# Patient Record
Sex: FEMALE | Race: BLACK OR AFRICAN AMERICAN | Employment: OTHER | URBAN - METROPOLITAN AREA
[De-identification: names, ages, dates, MRNs, and addresses within clinical notes are randomized per-mention and may not be internally consistent; named-entity substitution may affect disease eponyms.]

---

## 2017-10-17 ENCOUNTER — HOSPITAL ENCOUNTER (OUTPATIENT)
Dept: MAMMOGRAPHY | Age: 72
Discharge: HOME | End: 2017-10-17
Attending: NURSE PRACTITIONER
Payer: MEDICARE

## 2017-10-17 ENCOUNTER — HOSPITAL ENCOUNTER (OUTPATIENT)
Dept: BONE DENSITY | Age: 72
Discharge: HOME | End: 2017-10-17
Attending: NURSE PRACTITIONER
Payer: MEDICARE

## 2017-10-17 DIAGNOSIS — M81.0 OSTEOPOROSIS, SENILE: ICD-10-CM

## 2017-10-17 DIAGNOSIS — Z12.31 VISIT FOR SCREENING MAMMOGRAM: ICD-10-CM

## 2017-10-17 PROCEDURE — 77080 DXA BONE DENSITY AXIAL: CPT

## 2017-10-17 PROCEDURE — 77067 SCR MAMMO BI INCL CAD: CPT

## 2018-11-12 ENCOUNTER — HOSPITAL ENCOUNTER (OUTPATIENT)
Dept: MAMMOGRAPHY | Age: 73
Discharge: HOME | End: 2018-11-12
Attending: NURSE PRACTITIONER
Payer: MEDICARE

## 2018-11-12 DIAGNOSIS — Z12.39 SCREENING BREAST EXAMINATION: ICD-10-CM

## 2018-11-12 PROCEDURE — 77067 SCR MAMMO BI INCL CAD: CPT

## 2019-07-15 ENCOUNTER — HOSPITAL ENCOUNTER (INPATIENT)
Age: 74
LOS: 1 days | Discharge: HOME HEALTH | DRG: 065 | End: 2019-07-17
Attending: EMERGENCY MEDICINE | Admitting: EMERGENCY MEDICINE
Payer: MEDICARE

## 2019-07-15 ENCOUNTER — APPOINTMENT (OUTPATIENT)
Dept: CT IMAGING | Age: 74
DRG: 065 | End: 2019-07-15
Attending: EMERGENCY MEDICINE
Payer: MEDICARE

## 2019-07-15 ENCOUNTER — APPOINTMENT (OUTPATIENT)
Dept: MRI IMAGING | Age: 74
DRG: 065 | End: 2019-07-15
Attending: INTERNAL MEDICINE
Payer: MEDICARE

## 2019-07-15 ENCOUNTER — APPOINTMENT (OUTPATIENT)
Dept: ULTRASOUND IMAGING | Age: 74
DRG: 065 | End: 2019-07-15
Attending: INTERNAL MEDICINE
Payer: MEDICARE

## 2019-07-15 DIAGNOSIS — G45.9 TIA (TRANSIENT ISCHEMIC ATTACK): Primary | ICD-10-CM

## 2019-07-15 DIAGNOSIS — R47.81 SLURRED SPEECH: ICD-10-CM

## 2019-07-15 DIAGNOSIS — E78.2 MIXED HYPERLIPIDEMIA: ICD-10-CM

## 2019-07-15 DIAGNOSIS — I63.9 ACUTE CVA (CEREBROVASCULAR ACCIDENT) (HCC): ICD-10-CM

## 2019-07-15 LAB
ALBUMIN SERPL-MCNC: 3.6 G/DL (ref 3.5–5)
ALBUMIN/GLOB SERPL: 0.8 {RATIO} (ref 1.1–2.2)
ALP SERPL-CCNC: 104 U/L (ref 45–117)
ALT SERPL-CCNC: 14 U/L (ref 12–78)
ANION GAP SERPL CALC-SCNC: 3 MMOL/L (ref 5–15)
AST SERPL-CCNC: 14 U/L (ref 15–37)
ATRIAL RATE: 70 BPM
BASOPHILS # BLD: 0 K/UL (ref 0–0.1)
BASOPHILS NFR BLD: 1 % (ref 0–1)
BILIRUB DIRECT SERPL-MCNC: 0.1 MG/DL (ref 0–0.2)
BILIRUB SERPL-MCNC: 0.3 MG/DL (ref 0.2–1)
BUN SERPL-MCNC: 14 MG/DL (ref 6–20)
BUN/CREAT SERPL: 15 (ref 12–20)
CALCIUM SERPL-MCNC: 8.8 MG/DL (ref 8.5–10.1)
CALCULATED P AXIS, ECG09: 84 DEGREES
CALCULATED R AXIS, ECG10: 38 DEGREES
CALCULATED T AXIS, ECG11: 14 DEGREES
CHLORIDE SERPL-SCNC: 111 MMOL/L (ref 97–108)
CO2 SERPL-SCNC: 29 MMOL/L (ref 21–32)
CREAT SERPL-MCNC: 0.94 MG/DL (ref 0.55–1.02)
DIAGNOSIS, 93000: NORMAL
DIFFERENTIAL METHOD BLD: ABNORMAL
EOSINOPHIL # BLD: 0.2 K/UL (ref 0–0.4)
EOSINOPHIL NFR BLD: 3 % (ref 0–7)
ERYTHROCYTE [DISTWIDTH] IN BLOOD BY AUTOMATED COUNT: 15.3 % (ref 11.5–14.5)
GLOBULIN SER CALC-MCNC: 4.4 G/DL (ref 2–4)
GLUCOSE BLD STRIP.AUTO-MCNC: 79 MG/DL (ref 65–100)
GLUCOSE SERPL-MCNC: 82 MG/DL (ref 65–100)
HCT VFR BLD AUTO: 42.4 % (ref 35–47)
HGB BLD-MCNC: 13 G/DL (ref 11.5–16)
IMM GRANULOCYTES # BLD AUTO: 0 K/UL (ref 0–0.04)
IMM GRANULOCYTES NFR BLD AUTO: 0 % (ref 0–0.5)
INR PPP: 1 (ref 0.9–1.1)
LYMPHOCYTES # BLD: 2.5 K/UL (ref 0.8–3.5)
LYMPHOCYTES NFR BLD: 43 % (ref 12–49)
MCH RBC QN AUTO: 26.3 PG (ref 26–34)
MCHC RBC AUTO-ENTMCNC: 30.7 G/DL (ref 30–36.5)
MCV RBC AUTO: 85.8 FL (ref 80–99)
MONOCYTES # BLD: 0.5 K/UL (ref 0–1)
MONOCYTES NFR BLD: 8 % (ref 5–13)
NEUTS SEG # BLD: 2.6 K/UL (ref 1.8–8)
NEUTS SEG NFR BLD: 45 % (ref 32–75)
NRBC # BLD: 0 K/UL (ref 0–0.01)
NRBC BLD-RTO: 0 PER 100 WBC
P-R INTERVAL, ECG05: 142 MS
PLATELET # BLD AUTO: 337 K/UL (ref 150–400)
PMV BLD AUTO: 11.1 FL (ref 8.9–12.9)
POTASSIUM SERPL-SCNC: 3.9 MMOL/L (ref 3.5–5.1)
PROT SERPL-MCNC: 8 G/DL (ref 6.4–8.2)
PROTHROMBIN TIME: 10.4 SEC (ref 9–11.1)
Q-T INTERVAL, ECG07: 394 MS
QRS DURATION, ECG06: 86 MS
QTC CALCULATION (BEZET), ECG08: 425 MS
RBC # BLD AUTO: 4.94 M/UL (ref 3.8–5.2)
SERVICE CMNT-IMP: NORMAL
SODIUM SERPL-SCNC: 143 MMOL/L (ref 136–145)
VENTRICULAR RATE, ECG03: 70 BPM
WBC # BLD AUTO: 5.8 K/UL (ref 3.6–11)

## 2019-07-15 PROCEDURE — 74011000250 HC RX REV CODE- 250: Performed by: INTERNAL MEDICINE

## 2019-07-15 PROCEDURE — 74011250637 HC RX REV CODE- 250/637: Performed by: INTERNAL MEDICINE

## 2019-07-15 PROCEDURE — 0042T CT PERF W CBF: CPT

## 2019-07-15 PROCEDURE — 70496 CT ANGIOGRAPHY HEAD: CPT

## 2019-07-15 PROCEDURE — 99285 EMERGENCY DEPT VISIT HI MDM: CPT

## 2019-07-15 PROCEDURE — 85025 COMPLETE CBC W/AUTO DIFF WBC: CPT

## 2019-07-15 PROCEDURE — 70551 MRI BRAIN STEM W/O DYE: CPT

## 2019-07-15 PROCEDURE — 80048 BASIC METABOLIC PNL TOTAL CA: CPT

## 2019-07-15 PROCEDURE — 74011636320 HC RX REV CODE- 636/320: Performed by: EMERGENCY MEDICINE

## 2019-07-15 PROCEDURE — 85610 PROTHROMBIN TIME: CPT

## 2019-07-15 PROCEDURE — 70450 CT HEAD/BRAIN W/O DYE: CPT

## 2019-07-15 PROCEDURE — 36415 COLL VENOUS BLD VENIPUNCTURE: CPT

## 2019-07-15 PROCEDURE — 82962 GLUCOSE BLOOD TEST: CPT

## 2019-07-15 PROCEDURE — 99218 HC RM OBSERVATION: CPT

## 2019-07-15 PROCEDURE — 80076 HEPATIC FUNCTION PANEL: CPT

## 2019-07-15 PROCEDURE — 76536 US EXAM OF HEAD AND NECK: CPT

## 2019-07-15 PROCEDURE — 93005 ELECTROCARDIOGRAM TRACING: CPT

## 2019-07-15 PROCEDURE — 94762 N-INVAS EAR/PLS OXIMTRY CONT: CPT

## 2019-07-15 RX ORDER — LABETALOL HYDROCHLORIDE 5 MG/ML
5 INJECTION, SOLUTION INTRAVENOUS
Status: DISCONTINUED | OUTPATIENT
Start: 2019-07-15 | End: 2019-07-17 | Stop reason: HOSPADM

## 2019-07-15 RX ORDER — SODIUM CHLORIDE 0.9 % (FLUSH) 0.9 %
10 SYRINGE (ML) INJECTION
Status: COMPLETED | OUTPATIENT
Start: 2019-07-15 | End: 2019-07-15

## 2019-07-15 RX ORDER — ALBUTEROL SULFATE 90 UG/1
1 AEROSOL, METERED RESPIRATORY (INHALATION)
COMMUNITY
End: 2019-12-01 | Stop reason: CLARIF

## 2019-07-15 RX ORDER — CITALOPRAM 20 MG/1
20 TABLET, FILM COATED ORAL DAILY
Status: DISCONTINUED | OUTPATIENT
Start: 2019-07-16 | End: 2019-07-17 | Stop reason: HOSPADM

## 2019-07-15 RX ORDER — MIRTAZAPINE 15 MG/1
15 TABLET, FILM COATED ORAL
COMMUNITY
End: 2019-11-07 | Stop reason: SDUPTHER

## 2019-07-15 RX ORDER — SODIUM CHLORIDE 0.9 % (FLUSH) 0.9 %
5-40 SYRINGE (ML) INJECTION AS NEEDED
Status: DISCONTINUED | OUTPATIENT
Start: 2019-07-15 | End: 2019-07-17 | Stop reason: HOSPADM

## 2019-07-15 RX ORDER — GUAIFENESIN 100 MG/5ML
81 LIQUID (ML) ORAL DAILY
Status: DISCONTINUED | OUTPATIENT
Start: 2019-07-15 | End: 2019-07-17 | Stop reason: HOSPADM

## 2019-07-15 RX ORDER — SODIUM CHLORIDE 0.9 % (FLUSH) 0.9 %
5-40 SYRINGE (ML) INJECTION EVERY 8 HOURS
Status: DISCONTINUED | OUTPATIENT
Start: 2019-07-15 | End: 2019-07-17 | Stop reason: HOSPADM

## 2019-07-15 RX ORDER — LATANOPROST 50 UG/ML
1 SOLUTION/ DROPS OPHTHALMIC
COMMUNITY

## 2019-07-15 RX ORDER — LATANOPROST 50 UG/ML
1 SOLUTION/ DROPS OPHTHALMIC
Status: DISCONTINUED | OUTPATIENT
Start: 2019-07-15 | End: 2019-07-17 | Stop reason: HOSPADM

## 2019-07-15 RX ORDER — LISINOPRIL 20 MG/1
20 TABLET ORAL DAILY
COMMUNITY
End: 2019-11-07 | Stop reason: SDUPTHER

## 2019-07-15 RX ORDER — MIRTAZAPINE 15 MG/1
15 TABLET, FILM COATED ORAL
Status: DISCONTINUED | OUTPATIENT
Start: 2019-07-15 | End: 2019-07-17 | Stop reason: HOSPADM

## 2019-07-15 RX ORDER — ATORVASTATIN CALCIUM 40 MG/1
40 TABLET, FILM COATED ORAL
Status: DISCONTINUED | OUTPATIENT
Start: 2019-07-15 | End: 2019-07-17 | Stop reason: HOSPADM

## 2019-07-15 RX ORDER — CITALOPRAM 20 MG/1
20 TABLET, FILM COATED ORAL
COMMUNITY

## 2019-07-15 RX ORDER — ACETAMINOPHEN 325 MG/1
650 TABLET ORAL
Status: DISCONTINUED | OUTPATIENT
Start: 2019-07-15 | End: 2019-07-17 | Stop reason: HOSPADM

## 2019-07-15 RX ORDER — ACETAMINOPHEN 650 MG/1
650 SUPPOSITORY RECTAL
Status: DISCONTINUED | OUTPATIENT
Start: 2019-07-15 | End: 2019-07-17 | Stop reason: HOSPADM

## 2019-07-15 RX ORDER — ENOXAPARIN SODIUM 100 MG/ML
40 INJECTION SUBCUTANEOUS EVERY 24 HOURS
Status: DISCONTINUED | OUTPATIENT
Start: 2019-07-16 | End: 2019-07-17 | Stop reason: HOSPADM

## 2019-07-15 RX ADMIN — Medication 10 ML: at 13:17

## 2019-07-15 RX ADMIN — ASPIRIN 81 MG 81 MG: 81 TABLET ORAL at 15:48

## 2019-07-15 RX ADMIN — IOPAMIDOL 110 ML: 755 INJECTION, SOLUTION INTRAVENOUS at 13:17

## 2019-07-15 RX ADMIN — LATANOPROST 1 DROP: 50 SOLUTION OPHTHALMIC at 22:50

## 2019-07-15 RX ADMIN — Medication 10 ML: at 22:27

## 2019-07-15 RX ADMIN — ATORVASTATIN CALCIUM 40 MG: 40 TABLET, FILM COATED ORAL at 22:26

## 2019-07-15 NOTE — PROGRESS NOTES
Reason for Admission:   Headache, extremity weakness                   RRAT Score:          9 low risk           Plan for utilizing home health:   TBD                         Current Advanced Directive/Advance Care Plan: none                         Transition of Care Plan:             1.  Patient in ED bed waiting for inpatient admission  2. Patient will need 2nd IM letter at discharge  3. Patient prefers to discharge home with family assistance and follow up appointments. Patient is a 69year old female admitted 7/15 for headache and extremity weakness. Patient alert and oriented, resting in bed, no visitors present in room. Demographic information verified and correct. Insurance information verified and correct. Patient lives with her grandson, no home oxygen, no DME and has not used home health in the past.  Patient uses [facility] [employer] [facility]. Patient states she is independent with ADLs and can drive. Patient's daughter can transport at discharge. Care Management Interventions  PCP Verified by CM: Yes(Isabel King NP)  Mode of Transport at Discharge:  Other (see comment)(pt's daughter can transport at [facility])  Transition of Care Consult (CM Consult): Discharge Planning  Discharge Durable Medical Equipment: No  Physical Therapy Consult: No  Occupational Therapy Consult: No  Speech Therapy Consult: No  Current Support Network: Own Home(lives with grandson, 1 story house, 3 steps to enter)  Confirm Follow Up Transport: Family  Plan discussed with Pt/Family/Caregiver: Yes  Discharge Location  Discharge Placement: [address] Kiser, RN, [facility]  [phone]

## 2019-07-15 NOTE — H&P
Hospitalist Admission Note    NAME: Alan Gordon   :     MRN:  [id number]     Date/Time:  7/15/2019 3:22 PM    Patient PCP: Rossi Chilel NP  ______________________________________________________________________  Given the patient's current clinical presentation, I have a high level of concern for decompensation if discharged from the emergency department. Complex decision making was performed, which includes reviewing the patient's available past medical records, laboratory results, and x-ray films. My assessment of this patient's clinical condition and my plan of care is as follows. Assessment / Plan:  Possible TIA: Right sided weakness/numbness, dysarthria - resolved  -admit to Observation, telemetry  -continue on ASA  -Neurochecks  -MRI brain ordered, if positive then get TTE  -PT/OT/ST  -check A1c and FLP  -start statin    Right thyroid Nodule: CTA Head and Neck in ED showed: \". ..2. Nonspecific 1.8 cm centrally necrotic and peripherally enhancing right thyroid nodule. Follow-up thyroid ultrasound and aspiration is suggested. \"  -thyroid ultrasound ordered  -check TSH and Free T4    Hypertension:  -permissive hypertension at this time, hold home lisinopril  -prn labetalol    Anxiety/Depression:  -continue home celexa and remeron    Code Status: Full  Surrogate Decision Maker: Daughter    DVT Prophylaxis: sq Lovenox  GI Prophylaxis: not indicated    Baseline: Independent      Subjective:   CHIEF COMPLAINT: right hand weakness, trouble speaking    HISTORY OF PRESENT ILLNESS:     Alan Gordon is a 69 y.o.  female who presents with intermittent right had weakness and numbness. Patient reports that when she was driving yesterday her right hand was weak and she could not hold the steering wheel. This improved but she noted right hand clumsiness while in the store. Patient denies CP, SOB and Palpitations.  She reports right sided headache and states that she has been stressed recently. Today is her birthday. CTA Head and Neck showed: \"IMPRESSION:  1. No acute abnormality. Negative CTA head and neck  2. Nonspecific 1.8 cm centrally necrotic and peripherally enhancing right  thyroid nodule. Follow-up thyroid ultrasound and aspiration is suggested. \"    We were asked to admit for work up and evaluation of the above problems. Past Medical History:   Diagnosis Date    Fall at home 2/10/2014   [facility] or floaters of right eye 8/21/2014    Hypertension     Stroke [facility])     2010        Past Surgical History:   Procedure Laterality Date    HX TUBAL LIGATION  1981       Social History     Tobacco Use    Smoking status: Current Every Day Smoker     Packs/day: 0.25     Years: 20.00     Pack years: 5.00    Smokeless tobacco: Never Used   Substance Use Topics    Alcohol use: No        Family History   Problem Relation Age of Onset    Hypertension Mother     Hypertension Brother      No Known Allergies     Prior to Admission medications    Medication Sig Start Date End Date Taking? Authorizing Provider   lisinopril (PRINIVIL, ZESTRIL) 20 mg tablet Take 20 mg by mouth daily. Yes Provider, Historical   albuterol (VENTOLIN HFA) 90 mcg/actuation inhaler Take 1 Puff by inhalation every four (4) hours as needed for Wheezing. Yes Provider, Historical   citalopram (CELEXA) 20 mg tablet Take 20 mg by mouth daily as needed. Yes Provider, Historical   mirtazapine (REMERON) 15 mg tablet Take 15 mg by mouth daily as needed. Yes Provider, Historical   latanoprost (XALATAN) 0.005 % ophthalmic solution Administer 1 Drop to both eyes nightly. Yes Provider, Historical   multivitamin (ONE A DAY) tablet Take 1 Tab by mouth daily.    Yes Provider, Historical       REVIEW OF SYSTEMS:     Total of 12 systems reviewed as follows:       POSITIVE= underlined text  Negative = text not underlined  General:  fever, chills, sweats, generalized weakness, weight loss/gain,      loss of appetite   Eyes:    blurred vision, eye pain, loss of vision, double vision  ENT:    rhinorrhea, pharyngitis   Respiratory:   cough, sputum production, SOB, JHA, wheezing, pleuritic pain   Cardiology:   chest pain, palpitations, orthopnea, PND, edema, syncope   Gastrointestinal:  abdominal pain , N/V, diarrhea, dysphagia, constipation, bleeding   Genitourinary:  frequency, urgency, dysuria, hematuria, incontinence   Muskuloskeletal :  arthralgia, myalgia, back pain  Hematology:  easy bruising, nose or gum bleeding, lymphadenopathy   Dermatological: rash, ulceration, pruritis, color change / jaundice  Endocrine:   hot flashes or polydipsia   Neurological:  headache, dizziness, confusion, focal weakness, paresthesia,     Speech difficulties, memory loss, gait difficulty  Psychological: Feelings of anxiety, depression, agitation    Objective:   VITALS:    Visit Vitals  /86   Pulse 78   Temp 97.8 °F (36.6 °C)   Resp 17   Ht 5' 5\" (1.651 m)   Wt 71.8 kg (158 lb 4.6 oz)   SpO2 100%   BMI 26.34 kg/m²       PHYSICAL EXAM:    General:    Alert, cooperative, no distress, appears stated age. HEENT: Atraumatic, anicteric sclerae, pink conjunctivae     No oral ulcers, mucosa moist, o/p clear, dentition fair  Neck:  Supple, symmetrical,  thyroid: non tender  Lungs:   Clear to auscultation bilaterally. No Wheezing or Rhonchi. No rales. Chest wall:  No tenderness  No Accessory muscle use. Heart:   Regular  rhythm,  No  murmur   No edema  Abdomen:   Soft, non-tender. Not distended. Bowel sounds normal  Extremities: No cyanosis. No clubbing,      Skin turgor normal, Capillary refill normal, Radial dial pulse 2+  Skin:     Not pale. Not Jaundiced  No rashes   Psych:  Fair insight. Not depressed. Not anxious or agitated. Neurologic: EOMs intact. No facial asymmetry. No aphasia or slurred speech. Symmetrical strength. Alert and oriented X 4. _______________________________________________________________________  Care Plan discussed with:    Comments   Patient x    Family      RN     Care Manager                    Consultant:      _______________________________________________________________________  Expected  Disposition:   Home with Family x   HH/PT/OT/RN ? HH   SNF/LTC    FERNANDO    ________________________________________________________________________  TOTAL TIME: 50  Minutes    Critical Care Provided     Minutes non procedure based      Comments    x Reviewed previous records   >50% of visit spent in counseling and coordination of care x Discussion with patient and/or family and questions answered       ________________________________________________________________________  Signed: Venancio Art MD    Procedures: see electronic medical records for all procedures/Xrays and details which were not copied into this note but were reviewed prior to creation of Plan. LAB DATA REVIEWED:    Recent Results (from the past 24 hour(s))   CBC WITH AUTOMATED DIFF    Collection Time: 07/15/19  1:02 PM   Result Value Ref Range    WBC 5.8 3.6 - 11.0 K/uL    RBC 4.94 3.80 - 5.20 M/uL    HGB 13.0 11.5 - 16.0 g/dL    HCT 42.4 35.0 - 47.0 %    MCV 85.8 80.0 - 99.0 FL    MCH 26.3 26.0 - 34.0 PG    MCHC 30.7 30.0 - 36.5 g/dL    RDW 15.3 (H) 11.5 - 14.5 %    PLATELET 603 331 - 773 K/uL    MPV 11.1 8.9 - 12.9 FL    NRBC 0.0 0  WBC    ABSOLUTE NRBC 0.00 0.00 - 0.01 K/uL    NEUTROPHILS 45 32 - 75 %    LYMPHOCYTES 43 12 - 49 %    MONOCYTES 8 5 - 13 %    EOSINOPHILS 3 0 - 7 %    BASOPHILS 1 0 - 1 %    IMMATURE GRANULOCYTES 0 0.0 - 0.5 %    ABS. NEUTROPHILS 2.6 1.8 - 8.0 K/UL    ABS. LYMPHOCYTES 2.5 0.8 - 3.5 K/UL    ABS. MONOCYTES 0.5 0.0 - 1.0 K/UL    ABS. EOSINOPHILS 0.2 0.0 - 0.4 K/UL    ABS. BASOPHILS 0.0 0.0 - 0.1 K/UL    ABS. IMM.  GRANS. 0.0 0.00 - 0.04 K/UL    DF AUTOMATED     METABOLIC PANEL, BASIC    Collection Time: 07/15/19  1:02 PM   Result Value Ref Range    Sodium 143 136 - 145 mmol/L    Potassium 3.9 3.5 - 5.1 mmol/L    Chloride 111 (H) 97 - 108 mmol/L    CO2 29 21 - 32 mmol/L    Anion gap 3 (L) 5 - 15 mmol/L    Glucose 82 65 - 100 mg/dL    BUN 14 6 - 20 MG/DL    Creatinine 0.94 0.55 - 1.02 MG/DL    BUN/Creatinine ratio 15 12 - 20      GFR est AA >60 >60 ml/min/1.73m2    GFR est non-AA 59 (L) >60 ml/min/1.73m2    Calcium 8.8 8.5 - 10.1 MG/DL   PROTHROMBIN TIME + INR    Collection Time: 07/15/19  1:02 PM   Result Value Ref Range    INR 1.0 0.9 - 1.1      Prothrombin time 10.4 9.0 - 11.1 sec   HEPATIC FUNCTION PANEL    Collection Time: 07/15/19  1:02 PM   Result Value Ref Range    Protein, total 8.0 6.4 - 8.2 g/dL    Albumin 3.6 3.5 - 5.0 g/dL    Globulin 4.4 (H) 2.0 - 4.0 g/dL    A-G Ratio 0.8 (L) 1.1 - 2.2      Bilirubin, total 0.3 0.2 - 1.0 MG/DL    Bilirubin, direct 0.1 0.0 - 0.2 MG/DL    Alk.  phosphatase 104 45 - 117 U/L    AST (SGOT) 14 (L) 15 - 37 U/L    ALT (SGPT) 14 12 - 78 U/L   EKG, 12 LEAD, INITIAL    Collection Time: 07/15/19  1:48 PM   Result Value Ref Range    Ventricular Rate 70 BPM    Atrial Rate 70 BPM    P-R Interval 142 ms    QRS Duration 86 ms    Q-T Interval 394 ms    QTC Calculation (Bezet) 425 ms    Calculated P Axis 84 degrees    Calculated R Axis 38 degrees    Calculated T Axis 14 degrees    Diagnosis       Normal sinus rhythm  Nonspecific T wave abnormality  No previous ECGs available  Confirmed by Wayne Galarza MD, [facility] ([id number]) on 7/15/2019 2:58:34 PM     GLUCOSE, POC    Collection Time: 07/15/19  1:55 PM   Result Value Ref Range    Glucose (POC) 79 65 - 100 mg/dL    Performed by Nicola Reid

## 2019-07-15 NOTE — ED PROVIDER NOTES
EMERGENCY DEPARTMENT HISTORY AND PHYSICAL EXAM      Date: 7/15/2019  Patient Name: Bigg Salazar  Patient Age and Sex: 69 y.o. female     History of Presenting Illness     Chief Complaint   Patient presents with    Headache     PT reports a frontal headache since this morning    Extremity Weakness     Intermittent right arm weakness/heaviness since 1100 yesterday    Dysarthria     Slurred speech that started this morning. History Provided By: Patient    HPI: Bigg Salazar is a 73-year-old female with a history of hypertension presenting for dysarthria and externally weakness. Patient states that since 11 AM yesterday has been having intermittent right arm weakness and heaviness. States that it also feels numb at some point in time. It is associated with a frontal headache but no dizziness. Her knees reports that she went over her house this morning and noted that her speech was very slurred. Also sometimes having a difficult time getting words out. Nurse reported glucose was normal here. Denies any chest pain, shortness of breath, nausea, vomiting or fevers. There are no other complaints, changes, or physical findings at this time. PCP: Trevor Garvey NP    No current facility-administered medications on file prior to encounter. Current Outpatient Medications on File Prior to Encounter   Medication Sig Dispense Refill    lisinopril (PRINIVIL, ZESTRIL) 20 mg tablet Take 20 mg by mouth daily.  albuterol (VENTOLIN HFA) 90 mcg/actuation inhaler Take 1 Puff by inhalation every four (4) hours as needed for Wheezing.  citalopram (CELEXA) 20 mg tablet Take 20 mg by mouth daily as needed.  mirtazapine (REMERON) 15 mg tablet Take 15 mg by mouth daily as needed.  latanoprost (XALATAN) 0.005 % ophthalmic solution Administer 1 Drop to both eyes nightly.  multivitamin (ONE A DAY) tablet Take 1 Tab by mouth daily.          Past History     Past Medical History:  Past Medical History:   Diagnosis Date    Fall at home 2/10/2014   [facility] or floaters of right eye 8/21/2014    Hypertension     Stroke [facility])     2010       Past Surgical History:  Past Surgical History:   Procedure Laterality Date    HX TUBAL LIGATION  1981       Family History:  Family History   Problem Relation Age of Onset    Hypertension Mother     Hypertension Brother        Social History:  Social History     Tobacco Use    Smoking status: Current Every Day Smoker     Packs/day: 0.25     Years: 20.00     Pack years: 5.00    Smokeless tobacco: Never Used   Substance Use Topics    Alcohol use: No    Drug use: No       Allergies:  No Known Allergies      Review of Systems   Review of Systems   Constitutional: Negative for chills and fever. Respiratory: Negative for cough and shortness of breath. Cardiovascular: Negative for chest pain and leg swelling. Gastrointestinal: Negative for constipation, diarrhea, nausea and vomiting. Neurological: Positive for speech difficulty, weakness, numbness and headaches. Negative for light-headedness. All other systems reviewed and are negative. Physical Exam   Physical Exam   Constitutional: She is oriented to person, place, and time. She appears well-developed and well-nourished. HENT:   Head: Normocephalic and atraumatic. Eyes: Conjunctivae and EOM are normal.   Neck: Normal range of motion. Neck supple. Cardiovascular: Normal rate and regular rhythm. Pulmonary/Chest: Effort normal and breath sounds normal. No respiratory distress. Abdominal: Soft. She exhibits no distension. There is no tenderness. Musculoskeletal: Normal range of motion. Neurological: She is alert and oriented to person, place, and time. Patient has slurred speech, able to answer questions appropriately, 4 out of 5 strength with right  and elbow extension flexion. Skin: Skin is warm and dry. Psychiatric: She has a normal mood and affect.    Nursing note and vitals reviewed. Diagnostic Study Results     Labs -     Recent Results (from the past 12 hour(s))   CBC WITH AUTOMATED DIFF    Collection Time: 07/15/19  1:02 PM   Result Value Ref Range    WBC 5.8 3.6 - 11.0 K/uL    RBC 4.94 3.80 - 5.20 M/uL    HGB 13.0 11.5 - 16.0 g/dL    HCT 42.4 35.0 - 47.0 %    MCV 85.8 80.0 - 99.0 FL    MCH 26.3 26.0 - 34.0 PG    MCHC 30.7 30.0 - 36.5 g/dL    RDW 15.3 (H) 11.5 - 14.5 %    PLATELET 330 542 - 787 K/uL    MPV 11.1 8.9 - 12.9 FL    NRBC 0.0 0  WBC    ABSOLUTE NRBC 0.00 0.00 - 0.01 K/uL    NEUTROPHILS 45 32 - 75 %    LYMPHOCYTES 43 12 - 49 %    MONOCYTES 8 5 - 13 %    EOSINOPHILS 3 0 - 7 %    BASOPHILS 1 0 - 1 %    IMMATURE GRANULOCYTES 0 0.0 - 0.5 %    ABS. NEUTROPHILS 2.6 1.8 - 8.0 K/UL    ABS. LYMPHOCYTES 2.5 0.8 - 3.5 K/UL    ABS. MONOCYTES 0.5 0.0 - 1.0 K/UL    ABS. EOSINOPHILS 0.2 0.0 - 0.4 K/UL    ABS. BASOPHILS 0.0 0.0 - 0.1 K/UL    ABS. IMM. GRANS. 0.0 0.00 - 0.04 K/UL    DF AUTOMATED     METABOLIC PANEL, BASIC    Collection Time: 07/15/19  1:02 PM   Result Value Ref Range    Sodium 143 136 - 145 mmol/L    Potassium 3.9 3.5 - 5.1 mmol/L    Chloride 111 (H) 97 - 108 mmol/L    CO2 29 21 - 32 mmol/L    Anion gap 3 (L) 5 - 15 mmol/L    Glucose 82 65 - 100 mg/dL    BUN 14 6 - 20 MG/DL    Creatinine 0.94 0.55 - 1.02 MG/DL    BUN/Creatinine ratio 15 12 - 20      GFR est AA >60 >60 ml/min/1.73m2    GFR est non-AA 59 (L) >60 ml/min/1.73m2    Calcium 8.8 8.5 - 10.1 MG/DL   PROTHROMBIN TIME + INR    Collection Time: 07/15/19  1:02 PM   Result Value Ref Range    INR 1.0 0.9 - 1.1      Prothrombin time 10.4 9.0 - 11.1 sec   HEPATIC FUNCTION PANEL    Collection Time: 07/15/19  1:02 PM   Result Value Ref Range    Protein, total 8.0 6.4 - 8.2 g/dL    Albumin 3.6 3.5 - 5.0 g/dL    Globulin 4.4 (H) 2.0 - 4.0 g/dL    A-G Ratio 0.8 (L) 1.1 - 2.2      Bilirubin, total 0.3 0.2 - 1.0 MG/DL    Bilirubin, direct 0.1 0.0 - 0.2 MG/DL    Alk.  phosphatase 104 45 - 117 U/L    AST (SGOT) 14 (L) 15 - 37 U/L    ALT (SGPT) 14 12 - 78 U/L   EKG, 12 LEAD, INITIAL    Collection Time: 07/15/19  1:48 PM   Result Value Ref Range    Ventricular Rate 70 BPM    Atrial Rate 70 BPM    P-R Interval 142 ms    QRS Duration 86 ms    Q-T Interval 394 ms    QTC Calculation (Bezet) 425 ms    Calculated P Axis 84 degrees    Calculated R Axis 38 degrees    Calculated T Axis 14 degrees    Diagnosis       Normal sinus rhythm  Nonspecific T wave abnormality  No previous ECGs available  Confirmed by Curlee Galeazzi MD, Noel La ([id number]) on 7/15/2019 2:58:34 PM     GLUCOSE, POC    Collection Time: 07/15/19  1:55 PM   Result Value Ref Range    Glucose (POC) 79 65 - 100 mg/dL    Performed by Elijah Hutchinson        Radiologic Studies -   CTA CODE NEURO HEAD AND NECK W CONT   Final Result   IMPRESSION:   1. No acute abnormality. Negative CTA head and neck   2. Nonspecific 1.8 cm centrally necrotic and peripherally enhancing right   thyroid nodule. Follow-up thyroid ultrasound and aspiration is suggested. CT PERF W CBF   Final Result   IMPRESSION:   1. No acute abnormality. Negative CTA head and neck   2. Nonspecific 1.8 cm centrally necrotic and peripherally enhancing right   thyroid nodule. Follow-up thyroid ultrasound and aspiration is suggested. CT CODE NEURO HEAD WO CONTRAST   Final Result   Impression:    1. No evidence of acute infarct or intracranial hemorrhage. 2. Moderate to severe white matter disease is likely secondary to chronic small   vessel ischemic changes. MRI BRAIN WO CONT    (Results Pending)   US THYROID/PARATHYROID/SOFT TISS    (Results Pending)     CT Results  (Last 48 hours)               07/15/19 1316  CTA CODE NEURO HEAD AND NECK W CONT Final result    Impression:  IMPRESSION:   1. No acute abnormality. Negative CTA head and neck   2. Nonspecific 1.8 cm centrally necrotic and peripherally enhancing right   thyroid nodule. Follow-up thyroid ultrasound and aspiration is suggested.        Narrative: INDICATION: Right arm weakness. Contrast-enhanced CT angiogram head and neck performed using 100 cc Isovue-370. Three-dimensional postprocessing was performed. CT perfusion analysis using computed tomography with contrast administration   including postprocessing of parametric maps with determination of cerebral blood   flow, cerebral blood volume, and mean transit time was also performed. CT dose reduction was achieved through use of a standardized protocol tailored   for this examination and are automatic exposure control for dose modulation dose   reduction       NECK:       The origins of the great vessels are patent. Both vertebral arteries are patent. Both carotid arteries are patent. There is no significant stenosis using NASCET   criteria. There are nonspecific bilateral thyroid nodules the largest is on the right   measuring 1.8 x 1.4 cm, centrally necrotic and demonstrates contrast   enhancement. Head:       Major intracranial vessels are patent. No large vessel occlusion or intracranial   aneurysm. No evidence for intracranial hemorrhage or acute stroke. The   underlying brain demonstrates chronic ischemic change in the white matter. [facility] Perfusion imaging demonstrates symmetric intracranial perfusion. [facility] 07/15/19 1316  CT PERF W CBF Final result    Impression:  IMPRESSION:   1. No acute abnormality. Negative CTA head and neck   2. Nonspecific 1.8 cm centrally necrotic and peripherally enhancing right   thyroid nodule. Follow-up thyroid ultrasound and aspiration is suggested. Narrative:      INDICATION: Right arm weakness. Contrast-enhanced CT angiogram head and neck performed using 100 cc Isovue-370. Three-dimensional postprocessing was performed.        CT perfusion analysis using computed tomography with contrast administration   including postprocessing of parametric maps with determination of cerebral blood   flow, cerebral blood volume, and mean transit time was also performed. CT dose reduction was achieved through use of a standardized protocol tailored   for this examination and are automatic exposure control for dose modulation dose   reduction       NECK:       The origins of the great vessels are patent. Both vertebral arteries are patent. Both carotid arteries are patent. There is no significant stenosis using NASCET   criteria. There are nonspecific bilateral thyroid nodules the largest is on the right   measuring 1.8 x 1.4 cm, centrally necrotic and demonstrates contrast   enhancement. Head:       Major intracranial vessels are patent. No large vessel occlusion or intracranial   aneurysm. No evidence for intracranial hemorrhage or acute stroke. The   underlying brain demonstrates chronic ischemic change in the white matter. [facility] Perfusion imaging demonstrates symmetric intracranial perfusion. [facility] 07/15/19 1236  CT CODE NEURO HEAD WO CONTRAST Final result    Impression:  Impression:    1. No evidence of acute infarct or intracranial hemorrhage. 2. Moderate to severe white matter disease is likely secondary to chronic small   vessel ischemic changes. Narrative: Indication:  Stroke aphasia        Comparison: None       Findings: 5 mm axial images were obtained from the skull base through the   vertex. CT dose reduction was achieved through the use of a standardized protocol   tailored for this examination and automatic exposure control for dose   modulation. The ventricles and cortical sulci are prominent, compatible with age related   volume loss. There is no evidence of intracranial hemorrhage, mass, mass effect,   or acute infarct. There is periventricular white matter disease. No extra-axial   fluid collections are seen. The visualized paranasal sinuses and mastoid air   cells are clear. The orbital structures are unremarkable. No osseous   abnormalities are seen.                 CXR Results  (Last 48 hours)    None            Medical Decision Making   I am the first provider for this patient. I reviewed the vital signs, available nursing notes, past medical history, past surgical history, family history and social history. Vital Signs-Reviewed the patient's vital signs. Patient Vitals for the past 12 hrs:   Temp Pulse Resp BP SpO2   07/15/19 1430  78 17 162/86 100 %   07/15/19 1400  80 21 (!) 180/101 99 %   07/15/19 1340  81 21 168/78 100 %   07/15/19 1224 97.8 °F (36.6 °C) 79 16 160/75 100 %       Records Reviewed: Nursing Notes and Old Medical Records    Provider Notes (Medical Decision Making):   Patient presents with stroke like symptoms and seen immediately by me on arrival. Spoke with pt regarding symptoms, Time of onset, vitals. DDx: ischemic vs. Hemorrhagic stroke, complex migraine, sunny's paralysis, hypoglycemia. Given the history and physical, will get a CT head and Neurology consult . no tpa since out of window. ED Course:   Initial assessment performed. The patients presenting problems have been discussed, and they are in agreement with the care plan formulated and outlined with them. I have encouraged them to ask questions as they arise throughout their visit. ED Course as of Jul 15 1611   Mon Jul 15, 2019   1243 Spoke with Bryce Flores neuro who will see her and agrees with CTA. [JS]   0166 CTs done, Bryce Flores at bedside    [JS]   [facility]. states likely TIA. Needs mri, mra and neurology to see. Permissive htn, asa.    [JS]      ED Course User Index  [JS] Arthur Ch MD         Disposition:    Admission Note:  Patient is being admitted to the hospital by Dr. Agustín Ye, Service: Hospitalist.  The results of their tests and reasons for their admission have been discussed with them and available family. They convey agreement and understanding for the need to be admitted and for their admission diagnosis. Diagnosis     Clinical Impression:   1.  TIA (transient ischemic attack)    2. Slurred speech        Attestations:  Isaac Rodarte M.D. Please note that this dictation was completed with Marco Vasco, the computer voice recognition software. Quite often unanticipated grammatical, syntax, homophones, and other interpretive errors are inadvertently transcribed by the computer software. Please disregard these errors. Please excuse any errors that have escaped final proofreading. Thank you.

## 2019-07-15 NOTE — PROGRESS NOTES
Pharmacy Medication Reconciliation     The patient was interviewed regarding current PTA medication list, use and drug allergies. The patient was questioned regarding use of any other inhalers, topical products, over the counter medications, herbal medications, vitamin products or ophthalmic/nasal/otic medication use. Allergy Update: Patient has no known allergies. Sources: patients and rx query     Recommendations/Findings: The following amendments were made to the patient's active medication list on file at [facility]:   1) Additions:   Ventolin 90 mcg/actuation 1 puff every 4hrs prn    Citalopram 20 mg tab 1 tab daily prn    Latanoprost 0.005% 1 drop in both eyes at night    Lisinopril 20 mg 1 tab po every day   Mirtazapine 15 mg 1 tablet every day prn     2) Deletions:   Aspirin 81 mg once a day    B-12 1000 mcg once a day    Ergocalciferol 81769 take one cap every seven days   Ibuprofen 800 mg tab take one tablet every 8hrs prn    Lisinopril/HCTZ 10-12.5 take (1/2) tab by mouth every day    3) Changes: none    4) Pertinent Pharmacy Findings:  · Patient has not taken mirtazapine or citalopram in at least 2 weeks because she takes them as needed      -Clarified PTA med list with patient. PTA medication list was corrected to the following:     Prior to Admission Medications   Prescriptions Last Dose Informant Patient Reported? Taking? albuterol (VENTOLIN HFA) 90 mcg/actuation inhaler  Self Yes Yes   Sig: Take 1 Puff by inhalation every four (4) hours as needed for Wheezing. citalopram (CELEXA) 20 mg tablet 6/15/2019 at Unknown time Self Yes Yes   Sig: Take 20 mg by mouth daily as needed. latanoprost (XALATAN) 0.005 % ophthalmic solution 7/14/2019 at Unknown time Self Yes Yes   Sig: Administer 1 Drop to both eyes nightly. lisinopril (PRINIVIL, ZESTRIL) 20 mg tablet 7/15/2019 at Unknown time Self Yes Yes   Sig: Take 20 mg by mouth daily.    mirtazapine (REMERON) 15 mg tablet 6/15/2019 at Unknown time Self Yes Yes   Sig: Take 15 mg by mouth daily as needed. multivitamin (ONE A DAY) tablet 7/15/2019 at Unknown time Self Yes Yes   Sig: Take 1 Tab by mouth daily.       Facility-Administered Medications: None       Thank you,  [place]

## 2019-07-15 NOTE — PROGRESS NOTES

## 2019-07-15 NOTE — ED NOTES
TRANSFER - OUT REPORT:    Verbal report given to Vee Rojas on Deana Lezama  being transferred to [facility] for urgent transfer       Report consisted of patients Situation, Background, Assessment and   Recommendations(SBAR). Information from the following report(s) SBAR, ED Summary, [facility] and Recent Results was reviewed with the receiving nurse. Opportunity for questions and clarification was provided.       Patient transported with:   Return Path

## 2019-07-15 NOTE — PROGRESS NOTES
Spiritual Care Assessment/Progress Note  [facility]      NAME: Abdirahman Stern      MRN: [id number]  AGE: 69 y.o. SEX: female  Latter day Affiliation: Nondenominational   Language: English     7/15/2019     Total Time (in minutes): 8     Spiritual Assessment begun in [facility] EMERGENCY DEPT through conversation with:         [x]Patient        [] Family    [] Friend(s)        Reason for Consult: Other (comment)(Code Stroke)     Spiritual beliefs: (Please include comment if needed)     [] Identifies with a selwyn tradition:         [] Supported by a selwyn community:            [] Claims no spiritual orientation:           [] Seeking spiritual identity:                [] Adheres to an individual form of spirituality:           [x] Not able to assess:                           Identified resources for coping:      [] Prayer                               [] Music                  [] Guided Imagery     [] Family/friends                 [] Pet visits     [] Devotional reading                         [x] Unknown     [] Other:                                              Interventions offered during this visit: (See comments for more details)    Patient Interventions: Crisis, Other (comment)(Code Stroke)           Plan of Care:     [x] Support spiritual and/or cultural needs    [] Support AMD and/or advance care planning process      [] Support grieving process   [] Coordinate Rites and/or Rituals    [] Coordination with community clergy   [] No spiritual needs identified at this time   [] Detailed Plan of Care below (See Comments)  [] Make referral to Music Therapy  [] Make referral to Pet Therapy     [] Make referral to Addiction services  [] Make referral to [facility]  [] Make referral to Spiritual Care Partner  [] No future visits requested        [x] Follow up visits as needed     Comments:  made visit to ED for code stroke page to patients room. [address]was in [address] when  arrived.  Spiritual Care will follow up as needed.       Becka Leo MDiv  Pager: [phone]

## 2019-07-16 ENCOUNTER — APPOINTMENT (OUTPATIENT)
Dept: NON INVASIVE DIAGNOSTICS | Age: 74
DRG: 065 | End: 2019-07-16
Attending: EMERGENCY MEDICINE
Payer: MEDICARE

## 2019-07-16 PROBLEM — I63.9 STROKE (HCC): Status: ACTIVE | Noted: 2019-07-16

## 2019-07-16 LAB
ALBUMIN SERPL-MCNC: 3.2 G/DL (ref 3.5–5)
ALBUMIN/GLOB SERPL: 0.8 {RATIO} (ref 1.1–2.2)
ALP SERPL-CCNC: 98 U/L (ref 45–117)
ALT SERPL-CCNC: 12 U/L (ref 12–78)
ANION GAP SERPL CALC-SCNC: 5 MMOL/L (ref 5–15)
AST SERPL-CCNC: 13 U/L (ref 15–37)
BILIRUB SERPL-MCNC: 0.3 MG/DL (ref 0.2–1)
BUN SERPL-MCNC: 18 MG/DL (ref 6–20)
BUN/CREAT SERPL: 18 (ref 12–20)
CALCIUM SERPL-MCNC: 8.7 MG/DL (ref 8.5–10.1)
CHLORIDE SERPL-SCNC: 110 MMOL/L (ref 97–108)
CHOLEST SERPL-MCNC: 172 MG/DL
CO2 SERPL-SCNC: 25 MMOL/L (ref 21–32)
CREAT SERPL-MCNC: 0.99 MG/DL (ref 0.55–1.02)
ECHO AV AREA PEAK VELOCITY: 1.5 CM2
ECHO AV AREA/BSA PEAK VELOCITY: 0.8 CM2/M2
ECHO AV PEAK GRADIENT: 4.9 MMHG
ECHO AV PEAK VELOCITY: 110.31 CM/S
ECHO AV REGURGITANT PHT: 1546.6 CM
ECHO EST RA PRESSURE: 10 MMHG
ECHO LA AREA 4C: 18.1 CM2
ECHO LA MAJOR AXIS: 2.87 CM
ECHO LA VOL 4C: 47.37 ML (ref 22–52)
ECHO LA VOLUME INDEX A4C: 26.47 ML/M2 (ref 16–28)
ECHO LV E' LATERAL VELOCITY: 7.43 CM/S
ECHO LV E' SEPTAL VELOCITY: 6.15 CM/S
ECHO LV INTERNAL DIMENSION DIASTOLIC: 4.09 CM (ref 3.9–5.3)
ECHO LV INTERNAL DIMENSION SYSTOLIC: 2.15 CM
ECHO LV IVSD: 1.23 CM (ref 0.6–0.9)
ECHO LV MASS 2D: 200.4 G (ref 67–162)
ECHO LV MASS INDEX 2D: 112 G/M2 (ref 43–95)
ECHO LV POSTERIOR WALL DIASTOLIC: 1.18 CM (ref 0.6–0.9)
ECHO LVOT DIAM: 1.89 CM
ECHO LVOT PEAK GRADIENT: 1.4 MMHG
ECHO LVOT PEAK VELOCITY: 59.32 CM/S
ECHO MV A VELOCITY: 84.05 CM/S
ECHO MV E DECELERATION TIME (DT): 263.6 MS
ECHO MV E VELOCITY: 72.99 CM/S
ECHO MV E/A RATIO: 0.87
ECHO MV E/E' LATERAL: 9.82
ECHO MV E/E' RATIO (AVERAGED): 10.85
ECHO MV E/E' SEPTAL: 11.87
ECHO MV REGURGITANT PEAK GRADIENT: 9.2 MMHG
ECHO MV REGURGITANT PEAK VELOCITY: 151.5 CM/S
ECHO PULMONARY ARTERY SYSTOLIC PRESSURE (PASP): 40 MMHG
ECHO PV MAX VELOCITY: 73.33 CM/S
ECHO PV PEAK GRADIENT: 2.2 MMHG
ECHO RA AREA 4C: 8.54 CM2
ECHO RIGHT VENTRICULAR SYSTOLIC PRESSURE (RVSP): 31.1 MMHG
ECHO TV REGURGITANT MAX VELOCITY: 229.51 CM/S
ECHO TV REGURGITANT PEAK GRADIENT: 21.1 MMHG
ERYTHROCYTE [DISTWIDTH] IN BLOOD BY AUTOMATED COUNT: 15.3 % (ref 11.5–14.5)
EST. AVERAGE GLUCOSE BLD GHB EST-MCNC: 123 MG/DL
GLOBULIN SER CALC-MCNC: 3.9 G/DL (ref 2–4)
GLUCOSE SERPL-MCNC: 96 MG/DL (ref 65–100)
HBA1C MFR BLD: 5.9 % (ref 4.2–6.3)
HCT VFR BLD AUTO: 40.8 % (ref 35–47)
HDLC SERPL-MCNC: 52 MG/DL
HDLC SERPL: 3.3 {RATIO} (ref 0–5)
HGB BLD-MCNC: 12.5 G/DL (ref 11.5–16)
LDLC SERPL CALC-MCNC: 97.2 MG/DL (ref 0–100)
LIPID PROFILE,FLP: NORMAL
MCH RBC QN AUTO: 26.4 PG (ref 26–34)
MCHC RBC AUTO-ENTMCNC: 30.6 G/DL (ref 30–36.5)
MCV RBC AUTO: 86.3 FL (ref 80–99)
NRBC # BLD: 0 K/UL (ref 0–0.01)
NRBC BLD-RTO: 0 PER 100 WBC
PISA AR MAX VEL: 280.26 CM/S
PLATELET # BLD AUTO: 340 K/UL (ref 150–400)
PMV BLD AUTO: 11.2 FL (ref 8.9–12.9)
POTASSIUM SERPL-SCNC: 4 MMOL/L (ref 3.5–5.1)
PROT SERPL-MCNC: 7.1 G/DL (ref 6.4–8.2)
RBC # BLD AUTO: 4.73 M/UL (ref 3.8–5.2)
SODIUM SERPL-SCNC: 140 MMOL/L (ref 136–145)
T4 FREE SERPL-MCNC: 1.1 NG/DL (ref 0.8–1.5)
TRIGL SERPL-MCNC: 114 MG/DL (ref ?–150)
TSH SERPL DL<=0.05 MIU/L-ACNC: 0.73 UIU/ML (ref 0.36–3.74)
VLDLC SERPL CALC-MCNC: 22.8 MG/DL
WBC # BLD AUTO: 6.5 K/UL (ref 3.6–11)

## 2019-07-16 PROCEDURE — 74011250637 HC RX REV CODE- 250/637: Performed by: INTERNAL MEDICINE

## 2019-07-16 PROCEDURE — 97116 GAIT TRAINING THERAPY: CPT

## 2019-07-16 PROCEDURE — 97162 PT EVAL MOD COMPLEX 30 MIN: CPT

## 2019-07-16 PROCEDURE — 65660000000 HC RM CCU STEPDOWN

## 2019-07-16 PROCEDURE — 80053 COMPREHEN METABOLIC PANEL: CPT

## 2019-07-16 PROCEDURE — 84443 ASSAY THYROID STIM HORMONE: CPT

## 2019-07-16 PROCEDURE — 83036 HEMOGLOBIN GLYCOSYLATED A1C: CPT

## 2019-07-16 PROCEDURE — 99218 HC RM OBSERVATION: CPT

## 2019-07-16 PROCEDURE — 85027 COMPLETE CBC AUTOMATED: CPT

## 2019-07-16 PROCEDURE — 97530 THERAPEUTIC ACTIVITIES: CPT | Performed by: OCCUPATIONAL THERAPIST

## 2019-07-16 PROCEDURE — 74011250636 HC RX REV CODE- 250/636: Performed by: INTERNAL MEDICINE

## 2019-07-16 PROCEDURE — 36415 COLL VENOUS BLD VENIPUNCTURE: CPT

## 2019-07-16 PROCEDURE — 93306 TTE W/DOPPLER COMPLETE: CPT

## 2019-07-16 PROCEDURE — 92522 EVALUATE SPEECH PRODUCTION: CPT | Performed by: SPEECH-LANGUAGE PATHOLOGIST

## 2019-07-16 PROCEDURE — 80061 LIPID PANEL: CPT

## 2019-07-16 PROCEDURE — 97165 OT EVAL LOW COMPLEX 30 MIN: CPT | Performed by: OCCUPATIONAL THERAPIST

## 2019-07-16 PROCEDURE — 84439 ASSAY OF FREE THYROXINE: CPT

## 2019-07-16 RX ADMIN — ATORVASTATIN CALCIUM 40 MG: 40 TABLET, FILM COATED ORAL at 22:55

## 2019-07-16 RX ADMIN — LATANOPROST 1 DROP: 50 SOLUTION OPHTHALMIC at 22:55

## 2019-07-16 RX ADMIN — Medication 10 ML: at 22:00

## 2019-07-16 RX ADMIN — ASPIRIN 81 MG 81 MG: 81 TABLET ORAL at 09:38

## 2019-07-16 RX ADMIN — ENOXAPARIN SODIUM 40 MG: 40 INJECTION SUBCUTANEOUS at 09:38

## 2019-07-16 RX ADMIN — Medication 10 ML: at 14:00

## 2019-07-16 RX ADMIN — CITALOPRAM HYDROBROMIDE 20 MG: 20 TABLET ORAL at 09:38

## 2019-07-16 RX ADMIN — Medication 10 ML: at 22:55

## 2019-07-16 RX ADMIN — Medication 10 ML: at 03:34

## 2019-07-16 NOTE — PHYSICIAN ADVISORY
Letter of Status Determination:  
Recommend hospitalization status upgraded from OBSERVATION  to INPATIENT  Status Pt Name:  Deana Craven MR#  
CHANDANA # [id number] / 
[id number] Payor: Maria Del Carmen Murguia / Plan: [address] HMO / Product Type: Managed Care Medicare /   
MICHAEL#  [id number] Room and [address] [place] [address]  @ [facility] Hospitalization date  7/15/2019 12:26 PM  
Current Attending Physician  Ermias Sood MD  
Principal diagnosis  TIA (transient ischemic attack) [G45.9] Clinicals  69 y.o. y.o  female hospitalized with above diagnosis This pt is now diagnosed with Acute CVA. She suffers from multiple chronic illnesses. MRI \"2 small areas of acute infarction\" [place] (MCG) criteria Does  apply Acute CVA STATUS DETERMINATION  Based on documented presenting clinical data, comorbid conditions, high risk of adverse events and deterioration, it is our recommendation that the patient's status should be upgraded from OBSERVATION to INPATIENT status. The final decision of the patient's hospitalization status depends on the attending physician's judgment. Additional comments Payor: Maria Del Carmen Murguia / Plan: [address] [place], [place] [address]O / Product Type: Managed Care Medicare /   
  
 
Kevin Schumacher MD MPH FACP Cell: [phone] Physician Advisor [address]  
   
Cell  [phone]   
 
 
[phone] iGno May

## 2019-07-16 NOTE — PROGRESS NOTES
Occupational Therapy neurological EVALUATION with discharge  Patient: Deana Craven (80 y.o. female)  Date: 7/16/2019  Primary Diagnosis: TIA (transient ischemic attack) [G45.9]       Precautions: none       ASSESSMENT:  Based on the objective data described below, the patient presents with slight right hand grasp deficits that does not impede function 4+/5. Pt has good balance, BUE coordination is intact and good safety awareness. Pt is overall independent with ADLS and does not need further skilled acute occupational therapy is not indicated at this time. Pt is in agreement   Discharge Recommendations: home without therapy services  Further Equipment Recommendations for Discharge: none needed     SUBJECTIVE:   Patient stated I feel fine.     OBJECTIVE DATA SUMMARY:   HISTORY:   Past Medical History:   Diagnosis Date    Fall at home 2/10/2014   [facility] or floaters of right eye 8/21/2014    Hypertension     Stroke [facility])     2010     Past Surgical History:   Procedure Laterality Date    HX TUBAL LIGATION  1981       Prior Level of Function/Environment/Context: driving, performing all ADLS and IADLs without assist, 15year old grandson lives with pt    Expanded or extensive additional review of patient history:     Home Situation  Home Environment: Private residence  # Steps to Enter: 3  Rails to Enter: Yes  Hand Rails : Bilateral  One/Two Story Residence: One story  Living Alone: No  Support Systems: Child(terrance), Family member(s)  Patient Expects to be Discharged to[de-identified] Unknown  Current DME Used/Available at Home: Walker, rolling  Tub or Shower Type: Tub/Shower combination    Hand dominance: right    EXAMINATION OF PERFORMANCE DEFICITS:  Cognitive/Behavioral Status:  Neurologic State: Alert  Orientation Level: Oriented X4  Cognition: Appropriate safety awareness; Appropriate for age attention/concentration; Appropriate decision making  Perception: Appears intact  Perseveration: No perseveration noted  Safety/Judgement: Awareness of environment; Fall prevention;Home safety; Insight into deficits        Hearing: Auditory  Auditory Impairment: Hard of hearing, left side    Vision/Perceptual:    Tracking: Able to track stimulus in all quadrants w/o difficulty                      Acuity: Within Defined Limits(with glasses)    Corrective Lenses: Glasses    Range of Motion:    AROM: Within functional limits  PROM: Within functional limits                      Strength:    Strength: (R UE slightly weaker)                Coordination:  Coordination: Within functional limits  Fine Motor Skills-Upper: Left Intact; Right Intact    Gross Motor Skills-Upper: Left Intact; Right Intact    Tone & Sensation:    Tone: Normal                         Balance:  Sitting: Intact; Without support  Standing: Intact; Without support    Functional Mobility and Transfers for ADLs:  Bed Mobility:  Rolling: Independent  Supine to Sit: Independent  Scooting: Independent    Transfers:  Sit to Stand: Supervision  Functional Transfers  Bathroom Mobility: Independent  Toilet Transfer : Independent   Bed to Chair: Independent    ADL Assessment:  Feeding: Independent    Oral Facial Hygiene/Grooming: Independent    Bathing: Independent    Upper Body Dressing: Independent    Lower Body Dressing: Independent    Toileting: Independent                ADL Intervention and task modifications:  Donned and doffed socks with independence. Obtained objects from floor with independece  Cognitive Retraining  Safety/Judgement: Awareness of environment; Fall prevention;Home safety; Insight into deficits    Therapeutic Exercise:  Issued blue heavy weight resistive sponge and educated on grasp and release exercises    Functional Measure:   Fugl-Rashid Assessment of Motor Recovery after Stroke:     Reflex Activity  Flexors/Biceps/Fingers: Can be elicited  Extensors/Triceps: Can be elicited  Reflex Subtotal: 4    Volitional Movement Within Synergies  Shoulder Retraction: Full  Shoulder Elevation: Full  Shoulder Abduction (90 degrees): Full  Shoulder External Rotation: Full  Elbow Flexion: Full  Forearm Supination: Full  Shoulder Adduction/Internal Rotation: Full  Elbow Extension: Full  Forearm Pronation: Full  Subtotal: 18    Volitional Movement Mixing Synergies  Hand to Lumbar Spine: Full  Shoulder Flexion (0-90 degrees): Full  Pronation-Supination: Full  Subtotal: 6    Volitional Movement With Little or No Synergy  Shoulder Abduction (0-90 degrees): Full  Shoulder Flexion ( degrees): Full  Pronation/Supination: Full  Subtotal : 6    Normal Reflex Activity  Biceps, Triceps, Finger Flexors: Full  Subtotal : 2    Upper Extremity Total   Upper Extremity Total: 36    Wrist  Stability at 15 Degree Dorsiflexion: Full  Repeated Dorsiflexion/ Volar Flexion: Full  Stability at 15 Degree Dorsiflexion: Full  Repeated Dorsiflexion/ Volar Flexion: Full  Circumduction: Full  Wrist Total: 10    Hand  Mass Flexion: Full  Mass Extension: Full  Grasp A: Full  Grasp B: Full  Grasp C: Full  Grasp D: Full  Grasp E: Full  Hand Total: 14    Coordination/Speed  Tremor: None  Dysmetria: None  Time: <1s  Coordination/Speed Total : 6    Total A-D  Total A-D (Motor Function): 66/66       This is a reliable/valid measure of arm function after a neurological event. It has established value to characterize functional status and for measuring spontaneous and therapy-induced recovery; tests proximal and distal motor functions. Fugl-Rashid Assessment  UE scores recorded between five and 30 days post neurologic event can be used to predict UE recovery at six months post neurologic event. Severe = 0-21 points   Moderately Severe = 22-33 points   Moderate = 34-47 points   Mild = 48-66 points  MORGAN Pastor, RADHA Ortega, & TUCKER Garvey (1992). Measurement of motor recovery after stroke: Outcome assessment and sample size requirements. Stroke, 23, pp. 7003-7136. ------------------------------------------------------------------------------------------------------------------------------------------------------------------  MCID:  Stroke:   Adams Acuna et al, 2001; n = 171; mean age 79 (6) years; assessed within 16 (12) days of stroke, Acute Stroke)  FMA Motor Scores from Admission to Discharge   10 point increase in FMA Upper Extremity = 1.5 change in discharge FIM   10 point increase in FMA Lower Extremity = 1.9 change in discharge FIM  MDC:   Stroke:   Jessi Hodges et al, 2008, n = 14, mean age = 59.9 (14.6) years, assessed on average 14 (6.5) months post stroke, Chronic Stroke)   FMA = 5.2 points for the Upper Extremity portion of the assessment       Occupational Therapy Evaluation Charge Determination   History Examination Decision-Making   LOW Complexity : Brief history review  LOW Complexity : 1-3 performance deficits relating to physical, cognitive , or psychosocial skils that result in activity limitations and / or participation restrictions  LOW Complexity : No comorbidities that affect functional and no verbal or physical assistance needed to complete eval tasks       Based on the above components, the patient evaluation is determined to be of the following complexity level: LOW     Pain:      0/10              Activity Tolerance:     Please refer to the flowsheet for vital signs taken during this treatment. After treatment:   [x]  Patient left in no apparent distress sitting up in chair  []  Patient left in no apparent distress in bed  [x]  Call bell left within reach  [x]  Nursing notified  []  Caregiver present  []  Bed alarm activated    COMMUNICATION/EDUCATION:   Findings and recommendations were discussed with: Physical Therapist, Registered Nurse and patient    Patient was educated regarding her deficit(s) of right hand decreased  as this relates to her diagnosis of CVA. She demonstrated Excellent understanding as evidenced by by verbalization.     Patient and/or family was verbally educated on the BE FAST acronym for signs/symptoms of CVA and TIA. BE FAST was written on patient's communication board  for visual education and reinforcement. All questions answered with patient indicating good understanding. [x]      Home safety education was provided and the patient/caregiver indicated understanding. [x]      Patient have participated as able and agree with findings and recommendations. []      Patient is unable to participate in plan of care at this time.     Thank you for this referral.  Lois Carvalho, OTR/L  Time Calculation: 22 mins

## 2019-07-16 NOTE — PROGRESS NOTES
Orders received, chart reviewed and patient evaluated by physical therapy. Recommend patient to discharge to home with OP neuro if desired. Encouraged patient to be up in the chair for all meals. Patient is up ad phuong in the room currently. Full evaluation to follow.      Ashley Bourne, PT, DPT

## 2019-07-16 NOTE — PROGRESS NOTES
Problem: TIA/CVA Stroke: 0-24 hours  Goal: Off Pathway (Use only if patient is Off Pathway)  Outcome: Progressing Towards Goal  Goal: Activity/Safety  Outcome: Progressing Towards Goal  Goal: Consults, if ordered  Outcome: Progressing Towards Goal  Goal: Diagnostic Test/Procedures  Outcome: Progressing Towards Goal  Goal: Discharge Planning  Outcome: Progressing Towards Goal  Goal: Medications  Outcome: Progressing Towards Goal  Goal: Respiratory  Outcome: Progressing Towards Goal  Goal: Treatments/Interventions/Procedures  Outcome: Progressing Towards Goal  Goal: Psychosocial  Outcome: Progressing Towards Goal  Goal: *Hemodynamically stable  Outcome: Progressing Towards Goal  Goal: *Neurologically stable  Description  Absence of additional neurological deficits    Outcome: Progressing Towards Goal  Goal: *Verbalizes anxiety and depression are reduced or absent  Outcome: Progressing Towards Goal  Goal: *Absence of Signs of Aspiration on Current Diet  Outcome: Progressing Towards Goal  Goal: *Absence of deep venous thrombosis signs and symptoms(Stroke Metric)  Outcome: Progressing Towards Goal  Goal: *Ability to perform ADLs and demonstrates progressive mobility and function  Outcome: Progressing Towards Goal  Goal: *Stroke education started(Stroke Metric)  Outcome: Progressing Towards Goal  Goal: *Dysphagia screen performed(Stroke Metric)  Outcome: Progressing Towards Goal  Goal: *Rehab consulted(Stroke Metric)  Outcome: Progressing Towards Goal     Problem: Patient Education: Go to Patient Education Activity  Goal: Patient/Family Education  Outcome: Progressing Towards Goal     Problem: TIA/CVA Stroke: 0-24 hours  Goal: Off Pathway (Use only if patient is Off Pathway)  Outcome: Progressing Towards Goal  Goal: Activity/Safety  Outcome: Progressing Towards Goal  Goal: Consults, if ordered  Outcome: Progressing Towards Goal  Goal: Diagnostic Test/Procedures  Outcome: Progressing Towards Goal  Goal: Nutrition/Diet  Outcome: Progressing Towards Goal  Goal: Discharge Planning  Outcome: Progressing Towards Goal  Goal: Medications  Outcome: Progressing Towards Goal  Goal: Respiratory  Outcome: Progressing Towards Goal  Goal: Treatments/Interventions/Procedures  Outcome: Progressing Towards Goal  Goal: Minimize risk of bleeding post-thrombolytic infusion  Outcome: Progressing Towards Goal  Goal: Monitor for complications post-thrombolytic infusion  Outcome: Progressing Towards Goal  Goal: Psychosocial  Outcome: Progressing Towards Goal  Goal: *Hemodynamically stable  Outcome: Progressing Towards Goal  Goal: *Neurologically stable  Description  Absence of additional neurological deficits    Outcome: Progressing Towards Goal  Goal: *Verbalizes anxiety and depression are reduced or absent  Outcome: Progressing Towards Goal  Goal: *Absence of Signs of Aspiration on Current Diet  Outcome: Progressing Towards Goal  Goal: *Absence of deep venous thrombosis signs and symptoms(Stroke Metric)  Outcome: Progressing Towards Goal  Goal: *Ability to perform ADLs and demonstrates progressive mobility and function  Outcome: Progressing Towards Goal  Goal: *Stroke education started(Stroke Metric)  Outcome: Progressing Towards Goal  Goal: *Dysphagia screen performed(Stroke Metric)  Outcome: Progressing Towards Goal  Goal: *Rehab consulted(Stroke Metric)  Outcome: Progressing Towards Goal     Problem: TIA/CVA Stroke: Day 2 Until Discharge  Goal: Off Pathway (Use only if patient is Off Pathway)  Outcome: Progressing Towards Goal  Goal: Activity/Safety  Outcome: Progressing Towards Goal  Goal: Diagnostic Test/Procedures  Outcome: Progressing Towards Goal  Goal: Nutrition/Diet  Outcome: Progressing Towards Goal  Goal: Discharge Planning  Outcome: Progressing Towards Goal  Goal: Medications  Outcome: Progressing Towards Goal  Goal: Respiratory  Outcome: Progressing Towards Goal  Goal: Treatments/Interventions/Procedures  Outcome: Progressing Towards Goal  Goal: Psychosocial  Outcome: Progressing Towards Goal  Goal: *Verbalizes anxiety and depression are reduced or absent  Outcome: Progressing Towards Goal  Goal: *Absence of aspiration  Outcome: Progressing Towards Goal  Goal: *Absence of deep venous thrombosis signs and symptoms(Stroke Metric)  Outcome: Progressing Towards Goal  Goal: *Optimal pain control at patient's stated goal  Outcome: Progressing Towards Goal  Goal: *Tolerating diet  Outcome: Progressing Towards Goal  Goal: *Ability to perform ADLs and demonstrates progressive mobility and function  Outcome: Progressing Towards Goal  Goal: *Stroke education continued(Stroke Metric)  Outcome: Progressing Towards Goal     Problem: Ischemic Stroke: Discharge Outcomes  Goal: *Verbalizes anxiety and depression are reduced or absent  Outcome: Progressing Towards Goal  Goal: *Verbalize understanding of risk factor modification(Stroke Metric)  Outcome: Progressing Towards Goal  Goal: *Hemodynamically stable  Outcome: Progressing Towards Goal  Goal: *Absence of aspiration pneumonia  Outcome: Progressing Towards Goal  Goal: *Aware of needed dietary changes  Outcome: Progressing Towards Goal  Goal: *Verbalize understanding of prescribed medications including anti-coagulants, anti-lipid, and/or anti-platelets(Stroke Metric)  Outcome: Progressing Towards Goal  Goal: *Tolerating diet  Outcome: Progressing Towards Goal  Goal: *Aware of follow-up diagnostics related to anticoagulants  Outcome: Progressing Towards Goal  Goal: *Ability to perform ADLs and demonstrates progressive mobility and function  Outcome: Progressing Towards Goal  Goal: *Absence of DVT(Stroke Metric)  Outcome: Progressing Towards Goal  Goal: *Absence of aspiration  Outcome: Progressing Towards Goal  Goal: *Optimal pain control at patient's stated goal  Outcome: Progressing Towards Goal  Goal: *Home safety concerns addressed  Outcome: Progressing Towards Goal  Goal: *Describes available resources and support systems  Outcome: Progressing Towards Goal  Goal: *Verbalizes understanding of activation of EMS(911) for stroke symptoms(Stroke Metric)  Outcome: Progressing Towards Goal  Goal: *Understands and describes signs and symptoms to report to providers(Stroke Metric)  Outcome: Progressing Towards Goal  Goal: *Neurolgocially stable (absence of additional neurological deficits)  Outcome: Progressing Towards Goal  Goal: *Verbalizes importance of follow-up with primary care physician(Stroke Metric)  Outcome: Progressing Towards Goal  Goal: *Smoking cessation discussed,if applicable(Stroke Metric)  Outcome: Progressing Towards Goal  Goal: *Depression screening completed(Stroke Metric)  Outcome: Progressing Towards Goal

## 2019-07-16 NOTE — CONSULTS
STROKE/TRANSIENT ISCHEMIC ATTACK CONSULT NOTE    Patient ID:  Abdirahman Stern  [id number]  53 y.o.  1945    Date of Consultation:  July 16, 2019    Referring Physician: Dr. German Keen    Reason for Consultation:  Right arm weakness and slurred speech    History of Present Illness:     Patient Active Problem List    Diagnosis Date Noted    TIA (transient ischemic attack) 07/15/2019    Flashers or floaters of right eye 08/21/2014    Fall at home 02/10/2014    Knee pain, left 02/10/2014    Prediabetes 02/10/2014    HTN, goal below 130/80 01/06/2014    Vitamin D deficiency 01/06/2014    Hypercholesterolemia 06/27/2013    Tiredness 06/06/2013    Depression, acute 06/06/2013     Past Medical History:   Diagnosis Date    Fall at home 2/10/2014   [facility] or floaters of right eye 8/21/2014    Hypertension     Stroke [facility])     2010      Past Surgical History:   Procedure Laterality Date    HX TUBAL LIGATION  1981      Prior to Admission medications    Medication Sig Start Date End Date Taking? Authorizing Provider   lisinopril (PRINIVIL, ZESTRIL) 20 mg tablet Take 20 mg by mouth daily. Yes Provider, Historical   albuterol (VENTOLIN HFA) 90 mcg/actuation inhaler Take 1 Puff by inhalation every four (4) hours as needed for Wheezing. Yes Provider, Historical   citalopram (CELEXA) 20 mg tablet Take 20 mg by mouth daily as needed. Yes Provider, Historical   mirtazapine (REMERON) 15 mg tablet Take 15 mg by mouth daily as needed. Yes Provider, Historical   latanoprost (XALATAN) 0.005 % ophthalmic solution Administer 1 Drop to both eyes nightly. Yes Provider, Historical   multivitamin (ONE A DAY) tablet Take 1 Tab by mouth daily. Yes Provider, Historical     No Known Allergies   Social History     Tobacco Use    Smoking status: Current Every Day Smoker     Packs/day: 0.25     Years: 20.00     Pack years: 5.00    Smokeless tobacco: Never Used   Substance Use Topics    Alcohol use:  No Family History   Problem Relation Age of Onset    Hypertension Mother     Hypertension Brother         Subjective:      Jayson Peoples is a 69 y.o. RHAAF with history of depression, hypertension, vitamin D deficiency and hypercholesterolemia who was admitted for a stroke. Patient yesterday while she was driving she noticed numbness of the right arm. Felt heavy. Denies any overt weakness. She tried to rub it to bring back the sensation. Then she went into the grocery store and was dropping things with her right hand. She is also dropping groceries that she was loading into her truck. She then drove home to see if symptoms would resolve. At home she also noted that she was starting to have slurring of her speech. Also developed moderate intensity headaches. Talk to her granddaughter on the phone and eventually her daughter and was brought to the emergency room. In the ER blood pressure was 160/75. Laboratory work-up was unremarkable except for LDL of 97.2. Head CT without contrast did not reveal any acute process. Moderate to severe white matter disease. CT perfusion did not reveal any abnormality. Head and neck CTA did not reveal any flow-limiting stenosis or aneurysm. Noted necrotic 1.8 cm right thyroid nodule. Brain MRI revealed small left parietal lobe and right centrum semiovale infarct. When seen, patient reports resolution of her symptoms. Outside reports reviewed: ER records, radiology reports, lab reports. Review of Systems:    Pertinent items are noted in HPI. Objective:     Patient Vitals for the past 8 hrs:   BP Temp Pulse Resp SpO2   07/16/19 0803 125/75 98.6 °F (37 °C) 70 16 96 %   07/16/19 0332 130/59 98.3 °F (36.8 °C) 74 18 97 %           NEUROLOGICAL EXAM:    Appearance: The patient is well developed, well nourished, provides a coherent history and is in no acute distress. Mental Status: Oriented to time, place and person.   Fluent, no evidence of dysarthria or aphasia. Mood and affect appropriate. Cranial Nerves:   Intact visual fields. LOYD, EOM's full, no nystagmus, no ptosis. Facial sensation is normal. Corneal reflexes are intact. Facial movement is symmetric. Hearing is normal bilaterally. Palate is midline with normal sternocleidomastoid and trapezius muscles are normal. Tongue is midline. Motor:  5/5 strength in upper and lower proximal and distal muscles. Normal bulk and tone. No fasciculations. No pronator drift. Reflexes:   Deep tendon reflexes 1+/4 and symmetrical.  Downgoing toes. Sensory:   Normal to cold, pinprick and vibration. Gait:  Normal gait. no Romberg. Tremor:   No tremor noted. Cerebellar:  No cerebellar signs present. Neurovascular:  Normal heart sounds and regular rhythm, peripheral pulses intact, and no carotid bruits. NIHSS 0    Imaging  CT Head, head/neck CTA, brain MRI: reviewed    Lab Review    Recent Results (from the past 24 hour(s))   CBC WITH AUTOMATED DIFF    Collection Time: 07/15/19  1:02 PM   Result Value Ref Range    WBC 5.8 3.6 - 11.0 K/uL    RBC 4.94 3.80 - 5.20 M/uL    HGB 13.0 11.5 - 16.0 g/dL    HCT 42.4 35.0 - 47.0 %    MCV 85.8 80.0 - 99.0 FL    MCH 26.3 26.0 - 34.0 PG    MCHC 30.7 30.0 - 36.5 g/dL    RDW 15.3 (H) 11.5 - 14.5 %    PLATELET 489 929 - 063 K/uL    MPV 11.1 8.9 - 12.9 FL    NRBC 0.0 0  WBC    ABSOLUTE NRBC 0.00 0.00 - 0.01 K/uL    NEUTROPHILS 45 32 - 75 %    LYMPHOCYTES 43 12 - 49 %    MONOCYTES 8 5 - 13 %    EOSINOPHILS 3 0 - 7 %    BASOPHILS 1 0 - 1 %    IMMATURE GRANULOCYTES 0 0.0 - 0.5 %    ABS. NEUTROPHILS 2.6 1.8 - 8.0 K/UL    ABS. LYMPHOCYTES 2.5 0.8 - 3.5 K/UL    ABS. MONOCYTES 0.5 0.0 - 1.0 K/UL    ABS. EOSINOPHILS 0.2 0.0 - 0.4 K/UL    ABS. BASOPHILS 0.0 0.0 - 0.1 K/UL    ABS. IMM.  GRANS. 0.0 0.00 - 0.04 K/UL    DF AUTOMATED     METABOLIC PANEL, BASIC    Collection Time: 07/15/19  1:02 PM   Result Value Ref Range    Sodium 143 136 - 145 mmol/L    Potassium 3.9 3.5 - 5.1 mmol/L    Chloride 111 (H) 97 - 108 mmol/L    CO2 29 21 - 32 mmol/L    Anion gap 3 (L) 5 - 15 mmol/L    Glucose 82 65 - 100 mg/dL    BUN 14 6 - 20 MG/DL    Creatinine 0.94 0.55 - 1.02 MG/DL    BUN/Creatinine ratio 15 12 - 20      GFR est AA >60 >60 ml/min/1.73m2    GFR est non-AA 59 (L) >60 ml/min/1.73m2    Calcium 8.8 8.5 - 10.1 MG/DL   PROTHROMBIN TIME + INR    Collection Time: 07/15/19  1:02 PM   Result Value Ref Range    INR 1.0 0.9 - 1.1      Prothrombin time 10.4 9.0 - 11.1 sec   HEPATIC FUNCTION PANEL    Collection Time: 07/15/19  1:02 PM   Result Value Ref Range    Protein, total 8.0 6.4 - 8.2 g/dL    Albumin 3.6 3.5 - 5.0 g/dL    Globulin 4.4 (H) 2.0 - 4.0 g/dL    A-G Ratio 0.8 (L) 1.1 - 2.2      Bilirubin, total 0.3 0.2 - 1.0 MG/DL    Bilirubin, direct 0.1 0.0 - 0.2 MG/DL    Alk.  phosphatase 104 45 - 117 U/L    AST (SGOT) 14 (L) 15 - 37 U/L    ALT (SGPT) 14 12 - 78 U/L   EKG, 12 LEAD, INITIAL    Collection Time: 07/15/19  1:48 PM   Result Value Ref Range    Ventricular Rate 70 BPM    Atrial Rate 70 BPM    P-R Interval 142 ms    QRS Duration 86 ms    Q-T Interval 394 ms    QTC Calculation (Bezet) 425 ms    Calculated P Axis 84 degrees    Calculated R Axis 38 degrees    Calculated T Axis 14 degrees    Diagnosis       Normal sinus rhythm  Nonspecific T wave abnormality  No previous ECGs available  Confirmed by Corby Arango MD, [facility] ([id number]) on 7/15/2019 2:58:34 PM     GLUCOSE, POC    Collection Time: 07/15/19  1:55 PM   Result Value Ref Range    Glucose (POC) 79 65 - 100 mg/dL    Performed by Monna Lanes    LIPID PANEL    Collection Time: 07/16/19  3:21 AM   Result Value Ref Range    LIPID PROFILE          Cholesterol, total 172 <200 MG/DL    Triglyceride 114 <150 MG/DL    HDL Cholesterol 52 MG/DL    LDL, calculated 97.2 0 - 100 MG/DL    VLDL, calculated 22.8 MG/DL    CHOL/HDL Ratio 3.3 0.0 - 5.0     HEMOGLOBIN A1C WITH EAG    Collection Time: 07/16/19  3:21 AM   Result Value Ref Range    Hemoglobin A1c 5.9 4.2 - 6.3 %    Est. average glucose 123 mg/dL   CBC W/O DIFF    Collection Time: 07/16/19  3:21 AM   Result Value Ref Range    WBC 6.5 3.6 - 11.0 K/uL    RBC 4.73 3.80 - 5.20 M/uL    HGB 12.5 11.5 - 16.0 g/dL    HCT 40.8 35.0 - 47.0 %    MCV 86.3 80.0 - 99.0 FL    MCH 26.4 26.0 - 34.0 PG    MCHC 30.6 30.0 - 36.5 g/dL    RDW 15.3 (H) 11.5 - 14.5 %    PLATELET 378 647 - 460 K/uL    MPV 11.2 8.9 - 12.9 FL    NRBC 0.0 0  WBC    ABSOLUTE NRBC 0.00 0.00 - 0.71 K/uL   METABOLIC PANEL, COMPREHENSIVE    Collection Time: 07/16/19  3:21 AM   Result Value Ref Range    Sodium 140 136 - 145 mmol/L    Potassium 4.0 3.5 - 5.1 mmol/L    Chloride 110 (H) 97 - 108 mmol/L    CO2 25 21 - 32 mmol/L    Anion gap 5 5 - 15 mmol/L    Glucose 96 65 - 100 mg/dL    BUN 18 6 - 20 MG/DL    Creatinine 0.99 0.55 - 1.02 MG/DL    BUN/Creatinine ratio 18 12 - 20      GFR est AA >60 >60 ml/min/1.73m2    GFR est non-AA 55 (L) >60 ml/min/1.73m2    Calcium 8.7 8.5 - 10.1 MG/DL    Bilirubin, total 0.3 0.2 - 1.0 MG/DL    ALT (SGPT) 12 12 - 78 U/L    AST (SGOT) 13 (L) 15 - 37 U/L    Alk. phosphatase 98 45 - 117 U/L    Protein, total 7.1 6.4 - 8.2 g/dL    Albumin 3.2 (L) 3.5 - 5.0 g/dL    Globulin 3.9 2.0 - 4.0 g/dL    A-G Ratio 0.8 (L) 1.1 - 2.2     T4, FREE    Collection Time: 07/16/19  3:21 AM   Result Value Ref Range    T4, Free 1.1 0.8 - 1.5 NG/DL   TSH 3RD GENERATION    Collection Time: 07/16/19  3:21 AM   Result Value Ref Range    TSH 0.73 0.36 - 3.74 uIU/mL         Assessment:   CVA  Hyperlipidemia    Plan:   Neurological examination was nonfocal.  No residual deficits. Status post left parietal lobe and right centrum semiovale small infarcts. Head CT without contrast did not reveal any acute abnormality. Moderate to severe white matter disease. Brain MRI revealed small acute left parietal lobe and right centrum semiovale stroke. Head and neck CTA did not reveal any flow-limiting stenosis or aneurysm. LDL was elevated and 97.2.   It needs to be less than 70. Recommend starting statin therapy. Echocardiogram is pending. Continue aspirin for stroke prophylaxis. Baseline evaluation by therapy. Patient was informed that per [facility] regulation she cannot drive at least for 30 days and pending reevaluation by neurology. She understood. If echo does not show any significant abnormality, patient is okay to be discharged from a neurological standpoint. Follow-up with outpatient neurology in 2 weeks. Thank you for the consult.

## 2019-07-16 NOTE — PROGRESS NOTES
Speech LAnguage Pathology evaluation/discharge  Patient: Moshe Holter (89 y.o. female)  Date: 7/16/2019  Primary Diagnosis: TIA (transient ischemic attack) [G45.9]       Precautions:        ASSESSMENT :  Based on the objective data described below, the patient presents with intact motor speech. Patient with intact oral motor function. Patient intelligible in conversation. Patient reports speech is back to baseline. STAND completed with no difficulty noted and diet initiated. Patient reports no difficulty swallow just limited appetite. Skilled acute therapy provided by a speech-language pathologist is not indicated at this time. PLAN :  Recommendations:  No further SLP treatment warranted  Discharge Recommendations: None     SUBJECTIVE:   Patient stated I knew something wasn't right of speech changes yesterday.     OBJECTIVE:     Past Medical History:   Diagnosis Date    Fall at home 2/10/2014   [facility] or floaters of right eye 8/21/2014    Hypertension     Stroke [facility])     2010     Past Surgical History:   Procedure Laterality Date    HX TUBAL LIGATION  1981     Prior Level of Function/Home Situation:   Home Situation  Home Environment: Private residence  # Steps to Enter: 3  Rails to Enter: Yes  Hand Rails : Bilateral  One/Two Story Residence: One story  Living Alone: No  Support Systems: Child(terrance), Family member(s)  Patient Expects to be Discharged to[de-identified] Unknown  Current DME Used/Available at Home: Walker, rolling  Tub or Shower Type: Tub/Shower combination  Mental Status:  Neurologic State: Alert  Orientation Level: Oriented X4  Cognition: Appropriate decision making, Appropriate for age attention/concentration, Appropriate safety awareness, Follows commands  Perception: Appears intact  Perseveration: No perseveration noted  Safety/Judgement: Awareness of environment, Fall prevention, Home safety, Insight into deficits  Motor Speech:  Oral-Motor Structure/Motor Speech  Face: No impairment  Labial: No impairment  Dentition: Natural;Intact  Oral Hygiene: Moist oral mucosa  Lingual: No impairment  Velum: Unable to visualize  Mandible: No impairment  Apraxic Characteristics: None  Dysarthric Characteristics: None  Intelligibility: No impairment  Overall Impairment Severity: None    NOMS: The NOMS functional outcome measure was used to quantify this patient's level of motor speech impairment. Based on the NOMS, the patient was determined to be at level 7 for motor speech function. NOMS Motor Speech:  Level 1 (CN):  100% unintelligible  Level 2 (CM):  Communication partner responsible for message; can do CV or automatic words w/ max cues but rarely intelligible in context  Level 3 (CL): communication partner primarily responsible for message but says CV/automatic words intelligibly; mod cues to say simple words/phrases  Level 4 (CK): In structured conversation with familiar listener can say simple words and phrases. Mod cues for simple sentences  Level 5 (CJ):  Uses simple sentences for ADLs with familiar and unfamiliar listener; min cues for complex sentences  Level 6 (CI):  Intelligible in ADLs; difficulty in voc/social activites; rare cues for complex message; uses comp strategies  Level 7 ([address] [place] [address]):  Intelligible in all activities. May occasionally use compensatory strategies. DOMINGO. (2003). National Outcomes Measurement System (NOMS): Adult Speech-Language Pathology User's Guide. After treatment:   []   Patient left in no apparent distress sitting up in chair  [x]   Patient left in no apparent distress in bed  [x]   Call bell left within reach  [x]   Nursing notified  []   Caregiver present  []   Bed alarm activated    COMMUNICATION/EDUCATION:   The patients plan of care including findings and recommendations was discussed with: Registered Nurse. Patient was educated regarding role of SLP.   [x]   Patient/family have participated as able and agree with findings and recommendations. []   Patient is unable to participate in plan of care at this time.     Thank you for this referral.  Lin Quinones SLP  Time Calculation: 15 mins

## 2019-07-16 NOTE — PROGRESS NOTES
Initial Nutrition Assessment:    INTERVENTIONS/RECOMMENDATIONS:   · Meals/Snacks: General/healthful diet: Continue Cardiac diet   · Supplements: Commercial supplement: Ensure enlive daily    ASSESSMENT:   Patient medically noted for possible TIA and right thyroid nodule. PMH for depression and HTN. Patient hadn't eaten much of her breakfast tray. She reports being hungry this morning but didn't like the food. Normally isn't very hungry; often skips breakfast and doesn't eat until later in the day. States she weighed 165# earlier this year. Was drinking boost/ensure at one point but MD told her to stop. Agreeable to ensure enlive daily for now. Encouraged intake of 3 meals/day. Diet Order: Cardiac  % Eaten:  No data found. Pertinent Medications: [x]Reviewed []Other: Atorvastatin, Celexa   Pertinent Labs: [x]Reviewed []Other:   Food Allergies: [x]None []Yes:    Last BM:     []Active     []Hyperactive  []Hypoactive       [] Absent BS  Skin:    [x] Intact   [] Incision  [] Breakdown: [] Edema []Other:    Anthropometrics:   Height: 5' 5\" (165.1 cm) Weight: 71.8 kg (158 lb 4.6 oz)   IBW (%IBW):   ( ) UBW (%UBW):   (  %)   Last Weight Metrics:  Weight Loss Metrics 7/15/2019 8/21/2014 2/10/2014 1/6/2014 6/27/2013 6/6/2013   Today's Wt 158 lb 4.6 oz 160 lb 175 lb 12.8 oz 176 lb 174 lb 173 lb   BMI 26.34 kg/m2 25.05 kg/m2 27.53 kg/m2 27.56 kg/m2 27.25 kg/m2 27.09 kg/m2       BMI: Body mass index is 26.34 kg/m². This BMI is indicative of:   []Underweight    []Normal    [x]Overweight    [] Obesity   [] Extreme Obesity (BMI>40)     Estimated Nutrition Needs (Based on):   1600 Kcals/day(BMR (1231) x 1. 3AF) , 58 g(-72g (0.8-1.0 g/kg bw)) Protein  Carbohydrate:  At Least 130 g/day  Fluids: 1600 mL/day (1ml/kcal)    Pt expected to meet estimated nutrient needs: [x]Yes []No    NUTRITION DIAGNOSES:   Problem:  Inadequate oral intake      Etiology: related to decreased appetite     Signs/Symptoms: as evidenced by reported weight loss, skipping meals      NUTRITION INTERVENTIONS:  Meals/Snacks: General/healthful diet   Supplements: Commercial supplement              GOAL:   PO intake >50% of meals and 75% of ONS next 3-5 days    LEARNING NEEDS (Diet, Food/Nutrient-Drug Interaction):    [x] None Identified   [] Identified and Education Provided/Documented   [] Identified and Pt declined/was not appropriate     Cultural, Baptist, OR Ethnic Dietary Needs:    [x] None Identified   [] Identified and Addressed     [x] Interdisciplinary Care Plan Reviewed/Documented    [x] Discharge Planning:   Heart healthy diet + ONS daily    MONITORING /EVALUATION:   Food/Nutrient Intake Outcomes:  Total energy intake  Physical Signs/Symptoms Outcomes: Weight/weight change    NUTRITION RISK:    [x] Patient At Nutritional Risk              [] Patient Not at Nutritional Risk    PT SEEN FOR:    []  MD Consult: []Calorie Count      []Diabetic Diet Education        []Diet Education     []Electrolyte Management     []General Nutrition Management and Supplements     []Management of Tube Feeding     []TPN Recommendations    [x]  RN Referral:  [x]MST score >=2     []Enteral/Parenteral Nutrition PTA     []Pregnant: Gestational DM or Multigestation     []Pressure Ulcer/Wound Care needs        []  Low BMI  []  DIOMEDES Aguilar [phone]  Pager [phone]    Weekend Pager [phone]

## 2019-07-16 NOTE — PROGRESS NOTES
physical Therapy neuro EVALUATION/discharge     Patient: Stephanie Vegas (62 y.o. female)  Date: 7/16/2019  Primary Diagnosis: TIA (transient ischemic attack) [G45.9]       Precautions:        ASSESSMENT :  Based on the objective data described below, the patient presents with good strength, good functional mobility, steady gait, and mildly impaired higher level balance following admission for TIA. PTA patient lives with her 15 yo grandson. She is independent with all aspects of functional mobility and ADLS. Patient denies any falls. She reports her family is very supportive. Currently, patient received resting in bed, agreeable and cleared for therapy. Patient is independent with all functional mobility. Patient mildly impaired with higher level balance as described below. Patient negotiated 3 stairs with mod I and R HR. Patient left sitting in the chair at the conclusion of PT evaluation. Recommending OP neuro PT at discharge. Further skilled acute physical therapy is not indicated at this time. PLAN :  Discharge Recommendations: Outpatient neuro PT  Further Equipment Recommendations for Discharge: none       SUBJECTIVE:   Patient stated I feel really good.     OBJECTIVE DATA SUMMARY:   HISTORY:    Past Medical History:   Diagnosis Date    Fall at home 2/10/2014   [facility] or floaters of right eye 8/21/2014    Hypertension     [facility]roke [facility])     2010     Past Surgical History:   Procedure Laterality Date    HX TUBAL LIGATION  1981     Prior Level of Function/Home Situation:  patient lives with her 15 yo grandson. She is independent with all aspects of functional mobility and ADLS. Patient denies any falls. She reports her family is very supportive.    Personal factors and/or comorbidities impacting plan of care: CVA    Home Situation  Home Environment: Private residence  # Steps to Enter: 3  Rails to Enter: Yes  Hand Rails : Bilateral  One/Two Story Residence: One story  Living Alone: No  Support Systems: Child(terrance), Family member(s)  Patient Expects to be Discharged to[de-identified] Unknown  Current DME Used/Available at Home: Walker, rolling  Tub or Shower Type: Tub/Shower combination    EXAMINATION/PRESENTATION/DECISION MAKING:   Critical Behavior:  Neurologic State: Alert  Orientation Level: Oriented X4  Cognition: Appropriate decision making, Appropriate for age attention/concentration, Appropriate safety awareness, Follows commands  Safety/Judgement: Awareness of environment, Fall prevention, Home safety, Insight into deficits  Hearing: Auditory  Auditory Impairment: Hard of hearing, left side  Skin:    Edema:   Range Of Motion:  AROM: Within functional limits           PROM: Within functional limits           Strength:    Strength: (R UE slightly weaker)                    Tone & Sensation:   Tone: Normal                              Coordination:  Coordination: Within functional limits  Vision:   Tracking: Able to track stimulus in all quadrants w/o difficulty  Acuity: Within Defined Limits(with glasses)  Corrective Lenses: Glasses  Functional Mobility:  Bed Mobility:  Rolling: Independent  Supine to Sit: Independent     Scooting: Independent  Transfers:  Sit to Stand: Supervision  Stand to Sit: Supervision        Bed to Chair: Independent              Balance:   Sitting: Intact; Without support  Standing: Intact; Without support  Ambulation/Gait Training:  Distance (ft): 200 Feet (ft)  Assistive Device: Gait belt  Ambulation - Level of Assistance: Supervision; Independent     Gait Description (WDL): Exceptions to [facility]           Base of Support: Narrowed     Speed/Gregoria: Pace decreased (<100 feet/min)                        Stair Training:  Number of Stairs Trained: 3  Stairs - Level of Assistance: Modified independent  Rail Use: Right        Therapeutic Exercises:       Functional Measure:  [place] Balance Test:    Sitting to Standin  Standing Unsupported: 4  Sitting with Back Unsupported: 4  Standing to Sittin  Transfers: 4  Standing Unsupported with Eyes Closed: 4  Standing Unsupported with Feet Together: 4  Reach Forward with Outstretched Arm: 4   Object: 4  Turn to Look Over Shoulders: 4  Turn 360 Degrees: 4  Alternate Foot on Step/Stool: 4  Standing Unsupported One Foot in Front: 1  Stand on One Le  Total: 50         56=Maximum possible score;   0-20=High fall risk  21-40=Moderate fall risk   41-56=Low fall risk       Physical Therapy Evaluation Charge Determination   History Examination Presentation Decision-Making   MEDIUM  Complexity : 1-2 comorbidities / personal factors will impact the outcome/ POC  MEDIUM Complexity : 3 Standardized tests and measures addressing body structure, function, activity limitation and / or participation in recreation  MEDIUM Complexity : Evolving with changing characteristics  Other outcome measures [place]  MEDIUM      Based on the above components, the patient evaluation is determined to be of the following complexity level: MEDIUM    Pain:  Pain Scale 1: Numeric (0 - 10)  Pain Intensity 1: 0              Activity Tolerance:   Good close to baseline. Please refer to the flowsheet for vital signs taken during this treatment. After treatment:   [x]         Patient left in no apparent distress sitting up in chair  []         Patient left in no apparent distress in bed  [x]         Call bell left within reach  [x]         Nursing notified  []         Caregiver present  []         Bed alarm activated    COMMUNICATION/EDUCATION:   Patient was educated regarding Her deficit(s) of slurred speech as this relates to Her diagnosis of CVA. She demonstrated Good understanding as evidenced by verbal feedback. Patient and/or family was verbally educated on the BE FAST acronym for signs/symptoms of CVA and TIA. BE FAST was written on patient's communication board  for visual education and reinforcement. All questions answered with patient indicating good understanding. [x]   Fall prevention education was provided and the patient/caregiver indicated understanding. [x]   Patient/family have participated as able and agree with findings and recommendations. []   Patient is unable to participate in plan of care at this time.     Findings and recommendations were discussed with: Occupational Therapist, Registered Nurse and     Thank you for this referral.  Marleny Don, PT, DPT   Time Calculation: 23 mins

## 2019-07-16 NOTE — ROUTINE PROCESS
* No surgery found * 
* No surgery found * Bedside and Verbal shift change report given to RN (oncoming nurse) by Drew Langley RN (offgoing nurse). Report included the following information SBAR, Kardex, ED Summary, [facility] and Recent Results. Zone Phone:   [phone] Significant changes during shift:  Admit to NSTU Patient Information Stephanie Vegas 
69 y.o. 
7/15/2019 12:26 PM by Juan M Dumont MD. Stephanie Vegas was admitted from Home 
 
Problem List 
 
Patient Active Problem List  
 Diagnosis Date Noted  TIA (transient ischemic attack) 07/15/2019  Flashers or floaters of right eye 08/21/2014  Fall at home 02/10/2014  Knee pain, left 02/10/2014  Prediabetes 02/10/2014  
 HTN, goal below 130/80 01/06/2014  Vitamin D deficiency 01/06/2014  Hypercholesterolemia 06/27/2013  Tiredness 06/06/2013  Depression, acute 06/06/2013 Past Medical History:  
Diagnosis Date  Fall at home 2/10/2014  Flashers or floaters of right eye 8/21/2014  Hypertension  Stroke ([facility] [address] 2010 Core Measures: CVA: Yes Yes CHF:No No 
PNA:No No 
 
Activity Status: OOB to Chair Yes Ambulated this shift Yes Bed Rest Yes Supplemental O2: (If Applicable) NC No 
NRB No 
Venti-mask No 
On 0 Liters/min DVT prophylaxis: DVT prophylaxis Med- No 
DVT prophylaxis SCD or COMFORT- No  
 
Wounds: (If Applicable) Wounds- No 
 
Location 0 Patient Safety: 
 
Falls Score Total Score: 0 Safety Level_______ Bed Alarm On? No 
Sitter? No 
 
Plan for upcoming shift: Consults Discharge Plan: Yes TBD Active Consults: 
IP CONSULT TO NEUROLOGY

## 2019-07-16 NOTE — PROGRESS NOTES
Bedside and Verbal shift change report given to fiorella RN (oncoming nurse) by jluis RN (offgoing nurse). Report included the following information SBAR, Kardex, ED Summary, MAR and Recent Results.     Zone Phone:   [phone]        Significant changes during shift:  Admit to NSTU           Patient Information     Abdirahman Stern  69 y.o.  7/15/2019 12:26 PM by German Keen MD. Abdirahman Stern was admitted from Home     Problem List          Patient Active Problem List     Diagnosis Date Noted    TIA (transient ischemic attack) 07/15/2019    Flashers or floaters of right eye 08/21/2014    Fall at home 02/10/2014    Knee pain, left 02/10/2014    Prediabetes 02/10/2014    HTN, goal below 130/80 01/06/2014    Vitamin D deficiency 01/06/2014    Hypercholesterolemia 06/27/2013    Tiredness 06/06/2013    Depression, acute 06/06/2013           Past Medical History:   Diagnosis Date    Fall at home 2/10/2014   [facility] or floaters of right eye 8/21/2014    Hypertension      Stroke [facility])       2010            Core Measures:     CVA: Yes Yes  CHF:No No  PNA:No No     Activity Status:     OOB to Chair Yes  Ambulated this shift Yes   Bed Rest Yes     Supplemental O2: (If Applicable)     NC No  NRB No  Venti-mask No  On 0 Liters/min     DVT prophylaxis:     DVT prophylaxis Med- No  DVT prophylaxis SCD or COMFORT- No      Wounds: (If Applicable)     Wounds- No     Location 0     Patient Safety:     Falls Score Total Score: 0  Safety Level_______  Bed Alarm On? No  Sitter?  No     Plan for upcoming shift: Consults           Discharge Plan: Yes TBD     Active Consults:  IP CONSULT TO NEUROLOGY

## 2019-07-16 NOTE — ROUTINE PROCESS
TRANSFER - IN REPORT: 
 
Verbal report received from [facility]) on Ruiz Sarmiento  being received from ED(unit) for routine progression of care Report consisted of patients Situation, Background, Assessment and  
Recommendations(SBAR). Information from the following report(s) SBAR, Kardex, ED Summary, [facility] and Recent Results was reviewed with the receiving nurse. Opportunity for questions and clarification was provided. Assessment completed upon patients arrival to unit and care assumed.

## 2019-07-17 ENCOUNTER — TELEPHONE (OUTPATIENT)
Dept: NEUROLOGY | Age: 74
End: 2019-07-17

## 2019-07-17 ENCOUNTER — APPOINTMENT (OUTPATIENT)
Dept: NON INVASIVE DIAGNOSTICS | Age: 74
DRG: 065 | End: 2019-07-17
Attending: EMERGENCY MEDICINE
Payer: MEDICARE

## 2019-07-17 VITALS
OXYGEN SATURATION: 98 % | HEART RATE: 66 BPM | DIASTOLIC BLOOD PRESSURE: 46 MMHG | TEMPERATURE: 97.8 F | WEIGHT: 158 LBS | RESPIRATION RATE: 16 BRPM | BODY MASS INDEX: 26.33 KG/M2 | HEIGHT: 65 IN | SYSTOLIC BLOOD PRESSURE: 148 MMHG

## 2019-07-17 PROCEDURE — 74011250636 HC RX REV CODE- 250/636: Performed by: INTERNAL MEDICINE

## 2019-07-17 PROCEDURE — 93308 TTE F-UP OR LMTD: CPT

## 2019-07-17 PROCEDURE — 74011250637 HC RX REV CODE- 250/637: Performed by: INTERNAL MEDICINE

## 2019-07-17 RX ORDER — ATORVASTATIN CALCIUM 40 MG/1
40 TABLET, FILM COATED ORAL
Qty: 30 TAB | Refills: 0 | Status: SHIPPED | OUTPATIENT
Start: 2019-07-17 | End: 2019-08-16

## 2019-07-17 RX ORDER — GUAIFENESIN 100 MG/5ML
81 LIQUID (ML) ORAL DAILY
Qty: 30 TAB | Refills: 0 | Status: SHIPPED | OUTPATIENT
Start: 2019-07-18 | End: 2019-08-17

## 2019-07-17 RX ADMIN — ASPIRIN 81 MG 81 MG: 81 TABLET ORAL at 08:38

## 2019-07-17 RX ADMIN — CITALOPRAM HYDROBROMIDE 20 MG: 20 TABLET ORAL at 08:38

## 2019-07-17 RX ADMIN — Medication 10 ML: at 04:21

## 2019-07-17 RX ADMIN — ENOXAPARIN SODIUM 40 MG: 40 INJECTION SUBCUTANEOUS at 08:38

## 2019-07-17 NOTE — PROGRESS NOTES
This nurse went over discharge packet with pt and pt's family. Medications and follow up appointments were reviewed. I asked if the pt or family had any questions or concerns and I received verbal confirmation that they understood and had no questions for me.  Rusty BANUELOS

## 2019-07-17 NOTE — ROUTINE PROCESS
Bedside and Verbal shift change report given to Emleia RN (oncoming nurse) by [employer] nurse).  Report included the following information SBAR, Kardex, ED Summary, MAR and Recent Results.     Zone Phone:   [phone]        Significant changes during shift:  None           Patient Information     Miriam Orf  69 y.o.  7/15/2019 12:26 PM by Fabian Oshea MD. Genoveva Jimenes was admitted from Home     Problem List             Patient Active Problem List     Diagnosis Date Noted    TIA (transient ischemic attack) 07/15/2019    Flashers or floaters of right eye 08/21/2014    Fall at home 02/10/2014    Knee pain, left 02/10/2014    Prediabetes 02/10/2014    HTN, goal below 130/80 01/06/2014    Vitamin D deficiency 01/06/2014    Hypercholesterolemia 06/27/2013    Tiredness 06/06/2013    Depression, acute 06/06/2013              Past Medical History:   Diagnosis Date    Fall at home 2/10/2014   [facility] or floaters of right eye 8/21/2014    Hypertension      Stroke [facility])       2010            Core Measures:     CVA: Yes Yes  CHF:No No  PNA:No No     Activity Status:     OOB to Chair Yes  Ambulated this shift Yes   Bed Rest Yes     Supplemental P9: (NX Applicable)     NC No  NRB No  Venti-mask No  On 0 Liters/min     DVT prophylaxis:     DVT prophylaxis Med- No  DVT prophylaxis SCD or COMFORT- No      Wounds: (If Applicable)     Wounds- No     Location 0     Patient Safety:     Falls Score Total Score: 0  Safety Level_______  Bed Alarm On? No  Sitter? No     Plan for upcoming shift: Consults           Discharge Plan: Yes TBD     Active Consults:  IP CONSULT TO NEUROLOGY

## 2019-07-17 NOTE — DISCHARGE SUMMARY
Hospitalist Discharge Summary     Patient ID:  Jayson Peoples  [id number]  44 y.o.  1945  7/15/2019    PCP on record: Kendra Whyte NP    Admit date: 7/15/2019  Discharge date and time: 7/17/2019    DISCHARGE DIAGNOSIS:      Right-sided weakness and numbness with slurred speech, resolved secondary to acute CVA  Right thyroid nodule, needs follow-up with endocrinology, will be arranged by PCP  Hypertension  Hyperlipidemia  Anxiety and depression  Overweight status BMI 26    CONSULTATIONS:  IP CONSULT TO NEUROLOGY    Excerpted HPI from H&P of Dr. Sarah Gamez: right hand weakness, trouble speaking     HISTORY OF PRESENT ILLNESS:     Jayson Peoples is a 69 y.o.  female who presents with intermittent right had weakness and numbness. Patient reports that when she was driving yesterday her right hand was weak and she could not hold the steering wheel. This improved but she noted right hand clumsiness while in the store. Patient denies CP, SOB and Palpitations. She reports right sided headache and states that she has been stressed recently. Today is her birthday.  Tri Ajert and Neck showed: \"IMPRESSION:  1. No acute abnormality. Negative CTA head and neck  2. Nonspecific 1.8 cm centrally necrotic and peripherally enhancing right  thyroid nodule. Follow-up thyroid ultrasound and aspiration is suggested. \"     We were asked to admit for work up and evaluation of the above problems.        ______________________________________________________________________  DISCHARGE SUMMARY/HOSPITAL COURSE:  for full details see H&P, daily progress notes, labs, consult notes.        Right sided weakness/numbness, dysarthria, resolved  2nd to acute CVA  R/o embolic source  -cont telemetry  -continue on ASA  -Neurochecks  -Patient's MRI reveals an acute infarct in the left frontal parietal lobe and additional focus in the right frontal lobe suspicious for acute infarct in background of severe periventricular and subcortical white matter disease  -Echocardiogram with mild LVH EF normal, no shunt, neg bubble  -We will follow on telemetry, so far no signs of arrhythmia  -A1c 5.9, TSH normal  -LDL 97, started atorvastatin high intensity dose  -Patient was seen by PT OT and speech and cleared for outpatient physical therapy, can be arranged by pcp  -Case management working on home health nursing for nursing and safety eval/PT     Right thyroid Nodule: CTA Head and Neck in ED showed: \". ..2. Nonspecific 1.8 cm centrally necrotic and peripherally enhancing right thyroid nodule. Follow-up thyroid ultrasound and aspiration is suggested. \"  -thyroid ultrasound: There is a dominant solid right thyroid nodule measuring 2.9 by 1.2  x 2.2 cm and fine-needle aspiration is recommended for further assessment. Other  nodules and cysts noted bilaterally measure 1 cm or less in size and can be  followed on future ultrasound. .     -normal TSH and Free T4  -will need f/u with endo as outpt to consider aspiration     Hypertension:  -permissive hypertension at this time, resume home lisinopril at dc       Anxiety/Depression:  -continue home celexa and remeron    Recommended Disposition:     Patient will be discharged home today with home health eval and safety PT eval.  She may be able to be transition to outpatient therapy as an outpatient. She will need to follow-up with her primary care physician tomorrow. I spoke to [place] care who will arrange her follow-up tomorrow. She will need to follow-up with neurology in the next 1 to 2 weeks. Her daughter has agreed to stay with her for a few days after dc. Patient presented with right-sided weakness and numbness and slurred speech which all resolved however she was found to have an acute CVA.   There was evidence of infarct in the left frontal parietal lobe as well as in the right frontal lobe with severe background periventricular and subcortical white matter disease. She was in sinus rhythm no evidence of A. fib. Echocardiogram showed normal EF no thrombi and the bubble study showed no evidence of shunt. Her LDL was 97 she was started on atorvastatin high intensity therapy. She was seen by neurology who reviewed the MRI and did not feel that this was likely embolic but may be related to amyloid angiopathy. She is to continue on aspirin on a statin. She is not allowed to drive for at least 30 days possibly 6 months or until cleared by neurology due to her acute stroke. And she will need to follow-up with neurology in the next 2 weeks. _______________________________________________________________________  Patient seen and examined by me on discharge day. Pertinent Findings:  Patient without complaints. No further neurologic symptoms. She is awake and alert oriented x3 no distress. Cardiovascular regular rate lungs are clear abdomen benign extremities no clubbing cyanosis or edema. Neuro exam is nonfocal.  _______________________________________________________________________  DISCHARGE MEDICATIONS:   Current Discharge Medication List      START taking these medications    Details   aspirin 81 mg chewable tablet Take 1 Tab by mouth daily for 30 days. Qty: 30 Tab, Refills: 0      atorvastatin (LIPITOR) 40 mg tablet Take 1 Tab by mouth nightly for 30 days. Qty: 30 Tab, Refills: 0         CONTINUE these medications which have NOT CHANGED    Details   lisinopril (PRINIVIL, ZESTRIL) 20 mg tablet Take 20 mg by mouth daily. albuterol (VENTOLIN HFA) 90 mcg/actuation inhaler Take 1 Puff by inhalation every four (4) hours as needed for Wheezing. citalopram (CELEXA) 20 mg tablet Take 20 mg by mouth daily as needed. mirtazapine (REMERON) 15 mg tablet Take 15 mg by mouth daily as needed. latanoprost (XALATAN) 0.005 % ophthalmic solution Administer 1 Drop to both eyes nightly.       multivitamin (ONE A DAY) tablet Take 1 Tab by mouth daily.     Associated Diagnoses: Depression, acute; Tiredness               Patient Follow Up Instructions:     F/u with pcp tomorrow, their office will contact you with time  F/u with neuro in 1-2 weeks  PCP will arrange f/u with endocrine for thyroid nodule evaluation  Return to the ER with recurrent neurologic symtomtoms      Follow-up Information     Follow up With Specialties Details Why Contact Info    Jose Roberto NP Nurse Practitioner In 3 days  [address] [place] [address] Annie Isabel MD Neurology In 2 weeks  [facility]  [address] [place] [address] Juan Carlos Whitehead MD Endocrinology In 2 weeks  [address] [facility] 2 [address] [facility]  [address] [address]  [phone]      At [facility]   This is your home health agency and they will see you when discharged for nursing and physical therapy safety eval.  [phone]    Jose Roberto NP Nurse Practitioner In 1 day  [address] [place] [address] Annie Isabel MD Neurology In 2 weeks  [address]  [phone]          ________________________________________________________________    Risk of deterioration: Moderate    Condition at Discharge:  Stable  __________________________________________________________________    Disposition  Home with family and home health services    ____________________________________________________________________    Code Status: Full Code  ___________________________________________________________________      Total time in minutes spent coordinating this discharge (includes going over instructions, follow-up, prescriptions, and preparing report for sign off to her PCP) : 40  minutes    Signed:  Catarino Ormond, MD

## 2019-07-17 NOTE — PROGRESS NOTES
Problem: TIA/CVA Stroke: 0-24 hours  Goal: Off Pathway (Use only if patient is Off Pathway)  Outcome: Progressing Towards Goal     Problem: TIA/CVA Stroke: Day 2 Until Discharge  Goal: *Optimal pain control at patient's stated goal  Outcome: Progressing Towards Goal  Goal: *Tolerating diet  Outcome: Progressing Towards Goal     Problem: Ischemic Stroke: Discharge Outcomes  Goal: *Ability to perform ADLs and demonstrates progressive mobility and function  Outcome: Progressing Towards Goal  Goal: *Absence of DVT(Stroke Metric)  Outcome: Progressing Towards Goal  Goal: *Absence of aspiration  Outcome: Progressing Towards Goal

## 2019-07-17 NOTE — TELEPHONE ENCOUNTER
Received call from [facility], who asked that a message be sent back to the nurse for a hospital follow up appt to be scheduled for the patient being discharged. The patient needs to be seen within 6 weeks for a TIA.       [phone]

## 2019-07-17 NOTE — DISCHARGE INSTRUCTIONS
HOSPITALIST DISCHARGE INSTRUCTIONS    NAME: Wilfrido Huber   :     MRN:  [id number]     Date/Time:  2019 3:36 PM    ADMIT DATE: 7/15/2019   DISCHARGE DATE: 2019         · It is important that you take the medication exactly as they are prescribed. · Keep your medication in the bottles provided by the pharmacist and keep a list of the medication names, dosages, and times to be taken in your wallet. · Do not take other medications without consulting your doctor. What to do at Home    Recommended diet:  Cardiac Diet    Recommended activity: Activity as tolerated and PT/OT per Home Health,  no driving for 6 months unless cleared by neurology      If you have questions regarding the hospital related prescriptions or hospital related issues please call [facility] at [phone]. You can always direct your questions to your primary care doctor if you are unable to reach your hospital physician; your PCP works as an extension of your hospital doctor just like your hospital doctor is an extension of your PCP for your time at the hospital [facility], [facility])    If you experience any of the following symptoms then please call your primary care physician or return to the emergency room if you cannot get hold of your doctor:    Fever, chills, nausea, vomiting, or persistent diarrhea  Worsening weakness or new problems with your speech or balance  Dark stools or visible blood in your stools  New Leg swelling or shortness of breath as these could be signs of a clot    Additional Instructions:      Bring these papers with you to your follow up appointments.  The papers will help your doctors be sure to continue the care plan from the hospital.    F/u with pcp tomorrow, their office will contact you with time  F/u with neuro in 1-2 weeks  PCP will arrange f/u with endocrine for thyroid nodule evaluation  Return to the ER with recurrent neurologic symtomtoms          Information obtained by :  I understand that if any problems occur once I am at home I am to contact my physician. I understand and acknowledge receipt of the instructions indicated above.                                                                                                                                            Physician's or R.N.'s Signature                                                                  Date/Time                                                                                                                                              Patient or Representative Signature

## 2019-07-17 NOTE — PROGRESS NOTES
SADA Plan--Home with home health services. [facility] Home accepted patient for home health services. Patient and family aware and in agreement. FOC signed and placed on chart. Orders faxed to [facility] with confirmation. Daughter states she will transport patient when she is discharged.  Daughter is in the room at this time and has spoken to MD.

## 2019-07-17 NOTE — PROGRESS NOTES
SADA Plan--Home with home health services. Spoke with patient and daughter re dcp. Patient and daughter agreeable with home health services. Choice given and [facility] chosen. Referral sent to At [address] [place] [address] for pt with safety eval and nursing for med compliance, assessment and vitals. Will await their response.

## 2019-07-30 ENCOUNTER — TELEPHONE (OUTPATIENT)
Dept: NEUROLOGY | Age: 74
End: 2019-07-30

## 2019-07-30 NOTE — TELEPHONE ENCOUNTER
----- Message from [employer] sent at 7/30/2019  2:54 PM EDT -----  Regarding: Dr. Rody Augustine Message/Vendor Calls    Caller's first and last name: Adam([employer])      Reason for call:sooner appt      Callback required yes/no and why:yes sooner appt      Best contact number(s): [phone]      Details to clarify the request:Gissel Hylton([employer]) requested a sooner appt for the pt.       [employer]

## 2019-07-31 NOTE — TELEPHONE ENCOUNTER
Patient saw Dr. Jie Horta on 07/16/19 in the hospital; the appointment patient has is for a hospital follow up.

## 2019-08-28 ENCOUNTER — HOSPITAL ENCOUNTER (OUTPATIENT)
Dept: ULTRASOUND IMAGING | Age: 74
Discharge: HOME | End: 2019-08-28
Attending: NURSE PRACTITIONER
Payer: MEDICARE

## 2019-08-28 DIAGNOSIS — E04.1 THYROID NODULE: ICD-10-CM

## 2019-08-28 PROCEDURE — 76536 US EXAM OF HEAD AND NECK: CPT

## 2019-08-29 ENCOUNTER — OFFICE VISIT (OUTPATIENT)
Dept: NEUROLOGY | Age: 74
End: 2019-08-29

## 2019-08-29 VITALS
SYSTOLIC BLOOD PRESSURE: 152 MMHG | BODY MASS INDEX: 27.49 KG/M2 | HEIGHT: 65 IN | HEART RATE: 80 BPM | OXYGEN SATURATION: 99 % | WEIGHT: 165 LBS | DIASTOLIC BLOOD PRESSURE: 66 MMHG

## 2019-08-29 DIAGNOSIS — I63.30 CEREBROVASCULAR ACCIDENT (CVA) DUE TO THROMBOSIS OF CEREBRAL ARTERY (HCC): Primary | ICD-10-CM

## 2019-08-29 DIAGNOSIS — E78.2 MIXED HYPERLIPIDEMIA: ICD-10-CM

## 2019-08-29 RX ORDER — ATORVASTATIN CALCIUM 40 MG/1
40 TABLET, FILM COATED ORAL DAILY
Qty: 30 TAB | Refills: 3 | Status: SHIPPED | OUTPATIENT
Start: 2019-08-29

## 2019-08-29 RX ORDER — ASPIRIN 81 MG/1
TABLET ORAL DAILY
COMMUNITY
End: 2019-11-07 | Stop reason: DRUGHIGH

## 2019-08-29 NOTE — PATIENT INSTRUCTIONS
High Cholesterol: Care Instructions  Your Care Instructions    Cholesterol is a type of fat in your blood. It is needed for many body functions, such as making new cells. Cholesterol is made by your body. It also comes from food you eat. High cholesterol means that you have too much of the fat in your blood. This raises your risk of a heart attack and stroke. LDL and HDL are part of your total cholesterol. LDL is the \"bad\" cholesterol. High LDL can raise your risk for heart disease, heart attack, and stroke. HDL is the \"good\" cholesterol. It helps clear bad cholesterol from the body. High HDL is linked with a lower risk of heart disease, heart attack, and stroke. Your cholesterol levels help your doctor find out your risk for having a heart attack or stroke. You and your doctor can talk about whether you need to lower your risk and what treatment is best for you. A heart-healthy lifestyle along with medicines can help lower your cholesterol and your risk. The way you choose to lower your risk will depend on how high your risk is for heart attack and stroke. It will also depend on how you feel about taking medicines. Follow-up care is a key part of your treatment and safety. Be sure to make and go to all appointments, and call your doctor if you are having problems. It's also a good idea to know your test results and keep a list of the medicines you take. How can you care for yourself at home? · Eat a variety of foods every day. Good choices include fruits, vegetables, whole grains (like oatmeal), dried beans and peas, nuts and seeds, soy products (like tofu), and fat-free or low-fat dairy products. · Replace butter, margarine, and hydrogenated or partially hydrogenated oils with olive and canola oils. (Canola oil margarine without trans fat is fine.)  · Replace red meat with fish, poultry, and soy protein (like tofu). · Limit processed and packaged foods like chips, crackers, and cookies.   · Bake, broil, or steam foods. Don't mccoy them. · Be physically active. Get at least 30 minutes of exercise on most days of the week. Walking is a good choice. You also may want to do other activities, such as running, swimming, cycling, or playing tennis or team sports. · Stay at a healthy weight or lose weight by making the changes in eating and physical activity listed above. Losing just a small amount of weight, even 5 to 10 pounds, can reduce your risk for having a heart attack or stroke. · Do not smoke. When should you call for help? Watch closely for changes in your health, and be sure to contact your doctor if:    · You need help making lifestyle changes.     · You have questions about your medicine. Where can you learn more? Go to http://jose c-lencho.info/. Enter B358 in the search box to learn more about \"High Cholesterol: Care Instructions. \"  Current as of: July 22, 2018  Content Version: 12.1  © 0626-7033 [employer]. Care instructions adapted under license by [employer] (which disclaims liability or warranty for this information). If you have questions about a medical condition or this instruction, always ask your healthcare professional. [place] [address] any warranty or liability for your use of this information. Learning About How to Prevent a Stroke  What is a stroke? A stroke occurs when a blood vessel in the brain bursts or is blocked by a blood clot. Without blood and the oxygen it carries, part of the brain starts to die. The part of the body controlled by the damaged area of the brain can't work properly. But there are many things you can do to help lower your stroke risk. What increases your risk for stroke? A risk factor is anything that makes you more likely to have a particular health problem.   Risk factors for stroke that you can treat or change include:  · Health problems like high blood pressure, atrial fibrillation, diabetes, and high cholesterol. · Smoking. · Heavy use of alcohol. · Being overweight. · Not getting enough physical activity. Risk factors you can't change include:  · Age. The risk of stroke goes up as you get older. · Race.  Americans, Native Americans, and [place] Natives have a higher risk than those of other races. · Being female. Women have a higher risk of stroke than men. · Family history of stroke. Your doctor can help you know your risk. Then you and your can doctor talk about whether you need to lower it. What can you do to prevent a stroke? · Treat any health problems you have that raise your risk. · Adopt a heart-healthy lifestyle:  ? Don't smoke. If you need help quitting, talk to your doctor about stop-smoking programs and medicines. These can increase your chances of quitting for good. ? Limit alcohol to 2 drinks a day for men and 1 drink a day for women. ? Stay at a healthy weight. Lose weight if you need to.  ? If your doctor recommends it, get more exercise. Walking is a good choice. Bit by bit, increase the amount you walk every day. Try for at least 30 minutes on most days of the week. ? Eat heart-healthy foods. These include fruits, vegetables, high-fiber foods, and fish. Choose foods that are low in sodium, saturated fat, and trans fat. · Decide with your doctor whether you will also take medicines to help lower your risk. For example, you and your doctor may decide you will take a medicine that prevents blood clots. What are the symptoms of a stroke? The brain damage from a stroke starts within minutes. That's why it's so important to know the symptoms of stroke and to act fast. Quick treatment can help limit damage to the brain so that you have fewer problems after the stroke. FAST is a simple way to remember the main symptoms of stroke. Recognizing these symptoms helps you know when to call for medical help. FAST stands for:  · Face drooping.   · Arm weakness. · Speech difficulty. · Time to call 911. Follow-up care is a key part of your treatment and safety. Be sure to make and go to all appointments, and call your doctor if you are having problems. It's also a good idea to know your test results and keep a list of the medicines you take. Where can you learn more? Go to http://jose c-lencho.info/. Enter G757 in the search box to learn more about \"Learning About How to Prevent a Stroke. \"  Current as of: September 26, 2018  Content Version: 12.1  © 9678-5109 [employer]. Care instructions adapted under license by [employer] (which disclaims liability or warranty for this information). If you have questions about a medical condition or this instruction, always ask your healthcare professional. [address] any warranty or liability for your use of this information. Learning About How to Prevent Another Stroke  What can you do to prevent another stroke? After a stroke, people feel lots of different emotions. Some people are worried that they could have another stroke. Or they may feel overwhelmed by how much there is to learn and do. Some people feel sad or depressed. No matter what emotions you are feeling, you can give yourself some control and peace of mind by making a plan to lower your risk of having another stroke. Take your medicines  You'll need to take medicines to help prevent another stroke. Be sure to take your medicines exactly as prescribed. And don't stop taking them unless your doctor tells you to. If you stop taking your medicines, you can increase your risk of having another stroke. Some of the medicines your doctor may prescribe include:  · Aspirin or some other blood thinner to prevent blood clots. · Statins and other medicines to lower cholesterol. · Blood pressure medicines to lower blood pressure.   Manage other health problems  You can help lower your chance of having another stroke by managing certain other health problems. Problems that increase your risk of having another stroke include:  · High blood pressure. · High cholesterol. · Atrial fibrillation. · Diabetes. If you have any of these health problems, you can manage them with healthy lifestyle changes along with medicine. Adopt a healthy lifestyle  · Do not smoke or allow others to smoke around you. If you need help quitting, talk to your doctor about stop-smoking programs and medicines. These can increase your chances of quitting for good. Smoking makes a stroke more likely. · Limit alcohol to 2 drinks a day for men and 1 drink a day for women. · Lose weight if you need to. Controlling your weight will help you keep your heart and body healthy. · Be active. Ask your doctor what type and level of activity is safe for you. · Eat heart-healthy foods, like fruits, vegetables, and high-fiber foods. It's also important to:  · Get a flu shot every year. · Ask for help if you think you are depressed. Do stroke rehab  Taking part in a stroke rehabilitation (rehab) program will help you to regain skills you lost or make the most of your abilities after a stroke. It also helps you take steps to prevent another stroke. Your rehab team will give you education and support to help you build new, healthy habits. You'll learn how to manage any other health problems that you might have. Chaplin Artist also learn how to exercise safely, eat a healthy diet, and quit smoking if you smoke. Chaplin Artist work with your team to decide what lifestyle choices are best for you. If your doctor hasn't already suggested it, ask him or her if stroke rehab is right for you. Know stroke symptoms  Make sure you know the symptoms of stroke. FAST is a simple way to remember. Recognizing these symptoms helps you know when to call for medical help. FAST stands for:  · Face drooping. · Arm weakness. · Speech difficulty.   · Time to call 911.  Follow-up care is a key part of your treatment and safety. Be sure to make and go to all appointments, and call your doctor if you are having problems. It's also a good idea to know your test results and keep a list of the medicines you take. Where can you learn more? Go to http://jose c-lencho.info/. Enter S479 in the search box to learn more about \"Learning About How to Prevent Another Stroke. \"  Current as of: September 26, 2018  Content Version: 12.1  © 5051-9130 [employer]. Care instructions adapted under license by [employer] (which disclaims liability or warranty for this information). If you have questions about a medical condition or this instruction, always ask your healthcare professional. [address] any warranty or liability for your use of this information.

## 2019-08-29 NOTE — LETTER
8/29/19 Patient: Sam Camejo YOB: 1945 Date of Visit: 8/29/2019 Larry Conner NP 
[address] [address] VIA Facsimile: [phone] Dear Larry Conner NP, Thank you for referring Ms. Sam Camejo to [address] [place] [address] for evaluation. My notes for this consultation are attached. If you have questions, please do not hesitate to call me. I look forward to following your patient along with you. Sincerely, Nico Rodriguez MD

## 2019-08-29 NOTE — PROGRESS NOTES
NEUROLOGY CLINIC NOTE    Patient ID:  Lexi Castro  [id number]  9 y.o.  1945    Date of Consultation:  August 29, 2019    Reason for Consultation:  Follow up for Stroke    Chief Complaint   Patient presents with   [facility] Follow Up       History of Present Illness:     Patient Active Problem List    Diagnosis Date Noted    Stroke [facility]) 07/16/2019    TIA (transient ischemic attack) 07/15/2019    Flashers or floaters of right eye 08/21/2014    Fall at home 02/10/2014    Knee pain, left 02/10/2014    Prediabetes 02/10/2014    HTN, goal below 130/80 01/06/2014    Vitamin D deficiency 01/06/2014    Hypercholesterolemia 06/27/2013    Tiredness 06/06/2013    Depression, acute 06/06/2013     Past Medical History:   Diagnosis Date    Fall at home 2/10/2014   [facility] or floaters of right eye 8/21/2014    Hypertension     Stroke [facility])     2010      Past Surgical History:   Procedure Laterality Date    HX TUBAL LIGATION  1981      Prior to Admission medications    Medication Sig Start Date End Date Taking? Authorizing Provider   aspirin delayed-release 81 mg tablet Take  by mouth daily. Yes Provider, Historical   lisinopril (PRINIVIL, ZESTRIL) 20 mg tablet Take 20 mg by mouth daily. Yes Provider, Historical   albuterol (VENTOLIN HFA) 90 mcg/actuation inhaler Take 1 Puff by inhalation every four (4) hours as needed for Wheezing. Yes Provider, Historical   citalopram (CELEXA) 20 mg tablet Take 20 mg by mouth daily as needed. Yes Provider, Historical   mirtazapine (REMERON) 15 mg tablet Take 15 mg by mouth daily as needed. Yes Provider, Historical   latanoprost (XALATAN) 0.005 % ophthalmic solution Administer 1 Drop to both eyes nightly. Yes Provider, Historical   multivitamin (ONE A DAY) tablet Take 1 Tab by mouth daily.    Yes Provider, Historical     No Known Allergies   Social History     Tobacco Use    Smoking status: Current Every Day Smoker     Packs/day: 0.25     Years: 20.00 Pack years: 5.00    Smokeless tobacco: Never Used   Substance Use Topics    Alcohol use: No      Family History   Problem Relation Age of Onset    Hypertension Mother     Hypertension Brother         Subjective:      Lexi Castro is a 69 y.o. RHAAF with history of depression, hypertension, vitamin D deficiency and hypercholesterolemia who is here for follow up after a recent stroke. Patient was admitted to [facility] 7/15/2019 for right arm weakness and slurred speech. Day of admission, while she was driving she noticed numbness of the right arm. Felt heavy. Denies any overt weakness. She tried to rub it to bring back the sensation. Then she went into the grocery store and was dropping things with her right hand. She is also dropping groceries that she was loading into her truck. She then drove home to see if symptoms would resolve. At home she also noted that she was starting to have slurring of her speech. Also developed moderate intensity headaches. Talk to her granddaughter on the phone and eventually her daughter and was brought to the emergency room. In the ER blood pressure was 160/75. Laboratory work-up was unremarkable except for LDL of 97.2. Head CT without contrast did not reveal any acute process. Moderate to severe white matter disease. CT perfusion did not reveal any abnormality. Head and neck CTA did not reveal any flow-limiting stenosis or aneurysm. Noted necrotic 1.8 cm right thyroid nodule. Brain MRI revealed small left parietal lobe and right centrum semiovale infarct. Severe periventricular and subcortical white matter disease. When seen, patient reports resolution of her symptoms. Echocardiogram done was unremarkable. Patient was discharged 7/17/2019 on atorvastatin 40 mg daily and aspirin 81 mg daily. Since then, patient reports no recurrence of the right arm and hand weakness as well as slurred speech. She continues to do well.   She is compliant with aspirin 81 mg daily as well as atorvastatin 40 mg daily. She is doing the dietary modifications. No new complaints. Outside reports reviewed: ER records, radiology reports, lab reports, historical medical records. Review of Systems:    A comprehensive review of systems was negative except for:   Depression, fatigue, hearing loss, muscle pain, poor appetite, snoring    Objective:     Visit Vitals  /66   Pulse 80   Ht 5' 5\" (1.651 m)   Wt 165 lb (74.8 kg)   SpO2 99%   BMI 27.46 kg/m²       PHYSICAL EXAM:    NEUROLOGICAL EXAM:     Appearance: The patient is well developed, well nourished, provides a coherent history and is in no acute distress. Mental Status: Oriented to time, place and person. Fluent, no evidence of dysarthria or aphasia. Mood and affect appropriate. Cranial Nerves:   Intact visual fields. LOYD, EOM's full, no nystagmus, no ptosis. Facial sensation is normal. Corneal reflexes are intact. Facial movement is symmetric. Hearing is normal bilaterally. Palate is midline with normal sternocleidomastoid and trapezius muscles are normal. Tongue is midline. Motor:  5/5 strength in upper and lower proximal and distal muscles. Normal bulk and tone. No fasciculations. No pronator drift. Reflexes:   Deep tendon reflexes 1+/4 and symmetrical.  Downgoing toes. Sensory:   Normal to cold, pinprick and vibration. Gait:  Normal gait. no Romberg. Tremor:   No tremor noted. Cerebellar:  Intact FTN/SUSAN/HTS     Imaging  CT Head, head/neck CTA, brain MRI: reviewed    Lab Review      Assessment:   CVA  Hyperlipidemia    Plan:   Neurological examination was nonfocal.  No residual deficits. Status post left parietal lobe and right centrum semiovale small infarcts. Head CT without contrast did not reveal any acute abnormality. Moderate to severe white matter disease. Brain MRI revealed small acute left parietal lobe and right centrum semiovale stroke.  Severe periventricular and deep white matter disease. Patient will be prone to memory lapses as well. Head and neck CTA did not reveal any flow-limiting stenosis or aneurysm. Echocardiogram was unremarkable. Continue aspirin 81 mg daily for stroke prophylaxis. Continue routine structured physical exercises. Call 911 if new deficits occur. Patient is released for driving from a neurological standpoint. Start off with daytime driving and then proceed to nighttime driving if no issues. LDL was elevated and 97.2. It needs to be less than 70. Continue atorvastatin 40 mg daily. Prescriptions provided. Future prescriptions through her PCP. Recommend PCP checking lipid profile in the future. Advised compliance with dietary modifications and medications for hyperlipidemia. All questions and concerns were answered.     Visit lasted 40 minutes. Greater than 50% was spent reviewing her medical records as summarized above, reviewing actual MRI films with patient, discussion about her condition, etiology, prognosis, stroke prevention and prophylaxis, compliance with dietary modifications and medications for hyperlipidemia, release for driving, treatment options, medication    This note was created using voice recognition software. Despite editing, there may be syntax errors.

## 2019-11-05 ENCOUNTER — HOSPITAL ENCOUNTER (OUTPATIENT)
Dept: MRI IMAGING | Age: 74
Discharge: HOME | End: 2019-11-05
Payer: MEDICARE

## 2019-11-05 DIAGNOSIS — Z86.73 HISTORY OF STROKE: ICD-10-CM

## 2019-11-05 PROCEDURE — 70551 MRI BRAIN STEM W/O DYE: CPT

## 2019-11-07 ENCOUNTER — OFFICE VISIT (OUTPATIENT)
Dept: NEUROLOGY | Age: 74
End: 2019-11-07

## 2019-11-07 VITALS — HEIGHT: 65 IN | OXYGEN SATURATION: 93 % | WEIGHT: 160 LBS | BODY MASS INDEX: 26.66 KG/M2 | HEART RATE: 92 BPM

## 2019-11-07 DIAGNOSIS — E78.2 MIXED HYPERLIPIDEMIA: ICD-10-CM

## 2019-11-07 DIAGNOSIS — I65.02 VERTEBRAL ARTERY OCCLUSION, LEFT: ICD-10-CM

## 2019-11-07 DIAGNOSIS — I63.9 CEREBELLAR INFARCT (HCC): Primary | ICD-10-CM

## 2019-11-07 DIAGNOSIS — Z86.73 HISTORY OF CVA (CEREBROVASCULAR ACCIDENT): ICD-10-CM

## 2019-11-07 DIAGNOSIS — I10 ESSENTIAL HYPERTENSION: ICD-10-CM

## 2019-11-07 RX ORDER — LISINOPRIL 10 MG/1
TABLET ORAL
COMMUNITY
Start: 2019-11-04

## 2019-11-07 RX ORDER — ASPIRIN 325 MG
325 TABLET ORAL DAILY
Qty: 30 TAB | Refills: 11
Start: 2019-11-07 | End: 2019-12-06

## 2019-11-07 RX ORDER — MIRTAZAPINE 30 MG/1
30 TABLET, FILM COATED ORAL
COMMUNITY
Start: 2019-10-04

## 2019-11-07 NOTE — PATIENT INSTRUCTIONS
High Cholesterol: Care Instructions Your Care Instructions Cholesterol is a type of fat in your blood. It is needed for many body functions, such as making new cells. Cholesterol is made by your body. It also comes from food you eat. High cholesterol means that you have too much of the fat in your blood. This raises your risk of a heart attack and stroke. LDL and HDL are part of your total cholesterol. LDL is the \"bad\" cholesterol. High LDL can raise your risk for heart disease, heart attack, and stroke. HDL is the \"good\" cholesterol. It helps clear bad cholesterol from the body. High HDL is linked with a lower risk of heart disease, heart attack, and stroke. Your cholesterol levels help your doctor find out your risk for having a heart attack or stroke. You and your doctor can talk about whether you need to lower your risk and what treatment is best for you. A heart-healthy lifestyle along with medicines can help lower your cholesterol and your risk. The way you choose to lower your risk will depend on how high your risk is for heart attack and stroke. It will also depend on how you feel about taking medicines. Follow-up care is a key part of your treatment and safety. Be sure to make and go to all appointments, and call your doctor if you are having problems. It's also a good idea to know your test results and keep a list of the medicines you take. How can you care for yourself at home? · Eat a variety of foods every day. Good choices include fruits, vegetables, whole grains (like oatmeal), dried beans and peas, nuts and seeds, soy products (like tofu), and fat-free or low-fat dairy products. · Replace butter, margarine, and hydrogenated or partially hydrogenated oils with olive and canola oils. (Canola oil margarine without trans fat is fine.) · Replace red meat with fish, poultry, and soy protein (like tofu). · Limit processed and packaged foods like chips, crackers, and cookies. · Bake, broil, or steam foods. Don't mccoy them. · Be physically active. Get at least 30 minutes of exercise on most days of the week. Walking is a good choice. You also may want to do other activities, such as running, swimming, cycling, or playing tennis or team sports. · Stay at a healthy weight or lose weight by making the changes in eating and physical activity listed above. Losing just a small amount of weight, even 5 to 10 pounds, can reduce your risk for having a heart attack or stroke. · Do not smoke. When should you call for help? Watch closely for changes in your health, and be sure to contact your doctor if: 
  · You need help making lifestyle changes.  
  · You have questions about your medicine. Where can you learn more? Go to http://jose cRue La Lalencho.info/. Enter H249 in the search box to learn more about \"High Cholesterol: Care Instructions. \" Current as of: April 9, 2019 Content Version: 12.2 © 3716-8534 [employer]. Care instructions adapted under license by [employer] (which disclaims liability or warranty for this information). If you have questions about a medical condition or this instruction, always ask your healthcare professional. [address] any warranty or liability for your use of this information. High Blood Pressure: Care Instructions Overview It's normal for blood pressure to go up and down throughout the day. But if it stays up, you have high blood pressure. Another name for high blood pressure is hypertension. Despite what a lot of people think, high blood pressure usually doesn't cause headaches or make you feel dizzy or lightheaded. It usually has no symptoms. But it does increase your risk of stroke, heart attack, and other problems. You and your doctor will talk about your risks of these problems based on your blood pressure. Your doctor will give you a goal for your blood pressure. Your goal will be based on your health and your age. Lifestyle changes, such as eating healthy and being active, are always important to help lower blood pressure. You might also take medicine to reach your blood pressure goal. 
Follow-up care is a key part of your treatment and safety. Be sure to make and go to all appointments, and call your doctor if you are having problems. It's also a good idea to know your test results and keep a list of the medicines you take. How can you care for yourself at home? Medical treatment · If you stop taking your medicine, your blood pressure will go back up. You may take one or more types of medicine to lower your blood pressure. Be safe with medicines. Take your medicine exactly as prescribed. Call your doctor if you think you are having a problem with your medicine. · Talk to your doctor before you start taking aspirin every day. Aspirin can help certain people lower their risk of a heart attack or stroke. But taking aspirin isn't right for everyone, because it can cause serious bleeding. · See your doctor regularly. You may need to see the doctor more often at first or until your blood pressure comes down. · If you are taking blood pressure medicine, talk to your doctor before you take decongestants or anti-inflammatory medicine, such as ibuprofen. Some of these medicines can raise blood pressure. · Learn how to check your blood pressure at home. Lifestyle changes · Stay at a healthy weight. This is especially important if you put on weight around the waist. Losing even 10 pounds can help you lower your blood pressure. · If your doctor recommends it, get more exercise. Walking is a good choice. Bit by bit, increase the amount you walk every day. Try for at least 30 minutes on most days of the week. You also may want to swim, bike, or do other activities. · Avoid or limit alcohol. Talk to your doctor about whether you can drink any alcohol. · Try to limit how much sodium you eat to less than 2,300 milligrams (mg) a day. Your doctor may ask you to try to eat less than 1,500 mg a day. · Eat plenty of fruits (such as bananas and oranges), vegetables, legumes, whole grains, and low-fat dairy products. · Lower the amount of saturated fat in your diet. Saturated fat is found in animal products such as milk, cheese, and meat. Limiting these foods may help you lose weight and also lower your risk for heart disease. · Do not smoke. Smoking increases your risk for heart attack and stroke. If you need help quitting, talk to your doctor about stop-smoking programs and medicines. These can increase your chances of quitting for good. When should you call for help? Call  911 anytime you think you may need emergency care. This may mean having symptoms that suggest that your blood pressure is causing a serious heart or blood vessel problem. Your blood pressure may be over 180/120. 
 For example, call  911 if: 
  · You have symptoms of a heart attack. These may include: 
? Chest pain or pressure, or a strange feeling in the chest. 
? Sweating. ? Shortness of breath. ? Nausea or vomiting. ? Pain, pressure, or a strange feeling in the back, neck, jaw, or upper belly or in one or both shoulders or arms. ? Lightheadedness or sudden weakness. ? A fast or irregular heartbeat.  
  · You have symptoms of a stroke. These may include: 
? Sudden numbness, tingling, weakness, or loss of movement in your face, arm, or leg, especially on only one side of your body. ? Sudden vision changes. ? Sudden trouble speaking. ? Sudden confusion or trouble understanding simple statements. ? Sudden problems with walking or balance. ? A sudden, severe headache that is different from past headaches.  
  · You have severe back or belly pain.  Do not wait until your blood pressure comes down on its own. Get help right away. 
 Call your doctor now or seek immediate care if: 
  · Your blood pressure is much higher than normal (such as 180/120 or higher), but you don't have symptoms.  
  · You think high blood pressure is causing symptoms, such as: 
? Severe headache. 
? Blurry vision.  
 Watch closely for changes in your health, and be sure to contact your doctor if: 
  · Your blood pressure measures higher than your doctor recommends at least 2 times. That means the top number is higher or the bottom number is higher, or both.  
  · You think you may be having side effects from your blood pressure medicine. Where can you learn more? Go to http://jose c-lencho.info/. Enter O297 in the search box to learn more about \"High Blood Pressure: Care Instructions. \" Current as of: April 9, 2019 Content Version: 12.2 © 0099-7031 [employer]. Care instructions adapted under license by [employer] (which disclaims liability or warranty for this information). If you have questions about a medical condition or this instruction, always ask your healthcare professional. [place] [address] any warranty or liability for your use of this information. Learning About How to Prevent a Stroke What is a stroke? A stroke occurs when a blood vessel in the brain bursts or is blocked by a blood clot. Without blood and the oxygen it carries, part of the brain starts to die. The part of the body controlled by the damaged area of the brain can't work properly. But there are many things you can do to help lower your stroke risk. What increases your risk for stroke? A risk factor is anything that makes you more likely to have a particular health problem. Risk factors for stroke that you can treat or change include: 
· Health problems like high blood pressure, atrial fibrillation, diabetes, and high cholesterol. · Smoking. · Heavy use of alcohol. · Being overweight. · Not getting enough physical activity. Risk factors you can't change include: · Age. The risk of stroke goes up as you get older. · Race.  Americans, Native Americans, and [place] Natives have a higher risk than those of other races. · Being female. Women have a higher risk of stroke than men. · Family history of stroke. Your doctor can help you know your risk. Then you and your can doctor talk about whether you need to lower it. What can you do to prevent a stroke? · Treat any health problems you have that raise your risk. · Adopt a heart-healthy lifestyle: ? Don't smoke. If you need help quitting, talk to your doctor about stop-smoking programs and medicines. These can increase your chances of quitting for good. ? Limit alcohol to 2 drinks a day for men and 1 drink a day for women. ? Stay at a healthy weight. Lose weight if you need to. 
? If your doctor recommends it, get more exercise. Walking is a good choice. Bit by bit, increase the amount you walk every day. Try for at least 30 minutes on most days of the week. ? Eat heart-healthy foods. These include fruits, vegetables, high-fiber foods, and fish. Choose foods that are low in sodium, saturated fat, and trans fat. · Decide with your doctor whether you will also take medicines to help lower your risk. For example, you and your doctor may decide you will take a medicine that prevents blood clots. What are the symptoms of a stroke? The brain damage from a stroke starts within minutes. That's why it's so important to know the symptoms of stroke and to act fast. Quick treatment can help limit damage to the brain so that you have fewer problems after the stroke. FAST is a simple way to remember the main symptoms of stroke. Recognizing these symptoms helps you know when to call for medical help. FAST stands for: 
· Face drooping. · Arm weakness. · Speech difficulty. · Time to call 911. Follow-up care is a key part of your treatment and safety. Be sure to make and go to all appointments, and call your doctor if you are having problems. It's also a good idea to know your test results and keep a list of the medicines you take. Where can you learn more? Go to http://jose c-lencho.info/. Enter G757 in the search box to learn more about \"Learning About How to Prevent a Stroke. \" Current as of: September 26, 2018 Content Version: 12.2 © 3922-2701 [employer]. Care instructions adapted under license by [employer] (which disclaims liability or warranty for this information). If you have questions about a medical condition or this instruction, always ask your healthcare professional. [place] [address] any warranty or liability for your use of this information. Learning About How to Prevent Another Stroke What can you do to prevent another stroke? After a stroke, people feel lots of different emotions. Some people are worried that they could have another stroke. Or they may feel overwhelmed by how much there is to learn and do. Some people feel sad or depressed. No matter what emotions you are feeling, you can give yourself some control and peace of mind by making a plan to lower your risk of having another stroke. Take your medicines You'll need to take medicines to help prevent another stroke. Be sure to take your medicines exactly as prescribed. And don't stop taking them unless your doctor tells you to. If you stop taking your medicines, you can increase your risk of having another stroke. Some of the medicines your doctor may prescribe include: · Aspirin or some other blood thinner to prevent blood clots. · Statins and other medicines to lower cholesterol. · Blood pressure medicines to lower blood pressure. Manage other health problems You can help lower your chance of having another stroke by managing certain other health problems. Problems that increase your risk of having another stroke include: · High blood pressure. · High cholesterol. · Atrial fibrillation. · Diabetes. If you have any of these health problems, you can manage them with healthy lifestyle changes along with medicine. Adopt a healthy lifestyle · Do not smoke or allow others to smoke around you. If you need help quitting, talk to your doctor about stop-smoking programs and medicines. These can increase your chances of quitting for good. Smoking makes a stroke more likely. · Limit alcohol to 2 drinks a day for men and 1 drink a day for women. · Lose weight if you need to. Controlling your weight will help you keep your heart and body healthy. · Be active. Ask your doctor what type and level of activity is safe for you. · Eat heart-healthy foods, like fruits, vegetables, and high-fiber foods. It's also important to: · Get a flu shot every year. · Ask for help if you think you are depressed. Do stroke rehab Taking part in a stroke rehabilitation (rehab) program will help you to regain skills you lost or make the most of your abilities after a stroke. It also helps you take steps to prevent another stroke. Your rehab team will give you education and support to help you build new, healthy habits. You'll learn how to manage any other health problems that you might have. Ashley Fernandez also learn how to exercise safely, eat a healthy diet, and quit smoking if you smoke. Ashley Fernandez work with your team to decide what lifestyle choices are best for you. If your doctor hasn't already suggested it, ask him or her if stroke rehab is right for you. Know stroke symptoms Make sure you know the symptoms of stroke. FAST is a simple way to remember. Recognizing these symptoms helps you know when to call for medical help. FAST stands for: 
· Face drooping. · Arm weakness. · Speech difficulty. · Time to call 911. Follow-up care is a key part of your treatment and safety. Be sure to make and go to all appointments, and call your doctor if you are having problems. It's also a good idea to know your test results and keep a list of the medicines you take. Where can you learn more? Go to http://jose c-lencho.info/. Enter F456 in the search box to learn more about \"Learning About How to Prevent Another Stroke. \" Current as of: September 26, 2018 Content Version: 12.2 © 5159-4406 [employer]. Care instructions adapted under license by [employer] (which disclaims liability or warranty for this information). If you have questions about a medical condition or this instruction, always ask your healthcare professional. [address] any warranty or liability for your use of this information.

## 2019-11-07 NOTE — PROGRESS NOTES
NEUROLOGY CLINIC NOTE    Patient ID:  Elayne Rangel  [id number]  59 y.o.  1945    Date of Consultation:  November 7, 2019    Reason for Consultation:  Stroke    Chief Complaint   Patient presents with    Follow-up       History of Present Illness:     Patient Active Problem List    Diagnosis Date Noted    Stroke [facility]) 07/16/2019    TIA (transient ischemic attack) 07/15/2019    Flashers or floaters of right eye 08/21/2014    Fall at home 02/10/2014    Knee pain, left 02/10/2014    Prediabetes 02/10/2014    HTN, goal below 130/80 01/06/2014    Vitamin D deficiency 01/06/2014    Hypercholesterolemia 06/27/2013    Tiredness 06/06/2013    Depression, acute 06/06/2013     Past Medical History:   Diagnosis Date    Fall at home 2/10/2014   [facility] or floaters of right eye 8/21/2014    Hypertension     Stroke [facility])     2010      Past Surgical History:   Procedure Laterality Date    HX TUBAL LIGATION  1981      Prior to Admission medications    Medication Sig Start Date End Date Taking? Authorizing Provider   aspirin delayed-release 81 mg tablet Take  by mouth daily. Yes Provider, Historical   atorvastatin (LIPITOR) 40 mg tablet Take 1 Tab by mouth daily. 8/29/19  Yes Santiago Palencia MD   lisinopril (PRINIVIL, ZESTRIL) 20 mg tablet Take 20 mg by mouth daily. Yes Provider, Historical   albuterol (VENTOLIN HFA) 90 mcg/actuation inhaler Take 1 Puff by inhalation every four (4) hours as needed for Wheezing. Yes Provider, Historical   citalopram (CELEXA) 20 mg tablet Take 20 mg by mouth daily as needed. Yes Provider, Historical   mirtazapine (REMERON) 15 mg tablet Take 15 mg by mouth daily as needed. Yes Provider, Historical   latanoprost (XALATAN) 0.005 % ophthalmic solution Administer 1 Drop to both eyes nightly. Yes Provider, Historical   multivitamin (ONE A DAY) tablet Take 1 Tab by mouth daily.    Yes Provider, Historical     No Known Allergies   Social History     Tobacco Use    Smoking status: Current Every Day Smoker     Packs/day: 0.25     Years: 20.00     Pack years: 5.00    Smokeless tobacco: Never Used   Substance Use Topics    Alcohol use: No      Family History   Problem Relation Age of Onset    Hypertension Mother     Hypertension Brother         Subjective:      Maddi Choudhury is a 69 y.o. RHAAF with history of depression, hypertension, vitamin D deficiency and hypercholesterolemia who is here for follow up after a recent stroke. Patient was admitted to [address] [place] [address] 7/15/2019 for right arm weakness and slurred speech. Day of admission, while she was driving she noticed numbness of the right arm. Felt heavy. Denies any overt weakness. She tried to rub it to bring back the sensation. Then she went into the grocery store and was dropping things with her right hand. She is also dropping groceries that she was loading into her truck. She then drove home to see if symptoms would resolve. At home she also noted that she was starting to have slurring of her speech. Also developed moderate intensity headaches. Talk to her granddaughter on the phone and eventually her daughter and was brought to the emergency room. In the ER blood pressure was 160/75. Laboratory work-up was unremarkable except for LDL of 97.2. Head CT without contrast did not reveal any acute process. Moderate to severe white matter disease. CT perfusion did not reveal any abnormality. Head and neck CTA did not reveal any flow-limiting stenosis or aneurysm. Noted necrotic 1.8 cm right thyroid nodule. Brain MRI revealed small left parietal lobe and right centrum semiovale infarct. Severe periventricular and subcortical white matter disease. When seen, patient reports resolution of her symptoms. Echocardiogram done was unremarkable. Patient was discharged 7/17/2019 on atorvastatin 40 mg daily and aspirin 81 mg daily.     Since then, patient reports no recurrence of the right arm and hand weakness as well as slurred speech. She continues to do well. She is compliant with aspirin 81 mg daily as well as atorvastatin 40 mg daily. She is doing the dietary modifications. No new complaints. Since the last visit on 8/29/2019, patient reportedly was doing fine until 11/1/2019 when she developed sudden onset of dizziness and when she was walking she was leaning to the left. Also noted slurring of her speech and blurred vision. Denies any weakness or sensory loss. Patient saw her PCP last 11/4/2019 for evaluation. Labs done 11/4/2019 revealed unremarkable CBC, INR, PT, PTT, B12, folate, homocysteine and LDL of 68. Brain MRI without contrast 11/5/2019 revealed new small infarct left middle cerebellar peduncle. Abnormal T2 hypointense signal seen throughout the left internal auditory canal with an apparent small soft tissue lesion in the left cerebellopontine angle cistern measuring 7 x 7 mm. 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 Abnormal flow void signal within the left vertebral artery V4 segment. Unchanged severe chronic microvascular ischemic disease. Unchanged numerous small chronic infarcts including bilateral thalami, autumn and middle cerebellar peduncles. Unchanged few scattered chronic microhemorrhages (left superior parietal lobe, right lateral frontal lobe, right parietal lobe, bilateral temporal and occipital lobes). A left suboccipital cranioplasty is again seen. Patient reports she is improving. Less issue with imbalance. Still having some slurred speech. Blurring vision is also improved. She apparently has not been taking aspirin 81 mg daily for several months now.     Outside reports reviewed: radiology reports, lab reports    Review of Systems:    A comprehensive review of systems was negative except for:   Weight loss, fatigue, chills, night sweats, blurry vision, dry eyes, constipation, increased urinary frequency, hair loss, weakness, depression, dizziness, balance issues    Objective: Visit Vitals  Pulse 92   Ht 5' 5\" (1.651 m)   Wt 160 lb (72.6 kg)   SpO2 93%   BMI 26.63 kg/m²       PHYSICAL EXAM:    NEUROLOGICAL EXAM:     Appearance: The patient is well developed, well nourished, provides a coherent history and is in no acute distress. Mental Status: Oriented to time, place and person. Fluent, no aphasia but with ataxic dysarthria. Mood and affect appropriate. Cranial Nerves:   Intact visual fields. LOYD, EOM's full, no nystagmus, no ptosis. Facial sensation is normal. Corneal reflexes are intact. Facial movement is symmetric. Hearing is normal bilaterally. Palate is midline with normal sternocleidomastoid and trapezius muscles are normal. Tongue is midline. Motor:  5/5 strength. Normal bulk and tone. No fasciculations. No pronator drift. Reflexes:   Deep tendon reflexes 1+/4 and symmetrical.  Downgoing toes. Sensory:   Normal to cold, pinprick and vibration. Gait:  Slightly leans to the left. No Romberg but with truncal instability. Unable to do tandem walking. Tremor:   No tremor noted. Cerebellar:  Dysmetria and ataxia left FTN/SUSAN/HTS     Imaging  Brain MRI: reviewed    Labs Reviewed    Assessment:   Left cerebellar infarct - acute  Left vertebral artery occlusion  History of CVA  Hyperlipidemia  Essential hypertension    Plan:   Neurological examination reveals ataxic dysarthria, left-sided ataxia and clumsiness and gait ataxia. Status post acute left middle cerebellar peduncle infarct. Brain MRI confirms this acute finding. No other new strokes are found. Patient apparently has been noncompliant with her aspirin. Start aspirin 325 mg daily for stroke prophylaxis. Plan is to do aspirin testing in 1 month to see if patient is a responder or not. Patient referred for home health speech, PT and OT. Patient recently just had extensive stroke risk stratification work-up done. No need to repeat. Patient was advised to call 911 if new deficits occur.   Daughter was on the phone during the visit. All questions and concerns were answered. Patient is not to drive until cleared by neurology. Brain MRI without contrast done July 2019 and the one done recently revealed abnormal flow void signal within the left vertebral artery V4 segment. However head and neck CTA did not reveal any flow-limiting stenosis or aneurysm. Treatment for this would be antiplatelet therapy. There is a question of whether the patient has a left schwannoma. Imaging also shows old left cranioplasty. I would suspect this was related to this issue. Status post left parietal lobe and right centrum semiovale small infarcts. Previous Head CT without contrast did not reveal any acute abnormality. Moderate to severe white matter disease. Brain MRI revealed small acute left parietal lobe and right centrum semiovale stroke. Severe periventricular and deep white matter disease. Patient will be prone to memory lapses as well. Head and neck CTA did not reveal any flow-limiting stenosis or aneurysm. Echocardiogram was unremarkable. LDL now at goal of <70. Continue atorvastatin 40 mg daily. Prescriptions provided. Advised compliance with dietary modifications and medications for hyperlipidemia. Patient is advised with daughter listening to check her blood pressures twice a day (upon waking up and before bed) to see if it is constantly less than 130/80. If higher than she needs adjustment of her blood pressure medications. Advised strict compliance with dietary modifications and medications for hypertension. All questions and concerns were answered.     This note was created using voice recognition software. Despite editing, there may be syntax errors.

## 2019-11-08 ENCOUNTER — DOCUMENTATION ONLY (OUTPATIENT)
Dept: NEUROLOGY | Age: 74
End: 2019-11-08

## 2019-11-08 ENCOUNTER — HOME HEALTH ADMISSION (OUTPATIENT)
Dept: HOME HEALTH SERVICES | Facility: HOME HEALTH | Age: 74
End: 2019-11-08

## 2019-11-08 ENCOUNTER — TELEPHONE (OUTPATIENT)
Dept: NEUROLOGY | Age: 74
End: 2019-11-08

## 2019-11-08 NOTE — TELEPHONE ENCOUNTER
----- Message from Jada Mcdermott sent at 11/8/2019  3:46 PM EST -----  Regarding: Dr. Glynn Crocker first and last name: [address]      Reason for call: Advising that occupation therapy will not be able to visit pt until the week of 11/17/19.       Callback required yes/no and why: yes      Best contact number(s): [phone]      Details to clarify the request:      Jada Mcdermott

## 2019-11-09 ENCOUNTER — APPOINTMENT (OUTPATIENT)
Dept: CT IMAGING | Age: 74
End: 2019-11-09
Attending: EMERGENCY MEDICINE
Payer: MEDICARE

## 2019-11-09 ENCOUNTER — HOSPITAL ENCOUNTER (EMERGENCY)
Age: 74
Discharge: HOME | End: 2019-11-09
Attending: EMERGENCY MEDICINE
Payer: MEDICARE

## 2019-11-09 VITALS
RESPIRATION RATE: 18 BRPM | SYSTOLIC BLOOD PRESSURE: 177 MMHG | DIASTOLIC BLOOD PRESSURE: 96 MMHG | OXYGEN SATURATION: 96 % | HEART RATE: 86 BPM | TEMPERATURE: 97.9 F | WEIGHT: 156.75 LBS | HEIGHT: 66 IN | BODY MASS INDEX: 25.19 KG/M2

## 2019-11-09 DIAGNOSIS — R53.1 WEAKNESS: Primary | ICD-10-CM

## 2019-11-09 LAB
ALBUMIN SERPL-MCNC: 3.9 G/DL (ref 3.5–5)
ALBUMIN/GLOB SERPL: 0.8 {RATIO} (ref 1.1–2.2)
ALP SERPL-CCNC: 158 U/L (ref 45–117)
ALT SERPL-CCNC: 16 U/L (ref 12–78)
ANION GAP SERPL CALC-SCNC: 4 MMOL/L (ref 5–15)
APPEARANCE UR: CLEAR
AST SERPL-CCNC: 20 U/L (ref 15–37)
BASOPHILS # BLD: 0 K/UL (ref 0–0.1)
BASOPHILS NFR BLD: 1 % (ref 0–1)
BILIRUB SERPL-MCNC: 0.6 MG/DL (ref 0.2–1)
BILIRUB UR QL: NEGATIVE
BUN SERPL-MCNC: 13 MG/DL (ref 6–20)
BUN/CREAT SERPL: 14 (ref 12–20)
CALCIUM SERPL-MCNC: 9.5 MG/DL (ref 8.5–10.1)
CHLORIDE SERPL-SCNC: 108 MMOL/L (ref 97–108)
CO2 SERPL-SCNC: 28 MMOL/L (ref 21–32)
COLOR UR: ABNORMAL
CREAT SERPL-MCNC: 0.96 MG/DL (ref 0.55–1.02)
DIFFERENTIAL METHOD BLD: ABNORMAL
EOSINOPHIL # BLD: 0.1 K/UL (ref 0–0.4)
EOSINOPHIL NFR BLD: 2 % (ref 0–7)
ERYTHROCYTE [DISTWIDTH] IN BLOOD BY AUTOMATED COUNT: 14.8 % (ref 11.5–14.5)
GLOBULIN SER CALC-MCNC: 5 G/DL (ref 2–4)
GLUCOSE SERPL-MCNC: 74 MG/DL (ref 65–100)
GLUCOSE UR STRIP.AUTO-MCNC: NEGATIVE MG/DL
HCT VFR BLD AUTO: 45.6 % (ref 35–47)
HGB BLD-MCNC: 13.8 G/DL (ref 11.5–16)
HGB UR QL STRIP: NEGATIVE
IMM GRANULOCYTES # BLD AUTO: 0 K/UL (ref 0–0.04)
IMM GRANULOCYTES NFR BLD AUTO: 0 % (ref 0–0.5)
KETONES UR QL STRIP.AUTO: 15 MG/DL
LEUKOCYTE ESTERASE UR QL STRIP.AUTO: NEGATIVE
LYMPHOCYTES # BLD: 2.3 K/UL (ref 0.8–3.5)
LYMPHOCYTES NFR BLD: 40 % (ref 12–49)
MCH RBC QN AUTO: 26.7 PG (ref 26–34)
MCHC RBC AUTO-ENTMCNC: 30.3 G/DL (ref 30–36.5)
MCV RBC AUTO: 88.4 FL (ref 80–99)
MONOCYTES # BLD: 0.5 K/UL (ref 0–1)
MONOCYTES NFR BLD: 9 % (ref 5–13)
NEUTS SEG # BLD: 2.7 K/UL (ref 1.8–8)
NEUTS SEG NFR BLD: 48 % (ref 32–75)
NITRITE UR QL STRIP.AUTO: NEGATIVE
NRBC # BLD: 0 K/UL (ref 0–0.01)
NRBC BLD-RTO: 0 PER 100 WBC
PH UR STRIP: 6 [PH] (ref 5–8)
PLATELET # BLD AUTO: 343 K/UL (ref 150–400)
PMV BLD AUTO: 10.7 FL (ref 8.9–12.9)
POTASSIUM SERPL-SCNC: 4.4 MMOL/L (ref 3.5–5.1)
PROT SERPL-MCNC: 8.9 G/DL (ref 6.4–8.2)
PROT UR STRIP-MCNC: NEGATIVE MG/DL
RBC # BLD AUTO: 5.16 M/UL (ref 3.8–5.2)
SODIUM SERPL-SCNC: 140 MMOL/L (ref 136–145)
SP GR UR REFRACTOMETRY: 1.01 (ref 1–1.03)
UROBILINOGEN UR QL STRIP.AUTO: 1 EU/DL (ref 0.2–1)
WBC # BLD AUTO: 5.7 K/UL (ref 3.6–11)

## 2019-11-09 PROCEDURE — 81003 URINALYSIS AUTO W/O SCOPE: CPT

## 2019-11-09 PROCEDURE — 85025 COMPLETE CBC W/AUTO DIFF WBC: CPT

## 2019-11-09 PROCEDURE — 36415 COLL VENOUS BLD VENIPUNCTURE: CPT

## 2019-11-09 PROCEDURE — 80053 COMPREHEN METABOLIC PANEL: CPT

## 2019-11-09 PROCEDURE — 70450 CT HEAD/BRAIN W/O DYE: CPT

## 2019-11-09 PROCEDURE — 99284 EMERGENCY DEPT VISIT MOD MDM: CPT

## 2019-11-09 NOTE — ED PROVIDER NOTES
EMERGENCY DEPARTMENT HISTORY AND PHYSICAL EXAM          Date: 11/9/2019  Patient Name: Alvina Montero    History of Presenting Illness     Chief Complaint   Patient presents with    Fatigue     pt was evaluated for stroke-like sx last friday. today she presents feeling \"off balance\" and nauseous as well as extreme fatigue. She woke up feeling that way this morning    Dizziness    Nausea       History Provided By: Patient    HPI: Alvina Montero is a 69 y.o. female, pmhx HTN,stroke, who presents ambulatory to the ED c/o  I dont feel right    Pt was treated here last week for a stroke. She notes today when she woke up she didn't feel right. She states she feels nauseated and weak. She denies any fevers, vomiting, chest pain, AP, BP, dysuria but notes she does feel cold and has been urinating frequently. PCP: Pablo Hood NP    Allergies: NKDA  Social Hx: +tobacco, -EtOH, -Illicit Drugs    There are no other complaints, changes, or physical findings at this time. Current Outpatient Medications   Medication Sig Dispense Refill    mirtazapine (REMERON) 30 mg tablet       lisinopril (PRINIVIL, ZESTRIL) 10 mg tablet       aspirin (ASPIRIN) 325 mg tablet Take 1 Tab by mouth daily. 30 Tab 11    atorvastatin (LIPITOR) 40 mg tablet Take 1 Tab by mouth daily. 30 Tab 3    albuterol (VENTOLIN HFA) 90 mcg/actuation inhaler Take 1 Puff by inhalation every four (4) hours as needed for Wheezing.  citalopram (CELEXA) 20 mg tablet Take 20 mg by mouth daily as needed.  latanoprost (XALATAN) 0.005 % ophthalmic solution Administer 1 Drop to both eyes nightly.  multivitamin (ONE A DAY) tablet Take 1 Tab by mouth daily.          Past History     Past Medical History:  Past Medical History:   Diagnosis Date    Fall at home 2/10/2014   [facility] or floaters of right eye 8/21/2014    Hypertension     Stroke [facility])     2010       Past Surgical History:  Past Surgical History:   Procedure Laterality Date    HX TUBAL LIGATION         Family History:  Family History   Problem Relation Age of Onset    Hypertension Mother     Hypertension Brother        Social History:  Social History     Tobacco Use    Smoking status: Former Smoker     Packs/day: 0.25     Years: 20.00     Pack years: 5.00     Last attempt to quit: 10/26/2019     Years since quittin.0    Smokeless tobacco: Never Used   Substance Use Topics    Alcohol use: No    Drug use: No       Allergies:  No Known Allergies      Review of Systems   Review of Systems   Constitutional: Positive for chills. Negative for activity change, appetite change, fever and unexpected weight change. HENT: Negative for congestion. Eyes: Negative for pain and visual disturbance. Respiratory: Negative for cough and shortness of breath. Cardiovascular: Negative for chest pain. Gastrointestinal: Negative for abdominal pain, diarrhea, nausea and vomiting. Genitourinary: Positive for frequency. Negative for difficulty urinating, dysuria, hematuria and urgency. Musculoskeletal: Negative for back pain. Skin: Negative for rash. Neurological: Positive for weakness (left leg weakness she noticed this morning when walking down the hallway). Negative for headaches. Physical Exam   Physical Exam   Constitutional: She is oriented to person, place, and time. She appears well-developed and well-nourished. Thin elderly female currently in minimal distress   HENT:   Head: Normocephalic and atraumatic. Mouth/Throat: Oropharynx is clear and moist.   Eyes: Pupils are equal, round, and reactive to light. Conjunctivae and EOM are normal. Right eye exhibits no discharge. Left eye exhibits no discharge. Neck: Normal range of motion. Neck supple. Cardiovascular: Normal rate, regular rhythm and normal heart sounds. No murmur heard. Pulmonary/Chest: Effort normal and breath sounds normal. No respiratory distress. She has no wheezes. She has no rales.    Abdominal: Soft. Bowel sounds are normal. She exhibits no distension and no mass. There is no tenderness. There is no rebound and no guarding. Musculoskeletal: Normal range of motion. She exhibits no edema, tenderness or deformity. Neurological: She is alert and oriented to person, place, and time. She has normal strength. She displays no tremor. A sensory deficit is present. No cranial nerve deficit. She exhibits normal muscle tone. Coordination normal.   Skin: Skin is warm and dry. No rash noted. She is not diaphoretic. Nursing note and vitals reviewed. Diagnostic Study Results     Labs -     Recent Results (from the past 12 hour(s))   CBC WITH AUTOMATED DIFF    Collection Time: 11/09/19  4:21 PM   Result Value Ref Range    WBC 5.7 3.6 - 11.0 K/uL    RBC 5.16 3.80 - 5.20 M/uL    HGB 13.8 11.5 - 16.0 g/dL    HCT 45.6 35.0 - 47.0 %    MCV 88.4 80.0 - 99.0 FL    MCH 26.7 26.0 - 34.0 PG    MCHC 30.3 30.0 - 36.5 g/dL    RDW 14.8 (H) 11.5 - 14.5 %    PLATELET 906 515 - 177 K/uL    MPV 10.7 8.9 - 12.9 FL    NRBC 0.0 0  WBC    ABSOLUTE NRBC 0.00 0.00 - 0.01 K/uL    NEUTROPHILS 48 32 - 75 %    LYMPHOCYTES 40 12 - 49 %    MONOCYTES 9 5 - 13 %    EOSINOPHILS 2 0 - 7 %    BASOPHILS 1 0 - 1 %    IMMATURE GRANULOCYTES 0 0.0 - 0.5 %    ABS. NEUTROPHILS 2.7 1.8 - 8.0 K/UL    ABS. LYMPHOCYTES 2.3 0.8 - 3.5 K/UL    ABS. MONOCYTES 0.5 0.0 - 1.0 K/UL    ABS. EOSINOPHILS 0.1 0.0 - 0.4 K/UL    ABS. BASOPHILS 0.0 0.0 - 0.1 K/UL    ABS. IMM.  GRANS. 0.0 0.00 - 0.04 K/UL    DF AUTOMATED     METABOLIC PANEL, COMPREHENSIVE    Collection Time: 11/09/19  4:21 PM   Result Value Ref Range    Sodium 140 136 - 145 mmol/L    Potassium 4.4 3.5 - 5.1 mmol/L    Chloride 108 97 - 108 mmol/L    CO2 28 21 - 32 mmol/L    Anion gap 4 (L) 5 - 15 mmol/L    Glucose 74 65 - 100 mg/dL    BUN 13 6 - 20 MG/DL    Creatinine 0.96 0.55 - 1.02 MG/DL    BUN/Creatinine ratio 14 12 - 20      GFR est AA >60 >60 ml/min/1.73m2    GFR est non-AA 57 (L) >60 ml/min/1.73m2    Calcium 9.5 8.5 - 10.1 MG/DL    Bilirubin, total 0.6 0.2 - 1.0 MG/DL    ALT (SGPT) 16 12 - 78 U/L    AST (SGOT) 20 15 - 37 U/L    Alk. phosphatase 158 (H) 45 - 117 U/L    Protein, total 8.9 (H) 6.4 - 8.2 g/dL    Albumin 3.9 3.5 - 5.0 g/dL    Globulin 5.0 (H) 2.0 - 4.0 g/dL    A-G Ratio 0.8 (L) 1.1 - 2.2     URINALYSIS W/ RFLX MICROSCOPIC    Collection Time: 11/09/19  7:05 PM   Result Value Ref Range    Color YELLOW/STRAW      Appearance CLEAR CLEAR      Specific gravity 1.015 1.003 - 1.030      pH (UA) 6.0 5.0 - 8.0      Protein NEGATIVE  NEG mg/dL    Glucose NEGATIVE  NEG mg/dL    Ketone 15 (A) NEG mg/dL    Bilirubin NEGATIVE  NEG      Blood NEGATIVE  NEG      Urobilinogen 1.0 0.2 - 1.0 EU/dL    Nitrites NEGATIVE  NEG      Leukocyte Esterase NEGATIVE  NEG         Radiologic Studies -   CT HEAD WO CONT   Final Result   IMPRESSION: No acute intracranial hemorrhage, mass or infarct. CT Results  (Last 48 hours)               11/09/19 1705  CT HEAD WO CONT Final result    Impression:  IMPRESSION: No acute intracranial hemorrhage, mass or infarct. Narrative:  INDICATION: stroke last week (on MRI) and now with LLE weakness        Exam: Noncontrast CT of the brain is performed with 5 mm collimation. CT dose reduction was achieved with the use of the standardized protocol   tailored for this examination and automatic exposure control for dose   modulation. Direct comparison is made to prior MR dated November 2019. FINDINGS: There is no acute intracranial hemorrhage, mass, mass effect or   herniation. There are confluent white matter hypodensities consistent with   chronic microvascular ischemic changes. There is no evidence of acute   territorial infarct. Ventricular system is normal. The mastoid air cells are   well pneumatized.  The visualized paranasal sinuses are normal.               CXR Results  (Last 48 hours)    None            Medical Decision Making   I am the first provider for this patient. I reviewed the vital signs, available nursing notes, past medical history, past surgical history, family history and social history. Vital Signs-Reviewed the patient's vital signs. Patient Vitals for the past 12 hrs:   Temp Pulse Resp BP SpO2   11/09/19 1930 97.9 °F (36.6 °C) 86 18 (!) 177/96 96 %   11/09/19 1714  73 17 140/88 99 %   11/09/19 1632     98 %   11/09/19 1539 97.3 °F (36.3 °C) 79 16 154/69 100 %     Pulse Oximetry Analysis - 100% on RA    Cardiac Monitor:   Rate: 85bpm  Rhythm: Normal Sinus Rhythm      Records Reviewed: Nursing Notes, Old Medical Records, Previous Radiology Studies and Previous Laboratory Studies    Provider Notes (Medical Decision Making):   MDM: Elderly female presenting with some ataxia this morning which seems to have since resolved. Family is concerned that she is not able to care for herself at home and she has not eaten since yesterday as there is no one at home to go get supplies for the patient. Patient states since she left the hospital she has been ambulating on her own and caring for herself without complications. Currently her neurologic exam is at baseline without any evidence of focality. ED Course:   Initial assessment performed. The patients presenting problems have been discussed, and they are in agreement with the care plan formulated and outlined with them. I have encouraged them to ask questions as they arise throughout their visit. PROGRESS NOTE:  7 PM  Pt remains unchanged. Her symptoms have not seemed to develop and she is sitting comfortably in bed. We have ambulated around the emergency room and she is able to get up on her own, put her shoes on and ambulate around the emergency room without any ataxia or evidence of weakness. Discussed with family CT findings without any evidence of bleeding from her recent stroke, appropriate care at home, neurologic follow-up.   All questions were answered and they are comfortable taking her back home with them. Discussed strict return precautions and with any concerns to bring her back to the emergency room. Discharge note:  Pt re-evaluated and noted to be feeling better, ready for discharge. Updated pt and family on all final lab and CT findings. Will follow up as instructed. All questions have been answered, pt voiced understanding and agreement with plan. Specific return precautions provided as well as instructions to return to the ED should sx worsen at any time. Vital signs stable for discharge. Critical Care Time:   0      Diagnosis     Clinical Impression:   1. Weakness        PLAN:  1. Current Discharge Medication List        2. Follow-up Information     Follow up With Specialties Details Why Contact Info    [facility] EMERGENCY DEPT Emergency Medicine  If symptoms worsen [address]  [address] [facility]  [phone]    Charissa Simmonds, NP Nurse Practitioner Schedule an appointment as soon as possible for a visit  [address] [id number] [phone]          Return to ED if worse     Disposition:  home      Please note, this dictation was completed with i[employer], the computer voice recognition software. Quite often unanticipated grammatical, syntax, homophones, and other interpretive errors are inadvertently transcribed by the computer software. Please disregard these errors. Please excuse any errors that have escaped final proof reading.

## 2019-11-09 NOTE — ED NOTES
Pt presents from triage with cc of dizziness (pass out) with nausea since yesterday at 1130am. States recent MRI showed stroke. She denies extremity weakness, tingling, sensory changes, shortness of breath, facial droop, slurred speech, or other complaints. Pt placed on cardiac monitor x3. VSS. Family at bedside.

## 2019-11-10 NOTE — DISCHARGE INSTRUCTIONS
Patient Education        Fatigue: Care Instructions  Your Care Instructions  Fatigue is a feeling of tiredness, exhaustion, or lack of energy. You may feel fatigue because of too much or not enough activity. It can also come from stress, lack of sleep, boredom, and poor diet. Many medical problems, such as viral infections, can cause fatigue. Emotional problems, especially depression, are often the cause of fatigue. Fatigue is most often a symptom of another problem. Treatment for fatigue depends on the cause. For example, if you have fatigue because you have a certain health problem, treating this problem also treats your fatigue. If depression or anxiety is the cause, treatment may help. Follow-up care is a key part of your treatment and safety. Be sure to make and go to all appointments, and call your doctor if you are having problems. It's also a good idea to know your test results and keep a list of the medicines you take. How can you care for yourself at home? · Get regular exercise. But don't overdo it. Go back and forth between rest and exercise. · Get plenty of rest.  · Eat a healthy diet. Do not skip meals, especially breakfast.  · Reduce your use of caffeine, tobacco, and alcohol. Caffeine is most often found in coffee, tea, cola drinks, and chocolate. · Limit medicines that can cause fatigue. This includes tranquilizers and cold and allergy medicines. When should you call for help? Watch closely for changes in your health, and be sure to contact your doctor if:    · You have new symptoms such as fever or a rash.     · Your fatigue gets worse.     · You have been feeling down, depressed, or hopeless. Or you may have lost interest in things that you usually enjoy.     · You are not getting better as expected. Where can you learn more? Go to http://jose c-lencho.info/. Enter A677 in the search box to learn more about \"Fatigue: Care Instructions. \"  Current as of: June 26, 2019  Content Version: 12.2  © 5734-3511 [employer]. Care instructions adapted under license by [employer] (which disclaims liability or warranty for this information). If you have questions about a medical condition or this instruction, always ask your healthcare professional. [address] any warranty or liability for your use of this information. Patient Education        Muscle Conditioning: Exercises  Introduction  Here are some examples of exercises for muscle conditioning. Start each exercise slowly. Ease off the exercise if you start to have pain. Your doctor or physical therapist will tell you when you can start these exercises and which ones will work best for you. How to do the exercises  Wall push-ups    1. Stand facing a wall, about 12 to 18 inches away. 2. Place your hands on the wall at shoulder height. 3. Slowly bend your elbows and bring your face toward the wall, moving your hips and shoulders forward together. 4. Push slowly back to the starting position. 5. Start with 5 repetitions and work up to 8 to 12. 6. Rest for a minute, and repeat the exercise. Knee extension    1. While sitting in a chair, straighten one leg and hold while you slowly count to 5. Be sure you do not lock your knee. 2. Repeat 8 to 12 times. 3. Rest for a minute, and repeat the exercise. 4. Do the same exercise with the other leg. Side-lying leg lift    1. Lie on your side, with your legs extended. Keep your hips straight up and down during this exercise. Do not let your top hip rock toward the back. Support your head with your hand, and place the other hand on the floor near your waist.  2. Slowly raise your upper leg until it is about in line with your shoulder. Keep your toes pointed forward. 3. Slowly lower your leg to the starting position. 4. Repeat 8 to 12 times. 5. Rest for a minute, and repeat the exercise.   6. Turn to your other side and do the same exercise with your other leg. Shallow standing knee bends    1. Stand with your hands lightly resting on a counter or chair in front of you with your feet shoulder-width apart. 2. Slowly bend your knees so that you squat down just like you were going to sit in a chair. Make sure your knees do not go in front of your toes. 3. Lower yourself about 6 inches. Your heels should remain on the floor at all times. 4. Rise slowly to a standing position. 5. Repeat 8 to 12 times. 6. Rest for a minute, and repeat the exercise. Follow-up care is a key part of your treatment and safety. Be sure to make and go to all appointments, and call your doctor if you are having problems. It's also a good idea to know your test results and keep a list of the medicines you take. Where can you learn more? Go to http://jose c-lencho.info/. Enter Y811 in the search box to learn more about \"Muscle Conditioning: Exercises. \"  Current as of: May 5, 2019  Content Version: 12.2  © 6705-1668 [employer]. Care instructions adapted under license by [employer] (which disclaims liability or warranty for this information). If you have questions about a medical condition or this instruction, always ask your healthcare professional. [address] any warranty or liability for your use of this information.

## 2019-11-10 NOTE — ED NOTES
Bedside and Verbal shift change report given to Ling Tubbs RN (oncoming nurse) by Keshav Barry RN (offgoing nurse). Report included the following information SBAR, ED Summary, MAR and Recent Results.

## 2019-11-10 NOTE — ED NOTES
MD Chapa reviewed discharge instructions with the patient and caregiver. The patient and caregiver verbalized understanding. Patient discharged home with stable vitals. Patient out of ED with discharge instructions in hand. Opportunity for questions and clarification provided. No further complaints noted at this time.

## 2019-11-19 ENCOUNTER — HOSPITAL ENCOUNTER (OUTPATIENT)
Dept: BONE DENSITY | Age: 74
Discharge: HOME | End: 2019-11-19
Attending: NURSE PRACTITIONER
Payer: MEDICARE

## 2019-11-19 ENCOUNTER — HOSPITAL ENCOUNTER (OUTPATIENT)
Dept: MAMMOGRAPHY | Age: 74
Discharge: HOME | End: 2019-11-19
Attending: NURSE PRACTITIONER
Payer: MEDICARE

## 2019-11-19 DIAGNOSIS — Z12.39 SCREENING BREAST EXAMINATION: ICD-10-CM

## 2019-11-19 DIAGNOSIS — M81.0 OSTEOPOROSIS: ICD-10-CM

## 2019-11-19 PROCEDURE — 77067 SCR MAMMO BI INCL CAD: CPT

## 2019-11-25 ENCOUNTER — OFFICE VISIT (OUTPATIENT)
Dept: ENDOCRINOLOGY | Age: 74
End: 2019-11-25

## 2019-11-25 VITALS
HEART RATE: 73 BPM | BODY MASS INDEX: 27.29 KG/M2 | WEIGHT: 169.8 LBS | DIASTOLIC BLOOD PRESSURE: 68 MMHG | SYSTOLIC BLOOD PRESSURE: 171 MMHG | HEIGHT: 66 IN

## 2019-11-25 DIAGNOSIS — E04.1 THYROID NODULE: Primary | ICD-10-CM

## 2019-11-25 RX ORDER — CALCIUM CARBONATE 600 MG
600 TABLET ORAL DAILY
COMMUNITY

## 2019-11-25 NOTE — PATIENT INSTRUCTIONS
1. Plan for a biopsy of your thyroid gland. I will call you with results. 2. Plan to return in 1 year. 3. Plan to repeat your ultrasound 1 week before the next visit (which is 1 year). We can adjust this plan if we need to based on the results. Abhilash Perez. [id number] [facility] 0 [facility]

## 2019-11-25 NOTE — PROGRESS NOTES
CONSULTATION REQUESTED BY: Charissa Simmonds, NP     REASON FOR CONSULT: Evaluation of thyroid nodule    CHIEF COMPLAINT: Thyroid nodule    HISTORY OF PRESENT ILLNESS:   Nikki Boyle is a 69 y.o. female with a PMHx as noted below who was referred to our endocrinology clinic for evaluation of a thyroid nodule. Patient describes that she had a stroke in July 2019,   CT imaging revealed a thyroid nodule for which thyroid Ultrasound was obtained, repeated. Family history is not significant for thyroid nodules or thyroid cancers. Patient denies any history of radiation exposure. They deny any dysphagia or dyspnea. Patient denies symptoms of hyper or hypothyroidism. 7/15/19: Thyroid Ultrasound:    Right dominant 2.9 x 1.2 x 2.2 cm solid nodule   Left dominant 1 x 0.6 x 1 cm nodule    8/28/19: Thyroid Ultrasound:    Right dominant 2.7 x 1.8 x 1.3 cm solid nodule, minimal change     Review of most recent thyroid function:  Lab Results   Component Value Date    TSH 0.73 07/16/2019    TSH 0.944 08/21/2014    TSH 0.652 01/06/2014    FT4 1.1 07/16/2019      TSILT = Thyroid stimulating antibodies  TMCLT = TPO antibodies  T3LT = Total T3 levels    At this time they would like to further investigate the nature of the thyroid nodule. PAST MEDICAL/SURGICAL HISTORY:   Past Medical History:   Diagnosis Date    Depression     Fall at [facility]/10/2014   [facility] or floaters of right eye 8/21/2014    High cholesterol     Hypertension     Stroke [facility])     2010 and 2019     Past Surgical History:   Procedure Laterality Date    HX TUBAL LIGATION  1981    HX TUMOR REMOVAL  2008    brain tumor       ALLERGIES:   No Known Allergies    MEDICATIONS ON ADMISSION:     Current Outpatient Medications:     calcium carbonate (CALTREX) 600 mg calcium (1,500 mg) tablet, Take 600 mg by mouth daily. , Disp: , Rfl:     mirtazapine (REMERON) 30 mg tablet, 30 mg nightly., Disp: , Rfl:     lisinopril (PRINIVIL, ZESTRIL) 10 mg tablet, , Disp: , Rfl:     aspirin (ASPIRIN) 325 mg tablet, Take 1 Tab by mouth daily. , Disp: 30 Tab, Rfl: 11    atorvastatin (LIPITOR) 40 mg tablet, Take 1 Tab by mouth daily. , Disp: 30 Tab, Rfl: 3    albuterol (VENTOLIN HFA) 90 mcg/actuation inhaler, Take 1 Puff by inhalation every four (4) hours as needed for Wheezing., Disp: , Rfl:     citalopram (CELEXA) 20 mg tablet, Take 20 mg by mouth daily as needed. , Disp: , Rfl:     latanoprost (XALATAN) 0.005 % ophthalmic solution, Administer 1 Drop to both eyes nightly., Disp: , Rfl:     multivitamin (ONE A DAY) tablet, Take 1 Tab by mouth daily. , Disp: , Rfl:     SOCIAL HISTORY:   Social History     Socioeconomic History    Marital status:      Spouse name: Not on file    Number of children: Not on file    Years of education: Not on file    Highest education level: Not on file   Occupational History    Not on file   Social Needs    Financial resource strain: Not on file    Food insecurity:     Worry: Not on file     Inability: Not on file    Transportation needs:     Medical: Not on file     Non-medical: Not on file   Tobacco Use    Smoking status: Former Smoker     Packs/day: 0.25     Years: 20.00     Pack years: 5.00     Last attempt to quit: 10/26/2019     Years since quittin.0    Smokeless tobacco: Never Used   Substance and Sexual Activity    Alcohol use: No    Drug use: No    Sexual activity: Never   Lifestyle    Physical activity:     Days per week: Not on file     Minutes per session: Not on file    Stress: Not on file   Relationships    Social connections:     Talks on phone: Not on file     Gets together: Not on file     Attends Restoration service: Not on file     Active member of club or organization: Not on file     Attends meetings of clubs or organizations: Not on file     Relationship status: Not on file    Intimate partner violence:     Fear of current or ex partner: Not on file     Emotionally abused: Not on file Physically abused: Not on file     Forced sexual activity: Not on file   Other Topics Concern    Not on file   Social History Narrative    Not on file       FAMILY HISTORY:  Family History   Problem Relation Age of Onset    Hypertension Mother     Dementia Brother     Alcohol abuse Sister     Alcohol abuse Sister     No Known Problems Sister     No Known Problems Sister     No Known Problems Sister     Dementia Brother     No Known Problems Brother        REVIEW OF SYSTEMS: Complete ROS assessed and noted for that which is described above, all else are negative. Eyes: normal  ENT: normal  CVS: normal  Resp: normal  GI: normal  : normal  GYN: normal  Endocrine: normal  Integument: normal  Musculoskeletal: normal  Neuro: normal  Psych: normal    PHYSICAL EXAMINATION:    VITAL SIGNS:  Visit Vitals  /68 (BP 1 Location: Right arm, BP Patient Position: Sitting)   Pulse 73   Ht 5' 6\" (1.676 m)   Wt 169 lb 12.8 oz (77 kg)   BMI 27.41 kg/m²       GENERAL: NCAT, Sitting comfortably, NAD  EYES: EOMI, non-icteric, no proptosis  Ear/Nose/Throat: NCAT, no inflammation, palpable Rt thyroid nodule (bulky)  LYMPH NODES: No LAD  CARDIOVASCULAR: S1 S2, RRR, No murmur, 2+ radial pulses  RESPIRATORY: CTA b/l, no wheeze/rales  GASTROINTESTINAL: NT, ND  MUSCULOSKELETAL: Normal ROM, no atrophy  SKIN: warm, no edema/rash/ or other skin changes  NEUROLOGIC: 5/5 power all extremities, AAOx3, no tremors  PSYCHIATRIC: Normal affect, Normal insight and judgement      REVIEW OF LABORATORY AND RADIOLOGY DATA:   Labs and documentation have been reviewed as described above. ASSESSMENT AND PLAN:   Bhavin Hussein is a 69 y.o. female with a PMHx as noted above who was referred to our endocrinology clinic for evaluation of a thyroid nodule. Thyroid Nodule    Today we discussed the incidence of thyroid nodules in the community and the natural history of most thyroid nodules.  We also discussed the proper workup and evaluation for thyroid nodules and management based upon the clinical features and sonographic patterns which may suggest benign vs more concerning nodules. We discussed the role of evaluating thyroid function to assess the impact of thyroid hormone levels on nodular size and to evaluate for autonomous toxic nodules, as this would also affect management decisions. Ultimately we also discussed the role of thyroid biopsies should that become indicated. Of note, patient seems to be communicating well following her stroke. I encouraged her to participate as actively as she can with any physical therapy . I have reviewed the images of the patients thyroid ultrasound and included select images above. Patient has a dominant solid right lobe nodule and on the left is a cyst. The solid nodule should be biopsied for reassurance. We discussed this today. FNA biopsy of right dominant 2.7 x 1.8 x 1.3 cm solid nodule  Will discuss results by phone when it becomes available,  Plan to RTC in 1 year with f/u thyroid US to be completed 1 week prior,   Plan to be adjusted based on findings,     Felix ROCA.  [id number] [facility] [place] [facility]

## 2019-12-01 ENCOUNTER — APPOINTMENT (OUTPATIENT)
Dept: CT IMAGING | Age: 74
DRG: 065 | End: 2019-12-01
Attending: EMERGENCY MEDICINE
Payer: MEDICARE

## 2019-12-01 ENCOUNTER — APPOINTMENT (OUTPATIENT)
Dept: GENERAL RADIOLOGY | Age: 74
DRG: 065 | End: 2019-12-01
Attending: EMERGENCY MEDICINE
Payer: MEDICARE

## 2019-12-01 ENCOUNTER — HOSPITAL ENCOUNTER (INPATIENT)
Age: 74
LOS: 5 days | Discharge: NURSING, REHAB OR LONG-TERM CARE | DRG: 065 | End: 2019-12-06
Attending: EMERGENCY MEDICINE | Admitting: INTERNAL MEDICINE
Payer: MEDICARE

## 2019-12-01 DIAGNOSIS — I63.9 CEREBELLAR INFARCT (HCC): ICD-10-CM

## 2019-12-01 DIAGNOSIS — I63.9 CEREBROVASCULAR ACCIDENT (CVA), UNSPECIFIED MECHANISM (HCC): ICD-10-CM

## 2019-12-01 DIAGNOSIS — I63.9 ACUTE CVA (CEREBROVASCULAR ACCIDENT) (HCC): ICD-10-CM

## 2019-12-01 DIAGNOSIS — I63.50 RIGHT PONTINE STROKE (HCC): ICD-10-CM

## 2019-12-01 DIAGNOSIS — Z86.73 HISTORY OF STROKE: ICD-10-CM

## 2019-12-01 DIAGNOSIS — I65.23 BILATERAL CAROTID ARTERY STENOSIS: ICD-10-CM

## 2019-12-01 DIAGNOSIS — H53.2 DIPLOPIA: Primary | ICD-10-CM

## 2019-12-01 DIAGNOSIS — D33.3 LEFT ACOUSTIC NEUROMA (HCC): ICD-10-CM

## 2019-12-01 LAB
ALBUMIN SERPL-MCNC: 3.3 G/DL (ref 3.5–5)
ALBUMIN/GLOB SERPL: 0.8 {RATIO} (ref 1.1–2.2)
ALP SERPL-CCNC: 156 U/L (ref 45–117)
ALT SERPL-CCNC: 37 U/L (ref 12–78)
ANION GAP SERPL CALC-SCNC: 6 MMOL/L (ref 5–15)
APPEARANCE UR: CLEAR
AST SERPL-CCNC: 34 U/L (ref 15–37)
BACTERIA URNS QL MICRO: NEGATIVE /HPF
BASOPHILS # BLD: 0 K/UL (ref 0–0.1)
BASOPHILS NFR BLD: 1 % (ref 0–1)
BILIRUB SERPL-MCNC: 0.5 MG/DL (ref 0.2–1)
BILIRUB UR QL: NEGATIVE
BUN SERPL-MCNC: 16 MG/DL (ref 6–20)
BUN/CREAT SERPL: 17 (ref 12–20)
CALCIUM SERPL-MCNC: 8.9 MG/DL (ref 8.5–10.1)
CHLORIDE SERPL-SCNC: 109 MMOL/L (ref 97–108)
CO2 SERPL-SCNC: 25 MMOL/L (ref 21–32)
COLOR UR: NORMAL
CREAT SERPL-MCNC: 0.95 MG/DL (ref 0.55–1.02)
DIFFERENTIAL METHOD BLD: ABNORMAL
EOSINOPHIL # BLD: 0.2 K/UL (ref 0–0.4)
EOSINOPHIL NFR BLD: 4 % (ref 0–7)
EPITH CASTS URNS QL MICRO: NORMAL /LPF
ERYTHROCYTE [DISTWIDTH] IN BLOOD BY AUTOMATED COUNT: 16.2 % (ref 11.5–14.5)
GLOBULIN SER CALC-MCNC: 4.4 G/DL (ref 2–4)
GLUCOSE BLD STRIP.AUTO-MCNC: 96 MG/DL (ref 65–100)
GLUCOSE SERPL-MCNC: 85 MG/DL (ref 65–100)
GLUCOSE UR STRIP.AUTO-MCNC: NEGATIVE MG/DL
HCT VFR BLD AUTO: 40.2 % (ref 35–47)
HGB BLD-MCNC: 12.2 G/DL (ref 11.5–16)
HGB UR QL STRIP: NEGATIVE
HYALINE CASTS URNS QL MICRO: NORMAL /LPF (ref 0–5)
IMM GRANULOCYTES # BLD AUTO: 0 K/UL (ref 0–0.04)
IMM GRANULOCYTES NFR BLD AUTO: 0 % (ref 0–0.5)
KETONES UR QL STRIP.AUTO: NEGATIVE MG/DL
LEUKOCYTE ESTERASE UR QL STRIP.AUTO: NEGATIVE
LYMPHOCYTES # BLD: 2.5 K/UL (ref 0.8–3.5)
LYMPHOCYTES NFR BLD: 39 % (ref 12–49)
MCH RBC QN AUTO: 26.7 PG (ref 26–34)
MCHC RBC AUTO-ENTMCNC: 30.3 G/DL (ref 30–36.5)
MCV RBC AUTO: 88 FL (ref 80–99)
MONOCYTES # BLD: 0.7 K/UL (ref 0–1)
MONOCYTES NFR BLD: 11 % (ref 5–13)
NEUTS SEG # BLD: 2.9 K/UL (ref 1.8–8)
NEUTS SEG NFR BLD: 45 % (ref 32–75)
NITRITE UR QL STRIP.AUTO: NEGATIVE
NRBC # BLD: 0 K/UL (ref 0–0.01)
NRBC BLD-RTO: 0 PER 100 WBC
PH UR STRIP: 8 [PH] (ref 5–8)
PLATELET # BLD AUTO: 335 K/UL (ref 150–400)
PMV BLD AUTO: 11.3 FL (ref 8.9–12.9)
POTASSIUM SERPL-SCNC: 4 MMOL/L (ref 3.5–5.1)
PROT SERPL-MCNC: 7.7 G/DL (ref 6.4–8.2)
PROT UR STRIP-MCNC: NEGATIVE MG/DL
RBC # BLD AUTO: 4.57 M/UL (ref 3.8–5.2)
RBC #/AREA URNS HPF: NORMAL /HPF (ref 0–5)
SERVICE CMNT-IMP: NORMAL
SODIUM SERPL-SCNC: 140 MMOL/L (ref 136–145)
SP GR UR REFRACTOMETRY: 1.01 (ref 1–1.03)
TROPONIN I SERPL-MCNC: <0.05 NG/ML
UA: UC IF INDICATED,UAUC: NORMAL
UROBILINOGEN UR QL STRIP.AUTO: 0.2 EU/DL (ref 0.2–1)
WBC # BLD AUTO: 6.4 K/UL (ref 3.6–11)
WBC URNS QL MICRO: NORMAL /HPF (ref 0–4)

## 2019-12-01 PROCEDURE — 81001 URINALYSIS AUTO W/SCOPE: CPT

## 2019-12-01 PROCEDURE — 80053 COMPREHEN METABOLIC PANEL: CPT

## 2019-12-01 PROCEDURE — 65660000000 HC RM CCU STEPDOWN

## 2019-12-01 PROCEDURE — 74011636320 HC RX REV CODE- 636/320: Performed by: EMERGENCY MEDICINE

## 2019-12-01 PROCEDURE — 36415 COLL VENOUS BLD VENIPUNCTURE: CPT

## 2019-12-01 PROCEDURE — 70450 CT HEAD/BRAIN W/O DYE: CPT

## 2019-12-01 PROCEDURE — 85025 COMPLETE CBC W/AUTO DIFF WBC: CPT

## 2019-12-01 PROCEDURE — 93005 ELECTROCARDIOGRAM TRACING: CPT

## 2019-12-01 PROCEDURE — 99285 EMERGENCY DEPT VISIT HI MDM: CPT

## 2019-12-01 PROCEDURE — 84484 ASSAY OF TROPONIN QUANT: CPT

## 2019-12-01 PROCEDURE — 70496 CT ANGIOGRAPHY HEAD: CPT

## 2019-12-01 PROCEDURE — 82962 GLUCOSE BLOOD TEST: CPT

## 2019-12-01 PROCEDURE — 71045 X-RAY EXAM CHEST 1 VIEW: CPT

## 2019-12-01 RX ORDER — ACETAMINOPHEN 325 MG/1
650 TABLET ORAL
Status: DISCONTINUED | OUTPATIENT
Start: 2019-12-01 | End: 2019-12-06 | Stop reason: HOSPADM

## 2019-12-01 RX ORDER — HEPARIN SODIUM 5000 [USP'U]/ML
5000 INJECTION, SOLUTION INTRAVENOUS; SUBCUTANEOUS EVERY 8 HOURS
Status: DISCONTINUED | OUTPATIENT
Start: 2019-12-01 | End: 2019-12-06 | Stop reason: HOSPADM

## 2019-12-01 RX ORDER — ATORVASTATIN CALCIUM 40 MG/1
40 TABLET, FILM COATED ORAL DAILY
Status: DISCONTINUED | OUTPATIENT
Start: 2019-12-02 | End: 2019-12-06 | Stop reason: HOSPADM

## 2019-12-01 RX ORDER — LABETALOL HYDROCHLORIDE 5 MG/ML
5 INJECTION, SOLUTION INTRAVENOUS
Status: DISCONTINUED | OUTPATIENT
Start: 2019-12-01 | End: 2019-12-06 | Stop reason: HOSPADM

## 2019-12-01 RX ORDER — CALCIUM CARBONATE 500(1250)
500 TABLET ORAL DAILY
Status: DISCONTINUED | OUTPATIENT
Start: 2019-12-02 | End: 2019-12-06 | Stop reason: HOSPADM

## 2019-12-01 RX ORDER — GUAIFENESIN 100 MG/5ML
81 LIQUID (ML) ORAL DAILY
Status: DISCONTINUED | OUTPATIENT
Start: 2019-12-02 | End: 2019-12-06 | Stop reason: HOSPADM

## 2019-12-01 RX ORDER — SODIUM CHLORIDE 0.9 % (FLUSH) 0.9 %
10 SYRINGE (ML) INJECTION
Status: COMPLETED | OUTPATIENT
Start: 2019-12-01 | End: 2019-12-01

## 2019-12-01 RX ORDER — CLOPIDOGREL BISULFATE 75 MG/1
75 TABLET ORAL DAILY
Status: DISCONTINUED | OUTPATIENT
Start: 2019-12-02 | End: 2019-12-06 | Stop reason: HOSPADM

## 2019-12-01 RX ORDER — MIRTAZAPINE 15 MG/1
30 TABLET, FILM COATED ORAL
Status: DISCONTINUED | OUTPATIENT
Start: 2019-12-01 | End: 2019-12-06 | Stop reason: HOSPADM

## 2019-12-01 RX ORDER — ACETAMINOPHEN 650 MG/1
650 SUPPOSITORY RECTAL
Status: DISCONTINUED | OUTPATIENT
Start: 2019-12-01 | End: 2019-12-06 | Stop reason: HOSPADM

## 2019-12-01 RX ORDER — LATANOPROST 50 UG/ML
1 SOLUTION/ DROPS OPHTHALMIC
Status: DISCONTINUED | OUTPATIENT
Start: 2019-12-01 | End: 2019-12-06 | Stop reason: HOSPADM

## 2019-12-01 RX ADMIN — Medication 10 ML: at 14:43

## 2019-12-01 RX ADMIN — IOPAMIDOL 100 ML: 755 INJECTION, SOLUTION INTRAVENOUS at 14:43

## 2019-12-01 NOTE — H&P
Hospitalist Admission Note    NAME: Sena Campbell   :     MRN:  [id number]     Date/Time:  2019 6:42 PM    Patient PCP: Camilo Bedolla NP  ________________________________________________________________________    My assessment of this patient's clinical condition and my plan of care is as follows. Assessment / Plan:  Diplopia concern for acute cerebrovascular accident  Recurrent CVA  Cerebral amyloid angiopathy on recent MRI of brain  Occlusion of left V4 segment chronic  -Patient had a left cerebellar infarct with left vertebral artery occlusion on MRI of  and was recommended to continue aspirin and atorvastatin by neurology and to have aspirin testing in 1 month  -Patient had another episode of CVA in 2019  -She will need further testing for recurrent CVA. EKG shows normal sinus rhythm. It is possible that cerebral  amyloid angiopathy could be contributing to her recurrent strokes  -Initiate complete work-up for CVA. Check MRI of the brain. Check 2D echocardiogram.  -Check hemoglobin A1c, TSH and fasting lipid profile  -Due to recurrent CVA, I will decrease the dose of aspirin to 81 mg from 325 mg and add Plavix 75 mg.   Please check with neurology if they are okay with this in a.m.  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   female who presents with past medical history of CVA, hypertension, dyslipidemia is coming to the hospital with chief complaints of blurred vision. Patient reports being in her usual state of health until about this morning when she woke up with blurred vision. Her last well-known time was about 11 PM last night before going to bed. She does not report any associated focal weakness, tingling, numbness, facial deviation or slurred speech. She does not report any headache. She reports this is her third episode of stroke and she recently followed up with the neurology on outpatient basis where she had an MRI and was told she had a new stroke. She does not report any history of chest pain, shortness of breath or palpitations. On arrival to the hospital, she was noted to have a blood pressure of 185 x 68. On lab work she was noted to have normal CBC. CMP was within normal limits. First troponin was normal at 0.05. CT head showed no acute process. 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 We were asked to admit for work up and evaluation of the above problems.      Past Medical History:   Diagnosis Date    Depression     Fall at home 2/10/2014   [facility] or floaters of right eye 2014    High cholesterol     Hypertension     Stroke [facility])      and         Past Surgical History:   Procedure Laterality Date    HX TUBAL LIGATION      HX TUMOR REMOVAL      brain tumor       Social History     Tobacco Use    Smoking status: Former Smoker     Packs/day: 0.25     Years: 20.00     Pack years: 5.00     Last attempt to quit: 10/26/2019     Years since quittin.0    Smokeless tobacco: Never Used   Substance Use Topics    Alcohol use: No        Family History   Problem Relation Age of Onset    Hypertension Mother     Dementia Brother     Alcohol abuse Sister     Alcohol abuse Sister     No Known Problems Sister     No Known Problems Sister     No Known Problems Sister     Dementia Brother     No Known Problems Brother      No Known Allergies     Prior to Admission medications    Medication Sig Start Date End Date Taking? Authorizing Provider   calcium carbonate (CALTREX) 600 mg calcium (1,500 mg) tablet Take 600 mg by mouth daily. Yes Provider, Historical   lisinopril (PRINIVIL, ZESTRIL) 10 mg tablet  11/4/19  Yes Provider, Historical   aspirin (ASPIRIN) 325 mg tablet Take 1 Tab by mouth daily. 11/7/19  Yes Sancho Rodriguez MD   atorvastatin (LIPITOR) 40 mg tablet Take 1 Tab by mouth daily. 8/29/19  Yes Sancho Rodriguez MD   citalopram (CELEXA) 20 mg tablet Take 20 mg by mouth daily as needed. Yes Provider, Historical   latanoprost (XALATAN) 0.005 % ophthalmic solution Administer 1 Drop to both eyes nightly. Yes Provider, Historical   mirtazapine (REMERON) 30 mg tablet 30 mg nightly. 10/4/19   Provider, Historical   multivitamin (ONE A DAY) tablet Take 1 Tab by mouth daily. Provider, Historical       REVIEW OF SYSTEMS:     I am not able to complete the review of systems because:    The patient is intubated and sedated    The patient has altered mental status due to his acute medical problems    The patient has baseline aphasia from prior stroke(s)    The patient has baseline dementia and is not reliable historian    The patient is in acute medical distress and unable to provide information           Total of 12 systems reviewed as follows:       POSITIVE= underlined text  Negative = text not underlined  General:  fever, chills, sweats, generalized weakness, weight loss/gain,      loss of appetite   Eyes:    blurred vision, eye pain, loss of vision, double vision  ENT:    rhinorrhea, pharyngitis   Respiratory:   cough, sputum production, SOB, JHA, wheezing, pleuritic pain   Cardiology:   chest pain, palpitations, orthopnea, PND, edema, syncope   Gastrointestinal:  abdominal pain , N/V, diarrhea, dysphagia, constipation, bleeding   Genitourinary:  frequency, urgency, dysuria, hematuria, incontinence   Muskuloskeletal :  arthralgia, myalgia, back pain  Hematology:  easy bruising, nose or gum bleeding, lymphadenopathy   Dermatological: rash, ulceration, pruritis, color change / jaundice  Endocrine:   hot flashes or polydipsia   Neurological:  headache, dizziness, confusion, focal weakness, paresthesia,     Speech difficulties, memory loss, gait difficulty  Psychological: Feelings of anxiety, depression, agitation    Objective:   VITALS:    Visit Vitals  /66   Pulse 68   Temp 98.1 °F (36.7 °C)   Resp 11   Ht 5' 6\" (1.676 m)   Wt 71.7 kg (158 lb 1.1 oz)   SpO2 97%   BMI 25.51 kg/m²       PHYSICAL EXAM:    General:    Alert, cooperative, no distress, appears stated age. HEENT: Atraumatic, anicteric sclerae, pink conjunctivae     No oral ulcers, mucosa moist  Neck:  Supple, symmetrical,  thyroid: non tender  Lungs:   Clear to auscultation bilaterally. No Wheezing or Rhonchi. No rales. Chest wall:  No tenderness  No Accessory muscle use. Heart:   Regular  rhythm,  No  murmur   No edema  Abdomen:   Soft, non-tender. Not distended. Bowel sounds normal  Extremities: No cyanosis. No clubbing,      Skin turgor normal, Capillary refill normal, Radial dial pulse 2+  Skin:     Not pale. Not Jaundiced  No rashes   Psych:  Good insight. Not depressed. Not anxious or agitated. Neurologic: EOMs intact. No facial asymmetry. No aphasia or slurred speech. Symmetrical strength, Sensation grossly intact. Alert and oriented X 4.   Double vision +     _______________________________________________________________________  Care Plan discussed with:    Comments   Patient y    Family      RN y    Care Manager                    Consultant:      _______________________________________________________________________  Expected  Disposition:   Home with Family y   HH/PT/OT/RN    SNF/LTC    FERNANDO ________________________________________________________________________  TOTAL TIME:  60  Minutes    Critical Care Provided     Minutes non procedure based      Comments    y Reviewed previous records   >50% of visit spent in counseling and coordination of care y Discussion with patient and/or family and questions answered       ________________________________________________________________________  Signed: Ariela Suarez MD    Procedures: see electronic medical records for all procedures/Xrays and details which were not copied into this note but were reviewed prior to creation of Plan. LAB DATA REVIEWED:    Recent Results (from the past 24 hour(s))   GLUCOSE, POC    Collection Time: 12/01/19 11:51 AM   Result Value Ref Range    Glucose (POC) 96 65 - 100 mg/dL    Performed by Jennifer Fitzpatrick    CBC WITH AUTOMATED DIFF    Collection Time: 12/01/19 12:05 PM   Result Value Ref Range    WBC 6.4 3.6 - 11.0 K/uL    RBC 4.57 3.80 - 5.20 M/uL    HGB 12.2 11.5 - 16.0 g/dL    HCT 40.2 35.0 - 47.0 %    MCV 88.0 80.0 - 99.0 FL    MCH 26.7 26.0 - 34.0 PG    MCHC 30.3 30.0 - 36.5 g/dL    RDW 16.2 (H) 11.5 - 14.5 %    PLATELET 086 858 - 339 K/uL    MPV 11.3 8.9 - 12.9 FL    NRBC 0.0 0  WBC    ABSOLUTE NRBC 0.00 0.00 - 0.01 K/uL    NEUTROPHILS 45 32 - 75 %    LYMPHOCYTES 39 12 - 49 %    MONOCYTES 11 5 - 13 %    EOSINOPHILS 4 0 - 7 %    BASOPHILS 1 0 - 1 %    IMMATURE GRANULOCYTES 0 0.0 - 0.5 %    ABS. NEUTROPHILS 2.9 1.8 - 8.0 K/UL    ABS. LYMPHOCYTES 2.5 0.8 - 3.5 K/UL    ABS. MONOCYTES 0.7 0.0 - 1.0 K/UL    ABS. EOSINOPHILS 0.2 0.0 - 0.4 K/UL    ABS. BASOPHILS 0.0 0.0 - 0.1 K/UL    ABS. IMM.  GRANS. 0.0 0.00 - 0.04 K/UL    DF AUTOMATED     METABOLIC PANEL, COMPREHENSIVE    Collection Time: 12/01/19 12:05 PM   Result Value Ref Range    Sodium 140 136 - 145 mmol/L    Potassium 4.0 3.5 - 5.1 mmol/L    Chloride 109 (H) 97 - 108 mmol/L    CO2 25 21 - 32 mmol/L    Anion gap 6 5 - 15 mmol/L    Glucose 85 65 - 100 mg/dL BUN 16 6 - 20 MG/DL    Creatinine 0.95 0.55 - 1.02 MG/DL    BUN/Creatinine ratio 17 12 - 20      GFR est AA >60 >60 ml/min/1.73m2    GFR est non-AA 58 (L) >60 ml/min/1.73m2    Calcium 8.9 8.5 - 10.1 MG/DL    Bilirubin, total 0.5 0.2 - 1.0 MG/DL    ALT (SGPT) 37 12 - 78 U/L    AST (SGOT) 34 15 - 37 U/L    Alk.  phosphatase 156 (H) 45 - 117 U/L    Protein, total 7.7 6.4 - 8.2 g/dL    Albumin 3.3 (L) 3.5 - 5.0 g/dL    Globulin 4.4 (H) 2.0 - 4.0 g/dL    A-G Ratio 0.8 (L) 1.1 - 2.2     URINALYSIS W/ REFLEX CULTURE    Collection Time: 12/01/19 12:05 PM   Result Value Ref Range    Color YELLOW/STRAW      Appearance CLEAR CLEAR      Specific gravity 1.008 1.003 - 1.030      pH (UA) 8.0 5.0 - 8.0      Protein NEGATIVE  NEG mg/dL    Glucose NEGATIVE  NEG mg/dL    Ketone NEGATIVE  NEG mg/dL    Bilirubin NEGATIVE  NEG      Blood NEGATIVE  NEG      Urobilinogen 0.2 0.2 - 1.0 EU/dL    Nitrites NEGATIVE  NEG      Leukocyte Esterase NEGATIVE  NEG      WBC 0-4 0 - 4 /hpf    RBC 0-5 0 - 5 /hpf    Epithelial cells FEW FEW /lpf    Bacteria NEGATIVE  NEG /hpf    UA:UC IF INDICATED CULTURE NOT INDICATED BY UA RESULT CNI      Hyaline cast 0-2 0 - 5 /lpf   TROPONIN I    Collection Time: 12/01/19 12:05 PM   Result Value Ref Range    Troponin-I, Qt. <0.05 <0.05 ng/mL   EKG, 12 LEAD, INITIAL    Collection Time: 12/01/19 12:41 PM   Result Value Ref Range    Ventricular Rate 68 BPM    Atrial Rate 68 BPM    P-R Interval 138 ms    QRS Duration 82 ms    Q-T Interval 396 ms    QTC Calculation (Bezet) 421 ms    Calculated P Axis 71 degrees    Calculated R Axis 25 degrees    Calculated T Axis 13 degrees    Diagnosis       Normal sinus rhythm  Nonspecific T wave abnormality  When compared with ECG of 15-JUL-2019 13:48,  No significant change was found

## 2019-12-01 NOTE — ED NOTES
Eye patch applied to right eye per Dr. Hansa Vu (neurology); patch to be switched to other eye every several hours.

## 2019-12-01 NOTE — ED PROVIDER NOTES
EMERGENCY DEPARTMENT HISTORY AND PHYSICAL EXAM      Date: 12/1/2019  Patient Name: Mónica Vincent    History of Presenting Illness     Chief Complaint   Patient presents with    Double Vision     Patient started with double vision at 0930; denies pain. Hx of CVA involving cerebellar function       History Provided By: Patient and Patient's Daughter    HPI: Mónica Vincent, 69 y.o. female with history of hypertension and prior CVA in 2010 as well as July 2019 and November 2019 presents to the ED with cc of double vision. Patient woke up with symptoms this morning. Last known normal 10:30 PM yesterday evening when patient went to sleep. Patient went back to sleep and then called her daughter when she woke up with symptoms again later this morning. Patient complains of double vision no matter where she looks but denies any eye pain. Denies any recent illness. There is no new numbness weakness or paresthesias of the extremities. She has been unsteady on her feet today. Daughter states this is an acute change for her. On chart review patient had MRI with admission 7/15 which demonstrated acute stroke. Patient is followed by Dr. Samantha Mendoza, neurology, who ordered outpatient MRI 11/5/2019 which demonstrated new acute right cerebellar peduncle infarction. Patient has been on aspirin alone. There are no other complaints, changes, or physical findings at this time. PCP: Lynne Burch NP    No current facility-administered medications on file prior to encounter. Current Outpatient Medications on File Prior to Encounter   Medication Sig Dispense Refill    calcium carbonate (CALTREX) 600 mg calcium (1,500 mg) tablet Take 600 mg by mouth daily.  lisinopril (PRINIVIL, ZESTRIL) 10 mg tablet       aspirin (ASPIRIN) 325 mg tablet Take 1 Tab by mouth daily. 30 Tab 11    atorvastatin (LIPITOR) 40 mg tablet Take 1 Tab by mouth daily.  30 Tab 3    citalopram (CELEXA) 20 mg tablet Take 20 mg by mouth daily as needed.  latanoprost (XALATAN) 0.005 % ophthalmic solution Administer 1 Drop to both eyes nightly.  mirtazapine (REMERON) 30 mg tablet 30 mg nightly.  multivitamin (ONE A DAY) tablet Take 1 Tab by mouth daily. Past History     Past Medical History:  Past Medical History:   Diagnosis Date    Depression     Fall at home 2/10/2014   [facility] or floaters of right eye 2014    High cholesterol     Hypertension     Stroke [facility])      and        Past Surgical History:  Past Surgical History:   Procedure Laterality Date    HX TUBAL LIGATION      HX TUMOR REMOVAL  2008    brain tumor       Family History:  Family History   Problem Relation Age of Onset    Hypertension Mother     Dementia Brother     Alcohol abuse Sister     Alcohol abuse Sister     No Known Problems Sister     No Known Problems Sister     No Known Problems Sister     Dementia Brother     No Known Problems Brother        Social History:  Social History     Tobacco Use    Smoking status: Former Smoker     Packs/day: 0.25     Years: 20.00     Pack years: 5.00     Last attempt to quit: 10/26/2019     Years since quittin.0    Smokeless tobacco: Never Used   Substance Use Topics    Alcohol use: No    Drug use: No       Allergies:  No Known Allergies      Review of Systems   Review of Systems   Constitutional: Negative for chills and fever. HENT: Negative. Eyes: Positive for visual disturbance. Respiratory: Negative for cough and shortness of breath. Cardiovascular: Negative for chest pain and leg swelling. Gastrointestinal: Negative for abdominal pain, nausea and vomiting. Genitourinary: Positive for frequency. Musculoskeletal: Negative for back pain and gait problem. Skin: Negative for color change and rash. Neurological: Negative for dizziness, speech difficulty, weakness, light-headedness, numbness and headaches. Hematological: Does not bruise/bleed easily. Physical Exam   Physical Exam  Constitutional:       General: She is not in acute distress. Appearance: Normal appearance. She is not ill-appearing or toxic-appearing. HENT:      Head: Normocephalic and atraumatic. Nose: Nose normal.      Mouth/Throat:      Mouth: Mucous membranes are moist.   Eyes:      Pupils: Pupils are equal, round, and reactive to light. Comments: Left eye deviated down now however when tested with the right eye covered there is full extraocular movement of the left eye but with positive nystagmus noted. Right eye is noted to have inability with adduction. Visual fields intact when both eyes tested individually. Patient is unable to focus both eyes on one single object. Neck:      Musculoskeletal: Normal range of motion and neck supple. Cardiovascular:      Rate and Rhythm: Normal rate and regular rhythm. Heart sounds: No murmur. Pulmonary:      Effort: Pulmonary effort is normal. No respiratory distress. Breath sounds: Normal breath sounds. No wheezing. Abdominal:      General: There is no distension. Palpations: Abdomen is soft. Tenderness: There is no tenderness. There is no guarding or rebound. Musculoskeletal: Normal range of motion. General: No swelling or tenderness. Right lower leg: No edema. Left lower leg: No edema. Skin:     General: Skin is warm and dry. Coloration: Skin is not pale. Findings: No erythema. Neurological:      Mental Status: She is alert and oriented to person, place, and time. Comments: Motor and sensory intact at her baseline. No other cranial nerve deficit aside from extraocular movement movements noted above.          Diagnostic Study Results     Labs -     Recent Results (from the past 12 hour(s))   GLUCOSE, POC    Collection Time: 12/01/19 11:51 AM   Result Value Ref Range    Glucose (POC) 96 65 - 100 mg/dL    Performed by Jasmine Hanna    CBC WITH AUTOMATED DIFF Collection Time: 12/01/19 12:05 PM   Result Value Ref Range    WBC 6.4 3.6 - 11.0 K/uL    RBC 4.57 3.80 - 5.20 M/uL    HGB 12.2 11.5 - 16.0 g/dL    HCT 40.2 35.0 - 47.0 %    MCV 88.0 80.0 - 99.0 FL    MCH 26.7 26.0 - 34.0 PG    MCHC 30.3 30.0 - 36.5 g/dL    RDW 16.2 (H) 11.5 - 14.5 %    PLATELET 526 498 - 648 K/uL    MPV 11.3 8.9 - 12.9 FL    NRBC 0.0 0  WBC    ABSOLUTE NRBC 0.00 0.00 - 0.01 K/uL    NEUTROPHILS 45 32 - 75 %    LYMPHOCYTES 39 12 - 49 %    MONOCYTES 11 5 - 13 %    EOSINOPHILS 4 0 - 7 %    BASOPHILS 1 0 - 1 %    IMMATURE GRANULOCYTES 0 0.0 - 0.5 %    ABS. NEUTROPHILS 2.9 1.8 - 8.0 K/UL    ABS. LYMPHOCYTES 2.5 0.8 - 3.5 K/UL    ABS. MONOCYTES 0.7 0.0 - 1.0 K/UL    ABS. EOSINOPHILS 0.2 0.0 - 0.4 K/UL    ABS. BASOPHILS 0.0 0.0 - 0.1 K/UL    ABS. IMM. GRANS. 0.0 0.00 - 0.04 K/UL    DF AUTOMATED     METABOLIC PANEL, COMPREHENSIVE    Collection Time: 12/01/19 12:05 PM   Result Value Ref Range    Sodium 140 136 - 145 mmol/L    Potassium 4.0 3.5 - 5.1 mmol/L    Chloride 109 (H) 97 - 108 mmol/L    CO2 25 21 - 32 mmol/L    Anion gap 6 5 - 15 mmol/L    Glucose 85 65 - 100 mg/dL    BUN 16 6 - 20 MG/DL    Creatinine 0.95 0.55 - 1.02 MG/DL    BUN/Creatinine ratio 17 12 - 20      GFR est AA >60 >60 ml/min/1.73m2    GFR est non-AA 58 (L) >60 ml/min/1.73m2    Calcium 8.9 8.5 - 10.1 MG/DL    Bilirubin, total 0.5 0.2 - 1.0 MG/DL    ALT (SGPT) 37 12 - 78 U/L    AST (SGOT) 34 15 - 37 U/L    Alk.  phosphatase 156 (H) 45 - 117 U/L    Protein, total 7.7 6.4 - 8.2 g/dL    Albumin 3.3 (L) 3.5 - 5.0 g/dL    Globulin 4.4 (H) 2.0 - 4.0 g/dL    A-G Ratio 0.8 (L) 1.1 - 2.2     URINALYSIS W/ REFLEX CULTURE    Collection Time: 12/01/19 12:05 PM   Result Value Ref Range    Color YELLOW/STRAW      Appearance CLEAR CLEAR      Specific gravity 1.008 1.003 - 1.030      pH (UA) 8.0 5.0 - 8.0      Protein NEGATIVE  NEG mg/dL    Glucose NEGATIVE  NEG mg/dL    Ketone NEGATIVE  NEG mg/dL    Bilirubin NEGATIVE  NEG      Blood NEGATIVE  NEG Urobilinogen 0.2 0.2 - 1.0 EU/dL    Nitrites NEGATIVE  NEG      Leukocyte Esterase NEGATIVE  NEG      WBC 0-4 0 - 4 /hpf    RBC 0-5 0 - 5 /hpf    Epithelial cells FEW FEW /lpf    Bacteria NEGATIVE  NEG /hpf    UA:UC IF INDICATED CULTURE NOT INDICATED BY UA RESULT CNI      Hyaline cast 0-2 0 - 5 /lpf   TROPONIN I    Collection Time: 12/01/19 12:05 PM   Result Value Ref Range    Troponin-I, Qt. <0.05 <0.05 ng/mL   EKG, 12 LEAD, INITIAL    Collection Time: 12/01/19 12:41 PM   Result Value Ref Range    Ventricular Rate 68 BPM    Atrial Rate 68 BPM    P-R Interval 138 ms    QRS Duration 82 ms    Q-T Interval 396 ms    QTC Calculation (Bezet) 421 ms    Calculated P Axis 71 degrees    Calculated R Axis 25 degrees    Calculated T Axis 13 degrees    Diagnosis       Normal sinus rhythm  Nonspecific T wave abnormality  When compared with ECG of 15-JUL-2019 13:48,  No significant change was found         Radiologic Studies -   CT HEAD WO CONT   Final Result   IMPRESSION:    1. No significant change in the extensive low-density in the periventricular   white matter, low density in the thalami bilaterally and mid autumn consistent   with small vessel ischemic changes. No acute intracranial process identified. CTA HEAD NECK W CONT         XR CHEST PORT   Final Result   IMPRESSION: No acute abnormality. . Increased density right lung base medially   is most likely a prominent pericardial fat pad. CT Results  (Last 48 hours)               12/01/19 1443  CT HEAD WO CONT Final result    Impression:  IMPRESSION:    1. No significant change in the extensive low-density in the periventricular   white matter, low density in the thalami bilaterally and mid autumn consistent   with small vessel ischemic changes. No acute intracranial process identified. Narrative:  EXAM: CT HEAD WO CONT       INDICATION: diplopia, recent CVA       COMPARISON: 2019. CONTRAST: None.        TECHNIQUE: Unenhanced CT of the head was performed using 5 mm images. Brain and   bone windows were generated. CT dose reduction was achieved through use of a   standardized protocol tailored for this examination and automatic exposure   control for dose modulation. FINDINGS:   There is diffuse prominence of ventricles and sulci. There are moderately severe   changes small vessel disease in the periventricular white matter and there is   low-density in the thalami bilaterally and mid autumn consistent small vessels   demonstrate changes similar to the prior study. Bony structures are unchanged. 12/01/19 1443  CTA HEAD NECK W CONT Preliminary result    Narrative:  *PRELIMINARY REPORT*       CTA was performed of the head and neck. There are changes of emphysema. There is   a 1.7 cm right thyroid nodule. There is a dominant right vertebral artery. 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 There are   bilateral posterior communicating arteries. There is question of increased soft tissue density in the left internal auditory   canal and MR of the IACs is recommended for further assessment. Preliminary report was provided by Dr. Malka Nicole, the on-call radiologist, at 21 [phone]       Final report to follow. *END PRELIMINARY REPORT*                                   CXR Results  (Last 48 hours)               12/01/19 1317  XR CHEST PORT Final result    Impression:  IMPRESSION: No acute abnormality. . Increased density right lung base medially   is most likely a prominent pericardial fat pad. Narrative:  EXAM:  XR CHEST PORT       INDICATION:  CVA       COMPARISON:  None. FINDINGS: A portable AP radiograph of the chest was obtained at 1256 hours. The   patient is on a cardiac monitor. Lungs are clear of an acute process. Increased   density right cardiophrenic angle is most likely prominent fat pad. Cindy Frizzle   Heart size   is normal.  The bones and soft tissues are grossly within normal limits. Medical Decision Making   I am the first provider for this patient. I reviewed the vital signs, available nursing notes, past medical history, past surgical history, family history and social history. Vital Signs-Reviewed the patient's vital signs. Patient Vitals for the past 12 hrs:   Temp Pulse Resp BP SpO2   12/01/19 1245  68 11 176/66 97 %   12/01/19 1141 98.1 °F (36.7 °C) 67 18 185/68 99 %       Records Reviewed: Nursing Notes, Old Medical Records, Previous Radiology Studies and Previous Laboratory Studies    Provider Notes (Medical Decision Making): This is a 70-year-old female here with diplopia and noted to have disconjugate gaze with exam as above. No other focal neurologic deficits noted on my examination. She is afebrile vital signs stable. Her last known normal was sometime yesterday evening and she is not a TPA candidate given her recurrent strokes recently. CT head and CTA obtained which were negative, CTA does show occlusion of left V4 which is not a candidate for intervention. Discussed with telemetry neurology, Dr. Regina Potts, who has evaluated patient and recommends admission with aspirin, Plavix. Concern at this time is for new stroke in posterior circulation versus brainstem. Discussed with Dr. Nicky Fabian, hospitalist, who will accept for admission. ED Course:   Initial assessment performed. The patients presenting problems have been discussed, and they are in agreement with the care plan formulated and outlined with them. I have encouraged them to ask questions as they arise throughout their visit. ED Course as of Dec 01 1716   Sun Dec 01, 2019   1705 EKG per my interpretation normal sinus rhythm, rate 60 bpm, normal axis, no acute ischemic changes.     [AK]      ED Course User Index  [AK] Velva Lundborg, MD       Admission Note:  Patient is being admitted to the hospital by Dr. Nicky Fabian, Service: Hospitalist.  The results of their tests and reasons for their admission have been discussed with them and available family. They convey agreement and understanding for the need to be admitted and for their admission diagnosis. Critical Care Time:   0    Disposition:  Admit      Diagnosis     Clinical Impression:   1. Diplopia        Attestations:    Josette Gutierrez MD    Please note that this dictation was completed with [employer], the computer voice recognition software. Quite often unanticipated grammatical, syntax, homophones, and other interpretive errors are inadvertently transcribed by the computer software. Please disregard these errors. Please excuse any errors that have escaped final proofreading. Thank you.

## 2019-12-01 NOTE — ED NOTES
[id number]- Patient arrives via EMS from home with c/o double vision today after waking; last known well time last night. She denies pain. Monitor x 3 applied. Strength and sensation intact. Nystagmus on lateral and superior gazes; amblyopia of left eye. 1240- Patient states she voided on herself. Ultrasorb changed. Patient placed on pure wick. Patient and family educated on device.

## 2019-12-02 ENCOUNTER — APPOINTMENT (OUTPATIENT)
Dept: MRI IMAGING | Age: 74
DRG: 065 | End: 2019-12-02
Attending: INTERNAL MEDICINE
Payer: MEDICARE

## 2019-12-02 ENCOUNTER — APPOINTMENT (OUTPATIENT)
Dept: NON INVASIVE DIAGNOSTICS | Age: 74
DRG: 065 | End: 2019-12-02
Attending: INTERNAL MEDICINE
Payer: MEDICARE

## 2019-12-02 PROBLEM — I63.50 RIGHT PONTINE STROKE (HCC): Status: ACTIVE | Noted: 2019-12-02

## 2019-12-02 PROBLEM — D33.3 LEFT ACOUSTIC NEUROMA (HCC): Status: ACTIVE | Noted: 2019-12-02

## 2019-12-02 PROBLEM — I65.23 BILATERAL CAROTID ARTERY STENOSIS: Status: ACTIVE | Noted: 2019-12-02

## 2019-12-02 PROBLEM — H53.2 DIPLOPIA: Status: ACTIVE | Noted: 2019-12-02

## 2019-12-02 LAB
ANION GAP SERPL CALC-SCNC: 5 MMOL/L (ref 5–15)
ATRIAL RATE: 68 BPM
AV PEAK GRADIENT: 79.06 MMHG
AV VELOCITY RATIO: 0.73
BUN SERPL-MCNC: 21 MG/DL (ref 6–20)
BUN/CREAT SERPL: 23 (ref 12–20)
CALCIUM SERPL-MCNC: 8.9 MG/DL (ref 8.5–10.1)
CALCULATED P AXIS, ECG09: 71 DEGREES
CALCULATED R AXIS, ECG10: 25 DEGREES
CALCULATED T AXIS, ECG11: 13 DEGREES
CHLORIDE SERPL-SCNC: 109 MMOL/L (ref 97–108)
CHOLEST SERPL-MCNC: 142 MG/DL
CO2 SERPL-SCNC: 26 MMOL/L (ref 21–32)
CREAT SERPL-MCNC: 0.9 MG/DL (ref 0.55–1.02)
DIAGNOSIS, 93000: NORMAL
ECHO AV AREA PEAK VELOCITY: 2.1 CM2
ECHO AV PEAK GRADIENT: 5.3 MMHG
ECHO AV PEAK VELOCITY: 115.49 CM/S
ECHO AV REGURGITANT PHT: 486.2 CM
ECHO EST RA PRESSURE: 10 MMHG
ECHO LA AREA 4C: 16.1 CM2
ECHO LA MAJOR AXIS: 2.92 CM
ECHO LA VOL 2C: 47.92 ML (ref 22–52)
ECHO LA VOL 4C: 38.61 ML (ref 22–52)
ECHO LA VOL BP: 52.32 ML (ref 22–52)
ECHO LA VOL/BSA BIPLANE: 28.91 ML/M2 (ref 16–28)
ECHO LA VOLUME INDEX A2C: 26.48 ML/M2 (ref 16–28)
ECHO LA VOLUME INDEX A4C: 21.34 ML/M2 (ref 16–28)
ECHO LV E' LATERAL VELOCITY: 7.09 CM/S
ECHO LV E' SEPTAL VELOCITY: 5.48 CM/S
ECHO LV EDV TEICHHOLZ: 0.41 ML
ECHO LV ESV TEICHHOLZ: 0.13 ML
ECHO LV INTERNAL DIMENSION DIASTOLIC: 3.98 CM (ref 3.9–5.3)
ECHO LV INTERNAL DIMENSION SYSTOLIC: 2.47 CM
ECHO LV IVSD: 1.16 CM (ref 0.6–0.9)
ECHO LV MASS 2D: 179.5 G (ref 67–162)
ECHO LV MASS INDEX 2D: 99.2 G/M2 (ref 43–95)
ECHO LV POSTERIOR WALL DIASTOLIC: 1.15 CM (ref 0.6–0.9)
ECHO LVOT DIAM: 1.91 CM
ECHO LVOT PEAK GRADIENT: 2.8 MMHG
ECHO LVOT PEAK VELOCITY: 84.09 CM/S
ECHO MV A VELOCITY: 100.75 CM/S
ECHO MV E DECELERATION TIME (DT): 204 MS
ECHO MV E VELOCITY: 58.63 CM/S
ECHO MV E/A RATIO: 0.58
ECHO MV E/E' LATERAL: 8.27
ECHO MV E/E' RATIO (AVERAGED): 9.48
ECHO MV E/E' SEPTAL: 10.7
ECHO MV REGURGITANT PEAK GRADIENT: 9.4 MMHG
ECHO MV REGURGITANT PEAK VELOCITY: 153.2 CM/S
ECHO PULMONARY ARTERY SYSTOLIC PRESSURE (PASP): 16.1 MMHG
ECHO RA AREA 4C: 8.94 CM2
ECHO RIGHT VENTRICULAR SYSTOLIC PRESSURE (RVSP): 16.1 MMHG
ECHO TV REGURGITANT MAX VELOCITY: 123.52 CM/S
ECHO TV REGURGITANT PEAK GRADIENT: 6.1 MMHG
ERYTHROCYTE [DISTWIDTH] IN BLOOD BY AUTOMATED COUNT: 16.2 % (ref 11.5–14.5)
EST. AVERAGE GLUCOSE BLD GHB EST-MCNC: 120 MG/DL
GLUCOSE SERPL-MCNC: 81 MG/DL (ref 65–100)
HBA1C MFR BLD: 5.8 % (ref 4–5.6)
HCT VFR BLD AUTO: 37.6 % (ref 35–47)
HDLC SERPL-MCNC: 69 MG/DL
HDLC SERPL: 2.1 {RATIO} (ref 0–5)
HGB BLD-MCNC: 11.6 G/DL (ref 11.5–16)
LDLC SERPL CALC-MCNC: 59.8 MG/DL (ref 0–100)
LIPID PROFILE,FLP: NORMAL
LVFS 2D: 37.99 %
MCH RBC QN AUTO: 27 PG (ref 26–34)
MCHC RBC AUTO-ENTMCNC: 30.9 G/DL (ref 30–36.5)
MCV RBC AUTO: 87.6 FL (ref 80–99)
MV DEC SLOPE: 2.87
NRBC # BLD: 0 K/UL (ref 0–0.01)
NRBC BLD-RTO: 0 PER 100 WBC
P-R INTERVAL, ECG05: 138 MS
PISA AR MAX VEL: 444.59 CM/S
PLATELET # BLD AUTO: 326 K/UL (ref 150–400)
PMV BLD AUTO: 11.3 FL (ref 8.9–12.9)
POTASSIUM SERPL-SCNC: 3.9 MMOL/L (ref 3.5–5.1)
Q-T INTERVAL, ECG07: 396 MS
QRS DURATION, ECG06: 82 MS
QTC CALCULATION (BEZET), ECG08: 421 MS
RBC # BLD AUTO: 4.29 M/UL (ref 3.8–5.2)
SODIUM SERPL-SCNC: 140 MMOL/L (ref 136–145)
TRIGL SERPL-MCNC: 66 MG/DL (ref ?–150)
TROPONIN I SERPL-MCNC: <0.05 NG/ML
TSH SERPL DL<=0.05 MIU/L-ACNC: 1.05 UIU/ML (ref 0.36–3.74)
VENTRICULAR RATE, ECG03: 68 BPM
VLDLC SERPL CALC-MCNC: 13.2 MG/DL
WBC # BLD AUTO: 5.7 K/UL (ref 3.6–11)

## 2019-12-02 PROCEDURE — 36415 COLL VENOUS BLD VENIPUNCTURE: CPT

## 2019-12-02 PROCEDURE — 80048 BASIC METABOLIC PNL TOTAL CA: CPT

## 2019-12-02 PROCEDURE — 97161 PT EVAL LOW COMPLEX 20 MIN: CPT

## 2019-12-02 PROCEDURE — 65660000000 HC RM CCU STEPDOWN

## 2019-12-02 PROCEDURE — 84484 ASSAY OF TROPONIN QUANT: CPT

## 2019-12-02 PROCEDURE — 92522 EVALUATE SPEECH PRODUCTION: CPT | Performed by: SPEECH-LANGUAGE PATHOLOGIST

## 2019-12-02 PROCEDURE — 83036 HEMOGLOBIN GLYCOSYLATED A1C: CPT

## 2019-12-02 PROCEDURE — 97116 GAIT TRAINING THERAPY: CPT

## 2019-12-02 PROCEDURE — 74011000250 HC RX REV CODE- 250: Performed by: INTERNAL MEDICINE

## 2019-12-02 PROCEDURE — 74011250636 HC RX REV CODE- 250/636: Performed by: INTERNAL MEDICINE

## 2019-12-02 PROCEDURE — 70551 MRI BRAIN STEM W/O DYE: CPT

## 2019-12-02 PROCEDURE — 93306 TTE W/DOPPLER COMPLETE: CPT

## 2019-12-02 PROCEDURE — 80061 LIPID PANEL: CPT

## 2019-12-02 PROCEDURE — 84443 ASSAY THYROID STIM HORMONE: CPT

## 2019-12-02 PROCEDURE — 85027 COMPLETE CBC AUTOMATED: CPT

## 2019-12-02 PROCEDURE — 74011250637 HC RX REV CODE- 250/637: Performed by: INTERNAL MEDICINE

## 2019-12-02 RX ORDER — GLUCOSAM/CHONDRO/HERB 149/HYAL 750-100 MG
1 TABLET ORAL DAILY
Status: DISCONTINUED | OUTPATIENT
Start: 2019-12-03 | End: 2019-12-06 | Stop reason: HOSPADM

## 2019-12-02 RX ORDER — LISINOPRIL 5 MG/1
10 TABLET ORAL
Status: DISCONTINUED | OUTPATIENT
Start: 2019-12-02 | End: 2019-12-06 | Stop reason: HOSPADM

## 2019-12-02 RX ADMIN — LATANOPROST 1 DROP: 50 SOLUTION OPHTHALMIC at 22:43

## 2019-12-02 RX ADMIN — ATORVASTATIN CALCIUM 40 MG: 40 TABLET, FILM COATED ORAL at 08:45

## 2019-12-02 RX ADMIN — MIRTAZAPINE 30 MG: 15 TABLET, FILM COATED ORAL at 22:02

## 2019-12-02 RX ADMIN — HEPARIN SODIUM 5000 UNITS: 5000 INJECTION INTRAVENOUS; SUBCUTANEOUS at 14:42

## 2019-12-02 RX ADMIN — ASPIRIN 81 MG 81 MG: 81 TABLET ORAL at 08:45

## 2019-12-02 RX ADMIN — HEPARIN SODIUM 5000 UNITS: 5000 INJECTION INTRAVENOUS; SUBCUTANEOUS at 06:01

## 2019-12-02 RX ADMIN — LABETALOL HYDROCHLORIDE 5 MG: 5 INJECTION INTRAVENOUS at 06:01

## 2019-12-02 RX ADMIN — CALCIUM 500 MG: 500 TABLET ORAL at 08:46

## 2019-12-02 RX ADMIN — HEPARIN SODIUM 5000 UNITS: 5000 INJECTION INTRAVENOUS; SUBCUTANEOUS at 22:02

## 2019-12-02 RX ADMIN — CLOPIDOGREL BISULFATE 75 MG: 75 TABLET ORAL at 08:45

## 2019-12-02 RX ADMIN — LISINOPRIL 10 MG: 5 TABLET ORAL at 22:02

## 2019-12-02 NOTE — PROGRESS NOTES
Problem: Mobility Impaired (Adult and Pediatric)  Goal: *Acute Goals and Plan of Care (Insert Text)  Description  FUNCTIONAL STATUS PRIOR TO ADMISSION: Pt has had 2 previous strokes, most recent in Nov. She lives with her dtr and family whom she states are with her most of time. She had progressed to mod I amb on level with SPC but needed assist on stairs to second floor. Family states L sided weakness from old stroke. Pt was able to dress self and take shower using shower chair. HOME SUPPORT PRIOR TO ADMISSION: The patient lived with family. Physical Therapy Goals  Initiated 12/2/2019  1. Patient will move from supine to sit and sit to supine , scoot up and down and roll side to side in bed with independence within 7 day(s). 2.  Patient will transfer from bed to chair and chair to bed with modified independence using the least restrictive device within 7 day(s). 3.  Patient will perform sit to stand with modified independence within 7 day(s). 4.  Patient will ambulate with supervision/set-up for 150 feet with the least restrictive device within 7 day(s). 5.  Patient will ascend/descend 12 stairs with left handrail(s) with minimal assistance/contact guard assist within 7 day(s). Outcome: Progressing Towards Goal   PHYSICAL THERAPY EVALUATION- NEURO POPULATION  Patient: Viv Phillips (38 y.o. female)  Date: 12/2/2019  Primary Diagnosis: Acute CVA (cerebrovascular accident) [facility]) [I63.9]        Precautions: fall        ASSESSMENT  Based on the objective data described below, the patient presents with ataxia, decreased balance, decreased gait and functional mobility with high risk of fall - all below her baseline noted above. Current Level of Function Impacting Discharge (mobility/balance): Pt is S to CGA for bed mobility. She is CGA to min A for transfers. She amb with no device with significant ataxia, path deviation and moderate trunk sway.  Pt is CGA when using RW for amb but with poor technique and decreased safety with its use. Pt is showing increased difficulty with control and strength vs function post previous CVAs. Functional Outcome Measure: The patient scored  13/56 on the [facility] Assessment which is indicative of high fall risk. Other factors to consider for discharge: 2 story home, was independent with amb with cane and basic ADLs     Patient will benefit from skilled therapy intervention to address the above noted impairments. PLAN :  Recommendations and Planned Interventions: bed mobility training, transfer training, gait training, therapeutic exercises, neuromuscular re-education, patient and family training/education, and therapeutic activities      Frequency/Duration: Patient will be followed by physical therapy:  5 times a week to address goals. Recommendation for discharge: (in order for the patient to meet his/her long term goals)  Given pt's previous CVAs and new deficits with significant decline from baseline, would recommend therapy 3 hours per day 5-7 days per week; pt would be good candidate and shows ability to tolerate more intense rehab    This discharge recommendation:  Has not yet been discussed the attending provider and/or case management    IF patient discharges home will need the following DME: to be determined (TBD)         SUBJECTIVE:   Patient stated I knew something was wrong.     OBJECTIVE DATA SUMMARY:   HISTORY:    Past Medical History:   Diagnosis Date    Depression     Fall at home 2/10/2014   [facility] or floaters of right eye 8/21/2014    High cholesterol     Hypertension     Stroke [facility])     2010 and 2019     Past Surgical History:   Procedure Laterality Date    HX TUBAL LIGATION  1981    HX TUMOR REMOVAL  2008    brain tumor       Personal factors and/or comorbidities impacting plan of care: 2 previous CVAs    Home Situation  Home Environment: Private residence  # Steps to Enter: 0  One/Two Story Residence: Two story  # of Interior Steps: 12  Interior Rails: Left  Living Alone: No  Support Systems: Family member(s), Child(terrance)  Current DME Used/Available at Home: Cane, straight, Shower chair  Tub or Shower Type: Tub/Shower combination    EXAMINATION/PRESENTATION/DECISION MAKING:   Critical Behavior:  Neurologic State: Alert  Orientation Level: Oriented X4  Cognition: Follows commands       Range Of Motion:  AROM: Within functional limits           PROM: Within functional limits           Strength:    Strength: Generally decreased, functional(slightly weaker L proximal LE)                    Tone & Sensation:   Tone: Normal              Sensation: Intact               Coordination:  Coordination: (ataxic in gait, decr finger>nose on L, decr LLE control)  Vision:   Tracking: (patched on R currently; see H&P notes for vision testing)  Functional Mobility:  Bed Mobility:  Rolling: Supervision  Supine to Sit: Contact guard assistance  Sit to Supine: Contact guard assistance  Scooting: Supervision  Transfers:  Sit to Stand: Contact guard assistance(to RW; Min A w/o device to come to support)  Stand to Sit: Contact guard assistance;Minimum assistance                       Balance:   Sitting: Impaired  Sitting - Static: Good (unsupported)  Sitting - Dynamic: Fair (occasional)  Standing: Impaired  Standing - Static: Fair  Standing - Dynamic : Fair(with RW; needs support w/o device)  Ambulation/Gait Training:  Distance (ft): 50 Feet (ft)  Assistive Device: Gait belt;Walker, rolling  Ambulation - Level of Assistance: Minimal assistance;Contact guard assistance(min A w/o device; CGA wtih RW)        Gait Abnormalities: Ataxic;Decreased step clearance;Trunk sway increased        Base of Support: Narrowed     Speed/Gregoria: Slow;Pace decreased (<100 feet/min)  Step Length: Right shortened;Left shortened  Swing Pattern: Left asymmetrical            Functional Measure  [place] Balance Test:  Not completed as neurologist arrived for exam just prior to testing; able to complete tinetti  Tinetti test:    Sitting Balance: 1  Arises: 1  Attempts to Rise: 2  Immediate Standing Balance: 1  Standing Balance: 0  Nudged: 0  Eyes Closed: 0  Turn 360 Degrees - Continuous/Discontinuous: 0  Turn 360 Degrees - Steady/Unsteady: 0  Sitting Down: 1  Balance Score: 6 Balance total score  Indication of Gait: 1  R Step Length/Height: 1  L Step Length/Height: 1  R Foot Clearance: 1  L Foot Clearance: 1  Step Symmetry: 0  Step Continuity: 0  Path: 1  Trunk: 0  Walking Time: 1  Gait Score: 7 Gait total score  Total Score: 13/28 Overall total score         Tinetti Tool Score Risk of Falls  <19 = High Fall Risk  19-24 = Moderate Fall Risk  25-28 = Low Fall Risk  Tinetti ME. Performance-Oriented Assessment of Mobility Problems in Elderly Patients. [facility]; [id number].  (Scoring Description: PT Bulletin Feb. 10, 1993)    Older adults: Melissa Chapman et al, 2009; n = 1000 [facility] elderly evaluated with ABC, MEL, ADL, and IADL)  · Mean MEL score for males aged 69-68 years = 26.21(3.40)  · Mean MEL score for females age 69-68 years = 25.16(4.30)  · Mean MEL score for males over 80 years = 23.29(6.02)  · Mean MEL score for females over 80 years = 17.20(8.32)            56=Maximum possible score;   0-20=High fall risk  21-40=Moderate fall risk   41-56=Low fall risk        Physical Therapy Evaluation Charge Determination   History Examination Presentation Decision-Making   HIGH Complexity :3+ comorbidities / personal factors will impact the outcome/ POC  HIGH Complexity : 4+ Standardized tests and measures addressing body structure, function, activity limitation and / or participation in recreation  MEDIUM Complexity : Evolving with changing characteristics  LOW Complexity : FOTO score of       Based on the above components, the patient evaluation is determined to be of the following complexity level: LOW         Activity Tolerance:   Good  Please refer to the flowsheet for vital signs taken during this treatment. After treatment patient left in no apparent distress:   Sitting at [facility] with neurologist for exam, dtr present     COMMUNICATION/EDUCATION:   The patients plan of care was discussed with: Registered Nurse. Fall prevention education was provided and the patient/caregiver indicated understanding., Patient/family have participated as able in goal setting and plan of care. , and Patient/family agree to work toward stated goals and plan of care.     Thank you for this referral.  Luann Fulton, PT   Time Calculation: 23 mins

## 2019-12-02 NOTE — ACP (ADVANCE CARE PLANNING)
Advance Care Planning Note      NAME: Elayne Rangel   :     MRN:  [id number]     Date/Time:  2019 7:01 PM    Active Diagnoses:  Hospital Problems  Date Reviewed: 2019          Codes Class Noted POA    Acute CVA (cerebrovascular accident) [facility]) ICD-10-CM: I63.9  ICD-9-CM: 434.91  2019 Unknown          Recurrent CVA  Cerebral amyloid angiopathy  HTN  Dyslipidemia  ? Schwannoma  Left V4 occlusion    These active diagnoses are of sufficient risk that focused discussion on advance care planning is indicated in order to allow the patient to thoughtfully consider personal goals of care, and if situations arise that prevent the ability to personally give input, to ensure appropriate representation of their personal desires for different levels and aggressiveness of care. Discussion:   Code status addressed and wants to be a Full Code. Patient wants central line and vasopressors if needed. Patient  would like to assign Kai Land   as the surrogate decision maker. Persons present and participating in discussion: Dannielle Enriquez MD, Kai Land. Time Spent:   Total time spent face-to-face in education and discussion:  16   minutes.          Stephani Cardenas MD   Hospitalist

## 2019-12-02 NOTE — CONSULTS
Dictated: Patient has a tiny punctate right pontine tegmentum infarct on diffusion imaging missed per the radiologist, but clearly accounts for her right medial longitudinal fasciculus syndrome and double vision. Plavix has been added to her aspirin therapy which we should continue for 3 to 6 weeks and then switch to Plavix alone. 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 Consult  REFERRED BY:  Fabiola Gonzales NP    CHIEF COMPLAINT: Sudden double vision that started on Saturday and has persisted with some weakness in the left leg that is worse than before      Subjective:     Katharine Sotelo is a 69 y.o. right-handed -American female seen as a new patient to me, at the request of Dr. Hector Mcintosh of new problem of sudden double vision that occurred Saturday morning when she awoke, and has persisted, with the vision being more [id number], associated with some more difficulty with walking and balance problems and seems to have some more weakness with her left knee giving way she says in addition. She did not appear to be diabetic but does have a history of high blood pressure hyperlipidemia recurring strokes in the past and just had a stroke in November 3 to 4 weeks ago involving the left cerebellar peduncle causing ataxia and loss of balance. She also has on MRI scan done today multiple areas of ischemia on her scans in different distributions with atrophy, and a fair number of micro infarcts and numerous microhemorrhages on the basis of her hypertensive disease raising the possibility of some possible amyloid angiopathy also. She also has a left vestibular schwannoma and an MRI of the internal auditory canals has been ordered.   Not mentioned in the MRI report there is a very small area of punctate infarct in the right pontine tegmentum that would fit with her findings of a right medial longitudinal fasciculus syndrome causing her double vision, and coming from small vessel hypertensive type disease. This may account some for her left leg feeling a little weaker also. She also has occluded left vertebral artery at the V4 segment previously seen on CTA last month. She denies any headache, denies any palpitations or chest pain, denies any other cardiac symptoms, and was admitted with accelerated hypertension from the emergency room. High-dose statin and 325 mg aspirin therapy prior to this admission, and her aspirin dose has been reduced to 81 mg now and she is also on Plavix therapy now in addition plus to high-dose statin. She denies any chest pain or palpitations or any other causes of this new lesion. She denies any headache, fever, meningismus, or other cranial nerve problem. She does have reduced hearing in the left ear. She does not think she seen a neurosurgeon.     Past Medical History:   Diagnosis Date    Depression     Fall at home 2/10/2014   [facility] or floaters of right eye 2014    High cholesterol     History of stroke     Hypertension     [facility] [address] [address]     Stroke ([facility] [address]      and       Past Surgical History:   Procedure Laterality Date    HX TUBAL LIGATION      HX TUMOR REMOVAL      brain tumor     Family History   Problem Relation Age of Onset   [facility] Hypertension Mother     Other Father         Unknown    Dementia Brother     Alcohol abuse Sister     Alcohol abuse Sister     No Known Problems Sister     No Known Problems Sister     No Known Problems Sister     Dementia Brother     No Known Problems Brother     No Known Problems Child       Social History     Tobacco Use    Smoking status: Former Smoker     Packs/day: 0.25     Years: 20.00     Pack years: 5.00     Last attempt to quit: 10/26/2019     Years since quittin.1    Smokeless tobacco: Never Used   Substance Use Topics    Alcohol use: No         Current Facility-Administered Medications:     [START ON 12/3/2019] omega 3-DHA-EPA-fish oil 1,000 mg (120 mg-180 mg) capsule 1 Cap, 1 Cap, Oral, DAILY, Diana Martinez MD    lisinopril (PRINIVIL, ZESTRIL) tablet 10 mg, 10 mg, Oral, QHS, Diana Martinez MD    atorvastatin (LIPITOR) tablet 40 mg, 40 mg, Oral, DAILY, Bard Tavares Lowe MD, 40 mg at 12/02/19 0845    calcium carbonate (OS-ABEL) tablet 500 mg [elemental], 500 mg, Oral, DAILY, Miguel RIVERA MD, 500 mg at 12/02/19 0846    latanoprost (XALATAN) 0.005 % ophthalmic solution 1 Drop, 1 Drop, Both Eyes, QHS, Timoteo Garrido MD    mirtazapine (REMERON) tablet 30 mg, 30 mg, Oral, QHS, Bard Tavares Garrido MD    acetaminophen (TYLENOL) tablet 650 mg, 650 mg, Oral, Q4H PRN **OR** acetaminophen (TYLENOL) solution 650 mg, 650 mg, Per NG tube, Q4H PRN **OR** acetaminophen (TYLENOL) suppository 650 mg, 650 mg, Rectal, Q4H PRN, Bard Tavares Lowe MD    aspirin chewable tablet 81 mg, 81 mg, Oral, DAILY, Timoteo Garrido MD, 81 mg at 12/02/19 0845    clopidogrel (PLAVIX) tablet 75 mg, 75 mg, Oral, DAILY, Timoteo Garrido MD, 75 mg at 12/02/19 0845    labetalol (NORMODYNE;TRANDATE) injection 5 mg, 5 mg, IntraVENous, Q10MIN PRN, Alyssa Mcdermott MD, 5 mg at 12/02/19 0601    heparin (porcine) injection 5,000 Units, 5,000 Units, SubCUTAneous, Q8H, Timoteo Garrido MD, 5,000 Units at 12/02/19 1442        No Known Allergies   MRI Results (most recent):  Results from Hospital Encounter encounter on 12/01/19   MRI BRAIN WO CONT    Narrative EXAM: MRI BRAIN WO CONT    INDICATION: Diplopia. COMPARISON: CTA head and neck 12/1/2019, MRI brain 11/5/2019. CONTRAST: None. TECHNIQUE:    Multiplanar multisequence acquisition without contrast of the brain and orbits. FINDINGS:  The orbital contents are within normal limits.  The optic nerves are normal in  size and signal. The extraocular muscles are normal.    There is unchanged abnormal T2 hypointense signal seen throughout the left  internal auditory canal (series 9 image 18 and 19), with an apparent small soft  tissue lesion in the left cerebellopontine angle cistern measuring 9 x 7 mm. There is unchanged abnormal flow void signal within the left vertebral artery V4  segment, consistent with occlusion as seen on prior CTA. Unchanged generalized parenchymal volume loss with commensurate dilation of the  sulci and ventricular system. Unchanged confluent periventricular deep white  matter T2/FLAIR hyperintensity, consistent with severe chronic microvascular  ischemic disease. Unchanged numerous small chronic infarcts, including in the  bilateral thalami, autumn and middle cerebellar peduncles. Redemonstrated numerous  peripheral chronic microhemorrhages scattered throughout the cerebral  hemispheres, as well as a few small chronic microhemorrhages noted in the deep  gray nuclei, for example in the left thalamus (series 6 image 31 and 29), and a  small chronic microhemorrhage in the left medulla (series 6 image 13). No  evidence of acute infarct. There is no cerebellar tonsillar herniation. The paranasal sinuses, mastoid air cells and middle ears are clear. A left  suboccipital cranioplasty is again noted. Impression IMPRESSION:       1. No acute intracranial abnormality. No evidence of intraorbital abnormality. 2. Unchanged generalized parenchymal volume loss and severe chronic  microvascular ischemic disease with numerous small chronic supratentorial and  infratentorial infarcts. 3. Unchanged scattered supratentorial and infratentorial chronic  microhemorrhages, which may be on the basis of chronic microvascular ischemic  disease. However, the peripheral location of nearly all of these  microhemorrhages raises suspicion for cerebral amyloid angiopathy, and clinical  correlation is recommended.    4. Redemonstrated left vestibular schwannoma within the internal auditory canal  and cerebellopontine angle cistern. Consider MRI IAC protocol for full  characterization. 5. Redemonstrated abnormal left vertebral artery V4 segment flow void,  consistent with occlusion as seen on prior CTA. Results from [facility] encounter on 12/01/19   MRI BRAIN WO CONT    Narrative EXAM: MRI BRAIN WO CONT    INDICATION: Diplopia. COMPARISON: CTA head and neck 12/1/2019, MRI brain 11/5/2019. CONTRAST: None. TECHNIQUE:    Multiplanar multisequence acquisition without contrast of the brain and orbits. FINDINGS:  The orbital contents are within normal limits. The optic nerves are normal in  size and signal. The extraocular muscles are normal.    There is unchanged abnormal T2 hypointense signal seen throughout the left  internal auditory canal (series 9 image 18 and 19), with an apparent small soft  tissue lesion in the left cerebellopontine angle cistern measuring 9 x 7 mm. There is unchanged abnormal flow void signal within the left vertebral artery V4  segment, consistent with occlusion as seen on prior CTA. Unchanged generalized parenchymal volume loss with commensurate dilation of the  sulci and ventricular system. Unchanged confluent periventricular deep white  matter T2/FLAIR hyperintensity, consistent with severe chronic microvascular  ischemic disease. Unchanged numerous small chronic infarcts, including in the  bilateral thalami, autumn and middle cerebellar peduncles. Redemonstrated numerous  peripheral chronic microhemorrhages scattered throughout the cerebral  hemispheres, as well as a few small chronic microhemorrhages noted in the deep  gray nuclei, for example in the left thalamus (series 6 image 31 and 29), and a  small chronic microhemorrhage in the left medulla (series 6 image 13). No  evidence of acute infarct. There is no cerebellar tonsillar herniation.        The paranasal sinuses, mastoid air cells and middle ears are clear. A left  suboccipital cranioplasty is again noted. Impression IMPRESSION:       1. No acute intracranial abnormality. No evidence of intraorbital abnormality. 2. Unchanged generalized parenchymal volume loss and severe chronic  microvascular ischemic disease with numerous small chronic supratentorial and  infratentorial infarcts. 3. Unchanged scattered supratentorial and infratentorial chronic  microhemorrhages, which may be on the basis of chronic microvascular ischemic  disease. However, the peripheral location of nearly all of these  microhemorrhages raises suspicion for cerebral amyloid angiopathy, and clinical  correlation is recommended. 4. Redemonstrated left vestibular schwannoma within the internal auditory canal  and cerebellopontine angle cistern. Consider MRI IAC protocol for full  characterization. 5. Redemonstrated abnormal left vertebral artery V4 segment flow void,  consistent with occlusion as seen on prior CTA. Review of Systems:  A comprehensive review of systems was negative except for: Constitutional: positive for fatigue and malaise  Eyes: positive for visual disturbance and Diplopia  Musculoskeletal: positive for myalgias, arthralgias, stiff joints and muscle weakness  Neurological: positive for coordination problems, gait problems, weakness and Diplopia  Behvioral/Psych: positive for anxiety and depression   Vitals:    12/02/19 1225 12/02/19 1326 12/02/19 1509 12/02/19 1929   BP: 154/71 145/71 164/74 151/64   Pulse: 70  82 84   Resp: 16  16 16   Temp: 98.2 °F (36.8 °C)  97.9 °F (36.6 °C) 98 °F (36.7 °C)   SpO2: 96%  97% 90%   Weight:  158 lb (71.7 kg)     Height:  5' 6\" (1.676 m)       Objective:     I      NEUROLOGICAL EXAM:    Appearance: The is well developed, well nourished, provides a fair history and is in no acute distress.    Mental Status: Oriented to time, place and person, and the president, cognitive function is slow but probably normal and speech is fluent and no aphasia or dysarthria. Mood and affect appropriate but anxious. Cranial Nerves:   Intact visual fields. Fundi are benign, disc are flat, no lesions seen on funduscopy. LOYD, EOM's showed decreased medial rectus function with nystagmus in the left eye on left gaze indicating a right medial longitudinal fasciculus syndrome or right pontine segmental infarct. Mild left eye nystagmus on gaze to the left, no ptosis. Facial sensation is normal. Corneal reflexes are not tested. Facial movement is symmetric. Hearing is abnormal bilaterally. Palate is midline with normal sternocleidomastoid and trapezius muscles are normal. Tongue is midline. Neck without meningismus or bruits  Temporal arteries are not tender or enlarged  TMJ areas are not tender on palpation   Motor:  4/5 strength in upper and lower proximal and distal muscles, with perhaps slight weakness of her left lower extremity. Normal bulk and tone. No fasciculations. Rapid alternating movement is symmetric and slow bilaterally   Reflexes:   Deep tendon reflexes 1+/4 and symmetrical.  No babinski or clonus present   Sensory:   Normal to touch, pinprick and vibration and temperature. DSS is intact   Gait:  Abnormal gait with the patient having mild cerebellar ataxia and mild weakness in the left lower extremity. Tremor:   No tremor noted. Cerebellar: Moderately abnormal cerebellar signs present on Romberg and tandem testing due to left cerebellar ataxia and finger-nose-finger exam is symmetric. Neurovascular:  Normal heart sounds and regular rhythm, peripheral pulses decreased, and no carotid bruits. Assessment:       ICD-10-CM ICD-9-CM    1. Diplopia H53.2 368. 2      Active Problems:    TIA (transient ischemic attack) (7/15/2019)      Stroke ([facility] [address] (7/16/2019)      Acute CVA (cerebrovascular accident) ([facility] [address] (12/1/2019)      Right pontine stroke ([facility] [address] (12/2/2019)      Bilateral carotid artery stenosis (12/2/2019)      Diplopia (12/2/2019)      Left acoustic neuroma ([address] (12/2/2019)      History of stroke ()        Plan:     Patient with new onset of double vision, and appears to have a right pontine segmental infarct on MRI, associated with a right medial longitudinal fasciculus syndrome causing her double vision. 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 She may be a candidate for gamma knife therapy if she is interested. Patient with multiple areas of previous stroke, will be treated with aspirin and Plavix for 3 to 6 weeks, and a high-dose statin and complete neurovascular work-up has been ordered. Continue cardiac monitoring and telemetry to rule out any cardioembolic phenomena. Have to watch her carefully because she has marked evidence of microhemorrhages from her hypertensive disease most likely may be a candidate for easy intracranial bleeding. Very difficult case, PT OT and speech therapy all has been consulted and will follow the patient and continue the excellent care of her blood pressure cholesterol and diet to reduce her risk factors of stroke which are mainly high blood pressure, cholesterol, smoking which she does not do, diabetes and poor diet. She is also advised of the risk factors for stroke including be fast which includes balance, eyes, face, arms, sensory and time. We will follow carefully with you.       Signed By: Ella Cordoba MD     December 2, 2019       CC: Faye Litten, NP  FAX: [phone]

## 2019-12-02 NOTE — PROGRESS NOTES
Speech LAnguage Pathology evaluation/discharge  Patient: Gavin Villa (80 y.o. female)  Date: 12/2/2019  Primary Diagnosis: Acute CVA (cerebrovascular accident) [facility]) [I63.9]       Precautions:        ASSESSMENT :  Based on the objective data described below, the patient presents with functional motor speech. Patient intelligible in conversation. Patient and daughter report baseline level of speech/language. STAND completed with no difficulty noted. Diet intiated. Patient and daughter report no difficulty swallowing. Formal swallow evaluation not warranted. Patient continues to report impaired vision however eye patch helps. Patient may follow up with outpatient SLP for reading comprehension treatment if reading comprehension is impaired. Patient reading words without difficulty this date. Skilled acute therapy provided by a speech-language pathologist is not indicated at this time. PLAN :  Recommendations:  No further SLP treatment warranted  Discharge Recommendations: None     SUBJECTIVE:   Patient stated [facility] speech is good. . Daughter at bedside. STAND completed.     OBJECTIVE:     Past Medical History:   Diagnosis Date    Depression     Fall at home 2/10/2014   [facility] or floaters of right eye 8/21/2014    High cholesterol     Hypertension     Stroke [facility])     2010 and 2019     Past Surgical History:   Procedure Laterality Date    HX TUBAL LIGATION  1981    HX TUMOR REMOVAL  2008    brain tumor     Prior Level of Function/Home Situation:   Home Situation  Home Environment: Private residence  # Steps to Enter: 0  One/Two Story Residence: Two story  # of Interior Steps: 12  Interior Rails: Left  Living Alone: No  Support Systems: Family member(s), Child(terrance)  Current DME Used/Available at Home: Cane, straight, Shower chair  Tub or Shower Type: Tub/Shower combination  Mental Status:  Neurologic State: Alert  Orientation Level: Oriented X4  Cognition: Follows commands Perseveration: No perseveration noted     Motor Speech:  Oral-Motor Structure/Motor Speech  Labial: Left droop(Mild)  Dentition: Natural;Intact  Oral Hygiene: Moist oral mucosa  Lingual: No impairment  Velum: Unable to visualize  Mandible: No impairment  Apraxic Characteristics: None  Dysarthric Characteristics: None  Intelligibility: No impairment    NOMS: The NOMS functional outcome measure was used to quantify this patient's level of motor speech impairment. Based on the NOMS, the patient was determined to be at level 7 for motor speech function. NOMS Motor Speech:  Level 1 (CN):  100% unintelligible  Level 2 (CM):  Communication partner responsible for message; can do CV or automatic words w/ max cues but rarely intelligible in context  Level 3 (CL): communication partner primarily responsible for message but says CV/automatic words intelligibly; mod cues to say simple words/phrases  Level 4 (CK): In structured conversation with familiar listener can say simple words and phrases. Mod cues for simple sentences  Level 5 (CJ):  Uses simple sentences for ADLs with familiar and unfamiliar listener; min cues for complex sentences  Level 6 (CI):  Intelligible in ADLs; difficulty in voc/social activites; rare cues for complex message; uses comp strategies  Level 7 ([address] [place] [address]):  Intelligible in all activities. May occasionally use compensatory strategies. DOMINGO. (2003). National Outcomes Measurement System (NOMS): Adult Speech-Language Pathology User's Guide. After treatment:   []   Patient left in no apparent distress sitting up in chair  [x]   Patient left in no apparent distress in bed  [x]   Call bell left within reach  [x]   Nursing notified  [x]   Caregiver present  []   Bed alarm activated    COMMUNICATION/EDUCATION:   The patients plan of care including findings and recommendations was discussed with:    Patient was educated regarding role of SLP and POC.   [x]   Patient/family have participated as able and agree with findings and recommendations. []   Patient is unable to participate in plan of care at this time.     Thank you for this referral.  Claudia Rizo, SLP  Time Calculation: 15 mins

## 2019-12-02 NOTE — PROGRESS NOTES
Attempted to see pt for OT services. Pt is in the process of transferring up from ED. Will defer and continue to follow.

## 2019-12-02 NOTE — PROGRESS NOTES
Hospitalist Progress Note    NAME: Alok Rangel   :  1945   MRN:  [id number]       Assessment / Plan:  Diplopia   Recurrent Stroke: discussed with Dr. 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 :  -MRI of auditory canals ordered     Hypertensive urgency  -restart home lisinopril (will ordered qhs)  -prn labetalol      Hyperlipidemia: controlled  -Continue Lipitor  Depression  -continue Remeron     Code Status: full   Surrogate Decision Maker: Kai Land     DVT Prophylaxis: sq heparin  GI Prophylaxis: not indicated     Baseline: From home independent       Subjective:     Chief Complaint / Reason for Physician Visit: follow-up stroke  Patient seen for follow-up  Still with double vision  Quit smoking 1 month ago      Review of Systems:  Symptom Y/N Comments  Symptom Y/N Comments   Fever/Chills    Chest Pain n    Poor Appetite    Edema     Cough    Abdominal Pain n    Sputum    Joint Pain     SOB/JHA n   Pruritis/Rash     Nausea/vomit    Tolerating PT/OT     Diarrhea    Tolerating Diet     Constipation    Other       Could NOT obtain due to:      Objective:     VITALS:   Last 24hrs VS reviewed since prior progress note.  Most recent are:  Patient Vitals for the past 24 hrs:   Temp Pulse Resp BP SpO2   19 1225 98.2 °F (36.8 °C) 70 16 154/71 96 %   19 1000  84 16 145/71 95 %   19 0900  (!) 110 18 195/89 98 %   19 0825  80 18 153/64 95 %   19 0700  73 10 152/70 95 %   19 0630  80 11 178/67 96 %   19 0130  88 16 176/70 96 %   19 0100  74 11 159/65 95 %   12/02/19 0030  80 12 153/68 96 %   12/01/19 2330  79 11 151/68 96 %   12/01/19 2300  79 11 146/58 96 %   12/01/19 2230  75 15 124/72 97 %   12/01/19 2200  80 13 144/65 97 %   12/01/19 2130  79 10 120/58 96 %   12/01/19 2100  83 16 148/59 96 %   12/01/19 2030  80 11 153/64 96 %   12/01/19 2000  87 15 166/71 96 %   12/01/19 1900  87 16 (!) 157/102 98 %   12/01/19 1830  79 13 174/69 99 %   12/01/19 1245  68 11 176/66 97 %     No intake or output data in the 24 hours ending 12/02/19 1236     PHYSICAL EXAM:  General: WD, WN. Alert, cooperative, no acute distress    EENT:  Disconjugate gaze. Anicteric sclerae. MMM  Resp:  CTA bilaterally, no wheezing or rales. No accessory muscle use  CV:  Regular  rhythm,  No edema  GI:  Soft, Non distended, Non tender.  +Bowel sounds  Neurologic:  Alert and oriented X 3, normal speech,   Psych:   Good insight. Not anxious nor agitated  Skin:  No rashes. No jaundice    Reviewed most current lab test results and cultures  YES  Reviewed most current radiology test results   YES  Review and summation of old records today    NO  Reviewed patient's current orders and MAR    YES  PMH/SH reviewed - no change compared to H&P  ________________________________________________________________________  Care Plan discussed with:    Comments   Patient x    Family      RN x    Care Manager     Consultant  x Dr. Melvin Whitmore team rounds were held today with , nursing, pharmacist and clinical coordinator. Patient's plan of care was discussed; medications were reviewed and discharge planning was addressed.      ________________________________________________________________________  Total NON critical care TIME:  35   Minutes    Total CRITICAL CARE TIME Spent:   Minutes non procedure based      Comments   >50% of visit spent in counseling and coordination of care x ________________________________________________________________________  Elisabeth Gonzales MD     Procedures: see electronic medical records for all procedures/Xrays and details which were not copied into this note but were reviewed prior to creation of Plan. LABS:  I reviewed today's most current labs and imaging studies.   Pertinent labs include:  Recent Labs     12/02/19  0345 12/01/19  1205   WBC 5.7 6.4   HGB 11.6 12.2   HCT 37.6 40.2    335     Recent Labs     12/02/19  0345 12/01/19  1205    140   K 3.9 4.0   * 109*   CO2 26 25   GLU 81 85   BUN 21* 16   CREA 0.90 0.95   CA 8.9 8.9   ALB  --  3.3*   TBILI  --  0.5   SGOT  --  34   ALT  --  37       Signed: Elisabeth Gonzales MD

## 2019-12-02 NOTE — PROGRESS NOTES
Reason for Admission:   CVA                  RRAT Score:     16 moderate risk             Do you (patient/family) have any concerns for transition/discharge? No concerns at this time              Plan for utilizing home health:   Currently using [facility]    Current Advanced Directive/Advance Care Plan:  none            Transition of Care Plan:          1. Patient in ED bed waiting for inpatient admission  2. Patient will need 2nd IM letter at discharge  3. Patient states she will discharge to her daughter's house with follow up appointments. Patient states she is currently using [facility]    Patient is a 69year old female admitted 12/1 for CVA. Patient alert and oriented, resting in bed, no visitors present in room. Demographic information verified and correct. Insurance information verified and correct. Patient lives with her daughter, no home oxygen, no DME, is currently using HH. Patient uses [address] on [address]. Patient states she is independent with ADL, her daughter manages her medications and provides transportation. Patient's daughter can transport at discharge. Care Management Interventions  PCP Verified by CM: Yes(Isabel Vega NP)  Mode of Transport at Discharge: Other (see comment)(pt's daughter can transport at [place])  Transition of Care Consult (CM Consult): Discharge Planning  Discharge Durable Medical Equipment: No  Physical Therapy Consult: Yes  Occupational Therapy Consult: Yes  Speech Therapy Consult: Yes  Current Support Network:  Other(will dc to daughters' house, 2 story, 0 steps to enter)  Confirm Follow Up Transport: Family  Plan discussed with Pt/Family/Caregiver: Yes  Discharge Location  Discharge Placement: [address] RN, [address] [place] [address]  [phone]

## 2019-12-03 ENCOUNTER — APPOINTMENT (OUTPATIENT)
Dept: MRI IMAGING | Age: 74
DRG: 065 | End: 2019-12-03
Attending: PSYCHIATRY & NEUROLOGY
Payer: MEDICARE

## 2019-12-03 PROCEDURE — 74011250636 HC RX REV CODE- 250/636: Performed by: INTERNAL MEDICINE

## 2019-12-03 PROCEDURE — 97116 GAIT TRAINING THERAPY: CPT

## 2019-12-03 PROCEDURE — A9575 INJ GADOTERATE MEGLUMI 0.1ML: HCPCS | Performed by: INTERNAL MEDICINE

## 2019-12-03 PROCEDURE — 70553 MRI BRAIN STEM W/O & W/DYE: CPT

## 2019-12-03 PROCEDURE — 74011000250 HC RX REV CODE- 250: Performed by: INTERNAL MEDICINE

## 2019-12-03 PROCEDURE — 97165 OT EVAL LOW COMPLEX 30 MIN: CPT

## 2019-12-03 PROCEDURE — 74011250637 HC RX REV CODE- 250/637: Performed by: INTERNAL MEDICINE

## 2019-12-03 PROCEDURE — 65660000000 HC RM CCU STEPDOWN

## 2019-12-03 PROCEDURE — 97535 SELF CARE MNGMENT TRAINING: CPT

## 2019-12-03 RX ORDER — GADOTERATE MEGLUMINE 376.9 MG/ML
15 INJECTION INTRAVENOUS
Status: COMPLETED | OUTPATIENT
Start: 2019-12-03 | End: 2019-12-03

## 2019-12-03 RX ORDER — POLYETHYLENE GLYCOL 3350 17 G/17G
17 POWDER, FOR SOLUTION ORAL DAILY
Status: DISCONTINUED | OUTPATIENT
Start: 2019-12-04 | End: 2019-12-06 | Stop reason: HOSPADM

## 2019-12-03 RX ADMIN — CALCIUM 500 MG: 500 TABLET ORAL at 08:58

## 2019-12-03 RX ADMIN — LISINOPRIL 10 MG: 5 TABLET ORAL at 20:25

## 2019-12-03 RX ADMIN — HEPARIN SODIUM 5000 UNITS: 5000 INJECTION INTRAVENOUS; SUBCUTANEOUS at 22:16

## 2019-12-03 RX ADMIN — CLOPIDOGREL BISULFATE 75 MG: 75 TABLET ORAL at 08:57

## 2019-12-03 RX ADMIN — ASPIRIN 81 MG 81 MG: 81 TABLET ORAL at 08:58

## 2019-12-03 RX ADMIN — OMEGA-3 FATTY ACIDS CAP 1000 MG 1 CAPSULE: 1000 CAP at 08:57

## 2019-12-03 RX ADMIN — HEPARIN SODIUM 5000 UNITS: 5000 INJECTION INTRAVENOUS; SUBCUTANEOUS at 05:06

## 2019-12-03 RX ADMIN — ATORVASTATIN CALCIUM 40 MG: 40 TABLET, FILM COATED ORAL at 08:57

## 2019-12-03 RX ADMIN — HEPARIN SODIUM 5000 UNITS: 5000 INJECTION INTRAVENOUS; SUBCUTANEOUS at 15:56

## 2019-12-03 RX ADMIN — MIRTAZAPINE 30 MG: 15 TABLET, FILM COATED ORAL at 22:18

## 2019-12-03 RX ADMIN — LATANOPROST 1 DROP: 50 SOLUTION OPHTHALMIC at 22:17

## 2019-12-03 RX ADMIN — GADOTERATE MEGLUMINE 15 ML: 376.9 INJECTION INTRAVENOUS at 10:20

## 2019-12-03 NOTE — PROGRESS NOTES
Initial Nutrition Assessment:    INTERVENTIONS/RECOMMENDATIONS:   · Meals/Snacks: General/healthful diet: Continue cardiac diet     ASSESSMENT:   Patient medically noted for diplopia and recurrent stroke. PMH stroke, depression, HTN, and acoustic schwannoma. Patient had finished breakfast at time of visit; wasn't a fan of her meal. She reports a fluctuating appetite at home. Not usually a breakfast eater but will eat lunch and dinner. Doesn't eat if she doesn't feel hungry. She used to drink boost/ensure supplements but her physician told her to stop; family reports this is because she was only consuming supplements and not her meals. Encouraged meals first and to supplement with ensure if eating <50%. She declined need for supplement at this time but aware of availability. She reports no recent weight loss. Family states she weighed 168# at an appointment last week. Would obtain standing scale weight for accuracy. Weight appears to have fluctuated between 158-168# over the last few months per chart review. Menu at bedside. Encouraged intake of meals and use of room service. Diet Order: Cardiac  % Eaten:  No data found. Pertinent Medications: [x]Reviewed []Other: Atorvastatin, Os-jacky, Plavix, Lisinopril, Remeron, Fish oil  Pertinent Labs: [x]Reviewed []Other: BG 81-85-96, A1c 5.8  Food Allergies: [x]None []Yes:    Last BM: 12/2  []Active     []Hyperactive  []Hypoactive       [] Absent BS  Skin:    [x] Intact   [] Incision  [] Breakdown: [] Edema []Other:    Anthropometrics:   Height: 5' 6\" (167.6 cm) Weight: 71.7 kg (158 lb)   IBW (%IBW):   ( ) UBW (%UBW):   (  %)   Last Weight Metrics:  Weight Loss Metrics 12/2/2019 11/25/2019 11/9/2019 11/7/2019 8/29/2019 7/17/2019 8/21/2014   Today's Wt 158 lb 169 lb 12.8 oz 156 lb 12 oz 160 lb 165 lb 158 lb 160 lb   BMI 25.5 kg/m2 27.41 kg/m2 25.3 kg/m2 26.63 kg/m2 27.46 kg/m2 26.29 kg/m2 25.05 kg/m2       BMI: Body mass index is 25.5 kg/m².     This BMI is indicative of:   []Underweight    []Normal    [x]Overweight    [] Obesity   [] Extreme Obesity (BMI>40)     Estimated Nutrition Needs (Based on):   1621 Kcals/day(BMR (1247) x 1. 3AF) , 72 g(-86g (1.0-1.2 g/kg bw)) Protein  Carbohydrate: At Least 130 g/day  Fluids: 1600 mL/day (1ml/kcal)    Pt expected to meet estimated nutrient needs: [x]Yes []No    NUTRITION DIAGNOSES:   Problem:  No nutritional diagnosis at this time      Etiology: related to       Signs/Symptoms: as evidenced by        NUTRITION INTERVENTIONS:  Meals/Snacks: General/healthful diet                  GOAL:   PO intake >50% of meals next 3-5 days    LEARNING NEEDS (Diet, Food/Nutrient-Drug Interaction):    [x] None Identified   [] Identified and Education Provided/Documented   [] Identified and Pt declined/was not appropriate     Cultural, Hinduism, OR Ethnic Dietary Needs:    [x] None Identified   [] Identified and Addressed     [x] Interdisciplinary Care Plan Reviewed/Documented    [x] Discharge Planning:  Heart healthy diet     MONITORING /EVALUATION:   Food/Nutrient Intake Outcomes:  Total energy intake  Physical Signs/Symptoms Outcomes: Weight/weight change, Glucose profile, Electrolyte and renal profile    NUTRITION RISK:    [x] Patient At Nutritional Risk              [] Patient Not at Nutritional Risk    PT SEEN FOR:    []  MD Consult: []Calorie Count      []Diabetic Diet Education        []Diet Education     []Electrolyte Management     []General Nutrition Management and Supplements     []Management of Tube Feeding     []TPN Recommendations    [x]  RN Referral:  [x]MST score >=2     []Enteral/Parenteral Nutrition PTA     []Pregnant: Gestational DM or Multigestation     []Pressure Ulcer/Wound Care needs        []  Low BMI  []  DIOMEDES Perez [address]  Pager [phone]    Weekend Pager [phone]

## 2019-12-03 NOTE — PROGRESS NOTES
Hospitalist Progress Note    NAME: Alok Rangel   :  1945   MRN:  [id number]       Assessment / Plan:  Diplopia POA  Acute right [place] stroke POA  Recurrent Stroke: discussed with Dr. 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  -Neurology will discuss with pt regarding Gamma knife radiation     Hypertensive urgency  -restart home lisinopril   -prn labetalol      Hyperlipidemia: controlled  -Continue Lipitor  Depression  -continue Remeron     Code Status: full   Surrogate Decision Maker: Kai Land     DVT Prophylaxis: sq heparin  GI Prophylaxis: not indicated     Baseline: From home independent    Hopefully tomorrow, If cleared by Neuro. Will benefit from inpatient rehab     Subjective:     Chief Complaint / Reason for Physician Visit: follow-up stroke  Patient seen for follow-up  Still with double vision  No events overnight      Review of Systems:  Symptom Y/N Comments  Symptom Y/N Comments   Fever/Chills    Chest Pain n    Poor Appetite    Edema     Cough    Abdominal Pain n    Sputum    Joint Pain     SOB/JHA n   Pruritis/Rash     Nausea/vomit    Tolerating PT/OT     Diarrhea    Tolerating Diet     Constipation    Other       Could NOT obtain due to:      Objective:     VITALS:   Last 24hrs VS reviewed since prior progress note.  Most recent are:  Patient Vitals for the past 24 hrs:   Temp Pulse Resp BP SpO2   19 1148 97.6 °F (36.4 °C) 75 16 151/71 95 %   19 0758 97.8 °F (36.6 °C) 77 16 148/69 97 %   12/03/19 0506 97.7 °F (36.5 °C) 82 16 (!) 126/91 99 %   12/03/19 0001 98.7 °F (37.1 °C) 86 16 152/76 95 %   12/02/19 2202  88  150/60    12/02/19 1929 98 °F (36.7 °C) 84 16 151/64 90 %   12/02/19 1509 97.9 °F (36.6 °C) 82 16 164/74 97 %     No intake or output data in the 24 hours ending 12/03/19 1435     PHYSICAL EXAM:  General: WD, WN. Alert, cooperative, no acute distress    EENT:  Disconjugate gaze. Anicteric sclerae. MMM  Resp:  CTA bilaterally, no wheezing or rales. No accessory muscle use  CV:  Regular  rhythm,  No edema  GI:  Soft, Non distended, Non tender.  +Bowel sounds  Neurologic:  Alert and oriented X 3, normal speech,   Psych:   Good insight. Not anxious nor agitated  Skin:  No rashes. No jaundice    Reviewed most current lab test results and cultures  YES  Reviewed most current radiology test results   YES  Review and summation of old records today    NO  Reviewed patient's current orders and MAR    YES  PMH/SH reviewed - no change compared to H&P  ________________________________________________________________________  Care Plan discussed with:    Comments   Patient x    Family      RN x    Care Manager     Consultant  x Dr. Mirta Hall team rounds were held today with , nursing, pharmacist and clinical coordinator. Patient's plan of care was discussed; medications were reviewed and discharge planning was addressed.      ________________________________________________________________________  Total NON critical care TIME:  35   Minutes    Total CRITICAL CARE TIME Spent:   Minutes non procedure based      Comments   >50% of visit spent in counseling and coordination of care x    ________________________________________________________________________  May MD Wilver     Procedures: see electronic medical records for all procedures/Xrays and details which were not copied into this note but were reviewed prior to creation of Plan. LABS:  I reviewed today's most current labs and imaging studies.   Pertinent labs include:  Recent Labs     12/02/19  0345 12/01/19  1205   WBC 5.7 6.4   HGB 11.6 12.2   HCT 37.6 40.2    335     Recent Labs     12/02/19  0345 12/01/19  1205    140   K 3.9 4.0   * 109*   CO2 26 25   GLU 81 85   BUN 21* 16   CREA 0.90 0.95   CA 8.9 8.9   ALB  --  3.3*   TBILI  --  0.5   SGOT  --  34   ALT  --  37       Signed: June Medellin MD

## 2019-12-03 NOTE — PROGRESS NOTES
[facility]:    Possibly Inpatient Rehab  2nd  Medicare Letter    CM: Jackie Sow is currently working with the pt in the Neuro Unit. CM staffed case with therapist and it was reported that pt could benefit from inpatient rehab. CM completed room visit with pt and daughter, by bedside. CM informed both of recommendations: inpatient rehab. Both agreeable to services. CM informed both that if pt is not approved for inpatient rehab then additional options can be arranged: snf placement. CM informed both of [address] document (signed) placed in chart. CM informed that pt is an current [employer] (aHydee Figueroa) pt. Arielle Vance will be approved through [employer] if clinicals are accepted by [facility].   CM will inform CM from [facility] about the following referral.    Jackie Sow, MSW, [address] [place] [address]

## 2019-12-03 NOTE — PROGRESS NOTES
Dictated: Patient has a tiny punctate right pontine tegmentum infarct on diffusion imaging missed per the radiologist, but clearly accounts for her right medial longitudinal fasciculus syndrome and double vision. Plavix has been added to her aspirin therapy which we should continue for 3 to 6 weeks and then switch to Plavix alone. 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 Consult  REFERRED BY:  Cheryl Martinez NP    CHIEF COMPLAINT: Sudden double vision that started on Saturday and has persisted with some weakness in the left leg that is worse than before      Subjective:     Alok Rangel is a 69 y.o. right-handed -American female seen for evaluation of new problem of sudden double vision that occurred Saturday morning when she awoke, and has persisted, with the vision being more [id number], associated with some more difficulty with walking and balance problems and seems to have some more weakness with her left knee giving way she says in addition. Her initial and repeat MRI scans show a right pontine tegmentum infarct typical of a lesion of the medial longitudinal fasciculus which accounts for her double vision. She also was found to have an acoustic neuroma, and has had surgery at [facility] [place] in 2002 for that, and is deaf in her left ear but has no other symptoms and does not want to do anything else with her acoustic neuroma. Patient just had a stroke in November 3 to 4 weeks ago involving the left cerebellar peduncle causing ataxia and loss of balance. 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   She denies any headache, denies any palpitations or chest pain, denies any other cardiac symptoms, and was admitted with accelerated hypertension from the emergency room. High-dose statin and 325 mg aspirin therapy prior to this admission, and her aspirin dose has been reduced to 81 mg now and she is also on Plavix therapy now in addition plus to high-dose statin. She denies any chest pain or palpitations or any other causes of this new lesion. She denies any headache, fever, meningismus, or other cranial nerve problem. She does have reduced hearing in the left ear.     Past Medical History:   Diagnosis Date    Depression     Fall at home 2/10/2014   [facility] or floaters of right eye 2014    High cholesterol     History of stroke     Hypertension     [facility] [address]     Stroke ([facility] [address]      and       Past Surgical History:   Procedure Laterality Date    HX TUBAL LIGATION      HX TUMOR REMOVAL      brain tumor     Family History   Problem Relation Age of Onset   Roberta Ewing Hypertension Mother     Other Father         Unknown    Dementia Brother     Alcohol abuse Sister     Alcohol abuse Sister     No Known Problems Sister     No Known Problems Sister     No Known Problems Sister     Dementia Brother     No Known Problems Brother     No Known Problems Child       Social History     Tobacco Use    Smoking status: Former Smoker     Packs/day: 0.25     Years: 20.00     Pack years: 5.00     Last attempt to quit: 10/26/2019     Years since quittin.1    Smokeless tobacco: Never Used   Substance Use Topics    Alcohol use: No         Current Facility-Administered Medications:     omega 3-DHA-EPA-fish oil 1,000 mg (120 mg-180 mg) capsule 1 Cap, 1 Cap, Oral, DAILY, Diana Martinez MD, 1 Cap at 19 0857    lisinopril (PRINIVIL, ZESTRIL) tablet 10 mg, 10 mg, Oral, QHS, Diana Martinez MD, 10 mg at 19 220    atorvastatin (LIPITOR) tablet 40 mg, 40 mg, Oral, DAILY, Timoteo Garrido MD, 40 mg at 19 0857    calcium carbonate (OS-ABEL) tablet 500 mg [elemental], 500 mg, Oral, DAILY, Du Rivers MD, 500 mg at 12/03/19 0858    latanoprost (XALATAN) 0.005 % ophthalmic solution 1 Drop, 1 Drop, Both Eyes, QHS, Erica Garrido MD, 1 Drop at 12/02/19 2243    mirtazapine (REMERON) tablet 30 mg, 30 mg, Oral, QHS, Timoteo Garrido MD, 30 mg at 12/02/19 2202    acetaminophen (TYLENOL) tablet 650 mg, 650 mg, Oral, Q4H PRN **OR** acetaminophen (TYLENOL) solution 650 mg, 650 mg, Per NG tube, Q4H PRN **OR** acetaminophen (TYLENOL) suppository 650 mg, 650 mg, Rectal, Q4H PRN, Erica Martinez MD    aspirin chewable tablet 81 mg, 81 mg, Oral, DAILY, Timoteo Garrido MD, 81 mg at 12/03/19 0858    clopidogrel (PLAVIX) tablet 75 mg, 75 mg, Oral, DAILY, Timoteo Garrido MD, 75 mg at 12/03/19 0857    labetalol (NORMODYNE;TRANDATE) injection 5 mg, 5 mg, IntraVENous, Q10MIN PRN, Mare Mckeon MD, 5 mg at 12/02/19 0601    heparin (porcine) injection 5,000 Units, 5,000 Units, SubCUTAneous, Q8H, Timoteo Garrido MD, 5,000 Units at 12/03/19 1556        No Known Allergies   MRI Results (most recent):  Results from Hospital Encounter encounter on 12/01/19   MRI IAC W WO CONT    Narrative INDICATION: acoustic neuroma left    EXAMINATION:  MR BRAIN WITH/WITHOUT CONTRAST, IAC PROTOCOL    COMPARISON: MRI noncontrast brain December 2, 2019    TECHNIQUE:  Multiplanar multisequence acquisition without and with contrast of  the brain/internal auditory canals. FINDINGS:    Ventricles are midline without hydrocephalus. Punctate focus of diffusion  restriction in the dorsal autumn just right of midline as well as a small linear  area of diffusion restriction in the left brachium pontis. Enhancing extra-axial  mass left cerebellopontine angle extending to the fundus of the left internal  auditory canal, filling the porous acoustic us measures 17 x 8 x 9 mm. This is  not cause mass effect upon the adjacent left brachium pontis. Areas of chronic  infarction in the autumn bilateral thalami and supratentorial brain better seen on  prior MRI. Postsurgical changes left occipital bone. Small round focus of  extra-axial enhancement planum sphenoid alley measures 7 x 7 mm. Impression IMPRESSION:  1. Suspect 2 separate small foci of acute to subacute infarction right dorsal  autumn and left brachium pontis. 2. Left cerebellopontine angle/IAC mass most consistent with adenoma, 17 x 8 x 9  mm. 3. Small, 7 x 7 mm meningioma planum sphenoidale. Results from [place] encounter on 12/01/19   MRI IAC W WO CONT    Narrative INDICATION: acoustic neuroma left    EXAMINATION:  MR BRAIN WITH/WITHOUT CONTRAST, IAC PROTOCOL    COMPARISON: MRI noncontrast brain December 2, 2019    TECHNIQUE:  Multiplanar multisequence acquisition without and with contrast of  the brain/internal auditory canals. FINDINGS:    Ventricles are midline without hydrocephalus. Punctate focus of diffusion  restriction in the dorsal autumn just right of midline as well as a small linear  area of diffusion restriction in the left brachium pontis. Enhancing extra-axial  mass left cerebellopontine angle extending to the fundus of the left internal  auditory canal, filling the porous acoustic us measures 17 x 8 x 9 mm. This is  not cause mass effect upon the adjacent left brachium pontis. Areas of chronic  infarction in the autumn bilateral thalami and supratentorial brain better seen on  prior MRI. Postsurgical changes left occipital bone. Small round focus of  extra-axial enhancement planum sphenoid alley measures 7 x 7 mm. Impression IMPRESSION:  1. Suspect 2 separate small foci of acute to subacute infarction right dorsal  autumn and left brachium pontis. 2. Left cerebellopontine angle/IAC mass most consistent with adenoma, 17 x 8 x 9  mm. 3. Small, 7 x 7 mm meningioma planum sphenoidale.      Review of Systems:  A comprehensive review of systems was negative except for: Constitutional: positive for fatigue and malaise  Eyes: positive for visual disturbance and Diplopia  Musculoskeletal: positive for myalgias, arthralgias, stiff joints and muscle weakness  Neurological: positive for coordination problems, gait problems, weakness and Diplopia  Behvioral/Psych: positive for anxiety and depression   Vitals:    12/03/19 0506 12/03/19 0758 12/03/19 1148 12/03/19 1530   BP: (!) 126/91 148/69 151/71 148/64   Pulse: 82 77 75 94   Resp: 16 16 16 16   Temp: 97.7 °F (36.5 °C) 97.8 °F (36.6 °C) 97.6 °F (36.4 °C) 97.9 °F (36.6 °C)   SpO2: 99% 97% 95% 95%   Weight:       Height:         Objective:     I      NEUROLOGICAL EXAM:    Appearance: The is well developed, well nourished, provides a fair history and is in no acute distress. Mental Status: Oriented to time, place and person, and the president, cognitive function is slow but probably normal and speech is fluent and no aphasia or dysarthria. Mood and affect appropriate but anxious. Cranial Nerves:   Intact visual fields. Fundi are benign, disc are flat, no lesions seen on funduscopy. LOYD, EOM's showed decreased medial rectus function with nystagmus in the left eye on left gaze indicating a right medial longitudinal fasciculus syndrome or right pontine segmental infarct. Mild left eye nystagmus on gaze to the left, no ptosis. Facial sensation is normal. Corneal reflexes are not tested. Facial movement is symmetric. Hearing is abnormal bilaterally. Palate is midline with normal sternocleidomastoid and trapezius muscles are normal. Tongue is midline. Neck without meningismus or bruits  Temporal arteries are not tender or enlarged  TMJ areas are not tender on palpation   Motor:  4/5 strength in upper and lower proximal and distal muscles, with perhaps slight weakness of her left lower extremity. Normal bulk and tone. No fasciculations.   Rapid alternating movement is symmetric and slow bilaterally   Reflexes:   Deep tendon reflexes 1+/4 and symmetrical.  No babinski or clonus present   Sensory:   Normal to touch, pinprick and vibration and temperature. DSS is intact   Gait:  Abnormal gait with the patient having mild cerebellar ataxia and mild weakness in the left lower extremity. Tremor:   No tremor noted. Cerebellar: Moderately abnormal cerebellar signs present on Romberg and tandem testing due to left cerebellar ataxia and finger-nose-finger exam is symmetric. Neurovascular:  Normal heart sounds and regular rhythm, peripheral pulses decreased, and no carotid bruits. Assessment:       ICD-10-CM ICD-9-CM    1. Diplopia H53.2 368.2    2. Bilateral carotid artery stenosis I65.23 433.10      433.30    3. History of stroke Z86.73 V12.54    4. Left acoustic neuroma (HCC) D33.3 225.1    5. Right pontine stroke ([facility]) I63.50 434.91    6. Cerebrovascular accident (CVA), unspecified mechanism ([facility] [address] I63.9 434.91    7. Acute CVA (cerebrovascular accident) [facility]) I63.9 434.91      Active Problems:    TIA (transient ischemic attack) (7/15/2019)      Stroke ([facility] [address] (7/16/2019)      Acute CVA (cerebrovascular accident) ([facility] [address] (12/1/2019)      Right pontine stroke ([facility] [address] (12/2/2019)      Bilateral carotid artery stenosis (12/2/2019)      Diplopia (12/2/2019)      Left acoustic neuroma ([facility] [address] (12/2/2019)      History of stroke ()        Plan:     Alok Rangel is a 69 y.o. right-handed -American female seen for evaluation of new problem of sudden double vision that occurred Saturday morning when she awoke, and has persisted, with the vision being more [id number]-[id number], associated with some more difficulty with walking and balance problems and seems to have some more weakness with her left knee giving way she says in addition. Her initial and repeat MRI scans show a right pontine tegmentum infarct typical of a lesion of the medial longitudinal fasciculus which accounts for her double vision.   She also was found to have an acoustic neuroma, and has had surgery at [facility] [place] in 2002 for that, and is deaf in her left ear but has no other symptoms and does not want to do anything else with her acoustic neuroma. Patient just had a stroke in November 3 to 4 weeks ago involving the left cerebellar peduncle causing ataxia and loss of balance. 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 She denies any headache, denies any palpitations or chest pain, denies any other cardiac symptoms, and was admitted with accelerated hypertension from the emergency room. High-dose statin and 325 mg aspirin therapy prior to this admission, and her aspirin dose has been reduced to 81 mg now and she is also on Plavix therapy now in addition plus to high-dose statin. She denies any chest pain or palpitations or any other causes of this new lesion. She denies any headache, fever, meningismus, or other cranial nerve problem. She does have reduced hearing in the left ear.         Signed By: Kofi Rinaldi MD     December 3, 2019       CC: Florentin Weller NP  FAX: [phone]

## 2019-12-03 NOTE — ROUTINE PROCESS
Bedside and Verbal shift change report given to [facility] (oncoming nurse) by Matty Becker (offgoing nurse). Report included the following information SBAR, Kardex, ED Summary, Intake/Output, MAR and Cardiac Rhythm NSR. Zone Phone:   [phone] Significant changes during shift: none Patient Information Donel Rake 
69 y.o. 
12/1/2019 11:30 AM by Myron Somers MD. Donel Rake was admitted from Home 
 
Problem List 
 
Patient Active Problem List  
 Diagnosis Date Noted  Right pontine stroke ([address] 12/02/2019  Bilateral carotid artery stenosis 12/02/2019  Diplopia 12/02/2019  Left acoustic neuroma ([address] 12/02/2019  
 History of stroke  Acute CVA (cerebrovascular accident) ([address] 12/01/2019  Stroke ([address] 07/16/2019  TIA (transient ischemic attack) 07/15/2019  Flashers or floaters of right eye 08/21/2014  Fall at home 02/10/2014  Knee pain, left 02/10/2014  Prediabetes 02/10/2014  
 HTN, goal below 130/80 01/06/2014  Vitamin D deficiency 01/06/2014  Hypercholesterolemia 06/27/2013  Tiredness 06/06/2013  Depression, acute 06/06/2013 Past Medical History:  
Diagnosis Date  Depression  Fall at home 2/10/2014  Flashers or floaters of right eye 8/21/2014  High cholesterol  History of stroke  Hypertension  Left acoustic neuroma ([address]  Stroke ([address] 2010 and 2019 Core Measures: CVA: Yes Yes CHF:No No 
PNA:No No 
 
Activity Status: OOB to Chair No 
Ambulated this shift Yes Bed Rest No 
 
 
LINES AND DRAINS: 
 
PIV 
 
DVT prophylaxis: DVT prophylaxis Med- Yes DVT prophylaxis SCD or COMFORT- No  
 
 
Patient Safety: 
 
Falls Score Total Score: 3 Safety Level_______ Bed Alarm On? Yes Sitter? No 
 
Plan for upcoming shift: MRI today Discharge Plan: Yes Active Consults: 
IP CONSULT TO NEUROLOGY 
IP CONSULT TO NEUROLOGY

## 2019-12-03 NOTE — ROUTINE PROCESS
Bedside and Verbal shift change report given to Ottoniel Molina (oncoming nurse) by [facility] (offgoing nurse). Report included the following information SBAR, Kardex, ED Summary, Intake/Output, MAR and Cardiac Rhythm NSR. Zone Phone:   [phone] Significant changes during shift:  Admit to floor; seen by OT, CM, neurology, speech, PT Patient Information Yoli Morales 
69 y.o. 
12/1/2019 11:30 AM by Marlen Lennon MD. Yoli Morales was admitted from Home 
 
Problem List 
 
Patient Active Problem List  
 Diagnosis Date Noted  Acute CVA (cerebrovascular accident) ([facility] [address] 12/01/2019  Stroke ([facility] [address] 07/16/2019  TIA (transient ischemic attack) 07/15/2019  Flashers or floaters of right eye 08/21/2014  Fall at home 02/10/2014  Knee pain, left 02/10/2014  Prediabetes 02/10/2014  
 HTN, goal below 130/80 01/06/2014  Vitamin D deficiency 01/06/2014  Hypercholesterolemia 06/27/2013  Tiredness 06/06/2013  Depression, acute 06/06/2013 Past Medical History:  
Diagnosis Date  Depression  Fall at home 2/10/2014  Flashers or floaters of right eye 8/21/2014  High cholesterol  Hypertension  Stroke ([facility] [address] 2010 and 2019 Core Measures: CVA: Yes Yes CHF:No No 
PNA:No No 
 
Activity Status: OOB to Chair No 
Ambulated this shift Yes Bed Rest No 
 
 
LINES AND DRAINS: 
 
PIV 
 
DVT prophylaxis: DVT prophylaxis Med- Yes DVT prophylaxis SCD or COMFORT- No  
 
 
Patient Safety: 
 
Falls Score Total Score: 3 Safety Level_______ Bed Alarm On? Yes Sitter? No 
 
Plan for upcoming shift: MRI tomorrow Discharge Plan: Yes Active Consults: 
IP CONSULT TO NEUROLOGY 
IP CONSULT TO NEUROLOGY

## 2019-12-03 NOTE — ROUTINE PROCESS
Bedside and Verbal shift change report given to Chaya Osorio (oncoming nurse) by Vitaliy Alas (offgoing nurse). Report included the following information SBAR, Kardex, ED Summary, Intake/Output, MAR and Cardiac Rhythm NSR. 
  
Zone Phone:   [phone] 
  
  
Significant changes during shift:  seen by neuro, OT, CM, PT, RD. Patient has left acoustic neuroma. Doesn't want to do anything with it.  
  
  
  
Patient Information 
  
Shoshana Peacock 
69 y.o. 
12/1/2019 11:30 AM by Anurag Mcguire MD. Shoshana Peacock was admitted from Home 
  
Problem List 
  
    
Patient Active Problem List  
  Diagnosis Date Noted  Acute CVA (cerebrovascular accident) ([facility] [address] 12/01/2019  Stroke ([facility] [address] 07/16/2019  TIA (transient ischemic attack) 07/15/2019  Flashers or floaters of right eye 08/21/2014  Fall at home 02/10/2014  Knee pain, left 02/10/2014  Prediabetes 02/10/2014  
 HTN, goal below 130/80 01/06/2014  Vitamin D deficiency 01/06/2014  Hypercholesterolemia 06/27/2013  Tiredness 06/06/2013  Depression, acute 06/06/2013  
  
    
Past Medical History:  
Diagnosis Date  Depression    
 Fall at home 2/10/2014  Flashers or floaters of right eye 8/21/2014  High cholesterol    
 Hypertension    
 Stroke [facility])    
  2010 and 2019  
  
  
  
Core Measures: 
  
CVA: Yes Yes CHF:No No 
PNA:No No 
  
Activity Status: 
  
OOB to Chair No 
Ambulated this shift Yes Bed Rest No 
  
  
LINES AND DRAINS: 
  
PIV 
  
DVT prophylaxis: 
  
DVT prophylaxis Med- Yes DVT prophylaxis SCD or COMFORT- No  
  
  
Patient Safety: 
  
Falls Score Total Score: 3 Safety Level_______ Bed Alarm On? Yes Sitter? No 
  
Plan for upcoming shift: safety 
  
  
  
Discharge Plan:  Yes  
  
Active Consults: 
IP CONSULT TO NEUROLOGY 
IP CONSULT TO NEUROLOGY

## 2019-12-03 NOTE — PROGRESS NOTES
Problem: Mobility Impaired (Adult and Pediatric)  Goal: *Acute Goals and Plan of Care (Insert Text)  Description  FUNCTIONAL STATUS PRIOR TO ADMISSION: Pt has had 2 previous strokes, most recent in Nov. She lives with her dtr and family whom she states are with her most of time. She had progressed to mod I amb on level with SPC but needed assist on stairs to second floor. Family states L sided weakness from old stroke. Pt was able to dress self and take shower using shower chair. HOME SUPPORT PRIOR TO ADMISSION: The patient lived with family. Physical Therapy Goals  Initiated 12/2/2019  1. Patient will move from supine to sit and sit to supine , scoot up and down and roll side to side in bed with independence within 7 day(s). 2.  Patient will transfer from bed to chair and chair to bed with modified independence using the least restrictive device within 7 day(s). 3.  Patient will perform sit to stand with modified independence within 7 day(s). 4.  Patient will ambulate with supervision/set-up for 150 feet with the least restrictive device within 7 day(s). 5.  Patient will ascend/descend 12 stairs with left handrail(s) with minimal assistance/contact guard assist within 7 day(s). Outcome: Progressing Towards Goal   PHYSICAL THERAPY TREATMENT  Patient: Alvina Montero (39 y.o. female)  Date: 12/3/2019  Diagnosis: Acute CVA (cerebrovascular accident) ([facility] [address] [I63.9]   <principal problem not specified>       Precautions:    Chart, physical therapy assessment, plan of care and goals were reviewed. ASSESSMENT  Pt continues to present with impaired functional mobility secondary to impaired balance, decreased endurance/activity tolerance, L sided weakness, and altered gait pattern. Pt with significant L lateral lean during static stance and ambulation, requiring min-modAx1 (verbal cues, tactile cues) to achieve midline and prevent lateral LOB.  Pt ambulated 50ft x2 w/ min-modAx1 and seated rest break b/t trials secondary to decreased endurance/activity tolerance. Pt tolerated therapy session well however remains well below her baseline mod I level and an excellent candidate for inpt rehab. Current Level of Function Impacting Discharge (mobility/balance): minAx1 during sit<>stand transfer,     Other factors to consider for discharge: previously mod I, falls risk, 2 story home, caregiver burden         PLAN :  Patient continues to benefit from skilled intervention to address the above impairments. Continue treatment per established plan of care. to address goals. Recommendation for discharge: (in order for the patient to meet his/her long term goals)  Therapy 3 hours per day 5-7 days per week    This discharge recommendation:  Has been made in collaboration with the attending provider and/or case management    IF patient discharges home will need the following DME: to be determined (TBD)       SUBJECTIVE:   Patient stated I feel terrible, I feel SO weak.     OBJECTIVE DATA SUMMARY:   Critical Behavior:  Neurologic State: Alert  Orientation Level: Oriented X4  Cognition: Follows commands     Functional Mobility Training:  Bed Mobility:      Received seated in bedside chair. Transfers:  Sit to Stand: Minimum assistance;Assist x1  Stand to Sit: Contact guard assistance                             Balance:  Sitting: Intact  Standing: Impaired  Standing - Static: Fair  Standing - Dynamic : Fair  Ambulation/Gait Training:  Distance (ft): 100 Feet (ft)(50ft x2 w/ seated rest break b/t)  Assistive Device: Gait belt  Ambulation - Level of Assistance: Minimal assistance; Moderate assistance;Assist x1        Gait Abnormalities: Decreased step clearance;Trunk sway increased        Base of Support: Narrowed     Speed/Gregoria: Slow;Pace decreased (<100 feet/min)                         Therapeutic Exercises:   Target Stepping w/modAx1  Pain Rating:  Denied complaints of pain    Activity Tolerance: Good  Please refer to the flowsheet for vital signs taken during this treatment.     After treatment patient left in no apparent distress:   Sitting in chair, Call bell within reach, and Caregiver / family present    COMMUNICATION/COLLABORATION:   The patients plan of care was discussed with: Registered Nurse and     Mike Geronimo, PT, DPT   Time Calculation: 18 mins

## 2019-12-04 PROCEDURE — 65660000000 HC RM CCU STEPDOWN

## 2019-12-04 PROCEDURE — 74011250637 HC RX REV CODE- 250/637: Performed by: INTERNAL MEDICINE

## 2019-12-04 PROCEDURE — 97535 SELF CARE MNGMENT TRAINING: CPT

## 2019-12-04 PROCEDURE — 97116 GAIT TRAINING THERAPY: CPT

## 2019-12-04 PROCEDURE — 74011250636 HC RX REV CODE- 250/636: Performed by: INTERNAL MEDICINE

## 2019-12-04 RX ADMIN — OMEGA-3 FATTY ACIDS CAP 1000 MG 1 CAPSULE: 1000 CAP at 09:47

## 2019-12-04 RX ADMIN — CLOPIDOGREL BISULFATE 75 MG: 75 TABLET ORAL at 09:45

## 2019-12-04 RX ADMIN — MIRTAZAPINE 30 MG: 15 TABLET, FILM COATED ORAL at 20:21

## 2019-12-04 RX ADMIN — LATANOPROST 1 DROP: 50 SOLUTION OPHTHALMIC at 20:24

## 2019-12-04 RX ADMIN — HEPARIN SODIUM 5000 UNITS: 5000 INJECTION INTRAVENOUS; SUBCUTANEOUS at 05:33

## 2019-12-04 RX ADMIN — LISINOPRIL 10 MG: 5 TABLET ORAL at 20:21

## 2019-12-04 RX ADMIN — POLYETHYLENE GLYCOL (3350) 17 G: 17 POWDER, FOR SOLUTION ORAL at 09:47

## 2019-12-04 RX ADMIN — ATORVASTATIN CALCIUM 40 MG: 40 TABLET, FILM COATED ORAL at 09:47

## 2019-12-04 RX ADMIN — ASPIRIN 81 MG 81 MG: 81 TABLET ORAL at 09:47

## 2019-12-04 RX ADMIN — HEPARIN SODIUM 5000 UNITS: 5000 INJECTION INTRAVENOUS; SUBCUTANEOUS at 14:27

## 2019-12-04 RX ADMIN — CALCIUM 500 MG: 500 TABLET ORAL at 09:46

## 2019-12-04 RX ADMIN — HEPARIN SODIUM 5000 UNITS: 5000 INJECTION INTRAVENOUS; SUBCUTANEOUS at 20:22

## 2019-12-04 NOTE — PROGRESS NOTES
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 PTA, Pt was receiving HH OT/PT to work on higher level balance and strength in LUE/LLE and had just begun using a SPC for two days. She was dressing/showering independently, yet admits to needing some assist with bathroom mobility intermittently d/t dizziness. She was cleared to drive after a period of restriction following July CVA, yet has not driven since October CVA. HOME SUPPORT: Pt recently moved in with her daughter who assists with bathroom mobility, med management, IADLs and transportation. Prior to October, Pt was completely independent, driving and living on her own. Occupational Therapy Goals  Initiated 12/3/2019  1. Patient will perform grooming tasks standing at the sink with Supervision within 7 day(s). 2.  Patient will perform UB/LB bathing standing at sink with Supervision within 7 day(s). 3.  Patient will perform standing aspects of lower body dressing with Supervision within 7 day(s). 4.  Patient will perform toilet transfers with Supervision using least restrictive device within 7 day(s). 5.  Patient will don/doff protective undergarment with Supervision within 7 day(s). 6.  Patient will perform item retrieval from various heights during ADLs with Supervision within 7 days. Outcome: Progressing Towards Goal   OCCUPATIONAL THERAPY TREATMENT  Patient: Huy Robertson (18 y.o. female)  Date: 12/4/2019  Diagnosis: Acute CVA (cerebrovascular accident) [facility]) [I63.9]   <principal problem not specified>       Precautions: Fall  Chart, occupational therapy assessment, plan of care, and goals were reviewed. ASSESSMENT  Patient continues with skilled OT services and is progressing towards goals.  Pt completed bathing/grooming tasks seated on commode and standing at sink with improved static standing balance and endurance this session. Accepts instruction to maintain at least one hand on sink during ADLs for stability and to use compensatory visual scanning techniques for item retrieval in bathroom. Continue to recommend Inpatient Rehab to maximize Pts higher level balance while performing ADL/IADL tasks. Current Level of Function Impacting Discharge (ADLs): CGA with bathroom mobility/toilet transfers; Requires Setup to CGA with ADLs    Other factors to consider for discharge: None         PLAN :  Patient continues to benefit from skilled intervention to address the above impairments. Continue treatment per established plan of care. to address goals. Recommend with staff: Assist with bathroom mobility    Recommend next OT session: Compensatory visual scanning techniques d/t diplopia; Standing tolerance with ADLs; Item retrieval challenging balance outside KAT    Recommendation for discharge: (in order for the patient to meet his/her long term goals)  Therapy 3 hours per day 5-7 days per week    This discharge recommendation:  Has been made in collaboration with the attending provider and/or case management    IF patient discharges home will need the following DME: to be determined (TBD)       SUBJECTIVE:   Patient stated My balance is getting better\"    OBJECTIVE DATA SUMMARY:   Cognitive/Behavioral Status:  Neurologic State: Alert  Orientation Level: Oriented X4  Cognition: Follows commands             Functional Mobility and Transfers for ADLs:  Bed Mobility:       Transfers:  Sit to Stand: Contact guard assistance  Functional Transfers  Bathroom Mobility: Contact guard assistance  Toilet Transfer : Contact guard assistance  Bed to Chair: Contact guard assistance    Balance:  Sitting: Intact  Sitting - Static: Good (unsupported)  Sitting - Dynamic: Good (unsupported)  Standing: Impaired; Without support  Standing - Static: Fair;Unsupported  Standing - Dynamic : Fair;Unsupported    ADL Intervention:       Grooming  Position Performed: Standing  Washing Hands: Contact guard assistance         Lower Body Bathing  Perineal  : Contact guard assistance  Position Performed: Standing  Lower Body : Set-up; Supervision  Position Performed: Other (comment)(seated on commode)  Cues: Verbal cues provided(to complete majority of task seated for safety)    Upper Body [address]: Set-up    Lower Body Dressing Assistance  Protective Undergarmet: Contact guard assistance(Pt managed side fasteners independently this session)  Socks: Supervision  Position Performed: Seated edge of bed    Toileting  Toileting Assistance: (standing at sink)  Bladder Hygiene: Contact guard assistance  Bowel Hygiene: Contact guard assistance  Cues: Verbal cues provided(cues to keep L hand on sink for stability)    Pain:  None    Activity Tolerance:   Good  Please refer to the flowsheet for vital signs taken during this treatment. After treatment patient left in no apparent distress:   Sitting in chair and Call bell within reach    COMMUNICATION/COLLABORATION:   The patients plan of care was discussed with: Physical Therapist, Registered Nurse, and Patient. Patient was educated regarding Her deficit(s) of diplopia as this relates to Her diagnosis of CVA. She demonstrated Good understanding as evidenced by verbalizing. Patient and/or family was verbally educated on the BE FAST acronym for signs/symptoms of CVA and TIA. BE FAST was written on patient's communication board  for visual education and reinforcement. All questions answered with patient indicating good understanding.      Belkys Barone, OTR/L  Time Calculation: 32 mins

## 2019-12-04 NOTE — ROUTINE PROCESS
Bedside and Verbal shift change report given to Sharri Parry (oncoming nurse) by Gretchen Menezes (offgoing nurse). Report included the following information SBAR, Kardex, ED Summary, Intake/Output, MAR and Cardiac Rhythm NSR. 
  
Zone Phone:   [phone] 
  
  
Significant changes during shift:    
  
Patient Information 
  
Killian Burger 
69 y.o. 
12/1/2019 11:30 AM by Randolph Gardner MD. Killian Burger was admitted from Home 
  
Problem List 
  
    
Patient Active Problem List  
  Diagnosis Date Noted  Acute CVA (cerebrovascular accident) ([facility] [address] 12/01/2019  Stroke ([facility] [address] 07/16/2019  TIA (transient ischemic attack) 07/15/2019  Flashers or floaters of right eye 08/21/2014  Fall at home 02/10/2014  Knee pain, left 02/10/2014  Prediabetes 02/10/2014  
 HTN, goal below 130/80 01/06/2014  Vitamin D deficiency 01/06/2014  Hypercholesterolemia 06/27/2013  Tiredness 06/06/2013  Depression, acute 06/06/2013  
  
    
Past Medical History:  
Diagnosis Date  Depression    
 Fall at home 2/10/2014  Flashers or floaters of right eye 8/21/2014  High cholesterol    
 Hypertension    
 Stroke [facility])    
  2010 and 2019  
  
  
  
Core Measures: 
  
CVA: Yes Yes CHF:No No 
PNA:No No 
  
Activity Status: 
  
OOB to Chair No 
Ambulated this shift Yes Bed Rest No 
  
  
LINES AND DRAINS: 
  
PIV 
  
DVT prophylaxis: 
  
DVT prophylaxis Med- Yes DVT prophylaxis SCD or COMFORT- No  
  
  
Patient Safety: 
  
Falls Score Total Score: 3 Safety Level_______ Bed Alarm On? Yes Sitter? No 
  
Plan for upcoming shift: safety 
  
  
  
Discharge Plan:  Yes  
  
Active Consults: 
IP CONSULT TO NEUROLOGY 
IP CONSULT TO NEUROLOGY

## 2019-12-04 NOTE — ROUTINE PROCESS
Bedside and Verbal shift change report given to La (oncoming nurse) by Jimmy (offgoing nurse). Report included the following information SBAR, Kardex, ED Summary, Intake/Output, MAR and Cardiac Rhythm NSR. 
  
Zone Phone:   [phone] 
  
  
Significant changes during shift:  none 
  
  
  
Patient Information 
  
Zuleika Camejo 
69 y.o. 
12/1/2019 11:30 AM by Jin Macdonald MD. Genoveva Jimenes was admitted from Home 
  
Problem List 
  
       
Patient Active Problem List  
  Diagnosis Date Noted  Acute CVA (cerebrovascular accident) ([facility] [address] 12/01/2019  Stroke ([facility] [address] 07/16/2019  TIA (transient ischemic attack) 07/15/2019  Flashers or floaters of right eye 08/21/2014  Fall at home 02/10/2014  Knee pain, left 02/10/2014  Prediabetes 02/10/2014  
 HTN, goal below 130/80 01/06/2014  Vitamin D deficiency 01/06/2014  Hypercholesterolemia 06/27/2013  Tiredness 06/06/2013  Depression, acute 06/06/2013  
  
       
Past Medical History:  
Diagnosis Date  Depression    
 Fall at home 2/10/2014  Flashers or floaters of right eye 8/21/2014  High cholesterol    
 Hypertension    
 Stroke [facility])    
  2010 and 2019  
  
  
  
Core Measures: 
  
CVA: Yes Yes CHF:No No 
PNA:No No 
  
Activity Status: 
  
OOB to Chair No 
Ambulated this shift Yes  
Bed Rest No 
  
  
LINES AND DRAINS: 
  
PIV 
  
DVT prophylaxis: 
  
DVT prophylaxis Med- Yes DVT prophylaxis SCD or COMFORT- No  
  
  
Patient Safety: 
  
Falls Score Total Score: 3 Safety Level_______ Bed Alarm On? Yes Sitter? No 
  
Plan for upcoming shift: safety 
  
  
  
Discharge Plan: Yes  
  
Active Consults: 
IP CONSULT TO NEUROLOGY 
IP CONSULT TO NEUROLOGY

## 2019-12-04 NOTE — PROGRESS NOTES
Problem: Falls - Risk of  Goal: *Absence of Falls  Description  Document Kanwal Dear Fall Risk and appropriate interventions in the flowsheet.   Outcome: Progressing Towards Goal  Note: Fall Risk Interventions:  Mobility Interventions: Bed/chair exit alarm         Medication Interventions: Evaluate medications/consider consulting pharmacy    Elimination Interventions: Elevated toilet seat              Problem: Patient Education: Go to Patient Education Activity  Goal: Patient/Family Education  Outcome: Progressing Towards Goal     Problem: Patient Education: Go to Patient Education Activity  Goal: Patient/Family Education  Outcome: Progressing Towards Goal     Problem: TIA/CVA Stroke: 0-24 hours  Goal: Off Pathway (Use only if patient is Off Pathway)  Outcome: Progressing Towards Goal  Goal: Activity/Safety  Outcome: Progressing Towards Goal  Goal: Consults, if ordered  Outcome: Progressing Towards Goal  Goal: Diagnostic Test/Procedures  Outcome: Progressing Towards Goal  Goal: Nutrition/Diet  Outcome: Progressing Towards Goal  Goal: Discharge Planning  Outcome: Progressing Towards Goal  Goal: Medications  Outcome: Progressing Towards Goal  Goal: Respiratory  Outcome: Progressing Towards Goal  Goal: Treatments/Interventions/Procedures  Outcome: Progressing Towards Goal  Goal: Minimize risk of bleeding post-thrombolytic infusion  Outcome: Progressing Towards Goal  Goal: Monitor for complications post-thrombolytic infusion  Outcome: Progressing Towards Goal  Goal: Psychosocial  Outcome: Progressing Towards Goal  Goal: *Hemodynamically stable  Outcome: Progressing Towards Goal  Goal: *Neurologically stable  Description  Absence of additional neurological deficits    Outcome: Progressing Towards Goal  Goal: *Verbalizes anxiety and depression are reduced or absent  Outcome: Progressing Towards Goal  Goal: *Absence of Signs of Aspiration on Current Diet  Outcome: Progressing Towards Goal  Goal: *Absence of deep venous thrombosis signs and symptoms(Stroke Metric)  Outcome: Progressing Towards Goal  Goal: *Ability to perform ADLs and demonstrates progressive mobility and function  Outcome: Progressing Towards Goal  Goal: *Stroke education started(Stroke Metric)  Outcome: Progressing Towards Goal  Goal: *Dysphagia screen performed(Stroke Metric)  Outcome: Progressing Towards Goal  Goal: *Rehab consulted(Stroke Metric)  Outcome: Progressing Towards Goal     Problem: TIA/CVA Stroke: Day 2 Until Discharge  Goal: Off Pathway (Use only if patient is Off Pathway)  Outcome: Progressing Towards Goal  Goal: Activity/Safety  Outcome: Progressing Towards Goal  Goal: Diagnostic Test/Procedures  Outcome: Progressing Towards Goal  Goal: Nutrition/Diet  Outcome: Progressing Towards Goal  Goal: Discharge Planning  Outcome: Progressing Towards Goal  Goal: Medications  Outcome: Progressing Towards Goal  Goal: Respiratory  Outcome: Progressing Towards Goal  Goal: Treatments/Interventions/Procedures  Outcome: Progressing Towards Goal  Goal: Psychosocial  Outcome: Progressing Towards Goal  Goal: *Verbalizes anxiety and depression are reduced or absent  Outcome: Progressing Towards Goal  Goal: *Absence of aspiration  Outcome: Progressing Towards Goal  Goal: *Absence of deep venous thrombosis signs and symptoms(Stroke Metric)  Outcome: Progressing Towards Goal  Goal: *Optimal pain control at patient's stated goal  Outcome: Progressing Towards Goal  Goal: *Tolerating diet  Outcome: Progressing Towards Goal  Goal: *Ability to perform ADLs and demonstrates progressive mobility and function  Outcome: Progressing Towards Goal  Goal: *Stroke education continued(Stroke Metric)  Outcome: Progressing Towards Goal

## 2019-12-04 NOTE — PROGRESS NOTES
Problem: Mobility Impaired (Adult and Pediatric)  Goal: *Acute Goals and Plan of Care (Insert Text)  Description  FUNCTIONAL STATUS PRIOR TO ADMISSION: Pt has had 2 previous strokes, most recent in Nov. She lives with her dtr and family whom she states are with her most of time. She had progressed to mod I amb on level with SPC but needed assist on stairs to second floor. Family states L sided weakness from old stroke. Pt was able to dress self and take shower using shower chair. HOME SUPPORT PRIOR TO ADMISSION: The patient lived with family. Physical Therapy Goals  Initiated 12/2/2019  1. Patient will move from supine to sit and sit to supine , scoot up and down and roll side to side in bed with independence within 7 day(s). 2.  Patient will transfer from bed to chair and chair to bed with modified independence using the least restrictive device within 7 day(s). 3.  Patient will perform sit to stand with modified independence within 7 day(s). 4.  Patient will ambulate with supervision/set-up for 150 feet with the least restrictive device within 7 day(s). 5.  Patient will ascend/descend 12 stairs with left handrail(s) with minimal assistance/contact guard assist within 7 day(s). Outcome: Progressing Towards Goal   PHYSICAL THERAPY TREATMENT  Patient: Alvina Montero (42 y.o. female)  Date: 12/4/2019  Diagnosis: Acute CVA (cerebrovascular accident) [facility]) [I63.9]   <principal problem not specified>       Precautions: Fall  Chart, physical therapy assessment, plan of care and goals were reviewed. ASSESSMENT  Patient continues with skilled PT services and is progressing towards goals. Pt with minor improvement in L lateral lean, LLE strength, gait stability, balance, and activity tolerance this date however continues to require minAx1 during ambulation w/o device. Pt with multiple minor LOB in L lateral direction, requiring minAx1 for balance check/prevention of fall.  Tactile and verbal cues requiring for pt to achieve and maintain midline standing. Overall pt tolerated therapy session well however remains a falls risk and should continue to have x1 person assist. Recommend inpt rehab at discharge     Current Level of Function Impacting Discharge (mobility/balance): minAx1 during ambulation without device    Other factors to consider for discharge: hx of CVA, double vision, falls risk, L sided wkness, caregiver burden         PLAN :  Patient continues to benefit from skilled intervention to address the above impairments. Continue treatment per established plan of care. to address goals. Recommendation for discharge: (in order for the patient to meet his/her long term goals)  Therapy 3 hours per day 5-7 days per week    This discharge recommendation:  Has been made in collaboration with the attending provider and/or case management    IF patient discharges home will need the following DME: to be determined (TBD)       SUBJECTIVE:   Patient stated I haven't gotten washed up or brushed my teeth since I've been here.     OBJECTIVE DATA SUMMARY:   Critical Behavior:  Neurologic State: Alert  Orientation Level: Oriented X4  Cognition: Follows commands     Functional Mobility Training:  Bed Mobility:  Rolling: Supervision  Supine to Sit: Supervision     Scooting: Supervision        Transfers:  Sit to Stand: Contact guard assistance  Stand to Sit: Contact guard assistance        Bed to Chair: Minimum assistance                    Balance:  Sitting: Intact  Sitting - Static: Good (unsupported)  Sitting - Dynamic: Good (unsupported)  Standing: Impaired; Without support  Standing - Static: Fair;Unsupported  Standing - Dynamic : Fair;Unsupported  Ambulation/Gait Training:  Distance (ft): 120 Feet (ft)  Assistive Device: Gait belt  Ambulation - Level of Assistance: Minimal assistance        Gait Abnormalities: Decreased step clearance;Trunk sway increased(L lateral lean)        Base of Support: Narrowed Speed/Gregoria: Slow;Pace decreased (<100 feet/min)  Step Length: Right shortened;Left shortened                        Pain Rating:  Denied complaints of pain    Activity Tolerance:   Good  Please refer to the flowsheet for vital signs taken during this treatment.     After treatment patient left in no apparent distress:   Sitting in chair and Call bell within reach    COMMUNICATION/COLLABORATION:   The patients plan of care was discussed with: Occupational Therapist, Registered Nurse, and     Asa Cramer PT, DPT   Time Calculation: 12 mins

## 2019-12-04 NOTE — PROGRESS NOTES
1908 Bedside report given to Saige Barone RN by [facility], RN. Report included SBAR, ED Report, Labs, and Cardiac Rhythm NSR. Patient in bed resting well. Vitals are stable.

## 2019-12-04 NOTE — PROGRESS NOTES
Hospitalist Progress Note    NAME: Kathairne Sotelo   :  1945   MRN:  [id number]       Assessment / Plan:  Diplopia POA  Acute right Pontine stroke POA  Recurrent Stroke: discussed with Dr. Jessica Mckeon and per his note \"Patient has a tiny punctate right pontine tegmentum infarct on diffusion imaging missed per the radiologist, but clearly accounts for her right medial longitudinal fasciculus syndrome and double vision. 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  -no plan for intervention. Hypertensive urgency  -restarted home lisinopril   -prn labetalol      Hyperlipidemia: controlled  -Continue Lipitor  Depression  -continue Remeron     Code Status: full   Surrogate Decision Maker: Kai Land     DVT Prophylaxis: sq heparin  GI Prophylaxis: not indicated     Baseline: From home independent    Pt with significant balance problem due to double vision. Will benefit from inpatient rehab/SNF.  working on placement     Subjective:     Chief Complaint / Reason for Physician Visit: follow-up stroke  Patient seen for follow-up  Still with double vision, slight improved. Review of Systems:  Symptom Y/N Comments  Symptom Y/N Comments   Fever/Chills    Chest Pain n    Poor Appetite n   Edema     Cough    Abdominal Pain n    Sputum    Joint Pain     SOB/JHA n   Pruritis/Rash     Nausea/vomit    Tolerating PT/OT     Diarrhea    Tolerating Diet     Constipation    Other       Could NOT obtain due to:      Objective:     VITALS:   Last 24hrs VS reviewed since prior progress note.  Most recent are:  Patient Vitals for the past 24 hrs:   Temp Pulse Resp BP SpO2   19 1539 97.8 °F (36.6 °C) 80 18 170/70 97 %   12/04/19 1253 97.7 °F (36.5 °C) 85 18 144/64 97 %   12/04/19 0743 98 °F (36.7 °C) 85 20 129/65 97 %   12/04/19 0434 98 °F (36.7 °C) 78 20 145/60 97 %   12/03/19 1908 97.8 °F (36.6 °C) 85 18 149/67 98 %     No intake or output data in the 24 hours ending 12/04/19 1550     PHYSICAL EXAM:  General: WD, WN. Alert, cooperative, no acute distress    EENT:  Disconjugate gaze. Anicteric sclerae. MMM  Resp:  CTA bilaterally, no wheezing or rales. No accessory muscle use  CV:  Regular  rhythm,  No edema  GI:  Soft, Non distended, Non tender.  +Bowel sounds  Neurologic:  Alert and oriented X 3, normal speech,   Psych:   Good insight. Not anxious nor agitated  Skin:  No rashes. No jaundice    Reviewed most current lab test results and cultures  YES  Reviewed most current radiology test results   YES  Review and summation of old records today    NO  Reviewed patient's current orders and MAR    YES  PMH/ reviewed - no change compared to H&P  ________________________________________________________________________  Care Plan discussed with:    Comments   Patient x    Family      RN x    Care Manager     Consultant  x Dr. Joselito Ellison team rounds were held today with , nursing, pharmacist and clinical coordinator. Patient's plan of care was discussed; medications were reviewed and discharge planning was addressed. ________________________________________________________________________  Total NON critical care TIME:  35   Minutes    Total CRITICAL CARE TIME Spent:   Minutes non procedure based      Comments   >50% of visit spent in counseling and coordination of care x    ________________________________________________________________________  Rik Toro MD     Procedures: see electronic medical records for all procedures/Xrays and details which were not copied into this note but were reviewed prior to creation of Plan.       LABS:  I reviewed today's most current labs and imaging studies.   Pertinent labs include:  Recent Labs     12/02/19  0345   WBC 5.7   HGB 11.6   HCT 37.6        Recent Labs     12/02/19  0345      K 3.9   *   CO2 26   GLU 81   BUN 21*   CREA 0.90   CA 8.9       Signed: Serena Dobbs MD

## 2019-12-04 NOTE — PROGRESS NOTES
SADA:    [facility] vs Inpatient Rehab  2nd IM Medicare Letter      Pt has [employer] (Cherri Scheuermann) policy. CM sent referral to [facility] for inpatient rehab, however, CM received call from pt's [employer]: Terri Márquez, via telephone. Terri Márquez reported that pt has been denied to [facility], by [facility] due to pt being too high functioning. CM informed that pt would be able to benefit from [facility] vs Inpatient Rehab. CM contact pt's daughter, Naheed Pope, via telephone regarding the following recommendations. Naheed Pope had concerns regarding pt's just returning home with [facility] and CM addressed concerns. Naheed Pope reported that she will contact CM after she finish teaching her class. CM spoke with Terri Márquez and it was reported that pt has had [facility] in the past, and resumption of care can be sent to [facility]. CM will inform Kimberly of the following when she contacts CM. CM review recommendations with MD.  Pt will require 2nd IM Medicare Letter at d/c. CM will continue to follow. UPDATE: 3:04PM    CM contact Mariano Brownraquel: Terri Márquez, via telephone regarding pt's d/c needs and plans. eTrri Márquez informed CM that clinicals are going to be reviewed for SNF services, with hopes that pt will get accepted for therapy needs. CM will inform the family of the following, and provide SNF List for pt to review. Once placement is selected by family, and referral will be sent to SNF providers. Pt's Arthor Cable will be needed by [facility].   ISAURO Mclaughlin, [address] [place] [address]

## 2019-12-05 LAB
GLUCOSE BLD STRIP.AUTO-MCNC: 126 MG/DL (ref 65–100)
SERVICE CMNT-IMP: ABNORMAL

## 2019-12-05 PROCEDURE — 82962 GLUCOSE BLOOD TEST: CPT

## 2019-12-05 PROCEDURE — 97530 THERAPEUTIC ACTIVITIES: CPT

## 2019-12-05 PROCEDURE — 74011250637 HC RX REV CODE- 250/637: Performed by: INTERNAL MEDICINE

## 2019-12-05 PROCEDURE — 65660000000 HC RM CCU STEPDOWN

## 2019-12-05 PROCEDURE — 97535 SELF CARE MNGMENT TRAINING: CPT

## 2019-12-05 PROCEDURE — 97116 GAIT TRAINING THERAPY: CPT

## 2019-12-05 PROCEDURE — 74011250636 HC RX REV CODE- 250/636: Performed by: INTERNAL MEDICINE

## 2019-12-05 RX ORDER — GUAIFENESIN 100 MG/5ML
81 LIQUID (ML) ORAL DAILY
Qty: 30 TAB | Refills: 1 | Status: SHIPPED | OUTPATIENT
Start: 2019-12-06 | End: 2020-04-19

## 2019-12-05 RX ORDER — CLOPIDOGREL BISULFATE 75 MG/1
75 TABLET ORAL DAILY
Qty: 30 TAB | Refills: 1 | Status: SHIPPED | OUTPATIENT
Start: 2019-12-06 | End: 2020-01-30

## 2019-12-05 RX ADMIN — CLOPIDOGREL BISULFATE 75 MG: 75 TABLET ORAL at 09:05

## 2019-12-05 RX ADMIN — MIRTAZAPINE 30 MG: 15 TABLET, FILM COATED ORAL at 21:23

## 2019-12-05 RX ADMIN — HEPARIN SODIUM 5000 UNITS: 5000 INJECTION INTRAVENOUS; SUBCUTANEOUS at 21:23

## 2019-12-05 RX ADMIN — POLYETHYLENE GLYCOL (3350) 17 G: 17 POWDER, FOR SOLUTION ORAL at 09:04

## 2019-12-05 RX ADMIN — LATANOPROST 1 DROP: 50 SOLUTION OPHTHALMIC at 21:23

## 2019-12-05 RX ADMIN — ASPIRIN 81 MG 81 MG: 81 TABLET ORAL at 09:05

## 2019-12-05 RX ADMIN — ATORVASTATIN CALCIUM 40 MG: 40 TABLET, FILM COATED ORAL at 09:05

## 2019-12-05 RX ADMIN — LISINOPRIL 10 MG: 5 TABLET ORAL at 21:23

## 2019-12-05 RX ADMIN — HEPARIN SODIUM 5000 UNITS: 5000 INJECTION INTRAVENOUS; SUBCUTANEOUS at 13:19

## 2019-12-05 RX ADMIN — HEPARIN SODIUM 5000 UNITS: 5000 INJECTION INTRAVENOUS; SUBCUTANEOUS at 05:29

## 2019-12-05 RX ADMIN — CALCIUM 500 MG: 500 TABLET ORAL at 09:05

## 2019-12-05 RX ADMIN — OMEGA-3 FATTY ACIDS CAP 1000 MG 1 CAPSULE: 1000 CAP at 09:05

## 2019-12-05 NOTE — PROGRESS NOTES
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  -no plan for intervention. Hypertensive urgency  -restarted home lisinopril   -prn labetalol      Hyperlipidemia: controlled  -Continue Lipitor  Depression  -continue Remeron     Code Status: full   Surrogate Decision Maker: Kai Land     DVT Prophylaxis: sq heparin  GI Prophylaxis: not indicated     Baseline: From home independent    Pt with significant balance problem due to double vision. Will benefit from inpatient rehab/SNF.  working on placement. Hopefully tomorrow     Subjective:     Chief Complaint / Reason for Physician Visit: follow-up stroke  Patient seen for follow-up  Still with double vision, slight improved. No events overnight        Review of Systems:  Symptom Y/N Comments  Symptom Y/N Comments   Fever/Chills n   Chest Pain n    Poor Appetite n   Edema     Cough    Abdominal Pain n    Sputum    Joint Pain     SOB/JHA n   Pruritis/Rash     Nausea/vomit    Tolerating PT/OT     Diarrhea    Tolerating Diet     Constipation    Other       Could NOT obtain due to:      Objective:     VITALS:   Last 24hrs VS reviewed since prior progress note.  Most recent are:  Patient Vitals for the past 24 hrs:   Temp Pulse Resp BP SpO2   19 1632    145/65    19 1546 98.1 °F (36.7 °C) 73 18 (!) 169/98 97 %   19 1155 97.9 °F (36.6 °C) 85 18 169/86 96 %   19 0748 97.5 °F (36.4 °C) 86 16 136/69 97 %   19 0316 97.8 °F (36.6 °C) 96 18 131/62 97 %   12/04/19 2346 97.7 °F (36.5 °C) 99 16 143/59 98 %   12/04/19 2024 97.8 °F (36.6 °C) 90 18 127/65 98 %       Intake/Output Summary (Last 24 hours) at 12/5/2019 1717  Last data filed at 12/5/2019 1427  Gross per 24 hour   Intake 960 ml   Output    Net 960 ml        PHYSICAL EXAM:  General: WD, WN. Alert, cooperative, no acute distress    EENT:  Disconjugate gaze. Anicteric sclerae. MMM  Resp:  CTA bilaterally, no wheezing or rales. No accessory muscle use  CV:  Regular  rhythm,  No edema  GI:  Soft, Non distended, Non tender.  +Bowel sounds  Neurologic:  Alert and oriented X 3, normal speech,   Psych:   Good insight. Not anxious nor agitated  Skin:  No rashes. No jaundice    Reviewed most current lab test results and cultures  YES  Reviewed most current radiology test results   YES  Review and summation of old records today    NO  Reviewed patient's current orders and MAR    YES  PMH/SH reviewed - no change compared to H&P  ________________________________________________________________________  Care Plan discussed with:    Comments   Patient x    Family      RN x    Care Manager     Consultant  x Dr. Moi Pimentel team rounds were held today with , nursing, pharmacist and clinical coordinator. Patient's plan of care was discussed; medications were reviewed and discharge planning was addressed. ________________________________________________________________________  Total NON critical care TIME:  25   Minutes    Total CRITICAL CARE TIME Spent:   Minutes non procedure based      Comments   >50% of visit spent in counseling and coordination of care x    ________________________________________________________________________  Melissa Helm MD     Procedures: see electronic medical records for all procedures/Xrays and details which were not copied into this note but were reviewed prior to creation of Plan.       LABS:  I reviewed today's most current labs and imaging studies. Pertinent labs include:  No results for input(s): WBC, HGB, HCT, PLT, HGBEXT, HCTEXT, PLTEXT, HGBEXT, HCTEXT, PLTEXT in the last 72 hours. No results for input(s): NA, K, CL, CO2, GLU, BUN, CREA, CA, MG, PHOS, ALB, TBIL, TBILI, SGOT, ALT, INR, INREXT, INREXT in the last 72 hours.     Signed: Bette Dawkins MD

## 2019-12-05 NOTE — DISCHARGE INSTRUCTIONS
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 · Keep your medication in the bottles provided by the pharmacist and keep a list of the medication names, dosages, and times to be taken in your wallet. · Do not take other medications without consulting your doctor. What to do at Home    Recommended diet:  Cardiac Diet    Recommended activity: PT/OT Eval and Treat      If you have questions regarding the hospital related prescriptions or hospital related issues please call [facility] at [phone]. You can always direct your questions to your primary care doctor if you are unable to reach your hospital physician; your PCP works as an extension of your hospital doctor just like your hospital doctor is an extension of your PCP for your time at the hospital [facility])    If you experience any of the following symptoms then please call your primary care physician or return to the emergency room if you cannot get hold of your doctor:    Fever, chills, nausea, vomiting, or persistent diarrhea  Worsening weakness or new problems with your speech or balance  Dark stools or visible blood in your stools  New Leg swelling or shortness of breath as these could be signs of a clot    Additional Instructions:      Bring these papers with you to your follow up appointments.  The papers will help your doctors be sure to continue the care plan from the hospital.              Information obtained by :  I understand that if any problems occur once I am at home I am to contact my physician. I understand and acknowledge receipt of the instructions indicated above.                                                                                                                                            Physician's or R.N.'s Signature                                                                  Date/Time                                                                                                                                              Patient or Representative Signature

## 2019-12-05 NOTE — PROGRESS NOTES
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 PTA, Pt was receiving HH OT/PT to work on higher level balance and strength in LUE/LLE and had just begun using a SPC for two days. She was dressing/showering independently, yet admits to needing some assist with bathroom mobility intermittently d/t dizziness. She was cleared to drive after a period of restriction following July CVA, yet has not driven since October CVA. HOME SUPPORT: Pt recently moved in with her daughter who assists with bathroom mobility, med management, IADLs and transportation. Prior to October, Pt was completely independent, driving and living on her own. Occupational Therapy Goals  Initiated 12/3/2019  1. Patient will perform grooming tasks standing at the sink with Supervision within 7 day(s). 2.  Patient will perform UB/LB bathing standing at sink with Supervision within 7 day(s). 3.  Patient will perform standing aspects of lower body dressing with Supervision within 7 day(s). 4.  Patient will perform toilet transfers with Supervision using least restrictive device within 7 day(s). 5.  Patient will don/doff protective undergarment with Supervision within 7 day(s). 6.  Patient will perform item retrieval from various heights during ADLs with Supervision within 7 days. Outcome: Progressing Towards Goal   OCCUPATIONAL THERAPY TREATMENT  Patient: Katharine Sotelo (54 y.o. female)  Date: 12/5/2019  Diagnosis: Acute CVA (cerebrovascular accident) [facility]) [I63.9]   <principal problem not specified>       Precautions: Fall  Chart, occupational therapy assessment, plan of care, and goals were reviewed. ASSESSMENT  Patient continues with skilled OT services and is progressing towards goals.  Pt demonstrates improved standing tolerance for ADLs including item retrieval from various heights this session, yet continues to experience minor LOB d/t L lateral leaning when standing/ambulating. She continues to require intermittent cueing to scan her environment for safety with eye patch to R eye and reminders to self-manage eye patch wearing schedule throughout the day. Pt continues to be an excellent Inpatient Rehab candidate given her independent PLOF before multiple CVAs beginning in July, high motivation level and ability to tolerate 3 hours of intensive therapy. Current Level of Function Impacting Discharge (ADLs): Min A-CGA with transfers; Supervision to Min A with ADLs    Other factors to consider for discharge: Independent PLOF         PLAN :  Patient continues to benefit from skilled intervention to address the above impairments. Continue treatment per established plan of care. to address goals. Recommend with staff: Assist with bathroom mobility    Recommend next OT session: Standing aspects of LB dressing with improved balance; Challenged Pt to perform teach-back of compensatory visual scanning techniques during self-care tasks now with added challenged of items being scattered around room. Recommendation for discharge: (in order for the patient to meet his/her long term goals)  Therapy 3 hours per day 5-7 days per week    This discharge recommendation:  Has been made in collaboration with the attending provider and/or case management    IF patient discharges home will need the following DME: to be determined (TBD)       SUBJECTIVE:   Patient stated I'm doing better but I still feel unsteady\" and \"It's hard [to hasten pace and pause to regain balance]. ..  I'm used to moving fast\"    OBJECTIVE DATA SUMMARY:   Cognitive/Behavioral Status:  Neurologic State: Alert  Orientation Level: Oriented X4    Functional Mobility and Transfers for ADLs:  Bed Mobility:  Rolling: Supervision  Supine to Sit: Supervision  Scooting: Supervision    Transfers:  Sit to Stand: Contact guard assistance  Functional Transfers  Bathroom Mobility: Minimum assistance(cues for scanning environment d/t bumping into door)  Toilet Transfer : Contact guard assistance  Bed to Chair: Minimum assistance    Balance:  Sitting: Intact  Sitting - Static: Good (unsupported)  Sitting - Dynamic: Good (unsupported)  Standing: Impaired  Standing - Static: Fair;Unsupported  Standing - Dynamic : Fair;Unsupported    ADL Intervention:     Guided Pt through self-care item retrieval from various heights using compensatory visual scanning techniques (eye patch on R eye) in room and at linen cart in hallway, with instruction to stand directly in front of surfaces she's retrieving items from and cues for proper body mechanics. Pt instructed to place at least one hand on supportive surfaces prior to opening drawers/cabinets for safety d/t LLE weakness. In hallway, therapist challenged Pt to scan environment and maneuver around several carts/obstacles in her path. She demonstrated x3 minor LOB d/t L lateral leaning while scanning, therefore encouraged Pt to pause, assume her balance, scan her environment and then plan further mobility to maximize her safety. Grooming  Position Performed: Standing(at sink)  Washing Face: Contact guard assistance  Brushing Teeth: Contact guard assistance    Upper [address]: Set-up; Supervision(seated on commode)    Toileting  Bladder Hygiene: Supervision  Clothing Management: Contact guard assistance(with gown management)    Therapeutic Exercises:   Guided Pt through BLE target stepping trials x3 reps to each side in front of step-to trash can to throw away self-care items with improved static/dynamic standing balance. Pain:  None    Activity Tolerance:   Fair  Please refer to the flowsheet for vital signs taken during this treatment.     After treatment patient left in no apparent distress:   Sitting in chair, Call bell within reach, and Bed / chair alarm activated    COMMUNICATION/COLLABORATION:   The patients plan of care was discussed with: Physical Therapist, Registered Nurse, and patient. Patient was educated regarding Her deficit(s) of L sided weakness as this relates to Her diagnosis of CVA. She demonstrated Good understanding as evidenced by verbalizing.     Belkys Barone, OTR/L  Time Calculation: 33 mins

## 2019-12-05 NOTE — PROGRESS NOTES
Dictated: Patient has a tiny punctate right pontine tegmentum infarct on diffusion imaging missed per the radiologist, but clearly accounts for her right medial longitudinal fasciculus syndrome and double vision. Plavix has been added to her aspirin therapy which we should continue for 3 to 6 weeks and then switch to Plavix alone. 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 Consult  REFERRED BY:  Americo Tubbs NP    CHIEF COMPLAINT: Sudden double vision that started on Saturday and has persisted with some weakness in the left leg that is worse than before      Subjective:     Yoli Morales is a 69 y.o. right-handed -American female seen for evaluation of new problem of sudden double vision that occurred Saturday morning when she awoke, and has persisted, with the vision being more [id number], associated with some more difficulty with walking and balance problems and seems to have some more weakness with her left knee giving way she says in addition. Her initial and repeat MRI scans show a right pontine tegmentum infarct typical of a lesion of the medial longitudinal fasciculus which accounts for her double vision. She also was found to have an acoustic neuroma, and [place] [facility] [place], and is deaf in her left ear but has no other symptoms and does not want to do anything else with her acoustic neuroma. Patient just had a stroke in November 3 to 4 weeks ago involving the left cerebellar peduncle causing ataxia and loss of balance. 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   She denies any headache, denies any palpitations or chest pain, denies any other cardiac symptoms, and was admitted with accelerated hypertension from the emergency room. High-dose statin and 325 mg aspirin therapy prior to this admission, and her aspirin dose has been reduced to 81 mg now and she is also on Plavix therapy now in addition plus to high-dose statin. She denies any chest pain or palpitations or any other causes of this new lesion. She denies any headache, fever, meningismus, or other cranial nerve problem. She does have reduced hearing in the left ear. Patient will go to rehab at [place] [address], probably tomorrow and she is doing well on antiplatelet therapy as above, we will follow her as needed for now, and see her back in stroke clinic after discharge in 2 to 4 weeks.   Discussed with the hospitalist in detail and they agree fully  Past Medical History:   Diagnosis Date    Depression     Fall at home 2/10/2014   [facility] or floaters of right eye 2014    High cholesterol     History of stroke     Hypertension     [facility] neuroma ([facility]     Stroke ([facility]      and       Past Surgical History:   Procedure Laterality Date    HX TUBAL LIGATION      HX TUMOR REMOVAL      brain tumor     Family History   Problem Relation Age of Onset   Queen Hypertension Mother     Other Father         Unknown    Dementia Brother     Alcohol abuse Sister     Alcohol abuse Sister     No Known Problems Sister     No Known Problems Sister     No Known Problems Sister     Dementia Brother     No Known Problems Brother     No Known Problems Child       Social History     Tobacco Use    Smoking status: Former Smoker     Packs/day: 0.25     Years: 20.00     Pack years: 5.00     Last attempt to quit: 10/26/2019     Years since quittin.1    Smokeless tobacco: Never Used   Substance Use Topics    Alcohol use: No         Current Facility-Administered Medications:     polyethylene glycol (MIRALAX) packet 17 g, 17 g, Oral, DAILY, Clara Moncada MD, 17 g at 19 0947    omega 3-DHA-EPA-fish oil 1,000 mg (120 mg-180 mg) capsule 1 Cap, 1 Cap, Oral, DAILY, Diana Martinez MD, 1 Cap at 12/04/19 0947    lisinopril (PRINIVIL, ZESTRIL) tablet 10 mg, 10 mg, Oral, QHS, Diana Martinez MD, 10 mg at 12/04/19 2021    atorvastatin (LIPITOR) tablet 40 mg, 40 mg, Oral, DAILY, Terra Noble MD, 40 mg at 12/04/19 0947    calcium carbonate (OS-ABEL) tablet 500 mg [elemental], 500 mg, Oral, DAILY, Timoteo Noble MD, 500 mg at 12/04/19 0946    latanoprost (XALATAN) 0.005 % ophthalmic solution 1 Drop, 1 Drop, Both Eyes, QHS, Terra Garrido MD, 1 Drop at 12/04/19 2024    mirtazapine (REMERON) tablet 30 mg, 30 mg, Oral, QHS, Timoteo Garrido MD, 30 mg at 12/04/19 2021    acetaminophen (TYLENOL) tablet 650 mg, 650 mg, Oral, Q4H PRN **OR** acetaminophen (TYLENOL) solution 650 mg, 650 mg, Per NG tube, Q4H PRN **OR** acetaminophen (TYLENOL) suppository 650 mg, 650 mg, Rectal, Q4H PRN, Terra Noble MD    aspirin chewable tablet 81 mg, 81 mg, Oral, DAILY, Timoteo Garrido MD, 81 mg at 12/04/19 0947    clopidogrel (PLAVIX) tablet 75 mg, 75 mg, Oral, DAILY, Timoteo Garrido MD, 75 mg at 12/04/19 0945    labetalol (NORMODYNE;TRANDATE) injection 5 mg, 5 mg, IntraVENous, Q10MIN PRN, Arron Gonzalez MD, 5 mg at 12/02/19 0601    heparin (porcine) injection 5,000 Units, 5,000 Units, SubCUTAneous, Q8H, Timoteo Garrido MD, 5,000 Units at 12/04/19 2022        No Known Allergies   MRI Results (most recent):  Results from Hospital Encounter encounter on 12/01/19   MRI IAC W WO CONT    Narrative INDICATION: acoustic neuroma left    EXAMINATION:  MR BRAIN WITH/WITHOUT CONTRAST, IAC PROTOCOL    COMPARISON: MRI noncontrast brain December 2, 2019    TECHNIQUE:  Multiplanar multisequence acquisition without and with contrast of  the brain/internal auditory canals. FINDINGS:    Ventricles are midline without hydrocephalus.  Punctate focus of diffusion  restriction in the dorsal autumn just right of midline as well as a small linear  area of diffusion restriction in the left brachium pontis. Enhancing extra-axial  mass left cerebellopontine angle extending to the fundus of the left internal  auditory canal, filling the porous acoustic us measures 17 x 8 x 9 mm. This is  not cause mass effect upon the adjacent left brachium pontis. Areas of chronic  infarction in the autumn bilateral thalami and supratentorial brain better seen on  prior MRI. Postsurgical changes left occipital bone. Small round focus of  extra-axial enhancement planum sphenoid alley measures 7 x 7 mm. Impression IMPRESSION:  1. Suspect 2 separate small foci of acute to subacute infarction right dorsal  autumn and left brachium pontis. 2. Left cerebellopontine angle/IAC mass most consistent with adenoma, 17 x 8 x 9  mm. 3. Small, 7 x 7 mm meningioma planum sphenoidale. Results from [facility] encounter on 12/01/19   MRI IAC W WO CONT    Narrative INDICATION: acoustic neuroma left    EXAMINATION:  MR BRAIN WITH/WITHOUT CONTRAST, IAC PROTOCOL    COMPARISON: MRI noncontrast brain December 2, 2019    TECHNIQUE:  Multiplanar multisequence acquisition without and with contrast of  the brain/internal auditory canals. FINDINGS:    Ventricles are midline without hydrocephalus. Punctate focus of diffusion  restriction in the dorsal autumn just right of midline as well as a small linear  area of diffusion restriction in the left brachium pontis. Enhancing extra-axial  mass left cerebellopontine angle extending to the fundus of the left internal  auditory canal, filling the porous acoustic us measures 17 x 8 x 9 mm. This is  not cause mass effect upon the adjacent left brachium pontis. Areas of chronic  infarction in the autumn bilateral thalami and supratentorial brain better seen on  prior MRI. Postsurgical changes left occipital bone. Small round focus of  extra-axial enhancement planum sphenoid alley measures 7 x 7 mm. Impression IMPRESSION:  1.  Suspect 2 separate small foci of acute to subacute infarction right dorsal  autumn and left brachium pontis. 2. Left cerebellopontine angle/IAC mass most consistent with adenoma, 17 x 8 x 9  mm. 3. Small, 7 x 7 mm meningioma planum sphenoidale. Review of Systems:  A comprehensive review of systems was negative except for: Constitutional: positive for fatigue and malaise  Eyes: positive for visual disturbance and Diplopia  Musculoskeletal: positive for myalgias, arthralgias, stiff joints and muscle weakness  Neurological: positive for coordination problems, gait problems, weakness and Diplopia  Behvioral/Psych: positive for anxiety and depression   Vitals:    12/04/19 0743 12/04/19 1253 12/04/19 1539 12/04/19 2024   BP: 129/65 144/64 170/70 127/65   Pulse: 85 85 80 90   Resp: 20 18 18 18   Temp: 98 °F (36.7 °C) 97.7 °F (36.5 °C) 97.8 °F (36.6 °C) 97.8 °F (36.6 °C)   SpO2: 97% 97% 97% 98%   Weight:       Height:         Objective:     I      NEUROLOGICAL EXAM:    Appearance: The is well developed, well nourished, provides a fair history and is in no acute distress. Mental Status: Oriented to time, place and person, and the president, cognitive function is slow but probably normal and speech is fluent and no aphasia or dysarthria. Mood and affect appropriate but anxious. Cranial Nerves:   Intact visual fields. Fundi are benign, disc are flat, no lesions seen on funduscopy. LOYD, EOM's showed decreased medial rectus function with nystagmus in the left eye on left gaze indicating a right medial longitudinal fasciculus syndrome or right pontine segmental infarct. Mild left eye nystagmus on gaze to the left, no ptosis. Facial sensation is normal. Corneal reflexes are not tested. Facial movement is symmetric. Hearing is abnormal bilaterally. Palate is midline with normal sternocleidomastoid and trapezius muscles are normal. Tongue is midline.   Neck without meningismus or bruits  Temporal arteries are not tender or enlarged  TMJ areas are not tender on palpation   Motor:  4/5 strength in upper and lower proximal and distal muscles, with perhaps slight weakness of her left lower extremity. Normal bulk and tone. No fasciculations. Rapid alternating movement is symmetric and slow bilaterally   Reflexes:   Deep tendon reflexes 1+/4 and symmetrical.  No babinski or clonus present   Sensory:   Normal to touch, pinprick and vibration and temperature. DSS is intact   Gait:  Abnormal gait with the patient having mild cerebellar ataxia and mild weakness in the left lower extremity. Tremor:   No tremor noted. Cerebellar: Moderately abnormal cerebellar signs present on Romberg and tandem testing due to left cerebellar ataxia and finger-nose-finger exam is symmetric. Neurovascular:  Normal heart sounds and regular rhythm, peripheral pulses decreased, and no carotid bruits. Assessment:       ICD-10-CM ICD-9-CM    1. Diplopia H53.2 368.2    2. Bilateral carotid artery stenosis I65.23 433.10      433.30    3. History of stroke Z86.73 V12.54    4. Left acoustic neuroma (HCC) D33.3 225.1    5. Right pontine stroke (formerly [facility]) I63.50 434.91    6. Cerebrovascular accident (CVA), unspecified mechanism ([address] I63.9 434.91    7.  Acute CVA (cerebrovascular accident) [facility]) I63.9 434.91      Active Problems:    TIA (transient ischemic attack) (7/15/2019)      Stroke ([address] (7/16/2019)      Acute CVA (cerebrovascular accident) ([address] (12/1/2019)      Right pontine stroke ([address] (12/2/2019)      Bilateral carotid artery stenosis (12/2/2019)      Diplopia (12/2/2019)      Left acoustic neuroma ([address] (12/2/2019)      History of stroke ()        Plan:     Elayne Rangel is a 69 y.o. right-handed -American female seen for evaluation of new problem of sudden double vision that occurred Saturday morning when she awoke, and has persisted, with the vision being more [id number]-ri-njnz, associated with some more difficulty with walking and balance problems and seems to have some more weakness with her left knee giving way she says in addition. Her initial and repeat MRI scans show a right pontine tegmentum infarct typical of a lesion of the medial longitudinal fasciculus which accounts for her double vision. She also was found to have an acoustic neuroma, and has [place] [facility] [place], and is deaf in her left ear but has no other symptoms and does not want to do anything else with her acoustic neuroma. Patient just had a stroke in November 3 to 4 weeks ago involving the left cerebellar peduncle causing ataxia and loss of balance. 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 She denies any headache, denies any palpitations or chest pain, denies any other cardiac symptoms, and was admitted with accelerated hypertension from the emergency room. High-dose statin and 325 mg aspirin therapy prior to this admission, and her aspirin dose has been reduced to 81 mg now and she is also on Plavix therapy now in addition plus to high-dose statin. She denies any chest pain or palpitations or any other causes of this new lesion. She denies any headache, fever, meningismus, or other cranial nerve problem. She does have reduced hearing in the left ear. Patient will go to rehab at [place] [address], probably tomorrow and she is doing well on antiplatelet therapy as above, we will follow her as needed for now, and see her back in stroke clinic after discharge in 2 to 4 weeks. Discussed with the hospitalist in detail and they agree fully  Discussed with patient and family in detail and all are agreed as above.       Signed By: Bhartah Forrester MD     December 4, 2019       CC: Mary Davis NP  FAX: [phone]

## 2019-12-05 NOTE — PROGRESS NOTES
[facility]ADA:    SNF Placement  2nd IM Medicare    CM met with pt with daughter by bedside. Daughter informed CM that she selected snf placement for pt and would like referrals sent to [address] [place] [address] (1st choice) and [facility] (2nd choice). CM informed pt's daughter of [address] document (signed) placed in chart. CM sent refferal Launie Been pending). CM will continue to follow up. Pt will require 2nd IM Medicare Letter at d/c and family will transport home. UPDATE: 10:55AM    CM informed that [address] [place] [address] has accepted pt, and has begin [facility] through [facility] [employer]     Harjinder Reeves, ISAURO, [address] [place] [address]

## 2019-12-05 NOTE — PROGRESS NOTES
Problem: Falls - Risk of  Goal: *Absence of Falls  Description  Document [place] Fall Risk and appropriate interventions in the flowsheet.   Outcome: Progressing Towards Goal  Note: Fall Risk Interventions:  Mobility Interventions: Bed/chair exit alarm, Communicate number of staff needed for ambulation/transfer, OT consult for ADLs, Patient to call before getting OOB, PT Consult for mobility concerns, PT Consult for assist device competence, Utilize walker, cane, or other assistive device, Utilize gait belt for transfers/ambulation         Medication Interventions: Bed/chair exit alarm, Evaluate medications/consider consulting pharmacy, Patient to call before getting OOB, Teach patient to arise slowly, Utilize gait belt for transfers/ambulation    Elimination Interventions: Bed/chair exit alarm, Call light in reach, Elevated toilet seat, Patient to call for help with toileting needs, Toilet paper/wipes in reach, Toileting schedule/hourly rounds

## 2019-12-05 NOTE — PROGRESS NOTES
Problem: Mobility Impaired (Adult and Pediatric)  Goal: *Acute Goals and Plan of Care (Insert Text)  Description  FUNCTIONAL STATUS PRIOR TO ADMISSION: Pt has had 2 previous strokes, most recent in Nov. She lives with her dtr and family whom she states are with her most of time. She had progressed to mod I amb on level with SPC but needed assist on stairs to second floor. Family states L sided weakness from old stroke. Pt was able to dress self and take shower using shower chair. HOME SUPPORT PRIOR TO ADMISSION: The patient lived with family. Physical Therapy Goals  Initiated 12/2/2019  1. Patient will move from supine to sit and sit to supine , scoot up and down and roll side to side in bed with independence within 7 day(s). 2.  Patient will transfer from bed to chair and chair to bed with modified independence using the least restrictive device within 7 day(s). 3.  Patient will perform sit to stand with modified independence within 7 day(s). 4.  Patient will ambulate with supervision/set-up for 150 feet with the least restrictive device within 7 day(s). 5.  Patient will ascend/descend 12 stairs with left handrail(s) with minimal assistance/contact guard assist within 7 day(s). Outcome: Progressing Towards Goal   PHYSICAL THERAPY TREATMENT  Patient: Viv Phillips (65 y.o. female)  Date: 12/5/2019  Diagnosis: Acute CVA (cerebrovascular accident) [facility]) [I63.9]   <principal problem not specified>       Precautions: Fall  Chart, physical therapy assessment, plan of care and goals were reviewed. ASSESSMENT  Patient continues with skilled PT services and is progressing towards goals however continues to present with impaired standing balance, impaired vision, L sided weakness, and decreased endurance/activity tolerance. Secondary to strong lateral lean, pt requires minAx1 in order to achieve and maintain midline position during standing and ambulation.  Pt tolerated therapy session well and remains an excellent candidate for additional rehab at discharge. Current Level of Function Impacting Discharge (mobility/balance): [place] during ambulation w/o device    Other factors to consider for discharge: impaired vision, caregiver burden, mod I prior to admission         PLAN :  Patient continues to benefit from skilled intervention to address the above impairments. Continue treatment per established plan of care. to address goals. Recommendation for discharge: (in order for the patient to meet his/her long term goals)  Therapy 3 hours per day 5-7 days per week    This discharge recommendation:  Has been made in collaboration with the attending provider and/or case management    IF patient discharges home will need the following DME: to be determined (TBD)       SUBJECTIVE:   Patient stated I am freezing today, I'm cold natured.     OBJECTIVE DATA SUMMARY:   Critical Behavior:  Neurologic State: Alert  Orientation Level: Oriented X4  Cognition: Follows commands     Functional Mobility Training:  Bed Mobility:  Rolling: Supervision  Supine to Sit: Supervision     Scooting: Supervision        Transfers:  Sit to Stand: Contact guard assistance  Stand to Sit: Contact guard assistance        Bed to Chair: Minimum assistance                    Balance:  Sitting: Intact  Standing: Impaired  Standing - Static: Fair  Standing - Dynamic : Fair  Ambulation/Gait Training:  Distance (ft): 150 Feet (ft)(75ft x2 w/ seated rest break bt)  Assistive Device: Gait belt  Ambulation - Level of Assistance: Minimal assistance        Gait Abnormalities: Decreased step clearance;Trunk sway increased(L lateral lean)        Base of Support: Narrowed     Speed/Gregoria: Pace decreased (<100 feet/min); Slow  Step Length: Left shortened; Other (comment)                Pain Rating:  Denied complaints of pain    Activity Tolerance:   Good  Please refer to the flowsheet for vital signs taken during this treatment.     After treatment patient left in no apparent distress:   Sitting in chair, Call bell within reach, and Caregiver / family present    COMMUNICATION/COLLABORATION:   The patients plan of care was discussed with: Registered Nurse    María Matthew PT, DPT   Time Calculation: 18 mins

## 2019-12-05 NOTE — DISCHARGE SUMMARY
Hospitalist Discharge Summary     Patient ID:  Ivelisse Nevarez  [id number]  65 y.o.  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   female who presents with past medical history of CVA, hypertension, dyslipidemia is coming to the hospital with chief complaints of blurred vision. Patient reports being in her usual state of health until about this morning when she woke up with blurred vision. Her last well-known time was about 11 PM last night before going to bed. She does not report any associated focal weakness, tingling, numbness, facial deviation or slurred speech. She does not report any headache. She reports this is her third episode of stroke and she recently followed up with the neurology on outpatient basis where she had an MRI and was told she had a new stroke. She does not report any history of chest pain, shortness of breath or palpitations.     On arrival to the hospital, she was noted to have a blood pressure of 185 x 68. On lab work she was noted to have normal CBC. CMP was within normal limits. First troponin was normal at 0.05. CT head showed no acute process. 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  ______________________________________________________________________  DISCHARGE SUMMARY/HOSPITAL COURSE:  for full details see H&P, daily progress notes, labs, consult notes.        Diplopia POA  Acute right Pontine stroke POA  Recurrent Stroke: discussed with Dr. 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  -no plan for intervention.     Hypertensive urgency  -restarted home lisinopril   -prn labetalol      Hyperlipidemia: controlled  -Continue Lipitor  Depression  -continue Remeron      _______________________________________________________________________  Patient seen and examined by me on discharge day. Pertinent Findings:  Gen:    Not in distress  Chest: Clear lungs  CVS:   Regular rhythm. No edema  Abd:  Soft, not distended, not tender  Neuro:  Alert, oreinted x 3  _______________________________________________________________________  DISCHARGE MEDICATIONS:   Current Discharge Medication List      START taking these medications    Details   clopidogrel (PLAVIX) 75 mg tab Take 1 Tab by mouth daily. Qty: 30 Tab, Refills: 1      aspirin 81 mg chewable tablet Take 1 Tab by mouth daily. Qty: 30 Tab, Refills: 1         CONTINUE these medications which have NOT CHANGED    Details   calcium carbonate (CALTREX) 600 mg calcium (1,500 mg) tablet Take 600 mg by mouth daily. mirtazapine (REMERON) 30 mg tablet 30 mg nightly. lisinopril (PRINIVIL, ZESTRIL) 10 mg tablet       atorvastatin (LIPITOR) 40 mg tablet Take 1 Tab by mouth daily. Qty: 30 Tab, Refills: 3    Associated Diagnoses: Mixed hyperlipidemia      citalopram (CELEXA) 20 mg tablet Take 20 mg by mouth daily as needed. latanoprost (XALATAN) 0.005 % ophthalmic solution Administer 1 Drop to both eyes nightly. multivitamin (ONE A DAY) tablet Take 1 Tab by mouth daily. Associated Diagnoses: Depression, acute; Tiredness         STOP taking these medications       aspirin (ASPIRIN) 325 mg tablet Comments:   Reason for Stopping:                 Patient Follow Up Instructions:    Activity: PT/OT Eval and Treat  Diet: Cardiac Diet    Follow-up Information     Follow up With Specialties Details Why Contact Info    Americo Tubbs NP Nurse Practitioner   [address]  [phone]      Ruy Moore MD Neurology In 4 weeks  [address]  [facility]  [phone]          ________________________________________________________________    Risk of deterioration: Low    Condition at Discharge:  Stable  __________________________________________________________________    Disposition  SNF/LTC    ____________________________________________________________________    Code Status: Full Code  ___________________________________________________________________      Total time in minutes spent coordinating this discharge (includes going over instructions, follow-up, prescriptions, and preparing report for sign off to her PCP) :  45 minutes    Signed:  Yessenia Andre MD

## 2019-12-06 VITALS
RESPIRATION RATE: 16 BRPM | TEMPERATURE: 97.4 F | SYSTOLIC BLOOD PRESSURE: 148 MMHG | HEART RATE: 78 BPM | DIASTOLIC BLOOD PRESSURE: 66 MMHG | HEIGHT: 66 IN | OXYGEN SATURATION: 95 % | BODY MASS INDEX: 26.98 KG/M2 | WEIGHT: 167.9 LBS

## 2019-12-06 PROCEDURE — 74011250637 HC RX REV CODE- 250/637: Performed by: INTERNAL MEDICINE

## 2019-12-06 PROCEDURE — 74011250636 HC RX REV CODE- 250/636: Performed by: INTERNAL MEDICINE

## 2019-12-06 PROCEDURE — 94760 N-INVAS EAR/PLS OXIMETRY 1: CPT

## 2019-12-06 RX ADMIN — ASPIRIN 81 MG 81 MG: 81 TABLET ORAL at 08:54

## 2019-12-06 RX ADMIN — ACETAMINOPHEN 650 MG: 325 TABLET ORAL at 08:54

## 2019-12-06 RX ADMIN — HEPARIN SODIUM 5000 UNITS: 5000 INJECTION INTRAVENOUS; SUBCUTANEOUS at 05:31

## 2019-12-06 RX ADMIN — OMEGA-3 FATTY ACIDS CAP 1000 MG 1 CAPSULE: 1000 CAP at 08:53

## 2019-12-06 RX ADMIN — CLOPIDOGREL BISULFATE 75 MG: 75 TABLET ORAL at 08:54

## 2019-12-06 RX ADMIN — POLYETHYLENE GLYCOL (3350) 17 G: 17 POWDER, FOR SOLUTION ORAL at 08:53

## 2019-12-06 RX ADMIN — CALCIUM 500 MG: 500 TABLET ORAL at 08:53

## 2019-12-06 RX ADMIN — ATORVASTATIN CALCIUM 40 MG: 40 TABLET, FILM COATED ORAL at 08:53

## 2019-12-06 NOTE — ROUTINE PROCESS
Bedside and Verbal shift change report given to Julia  (oncoming nurse) by Andres Campos nurse). Report included the following information SBAR, Kardex, ED Summary, Intake/Output, MAR and Cardiac Rhythm NSR. 
  
Zone Phone:   [phone] 
  
  
Significant changes during shift:  none 
  
  
  
Patient Information 
  
Nikki Boyle 
69 y.o. 
12/1/2019 11:30 AM by Maren Frankel, MD. Genoveva Jimenes was admitted from Home 
  
Problem List 
  
       
Patient Active Problem List  
  Diagnosis Date Noted  Acute CVA (cerebrovascular accident) ([facility] 12/01/2019  Stroke ([facility] 07/16/2019  TIA (transient ischemic attack) 07/15/2019  Flashers or floaters of right eye 08/21/2014  Fall at home 02/10/2014  Knee pain, left 02/10/2014  Prediabetes 02/10/2014  
 HTN, goal below 130/80 01/06/2014  Vitamin D deficiency 01/06/2014  Hypercholesterolemia 06/27/2013  Tiredness 06/06/2013  Depression, acute 06/06/2013  
  
       
Past Medical History:  
Diagnosis Date  Depression    
 Fall at home 2/10/2014  Flashers or floaters of right eye 8/21/2014  High cholesterol    
 Hypertension    
 Stroke [facility])    
  2010 and 2019  
  
  
  
Core Measures: 
  
CVA: Yes Yes CHF:No No 
PNA:No No 
  
Activity Status: 
  
OOB to Chair No 
Ambulated this shift Yes  
Bed Rest No 
  
  
LINES AND DRAINS: 
  
PIV 
  
DVT prophylaxis: 
  
DVT prophylaxis Med- Yes DVT prophylaxis SCD or COMFORT- No  
  
  
Patient Safety: 
  
Falls Score Total Score: 3 Safety Level_______ Bed Alarm On? Yes Sitter? No 
  
Plan for upcoming shift: safety 
  
  
  
Discharge Plan: Yes  
  
Active Consults: 
IP CONSULT TO NEUROLOGY 
IP CONSULT TO NEUROLOGY

## 2019-12-06 NOTE — PROGRESS NOTES
SADA:    SNF Placement  Auth from Insurance  2nd IM Medicare Letter  Family to transport    CM contact pt's CM from [employer]: Kelin cJ to verify if Myra Mcburney has been accepted for snf placement, provided by [facility]. Kelin Jc informed CM that auth hasnt been accepted as of yet, but she believes Myra Mcburney will be accepted and she will hear from [facility] by noon. CM will inform MD and pt of the following    Pt will require 2nd IM Medicare letter at d/c. Pt family will transport.     ISAURO Bernardo, [address] [place] [address]

## 2019-12-06 NOTE — PROGRESS NOTES
Spiritual Care Partner Volunteer visited patient in Neuro on December 6, 2019.   Documented by:     KACIE Nevarez, [address] [employer], Staff [address] [place] [address] Avenue    [address] Paging Service  [phone]

## 2019-12-06 NOTE — PROGRESS NOTES
Discharge instructions reviewed with patient. Pt's daughter is on her way to pick her up and transport to SNF for rehab. Pt verbalizes understanding of all information provided.

## 2019-12-06 NOTE — PROGRESS NOTES
Communication to Patient/Family:  Met with patient and family and they are agreeable to the transition plan. The Plan for Transition of Care is related to the following treatment goals:     CM received message from snf liaison, and it was reported that pt insurance rod [facility]) has been accepted for pt to go to [facility]. CM contact pt's daughter: Kendra Oviedo, via telephone and informed her of the following. Kimberly agreeable to auth and acceptance, and will arrive at [facility] to transport pt to snf. The Patient and/or patient representative was provided with a choice of provider and agrees  with the discharge plan. Yes [x] No []    A Freedom of choice list was provided with basic dialogue that supports the patient's individualized plan of care/goals and shares the quality data associated with the providers. Yes [x] No []    SNF/Rehab Transition:  Patient has been accepted to [facility] SNF/Rehab and meets criteria for admission. Patient will transported by patients daughter: Kendra Oviedo  and expected to leave at 1:30pm.    Communication to SNF/Rehab:  Bedside RN, Stephanie Garcia , has been notified to update the transition plan to the facility and call report [phone]). Discharge information has been updated on the AVS. And communicated to facility via [employer], or CC link. Discharge instructions to be fax'd to facility at [facility] #). Nursing Please include all hard scripts for controlled substances, med rec and dc summary, and AVS in packet. Reviewed and confirmed with facility, [facility], can manage the patient care needs for the following:     Tamera President with (X) only those applicable:  Medication:  [x]Medications are available at the facility  []IV Antibiotics    []Controlled Substance  hard copies available sent.   []Weekly Labs    Equipment:  []CPAP/BiPAP  []Wound Vacuum  []Delarosa or Urinary Device  []PICC/Central Line  []Nebulizer  []Ventilator    Treatment:  []Isolation (for MRSA, VRE, etc.)  []Surgical Drain Management  []Tracheostomy Care  []Dressing Changes  []Dialysis with transportation  []PEG Care  []Oxygen  []Daily Weights for Heart Failure    Dietary:  []Any diet limitations  []Tube Feedings   []Total Parenteral Management (TPN)    Financial Resources:  []Medicaid Application Completed    []UAI Completed and copy given to pt/family  and copy given to pt/family  []A screening has previously been completed. []Level II Completed    [] Private pay individual who will not become   financially eligible for Medicaid within 6 months from admission to a [address] [place] [address] facility. [] Individual refused to have screening conducted. []Medicaid Application Completed    []The screening denied because it was determined individual did not need/did not qualify for nursing facility level of care. [] Out of state residents seeking direct admission to a [address] facility. [] Individuals who are inpatients of an out of state hospital, or in state or out of state veterans/ hospital and seek direct admission to a [address] facility  [] Individuals who are pateints or residents of a state owned/operated facility that is licensed by [employer] and seek direct admission to [address] [place] [address]  [] A screening not required for enrollment in [address] [address] [address] services as set out in [address] [place] [address] 93-  [] [facility]) staff shall perform screenings of the [facility] clients. Advanced Care Plan:  []Surrogate Decision Maker of Care  []POA  []Communicated Code Status and copy sent. Other:   Pt does not qualify for medicaid screening. Pt's daughter declined       Pt received 2n  Medicare letter (signed) placed in chart. CM has completed the needs of the pt at this time. Care Management Interventions  PCP Verified by CM: Yes  Mode of Transport at Discharge:  Other (see comment)(family to transport )  Transition of Care Consult (CM Consult): SNF([facility] )  Partner SNF: Yes  Discharge Durable Medical Equipment: No  Physical Therapy Consult: Yes  Occupational Therapy Consult: Yes  Speech Therapy Consult: No  Current Support Network:  Other(will dc to daughters' house, 2 story, 0 steps to enter)  Confirm Follow Up Transport: Family  Plan discussed with Pt/Family/Caregiver: Yes  Freedom of Choice Offered: Yes  Discharge Location  Discharge Placement: Skilled nursing facility

## 2019-12-06 NOTE — ROUTINE PROCESS
Called report to Deana at [facility]. Used SBAR, Kardex, Flow sheet and MAR. All questions answered. Gave my name and number incase she had more questions later.

## 2019-12-16 ENCOUNTER — HOSPITAL ENCOUNTER (OUTPATIENT)
Dept: LAB | Age: 74
Discharge: HOME | End: 2019-12-16

## 2019-12-16 ENCOUNTER — OFFICE VISIT (OUTPATIENT)
Dept: NEUROLOGY | Age: 74
End: 2019-12-16

## 2019-12-16 VITALS
OXYGEN SATURATION: 98 % | BODY MASS INDEX: 27.64 KG/M2 | HEART RATE: 77 BPM | SYSTOLIC BLOOD PRESSURE: 142 MMHG | HEIGHT: 66 IN | DIASTOLIC BLOOD PRESSURE: 74 MMHG | WEIGHT: 171.96 LBS

## 2019-12-16 DIAGNOSIS — I65.02 VERTEBRAL ARTERY OCCLUSION, LEFT: ICD-10-CM

## 2019-12-16 DIAGNOSIS — I63.9 ACUTE CVA (CEREBROVASCULAR ACCIDENT) (HCC): ICD-10-CM

## 2019-12-16 DIAGNOSIS — I10 ESSENTIAL HYPERTENSION: ICD-10-CM

## 2019-12-16 DIAGNOSIS — I63.9 ACUTE CVA (CEREBROVASCULAR ACCIDENT) (HCC): Primary | ICD-10-CM

## 2019-12-16 DIAGNOSIS — Z51.81 THERAPEUTIC DRUG MONITORING: ICD-10-CM

## 2019-12-16 DIAGNOSIS — E78.2 MIXED HYPERLIPIDEMIA: ICD-10-CM

## 2019-12-16 DIAGNOSIS — D33.3 UNILATERAL VESTIBULAR SCHWANNOMA (HCC): ICD-10-CM

## 2019-12-16 LAB
ASPIRIN TEST, ASPIRN: 380 ARU
COMMENT, HOLDF: NORMAL
P2Y12 PLT RESPONSE,PPPR: 124 PRU (ref 194–418)
SAMPLES BEING HELD,HOLD: NORMAL

## 2019-12-16 NOTE — PATIENT INSTRUCTIONS
Learning About How to Prevent a Stroke What is a stroke? A stroke occurs when a blood vessel in the brain bursts or is blocked by a blood clot. Without blood and the oxygen it carries, part of the brain starts to die. The part of the body controlled by the damaged area of the brain can't work properly. But there are many things you can do to help lower your stroke risk. What increases your risk for stroke? A risk factor is anything that makes you more likely to have a particular health problem. Risk factors for stroke that you can treat or change include: 
· Health problems like high blood pressure, atrial fibrillation, diabetes, and high cholesterol. · Smoking. · Heavy use of alcohol. · Being overweight. · Not getting enough physical activity. Risk factors you can't change include: · Age. The risk of stroke goes up as you get older. · Race.  Americans, Native Americans, and [place] Natives have a higher risk than those of other races. · Being female. Women have a higher risk of stroke than men. · Family history of stroke. Your doctor can help you know your risk. Then you and your can doctor talk about whether you need to lower it. What can you do to prevent a stroke? · Treat any health problems you have that raise your risk. · Adopt a heart-healthy lifestyle: ? Don't smoke. If you need help quitting, talk to your doctor about stop-smoking programs and medicines. These can increase your chances of quitting for good. ? Limit alcohol to 2 drinks a day for men and 1 drink a day for women. ? Stay at a healthy weight. Lose weight if you need to. 
? If your doctor recommends it, get more exercise. Walking is a good choice. Bit by bit, increase the amount you walk every day. Try for at least 30 minutes on most days of the week. ? Eat heart-healthy foods. These include fruits, vegetables, high-fiber foods, and fish. Choose foods that are low in sodium, saturated fat, and trans fat. · Decide with your doctor whether you will also take medicines to help lower your risk. For example, you and your doctor may decide you will take a medicine that prevents blood clots. What are the symptoms of a stroke? The brain damage from a stroke starts within minutes. That's why it's so important to know the symptoms of stroke and to act fast. Quick treatment can help limit damage to the brain so that you have fewer problems after the stroke. FAST is a simple way to remember the main symptoms of stroke. Recognizing these symptoms helps you know when to call for medical help. FAST stands for: 
· Face drooping. · Arm weakness. · Speech difficulty. · Time to call 911. Follow-up care is a key part of your treatment and safety. Be sure to make and go to all appointments, and call your doctor if you are having problems. It's also a good idea to know your test results and keep a list of the medicines you take. Where can you learn more? Go to http://jose c-lencho.info/. Enter G757 in the search box to learn more about \"Learning About How to Prevent a Stroke. \" Current as of: September 26, 2018 Content Version: 12.2 © 6944-3354 [employer]. Care instructions adapted under license by [employer] (which disclaims liability or warranty for this information). If you have questions about a medical condition or this instruction, always ask your healthcare professional. [place] [address] any warranty or liability for your use of this information. Learning About How to Prevent Another Stroke What can you do to prevent another stroke? After a stroke, people feel lots of different emotions. Some people are worried that they could have another stroke. Or they may feel overwhelmed by how much there is to learn and do. Some people feel sad or depressed.  
No matter what emotions you are feeling, you can give yourself some control and peace of mind by making a plan to lower your risk of having another stroke. Take your medicines You'll need to take medicines to help prevent another stroke. Be sure to take your medicines exactly as prescribed. And don't stop taking them unless your doctor tells you to. If you stop taking your medicines, you can increase your risk of having another stroke. Some of the medicines your doctor may prescribe include: · Aspirin or some other blood thinner to prevent blood clots. · Statins and other medicines to lower cholesterol. · Blood pressure medicines to lower blood pressure. Manage other health problems You can help lower your chance of having another stroke by managing certain other health problems. Problems that increase your risk of having another stroke include: · High blood pressure. · High cholesterol. · Atrial fibrillation. · Diabetes. If you have any of these health problems, you can manage them with healthy lifestyle changes along with medicine. Adopt a healthy lifestyle · Do not smoke or allow others to smoke around you. If you need help quitting, talk to your doctor about stop-smoking programs and medicines. These can increase your chances of quitting for good. Smoking makes a stroke more likely. · Limit alcohol to 2 drinks a day for men and 1 drink a day for women. · Lose weight if you need to. Controlling your weight will help you keep your heart and body healthy. · Be active. Ask your doctor what type and level of activity is safe for you. · Eat heart-healthy foods, like fruits, vegetables, and high-fiber foods. It's also important to: · Get a flu shot every year. · Ask for help if you think you are depressed. Do stroke rehab Taking part in a stroke rehabilitation (rehab) program will help you to regain skills you lost or make the most of your abilities after a stroke. It also helps you take steps to prevent another stroke. Your rehab team will give you education and support to help you build new, healthy habits. You'll learn how to manage any other health problems that you might have. Judy Villatoro also learn how to exercise safely, eat a healthy diet, and quit smoking if you smoke. Judy Villatoro work with your team to decide what lifestyle choices are best for you. If your doctor hasn't already suggested it, ask him or her if stroke rehab is right for you. Know stroke symptoms Make sure you know the symptoms of stroke. FAST is a simple way to remember. Recognizing these symptoms helps you know when to call for medical help. FAST stands for: 
· Face drooping. · Arm weakness. · Speech difficulty. · Time to call 911. Follow-up care is a key part of your treatment and safety. Be sure to make and go to all appointments, and call your doctor if you are having problems. It's also a good idea to know your test results and keep a list of the medicines you take. Where can you learn more? Go to http://jose c-elncho.info/. Enter U385 in the search box to learn more about \"Learning About How to Prevent Another Stroke. \" Current as of: September 26, 2018 Content Version: 12.2 © 3064-4856 [employer]. Care instructions adapted under license by [employer] (which disclaims liability or warranty for this information). If you have questions about a medical condition or this instruction, always ask your healthcare professional. [place] [address] any warranty or liability for your use of this information. High Cholesterol: Care Instructions Your Care Instructions Cholesterol is a type of fat in your blood. It is needed for many body functions, such as making new cells. Cholesterol is made by your body. It also comes from food you eat. High cholesterol means that you have too much of the fat in your blood. This raises your risk of a heart attack and stroke. LDL and HDL are part of your total cholesterol. LDL is the \"bad\" cholesterol. High LDL can raise your risk for heart disease, heart attack, and stroke. HDL is the \"good\" cholesterol. It helps clear bad cholesterol from the body. High HDL is linked with a lower risk of heart disease, heart attack, and stroke. Your cholesterol levels help your doctor find out your risk for having a heart attack or stroke. You and your doctor can talk about whether you need to lower your risk and what treatment is best for you. A heart-healthy lifestyle along with medicines can help lower your cholesterol and your risk. The way you choose to lower your risk will depend on how high your risk is for heart attack and stroke. It will also depend on how you feel about taking medicines. Follow-up care is a key part of your treatment and safety. Be sure to make and go to all appointments, and call your doctor if you are having problems. It's also a good idea to know your test results and keep a list of the medicines you take. How can you care for yourself at home? · Eat a variety of foods every day. Good choices include fruits, vegetables, whole grains (like oatmeal), dried beans and peas, nuts and seeds, soy products (like tofu), and fat-free or low-fat dairy products. · Replace butter, margarine, and hydrogenated or partially hydrogenated oils with olive and canola oils. (Canola oil margarine without trans fat is fine.) · Replace red meat with fish, poultry, and soy protein (like tofu). · Limit processed and packaged foods like chips, crackers, and cookies. · Bake, broil, or steam foods. Don't mccoy them. · Be physically active. Get at least 30 minutes of exercise on most days of the week. Walking is a good choice. You also may want to do other activities, such as running, swimming, cycling, or playing tennis or team sports.  
· Stay at a healthy weight or lose weight by making the changes in eating and physical activity listed above. Losing just a small amount of weight, even 5 to 10 pounds, can reduce your risk for having a heart attack or stroke. · Do not smoke. When should you call for help? Watch closely for changes in your health, and be sure to contact your doctor if: 
  · You need help making lifestyle changes.  
  · You have questions about your medicine. Where can you learn more? Go to http://jose c-lencho.info/. Enter V382 in the search box to learn more about \"High Cholesterol: Care Instructions. \" Current as of: April 9, 2019 Content Version: 12.2 © 0213-6808 [employer]. Care instructions adapted under license by [employer] (which disclaims liability or warranty for this information). If you have questions about a medical condition or this instruction, always ask your healthcare professional. [address] any warranty or liability for your use of this information. High Blood Pressure: Care Instructions Overview It's normal for blood pressure to go up and down throughout the day. But if it stays up, you have high blood pressure. Another name for high blood pressure is hypertension. Despite what a lot of people think, high blood pressure usually doesn't cause headaches or make you feel dizzy or lightheaded. It usually has no symptoms. But it does increase your risk of stroke, heart attack, and other problems. You and your doctor will talk about your risks of these problems based on your blood pressure. Your doctor will give you a goal for your blood pressure. Your goal will be based on your health and your age. Lifestyle changes, such as eating healthy and being active, are always important to help lower blood pressure. You might also take medicine to reach your blood pressure goal. 
Follow-up care is a key part of your treatment and safety.  Be sure to make and go to all appointments, and call your doctor if you are having problems. It's also a good idea to know your test results and keep a list of the medicines you take. How can you care for yourself at home? Medical treatment · If you stop taking your medicine, your blood pressure will go back up. You may take one or more types of medicine to lower your blood pressure. Be safe with medicines. Take your medicine exactly as prescribed. Call your doctor if you think you are having a problem with your medicine. · Talk to your doctor before you start taking aspirin every day. Aspirin can help certain people lower their risk of a heart attack or stroke. But taking aspirin isn't right for everyone, because it can cause serious bleeding. · See your doctor regularly. You may need to see the doctor more often at first or until your blood pressure comes down. · If you are taking blood pressure medicine, talk to your doctor before you take decongestants or anti-inflammatory medicine, such as ibuprofen. Some of these medicines can raise blood pressure. · Learn how to check your blood pressure at home. Lifestyle changes · Stay at a healthy weight. This is especially important if you put on weight around the waist. Losing even 10 pounds can help you lower your blood pressure. · If your doctor recommends it, get more exercise. Walking is a good choice. Bit by bit, increase the amount you walk every day. Try for at least 30 minutes on most days of the week. You also may want to swim, bike, or do other activities. · Avoid or limit alcohol. Talk to your doctor about whether you can drink any alcohol. · Try to limit how much sodium you eat to less than 2,300 milligrams (mg) a day. Your doctor may ask you to try to eat less than 1,500 mg a day. · Eat plenty of fruits (such as bananas and oranges), vegetables, legumes, whole grains, and low-fat dairy products. · Lower the amount of saturated fat in your diet. Saturated fat is found in animal products such as milk, cheese, and meat. Limiting these foods may help you lose weight and also lower your risk for heart disease. · Do not smoke. Smoking increases your risk for heart attack and stroke. If you need help quitting, talk to your doctor about stop-smoking programs and medicines. These can increase your chances of quitting for good. When should you call for help? Call  911 anytime you think you may need emergency care. This may mean having symptoms that suggest that your blood pressure is causing a serious heart or blood vessel problem. Your blood pressure may be over 180/120. 
 For example, call  911 if: 
  · You have symptoms of a heart attack. These may include: 
? Chest pain or pressure, or a strange feeling in the chest. 
? Sweating. ? Shortness of breath. ? Nausea or vomiting. ? Pain, pressure, or a strange feeling in the back, neck, jaw, or upper belly or in one or both shoulders or arms. ? Lightheadedness or sudden weakness. ? A fast or irregular heartbeat.  
  · You have symptoms of a stroke. These may include: 
? Sudden numbness, tingling, weakness, or loss of movement in your face, arm, or leg, especially on only one side of your body. ? Sudden vision changes. ? Sudden trouble speaking. ? Sudden confusion or trouble understanding simple statements. ? Sudden problems with walking or balance. ? A sudden, severe headache that is different from past headaches.  
  · You have severe back or belly pain.  
 Do not wait until your blood pressure comes down on its own. Get help right away. 
 Call your doctor now or seek immediate care if: 
  · Your blood pressure is much higher than normal (such as 180/120 or higher), but you don't have symptoms.  
  · You think high blood pressure is causing symptoms, such as: 
? Severe headache. 
? Blurry vision.  Watch closely for changes in your health, and be sure to contact your doctor if: 
  · Your blood pressure measures higher than your doctor recommends at least 2 times. That means the top number is higher or the bottom number is higher, or both.  
  · You think you may be having side effects from your blood pressure medicine. Where can you learn more? Go to http://jose c-lencho.info/. Enter L696 in the search box to learn more about \"High Blood Pressure: Care Instructions. \" Current as of: April 9, 2019 Content Version: 12.2 © 3062-5231 [employer]. Care instructions adapted under license by [employer] (which disclaims liability or warranty for this information). If you have questions about a medical condition or this instruction, always ask your healthcare professional. [address] any warranty or liability for your use of this information.

## 2019-12-16 NOTE — LETTER
12/17/19 Patient: Killian Burger YOB: 1945 Date of Visit: 12/16/2019 Dayana Call NP 
[address] [facility] [address] VIA Facsimile: [phone] Dear Dayana Call NP, Thank you for referring Ms. Killian Burger to [address] [place] [address] for evaluation. My notes for this consultation are attached. If you have questions, please do not hesitate to call me. I look forward to following your patient along with you. Sincerely, Iglesia Rutherford MD

## 2019-12-16 NOTE — PROGRESS NOTES
NEUROLOGY CLINIC NOTE    Patient ID:  Christina Waggoner  [id number]  37 y.o.  1945    Date of Visit:  December 16, 2019    Reason for Visit:  Recurrent stroke    Chief Complaint   Patient presents with    Follow-up       History of Present Illness:     Patient Active Problem List    Diagnosis Date Noted    Right pontine stroke ([facility] [address] 12/02/2019    Bilateral carotid artery stenosis 12/02/2019    Diplopia 12/02/2019    Left acoustic neuroma ([facility] [address] 12/02/2019    History of stroke     Acute CVA (cerebrovascular accident) ([facility] [address] 12/01/2019    Stroke ([facility] [address] 07/16/2019    TIA (transient ischemic attack) 07/15/2019    Flashers or floaters of right eye 08/21/2014    Fall at home 02/10/2014    Knee pain, left 02/10/2014    Prediabetes 02/10/2014    HTN, goal below 130/80 01/06/2014    Vitamin D deficiency 01/06/2014    Hypercholesterolemia 06/27/2013    Tiredness 06/06/2013    Depression, acute 06/06/2013     Past Medical History:   Diagnosis Date    Depression     Fall at home 2/10/2014   [facility] or floaters of right eye 8/21/2014    High cholesterol     History of stroke     Hypertension     Left acoustic neuroma ([facility] [address]     Stroke ([facility] [address]     2010 and 2019      Past Surgical History:   Procedure Laterality Date    HX TUBAL LIGATION  1981    HX TUMOR REMOVAL  2008    brain tumor      Prior to Admission medications    Medication Sig Start Date End Date Taking? Authorizing Provider   clopidogrel (PLAVIX) 75 mg tab Take 1 Tab by mouth daily. 12/6/19  Yes Marika Herrera MD   aspirin 81 mg chewable tablet Take 1 Tab by mouth daily. 12/6/19  Yes Marika Herrera MD   calcium carbonate (CALTREX) 600 mg calcium (1,500 mg) tablet Take 600 mg by mouth daily. Yes Provider, Historical   mirtazapine (REMERON) 30 mg tablet 30 mg nightly.  10/4/19  Yes Provider, Historical   lisinopril (PRINIVIL, ZESTRIL) 10 mg tablet  11/4/19  Yes Provider, Historical   atorvastatin (LIPITOR) 40 mg tablet Take 1 Tab by mouth daily. 19  Yes Natalee Hurst MD   citalopram (CELEXA) 20 mg tablet Take 20 mg by mouth daily as needed. Yes Provider, Historical   latanoprost (XALATAN) 0.005 % ophthalmic solution Administer 1 Drop to both eyes nightly. Yes Provider, Historical   multivitamin (ONE A DAY) tablet Take 1 Tab by mouth daily. Yes Provider, Historical     No Known Allergies   Social History     Tobacco Use    Smoking status: Former Smoker     Packs/day: 0.25     Years: 20.00     Pack years: 5.00     Last attempt to quit: 10/26/2019     Years since quittin.1    Smokeless tobacco: Never Used   Substance Use Topics    Alcohol use: No      Family History   Problem Relation Age of Onset    Hypertension Mother     Other Father         Unknown    Dementia Brother     Alcohol abuse Sister     Alcohol abuse Sister     No Known Problems Sister     No Known Problems Sister     No Known Problems Sister     Dementia Brother     No Known Problems Brother     No Known Problems Child         Subjective:      Zuleika Camejo is a 69 y.o. RHAAF with history of depression, hypertension, vitamin D deficiency and hypercholesterolemia who is here for follow up after a recent stroke. Patient was admitted to [facility] 7/15/2019 for right arm weakness and slurred speech. Day of admission, while she was driving she noticed numbness of the right arm. Felt heavy. Denies any overt weakness. She tried to rub it to bring back the sensation. Then she went into the grocery store and was dropping things with her right hand. She is also dropping groceries that she was loading into her truck. She then drove home to see if symptoms would resolve. At home she also noted that she was starting to have slurring of her speech. Also developed moderate intensity headaches. Talk to her granddaughter on the phone and eventually her daughter and was brought to the emergency room. In the ER blood pressure was 160/75.   Laboratory work-up was unremarkable except for LDL of 97.2. Head CT without contrast did not reveal any acute process. Moderate to severe white matter disease. CT perfusion did not reveal any abnormality. Head and neck CTA did not reveal any flow-limiting stenosis or aneurysm. Noted necrotic 1.8 cm right thyroid nodule. Brain MRI revealed small left parietal lobe and right centrum semiovale infarct. Severe periventricular and subcortical white matter disease. When seen, patient reports resolution of her symptoms. Echocardiogram done was unremarkable. Patient was discharged 7/17/2019 on atorvastatin 40 mg daily and aspirin 81 mg daily. Patient reportedly was doing fine until 11/1/2019 when she developed sudden onset of dizziness and when she was walking she was leaning to the left. Also noted slurring of her speech and blurred vision. Denies any weakness or sensory loss. Patient saw her PCP last 11/4/2019 for evaluation. Labs done 11/4/2019 revealed unremarkable CBC, INR, PT, PTT, B12, folate, homocysteine and LDL of 68. Brain MRI without contrast 11/5/2019 revealed new small infarct left middle cerebellar peduncle. Abnormal T2 hypointense signal seen throughout the left internal auditory canal with an apparent small soft tissue lesion in the left cerebellopontine angle cistern measuring 7 x 7 mm. 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 Abnormal flow void signal within the left vertebral artery V4 segment. Unchanged severe chronic microvascular ischemic disease. Unchanged numerous small chronic infarcts including bilateral thalami, autumn and middle cerebellar peduncles. Unchanged few scattered chronic microhemorrhages (left superior parietal lobe, right lateral frontal lobe, right parietal lobe, bilateral temporal and occipital lobes). A left suboccipital cranioplasty is again seen. Patient reports she is improving. Less issue with imbalance. Still having some slurred speech. Blurring vision is also improved.   She apparently has not been taking aspirin 81 mg daily for several months now. Since the last visit on 11/7/2019, patient was seen in the ER 11/9/2009 for waking up not feeling right, felt nauseous and weak. Blood pressure was 154/69. CBC, CMP and urinalysis were unremarkable. Head CT without contrast revealed no acute intracranial hemorrhage, mass or infarct. Confluent white matter hypodensities consistent with chronic microvascular ischemic changes. Patient was discharged and told to follow-up with PCP. Patient was seen by endocrinology last 11/25/2019 for evaluation of a thyroid nodule. Fine-needle aspiration was done. Then 12/1/2019, patient woke up seeing double no matter where she looks and increase wobbliness. In the ER, BP was 185/68. ER note mentions left eye deviated down but with individual ocular testing movements are full but there is presence of nystagmus. Right eye noted to be unable to adduct. CBC, CMP, troponin and urinalysis were unremarkable. Head CT without contrast revealed no acute process. Extensive periventricular white matter disease and low-density lesions involving bilateral thalami and mid autumn. Head and neck CTA revealed occlusion of the distal left vertebral artery but the right vertebral and basilar artery are patent. Chronic ischemic change in the white matter. Small left acoustic neuroma. Brain MRI without contrast done 12/2/2019 revealed unchanged generalized parenchymal volume loss and severe chronic microvascular ischemic disease with numerous small chronic supratentorial and infratentorial infarcts and microhemorrhages. Distribution raises suspicion for cerebral amyloid angiopathy. There is left vestibular schwannoma within the internal auditory canal and cerebellopontine angle cistern. MRI of the IAC with and without contrast revealed 2 separate small foci of acute to subacute infarction right dorsal autumn and left brachium pontis.   Left cerebellopontine angle/IAC mass most consistent with adenoma (17 x 8 x 9 mm). Small 7 x 7 mm meningioma planum sphenoidale. LDL was 59.8. Hemoglobin A1c slightly elevated at 5.8. Echocardiogram done 12/2/2019 revealed EF of 56 to 60%. Age-appropriate left ventricular diastolic function. Normal TSH. Patient was seen by Dr. Sabiha Powell (neurology) and exam revealed decreased medial rectus function with nystagmus in the left eye on left gaze indicating right medial longitudinal fasciculus syndrome or right pontine segmental infarct. Cerebellar ataxia. Plavix was added to patient's aspirin for stroke prophylaxis. Patient was discharged 12/6/2019 to inpatient rehab. Since then, patient continues to have double vision but is less intense. Better with eye patching. Improving balance issues. Outside reports reviewed: ER notes, radiology reports, lab reports, historical medical records    Review of Systems:    A comprehensive review of systems was negative except for:   Double vision, balance issues    Objective:     Visit Vitals  /74   Pulse 77   Ht 5' 6\" (1.676 m)   Wt 171 lb 15.3 oz (78 kg)   SpO2 98%   BMI 27.75 kg/m²       PHYSICAL EXAM:    NEUROLOGICAL EXAM:     Appearance: The patient is well developed, well nourished, provides a coherent history and is in no acute distress. Mental Status: Oriented to time, place and person. Fluent, no aphasia but with ataxic dysarthria. Mood and affect appropriate. Cranial Nerves:   Intact visual fields. LOYD, EOM's full except for left medial rectus mild weakness and dysconjugate horizontal eye movements, no nystagmus, no ptosis. Facial sensation is normal. Corneal reflexes are intact. Facial movement is symmetric. Hearing is normal bilaterally. Palate is midline with normal sternocleidomastoid and trapezius muscles are normal. Tongue is midline. Motor:  5/5 strength. Normal bulk and tone. No fasciculations. No pronator drift.    Reflexes:   Deep tendon reflexes 1+/4 and symmetrical.  Downgoing toes. Sensory:   Normal to cold, pinprick and vibration. Gait:  Slightly leans to the left. No Romberg but with significant truncal instability. Unable to do tandem walking. Steady with a walker. Tremor:   No tremor noted. Cerebellar:  Dysmetria and ataxia left FTN/SUSAN/HTS     Imaging  Head CT, head/neck CTA, Brain MRI, MRI of ICA: reviewed    Labs Reviewed    Assessment:   Brainstem CVA  Left vertebral artery occlusion  Left vestibular schwannoma  Hyperlipidemia  Essential hypertension  Therapeutic drug monitoring    Plan:   Neurological examination reveals left medial rectus paresis, ataxic dysarthria, left-sided ataxia and clumsiness and gait ataxia. Status post acute to subacute infarction right dorsal autumn and left brachium pontis. Previous left middle cerebellar peduncle infarct. Recent brain and IAC MRI confirms these acute finding. Continue aggressive inpatient rehab. Continue Aspirin 81 mg daily and Plavix 75 mg daily for stroke prophylaxis. Repeat echocardiogram was unremarkable. Patient was advised to call 911 if new deficits occur. Patient is not to drive until cleared by neurology. Head and neck CTA revealed occlusion of the distal left vertebral artery but the right vertebral and basilar artery are patent. Treatment for this would be dual antiplatelet therapy. MRI of the IAC with and without contrast left cerebellopontine angle/IAC mass most consistent with adenoma (17 x 8 x 9 mm). Small 7 x 7 mm meningioma planum sphenoidale. Patient has had a prior left cranioplasty. I would suspect this was related to this issue. May need neurosurgery re-evaluation. LDL now at goal of <70. Continue atorvastatin 40 mg daily. Advised compliance with dietary modifications and medications for hyperlipidemia. Patient was advised to check her blood pressures twice a day (upon waking up and before bed) to see if it is constantly less than 130/80 when discharge to home. If higher than she needs adjustment of her blood pressure medications. Advised strict compliance with dietary modifications and medications for hypertension. Check Aspirin and Plavix efficacy. Labs ordered. All questions and concerns were answered. Visit lasted 50 minutes. Greater than 50% was spent reviewing her medical records during her ER visit and most recent hospitalization as summarized above, discussion about her condition, etiology, prognosis, stroke prevention prophylaxis, strict compliance with dietary modifications and medications for hyperlipidemia and hypertension, potential reevaluation by neurosurgery for the left cerebellopontine angle mass, treatment options, medication, laboratory work-up     This note was created using voice recognition software. Despite editing, there may be syntax errors.

## 2019-12-18 NOTE — PROGRESS NOTES
Called patient's daughter and informed her that the aspirin testing and P2Y12 platelet function was positive. Continue medications for stroke prophylaxis.

## 2020-01-08 ENCOUNTER — TELEPHONE (OUTPATIENT)
Dept: NEUROLOGY | Age: 75
End: 2020-01-08

## 2020-01-29 DIAGNOSIS — I63.9 ACUTE CVA (CEREBROVASCULAR ACCIDENT) (HCC): Primary | ICD-10-CM

## 2020-01-30 ENCOUNTER — TELEPHONE (OUTPATIENT)
Dept: NEUROLOGY | Age: 75
End: 2020-01-30

## 2020-01-30 RX ORDER — CLOPIDOGREL BISULFATE 75 MG/1
TABLET ORAL
Qty: 90 TAB | Refills: 3 | Status: SHIPPED | OUTPATIENT
Start: 2020-01-30 | End: 2020-04-19

## 2020-02-11 ENCOUNTER — OFFICE VISIT (OUTPATIENT)
Dept: NEUROLOGY | Age: 75
End: 2020-02-11

## 2020-02-11 VITALS
OXYGEN SATURATION: 95 % | BODY MASS INDEX: 27.75 KG/M2 | HEIGHT: 66 IN | HEART RATE: 93 BPM | RESPIRATION RATE: 20 BRPM | SYSTOLIC BLOOD PRESSURE: 150 MMHG | DIASTOLIC BLOOD PRESSURE: 78 MMHG

## 2020-02-11 DIAGNOSIS — I65.02 VERTEBRAL ARTERY OCCLUSION, LEFT: ICD-10-CM

## 2020-02-11 DIAGNOSIS — D33.3 UNILATERAL VESTIBULAR SCHWANNOMA (HCC): ICD-10-CM

## 2020-02-11 DIAGNOSIS — I10 ESSENTIAL HYPERTENSION: ICD-10-CM

## 2020-02-11 DIAGNOSIS — I63.9 ACUTE CVA (CEREBROVASCULAR ACCIDENT) (HCC): Primary | ICD-10-CM

## 2020-02-11 DIAGNOSIS — E78.2 MIXED HYPERLIPIDEMIA: ICD-10-CM

## 2020-02-11 RX ORDER — HYDROCHLOROTHIAZIDE 12.5 MG/1
TABLET ORAL
COMMUNITY
Start: 2020-01-24

## 2020-02-11 RX ORDER — MIRABEGRON 25 MG/1
TABLET, FILM COATED, EXTENDED RELEASE ORAL
COMMUNITY
Start: 2020-01-07

## 2020-02-11 NOTE — LETTER
2/11/20 Patient: Zbigniew Osuna YOB: 1945 Date of Visit: 2/11/2020 Luz Gamez NP 
[address] [id number] VIA Facsimile: [phone] Dear Luz Gamez NP, Thank you for referring Ms. Zbigniew Osuna to [facility] for evaluation. My notes for this consultation are attached. If you have questions, please do not hesitate to call me. I look forward to following your patient along with you. Sincerely, Meghan Fuentes MD

## 2020-02-11 NOTE — PROGRESS NOTES
Has been doing okay other than yesterday she had a fall in her bedroom   Getting up out of the bed going to the bathroom, reached for the cane fell against the chair in her bedroom   Denies hitting her head, hit her right shoulder     Uses the cane pretty much all the time   Sometimes at home she may not use it but if she is going ot go out she will use it for her balance

## 2020-02-11 NOTE — PROGRESS NOTES
NEUROLOGY CLINIC NOTE    Patient ID:  Fredo Garcia  [id number]  29 y.o.  1945    Date of Visit:  February 11, 2020    Reason for Visit:  Recurrent stroke    Chief Complaint   Patient presents with    Follow-up     post stroke        History of Present Illness:     Patient Active Problem List    Diagnosis Date Noted    Right pontine stroke ([facility] [address] 12/02/2019    Bilateral carotid artery stenosis 12/02/2019    Diplopia 12/02/2019    Left acoustic neuroma ([facility] [address] 12/02/2019    History of stroke     Acute CVA (cerebrovascular accident) ([facility] [address] 12/01/2019    Stroke ([facility] [address] 07/16/2019    TIA (transient ischemic attack) 07/15/2019    Flashers or floaters of right eye 08/21/2014    Fall at home 02/10/2014    Knee pain, left 02/10/2014    Prediabetes 02/10/2014    HTN, goal below 130/80 01/06/2014    Vitamin D deficiency 01/06/2014    Hypercholesterolemia 06/27/2013    Tiredness 06/06/2013    Depression, acute 06/06/2013     Past Medical History:   Diagnosis Date    Depression     Fall at home 2/10/2014   [facility] or floaters of right eye 8/21/2014    High cholesterol     History of stroke     Hypertension     Left acoustic neuroma ([facility] [address] [facility]     Stroke ([facility] [address] [facility]     2010 and 2019      Past Surgical History:   Procedure Laterality Date    HX TUBAL LIGATION  1981    HX TUMOR REMOVAL  2008    brain tumor      Prior to Admission medications    Medication Sig Start Date End Date Taking? Authorizing Provider   MYRBETRIQ 25 mg ER tablet TK 1 T PO ONCE D 1/7/20  Yes Provider, Historical   hydroCHLOROthiazide (HYDRODIURIL) 12.5 mg tablet  1/24/20  Yes Provider, Historical   clopidogreL (PLAVIX) 75 mg tab TAKE 1 TABLET BY MOUTH EVERY DAY 1/30/20  Yes Elena Ornelas MD   aspirin 81 mg chewable tablet Take 1 Tab by mouth daily. 12/6/19  Yes Stefanie Kim MD   calcium carbonate (CALTREX) 600 mg calcium (1,500 mg) tablet Take 600 mg by mouth daily.    Yes Provider, Historical   mirtazapine (REMERON) 30 mg tablet 30 mg nightly. 10/4/19  Yes Provider, Historical   lisinopril (PRINIVIL, ZESTRIL) 10 mg tablet  19  Yes Provider, Historical   atorvastatin (LIPITOR) 40 mg tablet Take 1 Tab by mouth daily. 19  Yes Andreina Mclain MD   citalopram (CELEXA) 20 mg tablet Take 20 mg by mouth daily as needed. Yes Provider, Historical   latanoprost (XALATAN) 0.005 % ophthalmic solution Administer 1 Drop to both eyes nightly. Yes Provider, Historical   multivitamin (ONE A DAY) tablet Take 1 Tab by mouth daily. Yes Provider, Historical     No Known Allergies   Social History     Tobacco Use    Smoking status: Former Smoker     Packs/day: 0.25     Years: 20.00     Pack years: 5.00     Last attempt to quit: 10/26/2019     Years since quittin.2    Smokeless tobacco: Never Used   Substance Use Topics    Alcohol use: No      Family History   Problem Relation Age of Onset    Hypertension Mother     Other Father         Unknown    Dementia Brother     Alcohol abuse Sister     Alcohol abuse Sister     No Known Problems Sister     No Known Problems Sister     No Known Problems Sister     Dementia Brother     No Known Problems Brother     No Known Problems Child         Subjective:      Get Estrella is a 69 y.o. RHAAF with history of depression, hypertension, vitamin D deficiency and hypercholesterolemia who is here for follow up after a recent stroke. Patient was admitted to [facility] 7/15/2019 for right arm weakness and slurred speech. Day of admission, while she was driving she noticed numbness of the right arm. Felt heavy. Denies any overt weakness. She tried to rub it to bring back the sensation. Then she went into the grocery store and was dropping things with her right hand. She is also dropping groceries that she was loading into her truck. She then drove home to see if symptoms would resolve. At home she also noted that she was starting to have slurring of her speech.   Also developed moderate intensity headaches. Talk to her granddaughter on the phone and eventually her daughter and was brought to the emergency room. In the ER blood pressure was 160/75. Laboratory work-up was unremarkable except for LDL of 97.2. Head CT without contrast did not reveal any acute process. Moderate to severe white matter disease. CT perfusion did not reveal any abnormality. Head and neck CTA did not reveal any flow-limiting stenosis or aneurysm. Noted necrotic 1.8 cm right thyroid nodule. Brain MRI revealed small left parietal lobe and right centrum semiovale infarct. Severe periventricular and subcortical white matter disease. When seen, patient reports resolution of her symptoms. Echocardiogram done was unremarkable. Patient was discharged 7/17/2019 on atorvastatin 40 mg daily and aspirin 81 mg daily. Patient reportedly was doing fine until 11/1/2019 when she developed sudden onset of dizziness and when she was walking she was leaning to the left. Also noted slurring of her speech and blurred vision. Denies any weakness or sensory loss. Patient saw her PCP last 11/4/2019 for evaluation. Labs done 11/4/2019 revealed unremarkable CBC, INR, PT, PTT, B12, folate, homocysteine and LDL of 68. Brain MRI without contrast 11/5/2019 revealed new small infarct left middle cerebellar peduncle. Abnormal T2 hypointense signal seen throughout the left internal auditory canal with an apparent small soft tissue lesion in the left cerebellopontine angle cistern measuring 7 x 7 mm. 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 Abnormal flow void signal within the left vertebral artery V4 segment. Unchanged severe chronic microvascular ischemic disease. Unchanged numerous small chronic infarcts including bilateral thalami, autumn and middle cerebellar peduncles.   Unchanged few scattered chronic microhemorrhages (left superior parietal lobe, right lateral frontal lobe, right parietal lobe, bilateral temporal and occipital lobes). A left suboccipital cranioplasty is again seen. Patient reports she is improving. Less issue with imbalance. Still having some slurred speech. Blurring vision is also improved. She apparently has not been taking aspirin 81 mg daily for several months now. Patient was seen in the ER 11/9/2009 for waking up not feeling right, felt nauseous and weak. Blood pressure was 154/69. CBC, CMP and urinalysis were unremarkable. Head CT without contrast revealed no acute intracranial hemorrhage, mass or infarct. Confluent white matter hypodensities consistent with chronic microvascular ischemic changes. Patient was discharged and told to follow-up with PCP. Patient was seen by endocrinology last 11/25/2019 for evaluation of a thyroid nodule. Fine-needle aspiration was done. Then 12/1/2019, patient woke up seeing double no matter where she looks and increase wobbliness. In the ER, BP was 185/68. ER note mentions left eye deviated down but with individual ocular testing movements are full but there is presence of nystagmus. Right eye noted to be unable to adduct. CBC, CMP, troponin and urinalysis were unremarkable. Head CT without contrast revealed no acute process. Extensive periventricular white matter disease and low-density lesions involving bilateral thalami and mid autumn. Head and neck CTA revealed occlusion of the distal left vertebral artery but the right vertebral and basilar artery are patent. Chronic ischemic change in the white matter. Small left acoustic neuroma. Brain MRI without contrast done 12/2/2019 revealed unchanged generalized parenchymal volume loss and severe chronic microvascular ischemic disease with numerous small chronic supratentorial and infratentorial infarcts and microhemorrhages. Distribution raises suspicion for cerebral amyloid angiopathy. There is left vestibular schwannoma within the internal auditory canal and cerebellopontine angle cistern.   MRI of the IAC with and without contrast revealed 2 separate small foci of acute to subacute infarction right dorsal autumn and left brachium pontis. Left cerebellopontine angle/IAC mass most consistent with adenoma (17 x 8 x 9 mm). Small 7 x 7 mm meningioma planum sphenoidale. LDL was 59.8. Hemoglobin A1c slightly elevated at 5.8. Echocardiogram done 12/2/2019 revealed EF of 56 to 60%. Age-appropriate left ventricular diastolic function. Normal TSH. Patient was seen by Dr. Alexey Lowery (neurology) and exam revealed decreased medial rectus function with nystagmus in the left eye on left gaze indicating right medial longitudinal fasciculus syndrome or right pontine segmental infarct. Cerebellar ataxia. Plavix was added to patient's aspirin for stroke prophylaxis. Patient was discharged 12/6/2019 to inpatient rehab. Since the last visit on 12/16/2019, aspirin test and P2Y12 platelet response were appropriate. She reports resolution of her seeing double. Balance has improved but still with baseline unsteadiness. Catrina Yarsanism last night when getting out of bed and reaching for her cane. No injury. She is still doing home physical therapy which per patient is about to end. Daughter and patient mentions increase BP when exercising with PT. she is compliant with her aspirin 81 mg daily and Plavix 75 mg daily. Denies any side effects. She is also compliant with her statin therapy. No new complaint. Outside reports reviewed: lab reports    Review of Systems:    A comprehensive review of systems was negative except for:   balance issues    Objective:     Visit Vitals  /78   Pulse 93   Resp 20   Ht 5' 6\" (1.676 m)   SpO2 95%   BMI 27.75 kg/m²       PHYSICAL EXAM:    NEUROLOGICAL EXAM:     Appearance: The patient is well developed, well nourished, provides a coherent history and is in no acute distress. Mental Status: Oriented to time, place and person. Fluent, no aphasia but with mild ataxic dysarthria. Mood and affect appropriate. Cranial Nerves:   Intact visual fields. LOYD, EOM's full, no nystagmus, no ptosis. Facial sensation is normal. Corneal reflexes are intact. Facial movement is symmetric. Hearing is normal bilaterally. Palate is midline with normal sternocleidomastoid and trapezius muscles are normal. Tongue is midline. Motor:  5/5 strength. Normal bulk and tone. No fasciculations. No pronator drift. Reflexes:   Deep tendon reflexes 1+/4 and symmetrical.  Downgoing toes. Sensory:   Normal to cold, pinprick and vibration. Gait:  Slightly leans to the left. No Romberg and mild truncal instability. Unable to do tandem walking. Steadier with a cane. Tremor:   No tremor noted. Cerebellar:  Intact FTN/SUSAN/HTS     Labs Reviewed    Assessment:   Brainstem CVA  Left vertebral artery occlusion  Left vestibular schwannoma  Hyperlipidemia  Essential hypertension    Plan:   Neurological examination reveals improved. No left medial rectus paresis, mild ataxic dysarthria, no left-sided ataxia and clumsiness and mild gait ataxia. Status post acute to subacute infarction right dorsal autumn and left brachium pontis. Previous left middle cerebellar peduncle infarct. Recent brain and IAC MRI confirms these acute finding. Continue exercises taught by physical therapy. Continue Aspirin 81 mg daily and Plavix 75 mg daily for stroke prophylaxis. Labs done and confirms efficacy of both regimens. Repeat echocardiogram was unremarkable. Patient was advised to call [phone] if new deficits occur. [place] patient can drive 6 months after her last CVA. Head and neck CTA revealed occlusion of the distal left vertebral artery but the right vertebral and basilar artery are patent. Continue dual antiplatelet therapy. MRI of the IAC with and without contrast left cerebellopontine angle/IAC mass most consistent with adenoma (17 x 8 x 9 mm). Small 7 x 7 mm meningioma planum sphenoidale. Patient has had a prior left cranioplasty.   I would suspect this was related to this issue. May need neurosurgery re-evaluation. LDL now at goal of <70. Continue atorvastatin 40 mg daily. Advised compliance with dietary modifications and medications for hyperlipidemia. Patient was advised that she needs to build endurance to prevent elevation of BP when exercising. PCP added HCTZ to her regimen. Advised to do none weightbearing exercises (aquatics, stationary bike, elliptical and portable pedal exerciser). Advised strict compliance with dietary modifications and medications for hypertension. All questions and concerns were answered. This note was created using voice recognition software. Despite editing, there may be syntax errors.

## 2020-02-11 NOTE — PATIENT INSTRUCTIONS
Acoustic Neuroma: Care Instructions  Your Care Instructions    An acoustic neuroma is a growth (tumor) on the nerve to the inner ear. It does not turn into cancer. But it can cause hearing loss, ringing in the ear, and dizziness. A large acoustic neuroma can press on the brain and become life-threatening. Acoustic neuromas usually grow very slowly. They are most common in people between the ages of 27 and 61. Your doctor may want to watch a small neuroma to see how fast it grows. You may get regular tests to watch its growth. Neuromas that cause problems may be treated with radiation or surgery. An acoustic neuroma that is removed does not usually grow back. Follow-up care is a key part of your treatment and safety. Be sure to make and go to all appointments, and call your doctor if you are having problems. It's also a good idea to know your test results and keep a list of the medicines you take. How can you care for yourself at home? · Protect your ears from loud sounds. This can prevent hearing loss from getting worse. · Try hearing aids if you have trouble hearing. · Think about joining a support group. Sharing your experiences with other people who have the same problem may help you learn more and cope better. When should you call for help? Call 911 anytime you think you may need emergency care. For example, call if:    · You have symptoms of a stroke. These may include:  ? Sudden numbness, tingling, weakness, or loss of movement in your face, arm, or leg, especially on only one side of your body. ? Sudden vision changes. ? Sudden trouble speaking. ? Sudden confusion or trouble understanding simple statements. ? Sudden problems with walking or balance.   ? A sudden, severe headache that is different from past headaches.    Call your doctor now or seek immediate medical care if:    · You are dizzy or lose your balance.     · You lose hearing in one ear.     · Part of your face droops or sags.     · You have vision problems, such as blurred or double vision, or you can see only out of one eye.     · You slur your words or cannot talk normally.    Watch closely for changes in your health, and be sure to contact your doctor if:    · You do not get better as expected. Where can you learn more? Go to http://jose c-lencho.info/. Enter W065 in the search box to learn more about \"Acoustic Neuroma: Care Instructions. \"  Current as of: October 21, 2018  Content Version: 12.2  © 5175-3733 [employer]. Care instructions adapted under license by [employer] (which disclaims liability or warranty for this information). If you have questions about a medical condition or this instruction, always ask your healthcare professional. [address] any warranty or liability for your use of this information. Taking Aspirin and Other Antiplatelets Safely: Care Instructions  Your Care Instructions    Aspirin and other antiplatelet medicines help prevent blood clots from forming. They can help some people lower their risk of a heart attack or stroke. But these medicines can also make you more likely to bleed. That's why it's important to talk to your doctor before you start taking aspirin every day. It's not right for everyone. And if you and your doctor decide these medicines are right for you, learn how to take them safely. If you take aspirin, be sure you know how to take it. Your doctor can tell you what dose to take and how often to take it. One low-dose aspirin is 81 milligrams (mg). But the dose for daily aspirin can range from 81 mg to 325 mg. If you take another antiplatelet, take it as prescribed. Follow-up care is a key part of your treatment and safety. Be sure to make and go to all appointments, and call your doctor if you are having problems. It's also a good idea to know your test results and keep a list of the medicines you take.   How can you care for yourself at home? · Before you start to take daily aspirin or some other antiplatelet, tell your doctor all the medicines, vitamins, herbal products, and supplements you take. · Tell your doctors, dentist, and pharmacist that you take an antiplatelet. · Take your medicine as your doctor directs. Make sure that you understand exactly what your doctor wants you to do. If another doctor says to stop taking the medicine for any reason, talk to the doctor who prescribed it before you stop. · Take your medicine at the same time every day. · Do not chew or crush the coated or time-release forms of your medicine. · If you miss a dose, don't take an extra dose to make up for it. · Ask your doctor whether you can drink alcohol. And ask how much you can drink. When you take an antiplatelet, drinking too much raises your risk for liver damage and stomach bleeding. · If you are pregnant, are breastfeeding, or plan to become pregnant, talk to your doctor about what medicines are safe. · Talk with your doctor before you take a pain medicine. Many pain medicines have aspirin. Too much aspirin can be harmful. · Wear medical alert jewelry. This lets others know that you take an antiplatelet. You can buy it at most drugstores. · Try to avoid injuries that might make you bleed. For example, be careful when you exercise and when you play sports. Make your home safe to reduce your risk of falling. When should you call for help? Call 911 anytime you think you may need emergency care. For example, call if:    · You have a sudden, severe headache that is different from past headaches.    Call your doctor now or seek immediate medical care if:    · You have any abnormal bleeding, such as:  ? A nosebleed that you can't easily stop. ? Bloody or black stools, or rectal bleeding. ?  Bloody or pink urine.     · You feel dizzy or lightheaded or feel like you may faint.    Watch closely for changes in your health, and be sure to contact your doctor if you have any problems. Where can you learn more? Go to http://jose c-lencho.info/. Enter R569 in the search box to learn more about \"Taking Aspirin and Other Antiplatelets Safely: Care Instructions. \"  Current as of: April 9, 2019  Content Version: 12.2  © 5690-8759 [employer]. Care instructions adapted under license by [employer] (which disclaims liability or warranty for this information). If you have questions about a medical condition or this instruction, always ask your healthcare professional. [address] any warranty or liability for your use of this information. High Cholesterol: Care Instructions  Your Care Instructions    Cholesterol is a type of fat in your blood. It is needed for many body functions, such as making new cells. Cholesterol is made by your body. It also comes from food you eat. High cholesterol means that you have too much of the fat in your blood. This raises your risk of a heart attack and stroke. LDL and HDL are part of your total cholesterol. LDL is the \"bad\" cholesterol. High LDL can raise your risk for heart disease, heart attack, and stroke. HDL is the \"good\" cholesterol. It helps clear bad cholesterol from the body. High HDL is linked with a lower risk of heart disease, heart attack, and stroke. Your cholesterol levels help your doctor find out your risk for having a heart attack or stroke. You and your doctor can talk about whether you need to lower your risk and what treatment is best for you. A heart-healthy lifestyle along with medicines can help lower your cholesterol and your risk. The way you choose to lower your risk will depend on how high your risk is for heart attack and stroke. It will also depend on how you feel about taking medicines. Follow-up care is a key part of your treatment and safety.  Be sure to make and go to all appointments, and call your doctor if you are having problems. It's also a good idea to know your test results and keep a list of the medicines you take. How can you care for yourself at home? · Eat a variety of foods every day. Good choices include fruits, vegetables, whole grains (like oatmeal), dried beans and peas, nuts and seeds, soy products (like tofu), and fat-free or low-fat dairy products. · Replace butter, margarine, and hydrogenated or partially hydrogenated oils with olive and canola oils. (Canola oil margarine without trans fat is fine.)  · Replace red meat with fish, poultry, and soy protein (like tofu). · Limit processed and packaged foods like chips, crackers, and cookies. · Bake, broil, or steam foods. Don't mccoy them. · Be physically active. Get at least 30 minutes of exercise on most days of the week. Walking is a good choice. You also may want to do other activities, such as running, swimming, cycling, or playing tennis or team sports. · Stay at a healthy weight or lose weight by making the changes in eating and physical activity listed above. Losing just a small amount of weight, even 5 to 10 pounds, can reduce your risk for having a heart attack or stroke. · Do not smoke. When should you call for help? Watch closely for changes in your health, and be sure to contact your doctor if:    · You need help making lifestyle changes.     · You have questions about your medicine. Where can you learn more? Go to http://jose c-lencho.info/. Enter X947 in the search box to learn more about \"High Cholesterol: Care Instructions. \"  Current as of: April 9, 2019  Content Version: 12.2  © 5784-3911 [employer]. Care instructions adapted under license by [employer] (which disclaims liability or warranty for this information).  If you have questions about a medical condition or this instruction, always ask your healthcare professional. [address] any warranty or liability for your use of this information. Learning About How to Prevent a Stroke  What is a stroke? A stroke occurs when a blood vessel in the brain bursts or is blocked by a blood clot. Without blood and the oxygen it carries, part of the brain starts to die. The part of the body controlled by the damaged area of the brain can't work properly. But there are many things you can do to help lower your stroke risk. What increases your risk for stroke? A risk factor is anything that makes you more likely to have a particular health problem. Risk factors for stroke that you can treat or change include:  · Health problems like high blood pressure, atrial fibrillation, diabetes, and high cholesterol. · Smoking. · Heavy use of alcohol. · Being overweight. · Not getting enough physical activity. Risk factors you can't change include:  · Age. The risk of stroke goes up as you get older. · Race.  Americans, Native Americans, and [place] Natives have a higher risk than those of other races. · Being female. Women have a higher risk of stroke than men. · Family history of stroke. Your doctor can help you know your risk. Then you and your can doctor talk about whether you need to lower it. What can you do to prevent a stroke? · Treat any health problems you have that raise your risk. · Adopt a heart-healthy lifestyle:  ? Don't smoke. If you need help quitting, talk to your doctor about stop-smoking programs and medicines. These can increase your chances of quitting for good. ? Limit alcohol to 2 drinks a day for men and 1 drink a day for women. ? Stay at a healthy weight. Lose weight if you need to.  ? If your doctor recommends it, get more exercise. Walking is a good choice. Bit by bit, increase the amount you walk every day. Try for at least 30 minutes on most days of the week. ? Eat heart-healthy foods. These include fruits, vegetables, high-fiber foods, and fish.  Choose foods that are low in sodium, saturated fat, and trans fat. · Decide with your doctor whether you will also take medicines to help lower your risk. For example, you and your doctor may decide you will take a medicine that prevents blood clots. What are the symptoms of a stroke? The brain damage from a stroke starts within minutes. That's why it's so important to know the symptoms of stroke and to act fast. Quick treatment can help limit damage to the brain so that you have fewer problems after the stroke. FAST is a simple way to remember the main symptoms of stroke. Recognizing these symptoms helps you know when to call for medical help. FAST stands for:  · Face drooping. · Arm weakness. · Speech difficulty. · Time to call 911. Follow-up care is a key part of your treatment and safety. Be sure to make and go to all appointments, and call your doctor if you are having problems. It's also a good idea to know your test results and keep a list of the medicines you take. Where can you learn more? Go to http://jose c-lencho.info/. Enter G757 in the search box to learn more about \"Learning About How to Prevent a Stroke. \"  Current as of: September 26, 2018  Content Version: 12.2  © 2440-2600 [employer]. Care instructions adapted under license by [employer] (which disclaims liability or warranty for this information). If you have questions about a medical condition or this instruction, always ask your healthcare professional. [place] [address] any warranty or liability for your use of this information. Learning About How to Prevent Another Stroke  What can you do to prevent another stroke? After a stroke, people feel lots of different emotions. Some people are worried that they could have another stroke. Or they may feel overwhelmed by how much there is to learn and do. Some people feel sad or depressed.   No matter what emotions you are feeling, you can give yourself some control and peace of mind by making a plan to lower your risk of having another stroke. Take your medicines  You'll need to take medicines to help prevent another stroke. Be sure to take your medicines exactly as prescribed. And don't stop taking them unless your doctor tells you to. If you stop taking your medicines, you can increase your risk of having another stroke. Some of the medicines your doctor may prescribe include:  · Aspirin or some other blood thinner to prevent blood clots. · Statins and other medicines to lower cholesterol. · Blood pressure medicines to lower blood pressure. Manage other health problems  You can help lower your chance of having another stroke by managing certain other health problems. Problems that increase your risk of having another stroke include:  · High blood pressure. · High cholesterol. · Atrial fibrillation. · Diabetes. If you have any of these health problems, you can manage them with healthy lifestyle changes along with medicine. Adopt a healthy lifestyle  · Do not smoke or allow others to smoke around you. If you need help quitting, talk to your doctor about stop-smoking programs and medicines. These can increase your chances of quitting for good. Smoking makes a stroke more likely. · Limit alcohol to 2 drinks a day for men and 1 drink a day for women. · Lose weight if you need to. Controlling your weight will help you keep your heart and body healthy. · Be active. Ask your doctor what type and level of activity is safe for you. · Eat heart-healthy foods, like fruits, vegetables, and high-fiber foods. It's also important to:  · Get a flu shot every year. · Ask for help if you think you are depressed. Do stroke rehab  Taking part in a stroke rehabilitation (rehab) program will help you to regain skills you lost or make the most of your abilities after a stroke. It also helps you take steps to prevent another stroke.   Your rehab team will give you education and support to help you build new, healthy habits. You'll learn how to manage any other health problems that you might have. Constance Art also learn how to exercise safely, eat a healthy diet, and quit smoking if you smoke. Constance Art work with your team to decide what lifestyle choices are best for you. If your doctor hasn't already suggested it, ask him or her if stroke rehab is right for you. Know stroke symptoms  Make sure you know the symptoms of stroke. FAST is a simple way to remember. Recognizing these symptoms helps you know when to call for medical help. FAST stands for:  · Face drooping. · Arm weakness. · Speech difficulty. · Time to call 911. Follow-up care is a key part of your treatment and safety. Be sure to make and go to all appointments, and call your doctor if you are having problems. It's also a good idea to know your test results and keep a list of the medicines you take. Where can you learn more? Go to http://jose c-lencho.info/. Enter C645 in the search box to learn more about \"Learning About How to Prevent Another Stroke. \"  Current as of: September 26, 2018  Content Version: 12.2  © 0952-9327 [employer]. Care instructions adapted under license by [employer] (which disclaims liability or warranty for this information). If you have questions about a medical condition or this instruction, always ask your healthcare professional. [address] any warranty or liability for your use of this information. High Blood Pressure: Care Instructions  Overview    It's normal for blood pressure to go up and down throughout the day. But if it stays up, you have high blood pressure. Another name for high blood pressure is hypertension. Despite what a lot of people think, high blood pressure usually doesn't cause headaches or make you feel dizzy or lightheaded. It usually has no symptoms.  But it does increase your risk of stroke, heart attack, and other problems. You and your doctor will talk about your risks of these problems based on your blood pressure. Your doctor will give you a goal for your blood pressure. Your goal will be based on your health and your age. Lifestyle changes, such as eating healthy and being active, are always important to help lower blood pressure. You might also take medicine to reach your blood pressure goal.  Follow-up care is a key part of your treatment and safety. Be sure to make and go to all appointments, and call your doctor if you are having problems. It's also a good idea to know your test results and keep a list of the medicines you take. How can you care for yourself at home? Medical treatment  · If you stop taking your medicine, your blood pressure will go back up. You may take one or more types of medicine to lower your blood pressure. Be safe with medicines. Take your medicine exactly as prescribed. Call your doctor if you think you are having a problem with your medicine. · Talk to your doctor before you start taking aspirin every day. Aspirin can help certain people lower their risk of a heart attack or stroke. But taking aspirin isn't right for everyone, because it can cause serious bleeding. · See your doctor regularly. You may need to see the doctor more often at first or until your blood pressure comes down. · If you are taking blood pressure medicine, talk to your doctor before you take decongestants or anti-inflammatory medicine, such as ibuprofen. Some of these medicines can raise blood pressure. · Learn how to check your blood pressure at home. Lifestyle changes  · Stay at a healthy weight. This is especially important if you put on weight around the waist. Losing even 10 pounds can help you lower your blood pressure. · If your doctor recommends it, get more exercise. Walking is a good choice. Bit by bit, increase the amount you walk every day.  Try for at least 30 minutes on most days of the week. You also may want to swim, bike, or do other activities. · Avoid or limit alcohol. Talk to your doctor about whether you can drink any alcohol. · Try to limit how much sodium you eat to less than 2,300 milligrams (mg) a day. Your doctor may ask you to try to eat less than 1,500 mg a day. · Eat plenty of fruits (such as bananas and oranges), vegetables, legumes, whole grains, and low-fat dairy products. · Lower the amount of saturated fat in your diet. Saturated fat is found in animal products such as milk, cheese, and meat. Limiting these foods may help you lose weight and also lower your risk for heart disease. · Do not smoke. Smoking increases your risk for heart attack and stroke. If you need help quitting, talk to your doctor about stop-smoking programs and medicines. These can increase your chances of quitting for good. When should you call for help? Call  911 anytime you think you may need emergency care. This may mean having symptoms that suggest that your blood pressure is causing a serious heart or blood vessel problem. Your blood pressure may be over 180/120.   For example, call  911 if:    · You have symptoms of a heart attack. These may include:  ? Chest pain or pressure, or a strange feeling in the chest.  ? Sweating. ? Shortness of breath. ? Nausea or vomiting. ? Pain, pressure, or a strange feeling in the back, neck, jaw, or upper belly or in one or both shoulders or arms. ? Lightheadedness or sudden weakness. ? A fast or irregular heartbeat.     · You have symptoms of a stroke. These may include:  ? Sudden numbness, tingling, weakness, or loss of movement in your face, arm, or leg, especially on only one side of your body. ? Sudden vision changes. ? Sudden trouble speaking. ? Sudden confusion or trouble understanding simple statements. ? Sudden problems with walking or balance.   ? A sudden, severe headache that is different from past headaches.     · You have severe back or belly pain.    Do not wait until your blood pressure comes down on its own. Get help right away.   Call your doctor now or seek immediate care if:    · Your blood pressure is much higher than normal (such as 180/120 or higher), but you don't have symptoms.     · You think high blood pressure is causing symptoms, such as:  ? Severe headache.  ? Blurry vision.    Watch closely for changes in your health, and be sure to contact your doctor if:    · Your blood pressure measures higher than your doctor recommends at least 2 times. That means the top number is higher or the bottom number is higher, or both.     · You think you may be having side effects from your blood pressure medicine. Where can you learn more? Go to http://jose c-lencho.info/. Enter X340 in the search box to learn more about \"High Blood Pressure: Care Instructions. \"  Current as of: April 9, 2019  Content Version: 12.2  © 3936-5097 [employer]. Care instructions adapted under license by [employer] (which disclaims liability or warranty for this information). If you have questions about a medical condition or this instruction, always ask your healthcare professional. [place] [address] any warranty or liability for your use of this information.

## 2020-04-14 ENCOUNTER — HOSPITAL ENCOUNTER (INPATIENT)
Age: 75
LOS: 5 days | Discharge: HOME | DRG: 369 | End: 2020-04-19
Attending: EMERGENCY MEDICINE | Admitting: GENERAL ACUTE CARE HOSPITAL
Payer: MEDICARE

## 2020-04-14 DIAGNOSIS — K92.2 ACUTE UPPER GI BLEED: Primary | ICD-10-CM

## 2020-04-14 DIAGNOSIS — Z79.02 ANTIPLATELET OR ANTITHROMBOTIC LONG-TERM USE: ICD-10-CM

## 2020-04-14 LAB
ALBUMIN SERPL-MCNC: 3.5 G/DL (ref 3.5–5)
ALBUMIN/GLOB SERPL: 0.8 {RATIO} (ref 1.1–2.2)
ALP SERPL-CCNC: 127 U/L (ref 45–117)
ALT SERPL-CCNC: 26 U/L (ref 12–78)
ANION GAP SERPL CALC-SCNC: 7 MMOL/L (ref 5–15)
APTT PPP: 21.4 SEC (ref 22.1–32)
AST SERPL-CCNC: 23 U/L (ref 15–37)
BILIRUB SERPL-MCNC: 0.3 MG/DL (ref 0.2–1)
BUN SERPL-MCNC: 60 MG/DL (ref 6–20)
BUN/CREAT SERPL: 40 (ref 12–20)
CALCIUM SERPL-MCNC: 10 MG/DL (ref 8.5–10.1)
CHLORIDE SERPL-SCNC: 104 MMOL/L (ref 97–108)
CO2 SERPL-SCNC: 29 MMOL/L (ref 21–32)
CREAT SERPL-MCNC: 1.49 MG/DL (ref 0.55–1.02)
ERYTHROCYTE [DISTWIDTH] IN BLOOD BY AUTOMATED COUNT: 15.2 % (ref 11.5–14.5)
GLOBULIN SER CALC-MCNC: 4.2 G/DL (ref 2–4)
GLUCOSE SERPL-MCNC: 153 MG/DL (ref 65–100)
HCT VFR BLD AUTO: 32.5 % (ref 35–47)
HGB BLD-MCNC: 10.4 G/DL (ref 11.5–16)
INR PPP: 1 (ref 0.9–1.1)
MCH RBC QN AUTO: 27.5 PG (ref 26–34)
MCHC RBC AUTO-ENTMCNC: 32 G/DL (ref 30–36.5)
MCV RBC AUTO: 86 FL (ref 80–99)
NRBC # BLD: 0 K/UL (ref 0–0.01)
NRBC BLD-RTO: 0 PER 100 WBC
PLATELET # BLD AUTO: 328 K/UL (ref 150–400)
PMV BLD AUTO: 11.1 FL (ref 8.9–12.9)
POTASSIUM SERPL-SCNC: 3.9 MMOL/L (ref 3.5–5.1)
PROT SERPL-MCNC: 7.7 G/DL (ref 6.4–8.2)
PROTHROMBIN TIME: 10.7 SEC (ref 9–11.1)
RBC # BLD AUTO: 3.78 M/UL (ref 3.8–5.2)
SODIUM SERPL-SCNC: 140 MMOL/L (ref 136–145)
THERAPEUTIC RANGE,PTTT: ABNORMAL SECS (ref 58–77)
TROPONIN I SERPL-MCNC: <0.05 NG/ML
WBC # BLD AUTO: 12.3 K/UL (ref 3.6–11)

## 2020-04-14 PROCEDURE — 85730 THROMBOPLASTIN TIME PARTIAL: CPT

## 2020-04-14 PROCEDURE — 0W3P8ZZ CONTROL BLEEDING IN GASTROINTESTINAL TRACT, VIA NATURAL OR ARTIFICIAL OPENING ENDOSCOPIC: ICD-10-PCS | Performed by: INTERNAL MEDICINE

## 2020-04-14 PROCEDURE — 74011250636 HC RX REV CODE- 250/636: Performed by: GENERAL ACUTE CARE HOSPITAL

## 2020-04-14 PROCEDURE — 74011250636 HC RX REV CODE- 250/636: Performed by: EMERGENCY MEDICINE

## 2020-04-14 PROCEDURE — 77030010936 HC CLP LIG BSC -C: Performed by: INTERNAL MEDICINE

## 2020-04-14 PROCEDURE — 77030003657 HC NDL SCLER BSC -B: Performed by: INTERNAL MEDICINE

## 2020-04-14 PROCEDURE — 80053 COMPREHEN METABOLIC PANEL: CPT

## 2020-04-14 PROCEDURE — 99285 EMERGENCY DEPT VISIT HI MDM: CPT

## 2020-04-14 PROCEDURE — 85610 PROTHROMBIN TIME: CPT

## 2020-04-14 PROCEDURE — 93005 ELECTROCARDIOGRAM TRACING: CPT

## 2020-04-14 PROCEDURE — 96374 THER/PROPH/DIAG INJ IV PUSH: CPT

## 2020-04-14 PROCEDURE — 86900 BLOOD TYPING SEROLOGIC ABO: CPT

## 2020-04-14 PROCEDURE — 36415 COLL VENOUS BLD VENIPUNCTURE: CPT

## 2020-04-14 PROCEDURE — 85027 COMPLETE CBC AUTOMATED: CPT

## 2020-04-14 PROCEDURE — 77030038665 HC SOL ELEVIEW INJ AGNT ARPH -B: Performed by: INTERNAL MEDICINE

## 2020-04-14 PROCEDURE — 74011000250 HC RX REV CODE- 250: Performed by: EMERGENCY MEDICINE

## 2020-04-14 PROCEDURE — C9113 INJ PANTOPRAZOLE SODIUM, VIA: HCPCS | Performed by: EMERGENCY MEDICINE

## 2020-04-14 PROCEDURE — 86923 COMPATIBILITY TEST ELECTRIC: CPT

## 2020-04-14 PROCEDURE — 3E0G8GC INTRODUCTION OF OTHER THERAPEUTIC SUBSTANCE INTO UPPER GI, VIA NATURAL OR ARTIFICIAL OPENING ENDOSCOPIC: ICD-10-PCS | Performed by: INTERNAL MEDICINE

## 2020-04-14 PROCEDURE — 96361 HYDRATE IV INFUSION ADD-ON: CPT

## 2020-04-14 PROCEDURE — 65660000000 HC RM CCU STEPDOWN

## 2020-04-14 PROCEDURE — 84484 ASSAY OF TROPONIN QUANT: CPT

## 2020-04-14 PROCEDURE — 74011000250 HC RX REV CODE- 250: Performed by: GENERAL ACUTE CARE HOSPITAL

## 2020-04-14 PROCEDURE — 74011250636 HC RX REV CODE- 250/636: Performed by: INTERNAL MEDICINE

## 2020-04-14 PROCEDURE — 76040000007: Performed by: INTERNAL MEDICINE

## 2020-04-14 PROCEDURE — C9113 INJ PANTOPRAZOLE SODIUM, VIA: HCPCS | Performed by: GENERAL ACUTE CARE HOSPITAL

## 2020-04-14 RX ORDER — EPINEPHRINE 0.1 MG/ML
1 INJECTION INTRACARDIAC; INTRAVENOUS
Status: DISCONTINUED | OUTPATIENT
Start: 2020-04-14 | End: 2020-04-14

## 2020-04-14 RX ORDER — SODIUM CHLORIDE 9 MG/ML
75 INJECTION, SOLUTION INTRAVENOUS CONTINUOUS
Status: DISCONTINUED | OUTPATIENT
Start: 2020-04-14 | End: 2020-04-17

## 2020-04-14 RX ORDER — FLUMAZENIL 0.1 MG/ML
0.2 INJECTION INTRAVENOUS
Status: ACTIVE | OUTPATIENT
Start: 2020-04-14 | End: 2020-04-14

## 2020-04-14 RX ORDER — SODIUM CHLORIDE 0.9 % (FLUSH) 0.9 %
5-40 SYRINGE (ML) INJECTION AS NEEDED
Status: DISCONTINUED | OUTPATIENT
Start: 2020-04-14 | End: 2020-04-19 | Stop reason: HOSPADM

## 2020-04-14 RX ORDER — SODIUM CHLORIDE 9 MG/ML
75 INJECTION, SOLUTION INTRAVENOUS CONTINUOUS
Status: DISCONTINUED | OUTPATIENT
Start: 2020-04-14 | End: 2020-04-14

## 2020-04-14 RX ORDER — DEXTROMETHORPHAN/PSEUDOEPHED 2.5-7.5/.8
1.2 DROPS ORAL
Status: DISCONTINUED | OUTPATIENT
Start: 2020-04-14 | End: 2020-04-19 | Stop reason: HOSPADM

## 2020-04-14 RX ORDER — DEXTROMETHORPHAN/PSEUDOEPHED 2.5-7.5/.8
1.2 DROPS ORAL
Status: DISCONTINUED | OUTPATIENT
Start: 2020-04-14 | End: 2020-04-14

## 2020-04-14 RX ORDER — SODIUM CHLORIDE 0.9 % (FLUSH) 0.9 %
5-40 SYRINGE (ML) INJECTION EVERY 8 HOURS
Status: DISCONTINUED | OUTPATIENT
Start: 2020-04-14 | End: 2020-04-16

## 2020-04-14 RX ORDER — ONDANSETRON 2 MG/ML
4 INJECTION INTRAMUSCULAR; INTRAVENOUS
Status: DISCONTINUED | OUTPATIENT
Start: 2020-04-14 | End: 2020-04-14

## 2020-04-14 RX ORDER — MIDAZOLAM HYDROCHLORIDE 1 MG/ML
.25-5 INJECTION, SOLUTION INTRAMUSCULAR; INTRAVENOUS
Status: DISCONTINUED | OUTPATIENT
Start: 2020-04-14 | End: 2020-04-14

## 2020-04-14 RX ORDER — ATROPINE SULFATE 0.1 MG/ML
0.5 INJECTION INTRAVENOUS
Status: ACTIVE | OUTPATIENT
Start: 2020-04-14 | End: 2020-04-15

## 2020-04-14 RX ORDER — PANTOPRAZOLE SODIUM 40 MG/10ML
40 INJECTION, POWDER, LYOPHILIZED, FOR SOLUTION INTRAVENOUS
Status: COMPLETED | OUTPATIENT
Start: 2020-04-14 | End: 2020-04-14

## 2020-04-14 RX ORDER — ATROPINE SULFATE 0.1 MG/ML
0.5 INJECTION INTRAVENOUS
Status: DISCONTINUED | OUTPATIENT
Start: 2020-04-14 | End: 2020-04-14

## 2020-04-14 RX ORDER — ONDANSETRON 2 MG/ML
4 INJECTION INTRAMUSCULAR; INTRAVENOUS EVERY 6 HOURS
Status: DISCONTINUED | OUTPATIENT
Start: 2020-04-14 | End: 2020-04-14

## 2020-04-14 RX ORDER — NALOXONE HYDROCHLORIDE 0.4 MG/ML
0.4 INJECTION, SOLUTION INTRAMUSCULAR; INTRAVENOUS; SUBCUTANEOUS
Status: DISCONTINUED | OUTPATIENT
Start: 2020-04-14 | End: 2020-04-14

## 2020-04-14 RX ORDER — SODIUM CHLORIDE 0.9 % (FLUSH) 0.9 %
5-40 SYRINGE (ML) INJECTION EVERY 8 HOURS
Status: DISCONTINUED | OUTPATIENT
Start: 2020-04-14 | End: 2020-04-14

## 2020-04-14 RX ORDER — LATANOPROST 50 UG/ML
1 SOLUTION/ DROPS OPHTHALMIC
Status: DISCONTINUED | OUTPATIENT
Start: 2020-04-14 | End: 2020-04-19 | Stop reason: HOSPADM

## 2020-04-14 RX ORDER — SODIUM CHLORIDE 9 MG/ML
250 INJECTION, SOLUTION INTRAVENOUS AS NEEDED
Status: DISCONTINUED | OUTPATIENT
Start: 2020-04-14 | End: 2020-04-19 | Stop reason: HOSPADM

## 2020-04-14 RX ORDER — ACETAMINOPHEN 325 MG/1
650 TABLET ORAL
Status: DISCONTINUED | OUTPATIENT
Start: 2020-04-14 | End: 2020-04-14

## 2020-04-14 RX ORDER — SODIUM CHLORIDE 9 MG/ML
75 INJECTION, SOLUTION INTRAVENOUS CONTINUOUS
Status: DISPENSED | OUTPATIENT
Start: 2020-04-14 | End: 2020-04-14

## 2020-04-14 RX ORDER — KETAMINE HYDROCHLORIDE 50 MG/ML
1 INJECTION, SOLUTION INTRAMUSCULAR; INTRAVENOUS
Status: COMPLETED | OUTPATIENT
Start: 2020-04-14 | End: 2020-04-14

## 2020-04-14 RX ORDER — MIDAZOLAM HYDROCHLORIDE 1 MG/ML
.25-5 INJECTION, SOLUTION INTRAMUSCULAR; INTRAVENOUS
Status: ACTIVE | OUTPATIENT
Start: 2020-04-14 | End: 2020-04-14

## 2020-04-14 RX ORDER — NALOXONE HYDROCHLORIDE 0.4 MG/ML
0.4 INJECTION, SOLUTION INTRAMUSCULAR; INTRAVENOUS; SUBCUTANEOUS
Status: ACTIVE | OUTPATIENT
Start: 2020-04-14 | End: 2020-04-14

## 2020-04-14 RX ORDER — ONDANSETRON 2 MG/ML
4 INJECTION INTRAMUSCULAR; INTRAVENOUS EVERY 6 HOURS
Status: DISCONTINUED | OUTPATIENT
Start: 2020-04-14 | End: 2020-04-19 | Stop reason: HOSPADM

## 2020-04-14 RX ORDER — SODIUM CHLORIDE 9 MG/ML
125 INJECTION, SOLUTION INTRAVENOUS CONTINUOUS
Status: DISCONTINUED | OUTPATIENT
Start: 2020-04-14 | End: 2020-04-14

## 2020-04-14 RX ORDER — FLUMAZENIL 0.1 MG/ML
0.2 INJECTION INTRAVENOUS
Status: DISCONTINUED | OUTPATIENT
Start: 2020-04-14 | End: 2020-04-14

## 2020-04-14 RX ORDER — SODIUM CHLORIDE 0.9 % (FLUSH) 0.9 %
5-40 SYRINGE (ML) INJECTION AS NEEDED
Status: DISCONTINUED | OUTPATIENT
Start: 2020-04-14 | End: 2020-04-14

## 2020-04-14 RX ORDER — SODIUM CHLORIDE 0.9 % (FLUSH) 0.9 %
5-40 SYRINGE (ML) INJECTION EVERY 8 HOURS
Status: DISCONTINUED | OUTPATIENT
Start: 2020-04-14 | End: 2020-04-19 | Stop reason: HOSPADM

## 2020-04-14 RX ORDER — EPINEPHRINE 0.1 MG/ML
1 INJECTION INTRACARDIAC; INTRAVENOUS
Status: COMPLETED | OUTPATIENT
Start: 2020-04-14 | End: 2020-04-14

## 2020-04-14 RX ADMIN — PANTOPRAZOLE SODIUM 40 MG: 40 INJECTION, POWDER, FOR SOLUTION INTRAVENOUS at 15:47

## 2020-04-14 RX ADMIN — KETAMINE HYDROCHLORIDE 85.5 MG: 50 INJECTION, SOLUTION INTRAMUSCULAR; INTRAVENOUS at 18:10

## 2020-04-14 RX ADMIN — EPINEPHRINE 1 MG: 0.1 INJECTION INTRACARDIAC; INTRAVENOUS at 18:48

## 2020-04-14 RX ADMIN — SODIUM CHLORIDE 40 MG: 9 INJECTION INTRAMUSCULAR; INTRAVENOUS; SUBCUTANEOUS at 21:33

## 2020-04-14 RX ADMIN — Medication 10 ML: at 21:34

## 2020-04-14 RX ADMIN — SODIUM CHLORIDE 500 ML: 900 INJECTION, SOLUTION INTRAVENOUS at 15:47

## 2020-04-14 RX ADMIN — PANTOPRAZOLE SODIUM 40 MG: 40 INJECTION, POWDER, FOR SOLUTION INTRAVENOUS at 17:25

## 2020-04-14 RX ADMIN — ONDANSETRON 4 MG: 2 INJECTION INTRAMUSCULAR; INTRAVENOUS at 23:41

## 2020-04-14 RX ADMIN — Medication 10 ML: at 21:33

## 2020-04-14 RX ADMIN — SODIUM CHLORIDE 1000 ML: 900 INJECTION, SOLUTION INTRAVENOUS at 16:42

## 2020-04-14 RX ADMIN — LATANOPROST 1 DROP: 50 SOLUTION OPHTHALMIC at 22:35

## 2020-04-14 RX ADMIN — SODIUM CHLORIDE 75 ML/HR: 900 INJECTION, SOLUTION INTRAVENOUS at 21:32

## 2020-04-14 NOTE — ROUTINE PROCESS
TRANSFER - IN REPORT: 
 
TRANSFER - OUT REPORT: 
 
Verbal report given to [facility] on [facility]    Ed 20 (unit) for routine progression of care Report consisted of patients Situation, Background, Assessment and  
Recommendations(SBAR). Information from the following report(s) Procedure Summary was reviewed with the receiving nurse. Opportunity for questions and clarification was provided.

## 2020-04-14 NOTE — ED PROVIDER NOTES
EMERGENCY DEPARTMENT HISTORY AND PHYSICAL EXAM      Date: 4/14/2020  Patient Name: Homer Adams    History of Presenting Illness     Chief Complaint   Patient presents with    Blood in Vomit     Vomited \"dark\" one time yesterday, concerns for GI bleed       History Provided By: Patient and EMS    HPI: Homer Adams, 69 y.o. female  presents to the ED with cc of possible GI bleed. Patient states last night she started feeling very sick in her stomach and nauseous. She states she vomited dark red material that she is always blood. She denies having eaten anything red prior to vomiting. This morning she continued to have some vomiting that was more \"black tar\". She states her stools have been brown and have not been black in color. She continues to have abdominal discomfort. No fevers or chills. She does get short of breath with exertion. No chest pain. She has been lightheaded and dizzy. She went to her primary care doctor today and had a near syncopal event in the parking lot. EMS was called to transport her to the emergency department. She has no history of GI bleed. She does not have a GI specialist nor had endoscopy. She does have a history of stroke and is currently on aspirin and Plavix. There are no other complaints, changes, or physical findings at this time. PCP: Julien Moss NP    No current facility-administered medications on file prior to encounter. Current Outpatient Medications on File Prior to Encounter   Medication Sig Dispense Refill    MYRBETRIQ 25 mg ER tablet TK 1 T PO ONCE D      clopidogreL (PLAVIX) 75 mg tab TAKE 1 TABLET BY MOUTH EVERY DAY 90 Tab 3    atorvastatin (LIPITOR) 40 mg tablet Take 1 Tab by mouth daily. 30 Tab 3    latanoprost (XALATAN) 0.005 % ophthalmic solution Administer 1 Drop to both eyes nightly.  hydroCHLOROthiazide (HYDRODIURIL) 12.5 mg tablet       aspirin 81 mg chewable tablet Take 1 Tab by mouth daily.  30 Tab 1    calcium carbonate (CALTREX) 600 mg calcium (1,500 mg) tablet Take 600 mg by mouth daily.  mirtazapine (REMERON) 30 mg tablet 30 mg nightly.  lisinopril (PRINIVIL, ZESTRIL) 10 mg tablet       citalopram (CELEXA) 20 mg tablet Take 20 mg by mouth daily as needed.  multivitamin (ONE A DAY) tablet Take 1 Tab by mouth daily. Past History     Past Medical History:  Past Medical History:   Diagnosis Date    Depression     Fall at home 2/10/2014   [facility] or floaters of right eye 2014    High cholesterol     History of stroke     Hypertension     [facility]     Stroke ([facility]      and        Past Surgical History:  Past Surgical History:   Procedure Laterality Date    HX TUBAL LIGATION      HX TUMOR REMOVAL      brain tumor       Family History:  Family History   Problem Relation Age of Onset   [facility] Hypertension Mother     Other Father         Unknown    Dementia Brother     Alcohol abuse Sister     Alcohol abuse Sister     No Known Problems Sister     No Known Problems Sister     No Known Problems Sister     Dementia Brother     No Known Problems Brother     No Known Problems Child        Social History:  Social History     Tobacco Use    Smoking status: Former Smoker     Packs/day: 0.25     Years: 20.00     Pack years: 5.00     Last attempt to quit: 10/26/2019     Years since quittin.4    Smokeless tobacco: Never Used   Substance Use Topics    Alcohol use: No    Drug use: No       Allergies:  No Known Allergies      Review of Systems   Review of Systems   Constitutional: Negative for chills and fever. HENT: Negative for congestion, ear pain, rhinorrhea, sore throat and trouble swallowing. Eyes: Negative for visual disturbance. Respiratory: Positive for shortness of breath. Negative for cough and chest tightness. Cardiovascular: Negative for chest pain and palpitations.    Gastrointestinal: Positive for abdominal pain, nausea and vomiting. Negative for blood in stool, constipation and diarrhea. Genitourinary: Negative for decreased urine volume, difficulty urinating, dysuria and frequency. Musculoskeletal: Negative for back pain and neck pain. Skin: Negative for color change and rash. Neurological: Positive for light-headedness. Negative for dizziness, weakness and headaches. Physical Exam   Physical Exam  Vitals signs and nursing note reviewed. Constitutional:       General: She is not in acute distress. Appearance: She is well-developed. She is not ill-appearing. Eyes:      Conjunctiva/sclera: Conjunctivae normal.   Neck:      Musculoskeletal: Neck supple. Cardiovascular:      Rate and Rhythm: Regular rhythm. Tachycardia present. Pulmonary:      Effort: Pulmonary effort is normal. No accessory muscle usage or respiratory distress. Breath sounds: Normal breath sounds. Abdominal:      General: There is no distension. Palpations: Abdomen is soft. Tenderness: There is no abdominal tenderness. Lymphadenopathy:      Cervical: No cervical adenopathy. Skin:     General: Skin is warm and dry. Neurological:      Mental Status: She is alert and oriented to person, place, and time. Cranial Nerves: No cranial nerve deficit. Sensory: No sensory deficit. Diagnostic Study Results     Labs -     Recent Results (from the past 24 hour(s))   EKG, 12 LEAD, INITIAL    Collection Time: 04/14/20  3:07 PM   Result Value Ref Range    Ventricular Rate 105 BPM    Atrial Rate 105 BPM    P-R Interval 130 ms    QRS Duration 82 ms    Q-T Interval 364 ms    QTC Calculation (Bezet) 481 ms    Calculated P Axis 75 degrees    Calculated R Axis -4 degrees    Calculated T Axis 92 degrees    Diagnosis       Sinus tachycardia  Nonspecific T wave abnormality  When compared with ECG of 01-DEC-2019 12:41,  Vent.  rate has increased BY  37 BPM  QT has lengthened     CBC W/O DIFF    Collection Time: 04/14/20  3:33 PM Result Value Ref Range    WBC 12.3 (H) 3.6 - 11.0 K/uL    RBC 3.78 (L) 3.80 - 5.20 M/uL    HGB 10.4 (L) 11.5 - 16.0 g/dL    HCT 32.5 (L) 35.0 - 47.0 %    MCV 86.0 80.0 - 99.0 FL    MCH 27.5 26.0 - 34.0 PG    MCHC 32.0 30.0 - 36.5 g/dL    RDW 15.2 (H) 11.5 - 14.5 %    PLATELET 388 259 - 948 K/uL    MPV 11.1 8.9 - 12.9 FL    NRBC 0.0 0  WBC    ABSOLUTE NRBC 0.00 0.00 - 9.19 K/uL   METABOLIC PANEL, COMPREHENSIVE    Collection Time: 04/14/20  3:33 PM   Result Value Ref Range    Sodium 140 136 - 145 mmol/L    Potassium 3.9 3.5 - 5.1 mmol/L    Chloride 104 97 - 108 mmol/L    CO2 29 21 - 32 mmol/L    Anion gap 7 5 - 15 mmol/L    Glucose 153 (H) 65 - 100 mg/dL    BUN 60 (H) 6 - 20 MG/DL    Creatinine 1.49 (H) 0.55 - 1.02 MG/DL    BUN/Creatinine ratio 40 (H) 12 - 20      GFR est AA 42 (L) >60 ml/min/1.73m2    GFR est non-AA 34 (L) >60 ml/min/1.73m2    Calcium 10.0 8.5 - 10.1 MG/DL    Bilirubin, total 0.3 0.2 - 1.0 MG/DL    ALT (SGPT) 26 12 - 78 U/L    AST (SGOT) 23 15 - 37 U/L    Alk.  phosphatase 127 (H) 45 - 117 U/L    Protein, total 7.7 6.4 - 8.2 g/dL    Albumin 3.5 3.5 - 5.0 g/dL    Globulin 4.2 (H) 2.0 - 4.0 g/dL    A-G Ratio 0.8 (L) 1.1 - 2.2     PROTHROMBIN TIME + INR    Collection Time: 04/14/20  3:33 PM   Result Value Ref Range    INR 1.0 0.9 - 1.1      Prothrombin time 10.7 9.0 - 11.1 sec   PTT    Collection Time: 04/14/20  3:33 PM   Result Value Ref Range    aPTT 21.4 (L) 22.1 - 32.0 sec    aPTT, therapeutic range     58.0 - 77.0 SECS   TYPE & SCREEN    Collection Time: 04/14/20  3:33 PM   Result Value Ref Range    Crossmatch Expiration 04/17/2020     ABO/Rh(D) A POSITIVE     Antibody screen NEG     Unit number [id number]     Blood component type [facility]     Unit division 00     Status of unit ALLOCATED     Crossmatch result Compatible     Unit number [id number]     Blood component type [facility]     Unit division 00     Status of unit ALLOCATED     Crossmatch result Compatible    TROPONIN I    Collection Time: 04/14/20  3:33 PM   Result Value Ref Range    Troponin-I, Qt. <0.05 <0.05 ng/mL       Radiologic Studies -   No orders to display     CT Results  (Last 48 hours)    None        CXR Results  (Last 48 hours)    None            Medical Decision Making   I am the first provider for this patient. I reviewed the vital signs, available nursing notes, past medical history, past surgical history, family history and social history. Vital Signs-Reviewed the patient's vital signs. Patient Vitals for the past 24 hrs:   Temp Pulse Resp BP SpO2   04/14/20 1900  (!) 109 17 164/69 98 %   04/14/20 1848  (!) 117 18 176/68 97 %   04/14/20 1845  (!) 118 18 188/82 97 %   04/14/20 1844  (!) 125      04/14/20 1842  (!) 129 17 190/85 96 %   04/14/20 1840  (!) 129 15 175/76 95 %   04/14/20 1838  (!) 135 21 (!) 201/82 94 %   04/14/20 1836  (!) 133 17 (!) 219/97 95 %   04/14/20 1834  (!) 132 15 (!) 208/92 94 %   04/14/20 1832  (!) 134 19 199/90 95 %   04/14/20 1830  (!) 133 16 (!) 218/102 97 %   04/14/20 1828  (!) 133 19 (!) 217/99 97 %   04/14/20 1826  (!) 133 22 (!) 216/103 97 %   04/14/20 1824  (!) 129 22 (!) 217/108 96 %   04/14/20 1822  (!) 127 23 (!) 210/106 98 %   04/14/20 1820  (!) 121 21 (!) 223/102 97 %   04/14/20 1818  (!) 118 17 (!) 215/105 98 %   04/14/20 1816  (!) 107 20 (!) 236/97 91 %   04/14/20 1814  (!) 109 19 (!) 230/108 93 %   04/14/20 1812  91 16 176/82 97 %   04/14/20 1810  91 15 135/48    04/14/20 1750  94 15 138/60    04/14/20 1725    125/58    04/14/20 1700  93 17 102/45 100 %   04/14/20 1645  97 21 100/43 99 %   04/14/20 1630  (!) 104 16 105/43 97 %   04/14/20 1505 98.6 °F (37 °C) (!) 116 14 101/69 98 %   04/14/20 1500    95/40 98 %         Records Reviewed: Nursing Notes and Old Medical Records    Provider Notes (Medical Decision Making):   Patient presents with vomiting and upper GI bleed.   She was tachycardic and blood pressures were mildly hypotensive on arrival. Hemoglobin was in the 10s. She is on dual antiplatelets with a history of stroke. She is high risk for bleed. Not requiring blood transfusions in the emergency department. Will admit to hospitalist and gastroenterology will perform EGD. Assisted with sedation during EGD. No complications. ED Course:   Initial assessment performed. The patients presenting problems have been discussed, and they are in agreement with the care plan formulated and outlined with them. I have encouraged them to ask questions as they arise throughout their visit.          Orders Placed This Encounter    MODERATE SEDATION    CBC W/O DIFF    COMPREHENSIVE METABOLIC PANEL    PROTHROMBIN TIME + INR    PTT    TROPONIN I    DIET NPO    EKG NOTEWRITER(ASAP ONLY)    VERIFY CONSENT HAS BEEN OBTAINED EGD ONE TIME STAT    NOTIFY PROVIDER: SPECIFY Notify provider on pt's arrival to floor ONE TIME STAT    OUT OF BED WITH ASSISTANCE    VITAL SIGNS PER UNIT ROUTINE    VERIFY CONSENT HAS BEEN OBTAINED EGD ONE TIME STAT    FULL CODE    IP CONSULT TO GASTROENTEROLOGY    OXYGEN CANNULA Liters per minute: 2; Indications for O2 therapy: HYPOXIA CONTINUOUS Routine    RT--OXIMETRY, CONTINUOUS    OXYGEN CANNULA Liters per minute: 2; Indications for O2 therapy: HYPOXIA CONTINUOUS Routine    RT--OXIMETRY, CONTINUOUS    EKG, 12 LEAD, INITIAL    TYPE & SCREEN    SALINE LOCK IV ONE TIME STAT    INSERT PERIPHERAL IV ONE TIME Routine    INSERT PERIPHERAL IV ONE TIME Routine    pantoprazole (PROTONIX) injection 40 mg    sodium chloride 0.9 % bolus infusion 500 mL    sodium chloride 0.9 % bolus infusion 1,000 mL    pantoprazole (PROTONIX) injection 40 mg    DISCONTD: 0.9% sodium chloride infusion    sodium chloride (NS) flush 5-40 mL    DISCONTD: sodium chloride (NS) flush 5-40 mL    midazolam (VERSED) injection 0.25-5 mg    naloxone (NARCAN) injection 0.4 mg    flumazeniL (ROMAZICON) 0.1 mg/mL injection 0.2 mg    simethicone (MYLICON) 18IJ/7.8HT oral drops 80 mg    atropine injection 0.5 mg    DISCONTD: EPINEPHrine (ADRENALIN) 0.1 mg/mL syringe 1 mg    acetaminophen (TYLENOL) tablet 650 mg    0.9% sodium chloride infusion    DISCONTD: ondansetron (ZOFRAN) injection 4 mg    0.9% sodium chloride infusion    DISCONTD: sodium chloride (NS) flush 5-40 mL    sodium chloride (NS) flush 5-40 mL    DISCONTD: midazolam (VERSED) injection 0.25-5 mg    DISCONTD: naloxone (NARCAN) injection 0.4 mg    DISCONTD: flumazeniL (ROMAZICON) 0.1 mg/mL injection 0.2 mg    DISCONTD: simethicone (MYLICON) 38WK/3.2DM oral drops 80 mg    DISCONTD: atropine injection 0.5 mg    EPINEPHrine (ADRENALIN) 0.1 mg/mL syringe 1 mg    0.9% sodium chloride infusion 250 mL    ketamine (KETALAR) 50 mg/mL injection 85.5 mg    DISCONTD: ondansetron (ZOFRAN) injection 4 mg    ondansetron (ZOFRAN) injection 4 mg    IP CONSULT TO HOSPITALIST    INITIAL PHYSICIAN ORDER: INPATIENT Telemetry; 3. Patient receiving treatment that can only be provided in an inpatient setting (further clarification in H&P documentation)       EKG  Date/Time: 4/14/2020 3:07 PM  Performed by: Lelia Angeles MD  Authorized by: Lelia Angeles MD     ECG reviewed by ED Physician in the absence of a cardiologist: yes    Rate:     ECG rate:  105    ECG rate assessment: tachycardic    Rhythm:     Rhythm: sinus tachycardia    Ectopy:     Ectopy: none    QRS:     QRS axis:  Normal    QRS intervals:  Normal  Conduction:     Conduction: normal    ST segments:     ST segments:  Normal  T waves:     T waves: non-specific      Procedural Sedation  Date/Time: 4/14/2020 6:09 PM  Performed by: Lelia Angeles MD  Authorized by: Lelia Angeles MD     Consent:     Consent obtained:  Written    Consent given by:  Patient    Risks discussed:   Allergic reaction, inadequate sedation, nausea, vomiting, respiratory compromise necessitating ventilatory assistance and intubation and prolonged hypoxia resulting in organ damage  Indications:     Procedure performed:  Endoscopy    Procedure necessitating sedation performed by:  Different physician    Intended level of sedation:  Moderate (conscious sedation)  Pre-sedation assessment:     Time since last food or drink:  >12 hours    ASA classification: class 2 - patient with mild systemic disease      Neck mobility: normal      Mouth opening:  3 or more finger widths    Thyromental distance:  3 finger widths    Mallampati score:  II - soft palate, uvula, fauces visible    Pre-sedation assessments completed and reviewed: airway patency, cardiovascular function, hydration status, mental status, nausea/vomiting, pain level, respiratory function and temperature      History of difficult intubation: no    Immediate pre-procedure details:     Reassessment: Patient reassessed immediately prior to procedure      Reviewed: vital signs      Verified: bag valve mask available, emergency equipment available, intubation equipment available, IV patency confirmed, oxygen available and suction available    Procedure details (see MAR for exact dosages):     Sedation start time:  4/14/2020 6:09 PM    Preoxygenation:  Nasal cannula    Sedation:  Ketamine (85mg)    Intra-procedure monitoring:  Blood pressure monitoring, cardiac monitor, continuous pulse oximetry, continuous capnometry, frequent LOC assessments and frequent vital sign checks    Intra-procedure events: none      Intra-procedure management:  Supplemental oxygen    Sedation end time:  4/14/2020 6:35 PM  Post-procedure details:     Post-sedation assessment completed:  4/14/2020 7:11 PM    Attendance: Constant attendance by certified staff until patient recovered      Recovery: Patient returned to pre-procedure baseline      Post-sedation assessments completed and reviewed: airway patency, cardiovascular function, hydration status, mental status, nausea/vomiting, pain level, respiratory function and temperature      Patient is stable for discharge or admission: yes      Patient tolerance: Tolerated well, no immediate complications          Critical Care Time:   0    Disposition:  Admit        Diagnosis     Clinical Impression:   1. Acute upper GI bleed    2. Antiplatelet or antithrombotic long-term use            This note will not be viewable in MyChart.

## 2020-04-14 NOTE — CONSULTS
Gastrointestinal Consult    Subjective:     Patient is a 69 y.o. AA  female who is being seen with hematemesis for the last 1 day. I have been asked to see the patient for the above mentioned issue by the ER physician. There are + pre /syncopal symptoms. Onset of symptoms was abrupt with unchanged course since that time. Patient c/o no abdominal pain. Symptoms improve with nothing specific    There is a history of aspirin and Plavix use. There is no history of alcohol use. Past history includes CVA/TIA. Lab work up revealed a hgb of 10. 4. MCV is nml. Baseline hgb is 12. BUN is 60. LFTs are normal.     Coagulation profile shows a normal INR. She went to her primary care doctor today and had a near syncopal event in the parking lot. EMS was called to transport her to the emergency department. I was asked to see urgently for this bleeding.     Patient Active Problem List    Diagnosis Date Noted    Right pontine stroke ([address] 12/02/2019    Bilateral carotid artery stenosis 12/02/2019    Diplopia 12/02/2019    Left acoustic neuroma ([address] 12/02/2019    History of stroke     Acute CVA (cerebrovascular accident) ([address] 12/01/2019    Stroke ([address] 07/16/2019    TIA (transient ischemic attack) 07/15/2019    Flashers or floaters of right eye 08/21/2014    Fall at home 02/10/2014    Knee pain, left 02/10/2014    Prediabetes 02/10/2014    HTN, goal below 130/80 01/06/2014    Vitamin D deficiency 01/06/2014    Hypercholesterolemia 06/27/2013    Tiredness 06/06/2013    Depression, acute 06/06/2013     Past Medical History:   Diagnosis Date    Depression     Fall at home 2/10/2014   [facility] or floaters of right eye 8/21/2014    High cholesterol     History of stroke     Hypertension     Left acoustic neuroma ([address]     Stroke ([address]     2010 and 2019      Past Surgical History:   Procedure Laterality Date    HX TUBAL LIGATION  1981    HX TUMOR REMOVAL  2008    brain tumor     Family History Problem Relation Age of Onset    Hypertension Mother     Other Father         Unknown    Dementia Brother     Alcohol abuse Sister     Alcohol abuse Sister     No Known Problems Sister     No Known Problems Sister     No Known Problems Sister     Dementia Brother     No Known Problems Brother     No Known Problems Child       Social History     Socioeconomic History    Marital status:      Spouse name: Not on file    Number of children: Not on file    Years of education: Not on file    Highest education level: Not on file   Tobacco Use    Smoking status: Former Smoker     Packs/day: 0.25     Years: 20.00     Pack years: 5.00     Last attempt to quit: 10/26/2019     Years since quittin.4    Smokeless tobacco: Never Used   Substance and Sexual Activity    Alcohol use: No    Drug use: No    Sexual activity: Never       Current Facility-Administered Medications   Medication Dose Route Frequency Provider Last Rate Last Dose    pantoprazole (PROTONIX) injection 40 mg  40 mg IntraVENous NOW Yoon GARZA MD         Current Outpatient Medications   Medication Sig Dispense Refill    MYRBETRIQ 25 mg ER tablet TK 1 T PO ONCE D      clopidogreL (PLAVIX) 75 mg tab TAKE 1 TABLET BY MOUTH EVERY DAY 90 Tab 3    atorvastatin (LIPITOR) 40 mg tablet Take 1 Tab by mouth daily. 30 Tab 3    latanoprost (XALATAN) 0.005 % ophthalmic solution Administer 1 Drop to both eyes nightly.  hydroCHLOROthiazide (HYDRODIURIL) 12.5 mg tablet       aspirin 81 mg chewable tablet Take 1 Tab by mouth daily. 30 Tab 1    calcium carbonate (CALTREX) 600 mg calcium (1,500 mg) tablet Take 600 mg by mouth daily.  mirtazapine (REMERON) 30 mg tablet 30 mg nightly.  lisinopril (PRINIVIL, ZESTRIL) 10 mg tablet       citalopram (CELEXA) 20 mg tablet Take 20 mg by mouth daily as needed.  multivitamin (ONE A DAY) tablet Take 1 Tab by mouth daily.            No Known Allergies     Review of Systems:  A comprehensive review of systems was negative except for that written in the History of Present Illness. Objective:     Patient Vitals for the past 8 hrs:   BP Temp Pulse Resp SpO2 Height Weight   04/14/20 1505 101/69 98.6 °F (37 °C) (!) 116 14 98 % 5' 6\" (1.676 m) 85.3 kg (188 lb 0.8 oz)   04/14/20 1500 95/40    98 %         Physical Exam:   Visit Vitals  /69 (BP 1 Location: Left arm, BP Patient Position: At rest)   Pulse (!) 116   Temp 98.6 °F (37 °C)   Resp 14   Ht 5' 6\" (1.676 m)   Wt 85.3 kg (188 lb 0.8 oz)   SpO2 98%   BMI 30.35 kg/m²     General appearance: alert, appears stated age  Eyes: negative  Lungs: clear to auscultation bilaterally  Heart: normal apical impulse  Abdomen: soft, non-tender. Bowel sounds normal. No masses,  no organomegaly  Extremities: extremities normal, atraumatic, no cyanosis or edema, no edema  Neurologic: Grossly normal    Lab/Data Review:  Recent Results (from the past 24 hour(s))   EKG, 12 LEAD, INITIAL    Collection Time: 04/14/20  3:07 PM   Result Value Ref Range    Ventricular Rate 105 BPM    Atrial Rate 105 BPM    P-R Interval 130 ms    QRS Duration 82 ms    Q-T Interval 364 ms    QTC Calculation (Bezet) 481 ms    Calculated P Axis 75 degrees    Calculated R Axis -4 degrees    Calculated T Axis 92 degrees    Diagnosis       Sinus tachycardia  Nonspecific T wave abnormality  When compared with ECG of 01-DEC-2019 12:41,  Vent.  rate has increased BY  37 BPM  QT has lengthened     CBC W/O DIFF    Collection Time: 04/14/20  3:33 PM   Result Value Ref Range    WBC 12.3 (H) 3.6 - 11.0 K/uL    RBC 3.78 (L) 3.80 - 5.20 M/uL    HGB 10.4 (L) 11.5 - 16.0 g/dL    HCT 32.5 (L) 35.0 - 47.0 %    MCV 86.0 80.0 - 99.0 FL    MCH 27.5 26.0 - 34.0 PG    MCHC 32.0 30.0 - 36.5 g/dL    RDW 15.2 (H) 11.5 - 14.5 %    PLATELET 315 981 - 143 K/uL    MPV 11.1 8.9 - 12.9 FL    NRBC 0.0 0  WBC    ABSOLUTE NRBC 0.00 0.00 - 8.87 K/uL   METABOLIC PANEL, COMPREHENSIVE    Collection Time: 04/14/20  3:33 PM   Result Value Ref Range    Sodium 140 136 - 145 mmol/L    Potassium 3.9 3.5 - 5.1 mmol/L    Chloride 104 97 - 108 mmol/L    CO2 29 21 - 32 mmol/L    Anion gap 7 5 - 15 mmol/L    Glucose 153 (H) 65 - 100 mg/dL    BUN 60 (H) 6 - 20 MG/DL    Creatinine 1.49 (H) 0.55 - 1.02 MG/DL    BUN/Creatinine ratio 40 (H) 12 - 20      GFR est AA 42 (L) >60 ml/min/1.73m2    GFR est non-AA 34 (L) >60 ml/min/1.73m2    Calcium 10.0 8.5 - 10.1 MG/DL    Bilirubin, total 0.3 0.2 - 1.0 MG/DL    ALT (SGPT) 26 12 - 78 U/L    AST (SGOT) 23 15 - 37 U/L    Alk. phosphatase 127 (H) 45 - 117 U/L    Protein, total 7.7 6.4 - 8.2 g/dL    Albumin 3.5 3.5 - 5.0 g/dL    Globulin 4.2 (H) 2.0 - 4.0 g/dL    A-G Ratio 0.8 (L) 1.1 - 2.2     PROTHROMBIN TIME + INR    Collection Time: 04/14/20  3:33 PM   Result Value Ref Range    INR 1.0 0.9 - 1.1      Prothrombin time 10.7 9.0 - 11.1 sec   PTT    Collection Time: 04/14/20  3:33 PM   Result Value Ref Range    aPTT 21.4 (L) 22.1 - 32.0 sec    aPTT, therapeutic range     58.0 - 77.0 SECS   TROPONIN I    Collection Time: 04/14/20  3:33 PM   Result Value Ref Range    Troponin-I, Qt. <0.05 <0.05 ng/mL         Assessment:   GI bleeding w hematemesis  Anemia  On ASA/Plavix  Presyncope  MICHI        Plan:     Fluid resuscitation with normal saline. IV PPI gtt and IVF  Type and cross 2 units PRBCs,  Follow hgb closely  Upper endoscopy bedside ASAP after initial stabilization. I discussed the risks, benefits and alternatives with the patient. We will keep the patient nil per os for the procedure. ER MD to assist with sedation. Appreciate his help. I spoke with GI team & pts ER RN. Signed By: Roly Gaviria.  Fabricio Ha MD    April 14, 2020

## 2020-04-14 NOTE — H&P
Hospitalist Admission Note    NAME: Ирина Lan   :     MRN:  [id number]     Date/Time:  2020 5:23 PM    Patient PCP: Rosangela Cuenca NP  ______________________________________________________________________  Given the patient's current clinical presentation, I have a high level of concern for decompensation if discharged from the emergency department. Complex decision making was performed, which includes reviewing the patient's available past medical records, laboratory results, and x-ray films. My assessment of this patient's clinical condition and my plan of care is as follows. Assessment / Plan:  Acute GI bleed, likely upper  -Admit to Telemetry  -Keep NPO  -IVF   -IV PPI BID  -GI Consult  -Hold ASA and Plavix  -Monitor Hgb q8hrs - transfuse pRBC if Hgb less than 7    Hx of stroke  HLD  HTN  -Holding home PO meds given GI bleed - would resume tomorrow    Code Status: Full Code  Surrogate Decision Maker: Full Code  DVT Prophylaxis: SCD  GI Prophylaxis: not indicated  Baseline: Independent IADLs, uses cane at times      Subjective:   CHIEF COMPLAINT: nausea, vomiting blood and tar    HISTORY OF PRESENT ILLNESS:     Ирина Lan is a 69 y.o.  female who presents with CC listed above. Pt states that she was feeling well up until yesterday evening when she noticed abdominal pain, and nausea. She states that she \"vomited blood\" at that time. She went to bed afterwards and this morning \"vomited tar. \" She states that she tried to get to her PCP's office but was too weak to make it from the bus to inside the facility. She denies any previous history of GI bleed. She denies any history of NSAID abuse. Endorses taking ASA and Plavix daily for previous stroke. We were asked to admit for work up and evaluation of the above problems.      Past Medical History:   Diagnosis Date    Depression     Fall at home 2/10/2014   [facility] or floaters of right eye 2014    High cholesterol     History of stroke     Hypertension     [facility] neuroma [facility] [address]     Stroke ([facility] [address]      and         Past Surgical History:   Procedure Laterality Date    HX TUBAL LIGATION      HX TUMOR REMOVAL      brain tumor       Social History     Tobacco Use    Smoking status: Former Smoker     Packs/day: 0.25     Years: 20.00     Pack years: 5.00     Last attempt to quit: 10/26/2019     Years since quittin.4    Smokeless tobacco: Never Used   Substance Use Topics    Alcohol use: No        Family History   Problem Relation Age of Onset    Hypertension Mother     Other Father         Unknown    Dementia Brother     Alcohol abuse Sister     Alcohol abuse Sister     No Known Problems Sister     No Known Problems Sister     No Known Problems Sister     Dementia Brother     No Known Problems Brother     No Known Problems Child      No Known Allergies     Prior to Admission medications    Medication Sig Start Date End Date Taking? Authorizing Provider   MYRBETRIQ 25 mg ER tablet TK 1 T PO ONCE D 20  Yes Provider, Historical   clopidogreL (PLAVIX) 75 mg tab TAKE 1 TABLET BY MOUTH EVERY DAY 20  Yes Saige Caldera MD   atorvastatin (LIPITOR) 40 mg tablet Take 1 Tab by mouth daily. 19  Yes Saige Caldera MD   latanoprost (XALATAN) 0.005 % ophthalmic solution Administer 1 Drop to both eyes nightly. Yes Provider, Historical   hydroCHLOROthiazide (HYDRODIURIL) 12.5 mg tablet  20   Provider, Historical   aspirin 81 mg chewable tablet Take 1 Tab by mouth daily. 19   Alcon Mendoza MD   calcium carbonate (CALTREX) 600 mg calcium (1,500 mg) tablet Take 600 mg by mouth daily. Provider, Historical   mirtazapine (REMERON) 30 mg tablet 30 mg nightly.  10/4/19   Provider, Historical   lisinopril (PRINIVIL, ZESTRIL) 10 mg tablet  19   Provider, Historical   citalopram (CELEXA) 20 mg tablet Take 20 mg by mouth daily as needed. Provider, Historical   multivitamin (ONE A DAY) tablet Take 1 Tab by mouth daily. Provider, Historical       REVIEW OF SYSTEMS:     I am not able to complete the review of systems because: The patient is intubated and sedated    The patient has altered mental status due to his acute medical problems    The patient has baseline aphasia from prior stroke(s)    The patient has baseline dementia and is not reliable historian    The patient is in acute medical distress and unable to provide information           Total of 12 systems reviewed as follows:       POSITIVE= underlined text  Negative = text not underlined  General:  fever, chills, sweats, generalized weakness, weight loss/gain,      loss of appetite   Eyes:    blurred vision, eye pain, loss of vision, double vision  ENT:    rhinorrhea, pharyngitis   Respiratory:   cough, sputum production, SOB, JHA, wheezing, pleuritic pain   Cardiology:   chest pain, palpitations, orthopnea, PND, edema, syncope   Gastrointestinal:  abdominal pain , N/V, diarrhea, dysphagia, constipation, bleeding   Genitourinary:  frequency, urgency, dysuria, hematuria, incontinence   Muskuloskeletal :  arthralgia, myalgia, back pain  Hematology:  easy bruising, nose or gum bleeding, lymphadenopathy   Dermatological: rash, ulceration, pruritis, color change / jaundice  Endocrine:   hot flashes or polydipsia   Neurological:  headache, dizziness, confusion, focal weakness, paresthesia,     Speech difficulties, memory loss, gait difficulty  Psychological: Feelings of anxiety, depression, agitation    Objective:   VITALS:    Visit Vitals  /45   Pulse 93   Temp 98.6 °F (37 °C)   Resp 17   Ht 5' 6\" (1.676 m)   Wt 188 lb 0.8 oz (85.3 kg)   SpO2 100%   BMI 30.35 kg/m²       PHYSICAL EXAM:    General:    Alert, cooperative, no distress, appears stated age.      HEENT: Atraumatic, anicteric sclerae, pink conjunctivae     No oral ulcers, mucosa moist, throat clear, dentition fair  Neck:  Supple, symmetrical,  thyroid: non tender  Lungs:   Clear to auscultation bilaterally. No Wheezing or Rhonchi. No rales. Chest wall:  No tenderness  No Accessory muscle use. Heart:   Regular  rhythm,  No  murmur   No edema  Abdomen:   Soft, non-tender. Not distended. Bowel sounds normal  Extremities: No cyanosis. No clubbing,      Skin turgor normal, Capillary refill normal, Radial dial pulse 2+  Skin:     Not pale. Not Jaundiced  No rashes   Psych:  Good insight. Not depressed. Not anxious or agitated. Neurologic: EOMs intact. No facial asymmetry. No aphasia or slurred speech. Symmetrical strength, Sensation grossly intact. Alert and oriented X 4.     _______________________________________________________________________  Care Plan discussed with:    Comments   Patient x    Family      RN x    Care Manager                    Consultant:      _______________________________________________________________________  Expected  Disposition:   Home with Family    HH/PT/OT/RN x   SNF/LTC    FERNANDO    ________________________________________________________________________  TOTAL TIME:  54 Minutes    Critical Care Provided     Minutes non procedure based      Comments    x Reviewed previous records   >50% of visit spent in counseling and coordination of care  Discussion with patient and/or family and questions answered       ________________________________________________________________________  Signed: Carey Macdonald MD    Procedures: see electronic medical records for all procedures/Xrays and details which were not copied into this note but were reviewed prior to creation of Plan.     LAB DATA REVIEWED:    Recent Results (from the past 24 hour(s))   EKG, 12 LEAD, INITIAL    Collection Time: 04/14/20  3:07 PM   Result Value Ref Range    Ventricular Rate 105 BPM    Atrial Rate 105 BPM    P-R Interval 130 ms    QRS Duration 82 ms    Q-T Interval 364 ms    QTC Calculation (Bezet) 481 ms    Calculated P Axis 75 degrees    Calculated R Axis -4 degrees    Calculated T Axis 92 degrees    Diagnosis       Sinus tachycardia  Nonspecific T wave abnormality  When compared with ECG of 01-DEC-2019 12:41,  Vent. rate has increased BY  37 BPM  QT has lengthened     CBC W/O DIFF    Collection Time: 04/14/20  3:33 PM   Result Value Ref Range    WBC 12.3 (H) 3.6 - 11.0 K/uL    RBC 3.78 (L) 3.80 - 5.20 M/uL    HGB 10.4 (L) 11.5 - 16.0 g/dL    HCT 32.5 (L) 35.0 - 47.0 %    MCV 86.0 80.0 - 99.0 FL    MCH 27.5 26.0 - 34.0 PG    MCHC 32.0 30.0 - 36.5 g/dL    RDW 15.2 (H) 11.5 - 14.5 %    PLATELET 754 374 - 564 K/uL    MPV 11.1 8.9 - 12.9 FL    NRBC 0.0 0  WBC    ABSOLUTE NRBC 0.00 0.00 - 0.08 K/uL   METABOLIC PANEL, COMPREHENSIVE    Collection Time: 04/14/20  3:33 PM   Result Value Ref Range    Sodium 140 136 - 145 mmol/L    Potassium 3.9 3.5 - 5.1 mmol/L    Chloride 104 97 - 108 mmol/L    CO2 29 21 - 32 mmol/L    Anion gap 7 5 - 15 mmol/L    Glucose 153 (H) 65 - 100 mg/dL    BUN 60 (H) 6 - 20 MG/DL    Creatinine 1.49 (H) 0.55 - 1.02 MG/DL    BUN/Creatinine ratio 40 (H) 12 - 20      GFR est AA 42 (L) >60 ml/min/1.73m2    GFR est non-AA 34 (L) >60 ml/min/1.73m2    Calcium 10.0 8.5 - 10.1 MG/DL    Bilirubin, total 0.3 0.2 - 1.0 MG/DL    ALT (SGPT) 26 12 - 78 U/L    AST (SGOT) 23 15 - 37 U/L    Alk.  phosphatase 127 (H) 45 - 117 U/L    Protein, total 7.7 6.4 - 8.2 g/dL    Albumin 3.5 3.5 - 5.0 g/dL    Globulin 4.2 (H) 2.0 - 4.0 g/dL    A-G Ratio 0.8 (L) 1.1 - 2.2     PROTHROMBIN TIME + INR    Collection Time: 04/14/20  3:33 PM   Result Value Ref Range    INR 1.0 0.9 - 1.1      Prothrombin time 10.7 9.0 - 11.1 sec   PTT    Collection Time: 04/14/20  3:33 PM   Result Value Ref Range    aPTT 21.4 (L) 22.1 - 32.0 sec    aPTT, therapeutic range     58.0 - 77.0 SECS   TYPE & SCREEN    Collection Time: 04/14/20  3:33 PM   Result Value Ref Range    Crossmatch Expiration 04/17/2020     ABO/Rh(D) A POSITIVE     Antibody screen NEG TROPONIN I    Collection Time: 04/14/20  3:33 PM   Result Value Ref Range    Troponin-I, Qt. <0.05 <0.05 ng/mL

## 2020-04-14 NOTE — ED NOTES
Bedside and Verbal shift change report given to Lupe (oncoming nurse) by Arden Lu (offgoing nurse).  Report included the following information SBAR, Kardex, ED Summary, Intake/Output, MAR, Recent Results and Cardiac Rhythm SR.

## 2020-04-14 NOTE — ED NOTES
TRANSFER - OUT REPORT:    Verbal report given to CARLOS Moses(name) on Cordelia Loaiza  being transferred to Gen Surg Room 2178(unit) for routine progression of care       Report consisted of patients Situation, Background, Assessment and   Recommendations(SBAR). Information from the following report(s) SBAR, Kardex, ED Summary, Intake/Output, MAR, Recent Results and Cardiac Rhythm SR was reviewed with the receiving nurse. Lines:   Peripheral IV 04/14/20 Right Antecubital (Active)       Peripheral IV 26/29/92 Left Basilic (Active)   Site Assessment Clean, dry, & intact 4/14/2020  5:32 PM   Phlebitis Assessment 0 4/14/2020  5:32 PM   Infiltration Assessment 0 4/14/2020  5:32 PM   Dressing Status Clean, dry, & intact 4/14/2020  5:32 PM   Dressing Type Transparent 4/14/2020  5:32 PM   Hub Color/Line Status Green 4/14/2020  5:32 PM        Opportunity for questions and clarification was provided. CARLOS Moses was informed pt is having an Endoscopy at this time, Endo team and GI at bedside. Noel Wells will call for report update post endoscopy prior to transporting pt. Charge RN notified.

## 2020-04-14 NOTE — PROCEDURES
[place] Office: [phone]      Esophagogastroduodenoscopy Procedure Note      Thomas Cuba  1945  [id number]    Indication:  Hematemesis     : Fitz Arango MD    Referring Provider:  Abdirashid Flower NP    Sedation:  See ER MD notes    Procedure Details:  After detailed informed consent was obtained for the procedure, with all risks and benefits of procedure explained the patient was taken to the endoscopy suite and placed in the left lateral decubitus position. Following sequential administration of sedation as per above, the endoscope was inserted into the mouth and advanced under direct vision to second portion of the duodenum. A careful inspection was made as the gastroscope was withdrawn, including a retroflexed view of the proximal stomach; findings and interventions are described below. Findings:     Esophagus: The esophageal mucosa in the proximal and mid   esophagus is normal.   The squamo-columnar junction is at 40 cm where the Z-line was noted. A vessel with a large mucosal tear is noted at 5 o clock at the GE junction. Oozing is noted. 2 BS Resolution clips were applied to this area after 4 cc of 1:48356 epinephrine injected and area cauterized. On the opposing wall is a much large ulcerated oozing area with bright red laminar flow. I initially injected 6 cc of Epinephrine and then applied 3 clips to the site. Bleeding continued. I then used a Gold probe to cauterize--oozing did not stop. 3 more clips were applied(one in retroflexed view) applied to the site. Oozing slowed doen but did not completely seize likely secondary to AS/plavix effect. Further cautery not performed for risk of perforating esophagus. Stomach: The gastric mucosa has retch erythema in the fundus and moderate body erythema(c/w gastritis). The angularis is normal     Duodenum:   The bulb and post bulbar mucosa is normal in appearance.    The duodenal folds are normal.     Therapies: injection of 10 cc of  SM Epinephrine,   Cautery of GE junction ulcers/MWT  endoclip x 7  placed for hemorrhage      Specimen: none          Complications:   None were encountered during the procedure. EBL:  50 ml          Recommendations:     -Acid suppression with a proton pump inhibitor. ,   -Start Zofran to decrease retching.  -Hold ASA/Plavix. -Follow hgb, transfuse prn.  -Consult IR for continued bleeding. May need intervention if bleeding continues. I spoke with Dr Tamera Plaza about her.  -Keep her NPO for now.  -Case d/w Dr Yolette Ellis, EVANGELINA MD who was present throughout the case. Thank you for entrusting me with this patient's care. Please do not hesitate to contact me with any questions or if I can be of assistance with any of your other patients' GI needs. Ronnie Newberry MD  4/14/2020  6:38 PM

## 2020-04-14 NOTE — ED NOTES
Pt arrives to the ED via ambulance AAOX4 with a c/c of \"vomiting blood\" onset last night, denies blood in stool. Pt was at her PCP to get checked and had a near syncopal episode, denies LOC, dizziness or HA. Pt is on Plavix and ASA. Pt is noted in stable condition, now in ED room with side rail up, bed to lowest position and call light within reach. Will continue to monitor.

## 2020-04-14 NOTE — PROGRESS NOTES
Nurse Clarification of the Prior to Admission Medication Regimen     The patient was NOT interviewed regarding clarification of the prior to admission medication regimen because patient unavailable . Spoke with pharmacist at pharmacy listed in patient's profile. The individual(s) listed above were questioned regarding the patient's use of any other inhalers, topical products, over the counter medications, herbal medications, vitamin products or ophthalmic/nasal/otic medication use. Information Obtained From: Pharmacist    Recommendations/Findings: The following amendments were made to the patient's active medication list on file at [facility]:     1) Additions:    None    2) Removals:    None    3) Changes:   None       PTA medication list was corrected to the following:     Prior to Admission Medications   Prescriptions Last Dose Informant Taking? MYRBETRIQ 25 mg ER tablet  at Unknown time  Yes   Sig: TK 1 T PO ONCE D   aspirin 81 mg chewable tablet Unknown at Unknown time  No   Sig: Take 1 Tab by mouth daily. atorvastatin (LIPITOR) 40 mg tablet  at Unknown time  Yes   Sig: Take 1 Tab by mouth daily. calcium carbonate (CALTREX) 600 mg calcium (1,500 mg) tablet Unknown at Unknown time  No   Sig: Take 600 mg by mouth daily. citalopram (CELEXA) 20 mg tablet Unknown at Unknown time Self No   Sig: Take 20 mg by mouth daily as needed. clopidogreL (PLAVIX) 75 mg tab  at Unknown time  Yes   Sig: TAKE 1 TABLET BY MOUTH EVERY DAY   hydroCHLOROthiazide (HYDRODIURIL) 12.5 mg tablet Unknown at Unknown time  No   latanoprost (XALATAN) 0.005 % ophthalmic solution  at Unknown time Self Yes   Sig: Administer 1 Drop to both eyes nightly. lisinopril (PRINIVIL, ZESTRIL) 10 mg tablet Unknown at Unknown time  No   mirtazapine (REMERON) 30 mg tablet Unknown at Unknown time  No   Si mg nightly. multivitamin (ONE A DAY) tablet Unknown at Unknown time Self No   Sig: Take 1 Tab by mouth daily. Facility-Administered Medications: None        Thank you,  Christina Castillo, RN

## 2020-04-15 LAB
ATRIAL RATE: 105 BPM
CALCULATED P AXIS, ECG09: 75 DEGREES
CALCULATED R AXIS, ECG10: -4 DEGREES
CALCULATED T AXIS, ECG11: 92 DEGREES
DIAGNOSIS, 93000: NORMAL
HCT VFR BLD AUTO: 24.8 % (ref 35–47)
HCT VFR BLD AUTO: 25.7 % (ref 35–47)
HCT VFR BLD AUTO: 28.7 % (ref 35–47)
HGB BLD-MCNC: 7.9 G/DL (ref 11.5–16)
HGB BLD-MCNC: 8.1 G/DL (ref 11.5–16)
HGB BLD-MCNC: 8.9 G/DL (ref 11.5–16)
P-R INTERVAL, ECG05: 130 MS
Q-T INTERVAL, ECG07: 364 MS
QRS DURATION, ECG06: 82 MS
QTC CALCULATION (BEZET), ECG08: 481 MS
VENTRICULAR RATE, ECG03: 105 BPM

## 2020-04-15 PROCEDURE — 74011000250 HC RX REV CODE- 250: Performed by: GENERAL ACUTE CARE HOSPITAL

## 2020-04-15 PROCEDURE — 36415 COLL VENOUS BLD VENIPUNCTURE: CPT

## 2020-04-15 PROCEDURE — 65660000000 HC RM CCU STEPDOWN

## 2020-04-15 PROCEDURE — 74011250636 HC RX REV CODE- 250/636: Performed by: GENERAL ACUTE CARE HOSPITAL

## 2020-04-15 PROCEDURE — C9113 INJ PANTOPRAZOLE SODIUM, VIA: HCPCS | Performed by: GENERAL ACUTE CARE HOSPITAL

## 2020-04-15 PROCEDURE — 85018 HEMOGLOBIN: CPT

## 2020-04-15 PROCEDURE — 74011250636 HC RX REV CODE- 250/636: Performed by: INTERNAL MEDICINE

## 2020-04-15 PROCEDURE — 77010033678 HC OXYGEN DAILY

## 2020-04-15 PROCEDURE — 94760 N-INVAS EAR/PLS OXIMETRY 1: CPT

## 2020-04-15 RX ADMIN — Medication 10 ML: at 05:43

## 2020-04-15 RX ADMIN — ONDANSETRON 4 MG: 2 INJECTION INTRAMUSCULAR; INTRAVENOUS at 18:46

## 2020-04-15 RX ADMIN — SODIUM CHLORIDE 75 ML/HR: 900 INJECTION, SOLUTION INTRAVENOUS at 15:37

## 2020-04-15 RX ADMIN — SODIUM CHLORIDE 40 MG: 9 INJECTION INTRAMUSCULAR; INTRAVENOUS; SUBCUTANEOUS at 21:33

## 2020-04-15 RX ADMIN — Medication 10 ML: at 15:37

## 2020-04-15 RX ADMIN — Medication 10 ML: at 21:33

## 2020-04-15 RX ADMIN — ONDANSETRON 4 MG: 2 INJECTION INTRAMUSCULAR; INTRAVENOUS at 05:42

## 2020-04-15 RX ADMIN — SODIUM CHLORIDE 40 MG: 9 INJECTION INTRAMUSCULAR; INTRAVENOUS; SUBCUTANEOUS at 08:25

## 2020-04-15 RX ADMIN — ONDANSETRON 4 MG: 2 INJECTION INTRAMUSCULAR; INTRAVENOUS at 11:27

## 2020-04-15 RX ADMIN — Medication 10 ML: at 15:30

## 2020-04-15 RX ADMIN — LATANOPROST 1 DROP: 50 SOLUTION OPHTHALMIC at 21:33

## 2020-04-15 NOTE — PROGRESS NOTES
General Surgery End of Shift Nursing Note    Bedside shift change report given to [address] [place] [address] (oncoming nurse) by Autumn العراقي (offgoing nurse). Report included the following information SBAR, Kardex and Recent Results. Shift worked:   [id number]   Summary of shift:    Patient denies any pain and has been resting and sleeping for most of shift. Patient was sinus tach (100-110) at rest for most of shift and did not tolerate ambulating to bathroom well (up to 130s) but recovered within few minutes. Denied shortness of breath. Bedside commode placed at bedside. 0000 hgb was 8.1.    Issues for physician to address:   none     Number times ambulated in hallway past shift: 0    Number of times OOB to chair past shift: 0    Pain Management:  Current medication: n/a  Patient states pain is manageable on current pain medication: NO    GI:    Current diet:  DIET NPO        Diamond Marley RN

## 2020-04-15 NOTE — CDMP QUERY
Pt admitted with GI bleed and has anemia documented. Please further specify type and acuity of anemia in the medical record. ? Anemia due to acute blood loss ? Anemia due to chronic blood loss ? Anemia due to iron deficiency ? Anemia due to chronic disease, please specify disease ? Dilutional anemia 
? Other, please specify ? Clinically unable to determine The medical record reflects the following: 
   Risk Factors: presents with GI bleeding Clinical Indicators: [place] 10/32-8/25-7/24, Pulse 130s-110s, and hypotensive in Ed. Treatment: EGD procedure, transfusion of 2 units of PRBCs, Thank you Elijah Campoverde RN/CCDS 
[phone]

## 2020-04-15 NOTE — PROGRESS NOTES
General Surgery End of Shift Nursing Note        Shift worked: [id number]   Summary of shift:  No acute events. No c/o pain or nausea. -110 at rest; as high as 135 with activity but recovers within 3-4 minutes and no c/o SOB. Issues for physician to address: None.       Number times ambulated in hallway past shift: 0    Number of times OOB to chair past shift: 0    Pain Management:  Current medication: 0  Patient states pain is manageable on current pain medication: YES    GI:    Current diet:  DIET NPO    Tolerating current diet: YES  Passing flatus: YES  Last Bowel Movement: several days ago      Scott Borges RN

## 2020-04-15 NOTE — PROGRESS NOTES
Problem: Falls - Risk of  Goal: *Absence of Falls  Description: Document Rodney Sol Fall Risk and appropriate interventions in the flowsheet.   Outcome: Progressing Towards Goal  Note: Fall Risk Interventions:  Mobility Interventions: Utilize walker, cane, or other assistive device         Medication Interventions: Patient to call before getting OOB    Elimination Interventions: Call light in reach              Problem: Patient Education: Go to Patient Education Activity  Goal: Patient/Family Education  Outcome: Progressing Towards Goal     Problem: Hypertension  Goal: *Blood pressure within specified parameters  Outcome: Progressing Towards Goal  Goal: *Fluid volume balance  Outcome: Progressing Towards Goal  Goal: *Labs within defined limits  Outcome: Progressing Towards Goal     Problem: Patient Education: Go to Patient Education Activity  Goal: Patient/Family Education  Outcome: Progressing Towards Goal

## 2020-04-16 LAB
ANION GAP SERPL CALC-SCNC: 3 MMOL/L (ref 5–15)
BUN SERPL-MCNC: 34 MG/DL (ref 6–20)
BUN/CREAT SERPL: 30 (ref 12–20)
CALCIUM SERPL-MCNC: 8.1 MG/DL (ref 8.5–10.1)
CHLORIDE SERPL-SCNC: 115 MMOL/L (ref 97–108)
CO2 SERPL-SCNC: 27 MMOL/L (ref 21–32)
CREAT SERPL-MCNC: 1.15 MG/DL (ref 0.55–1.02)
ERYTHROCYTE [DISTWIDTH] IN BLOOD BY AUTOMATED COUNT: 15.4 % (ref 11.5–14.5)
GLUCOSE SERPL-MCNC: 69 MG/DL (ref 65–100)
HCT VFR BLD AUTO: 24.4 % (ref 35–47)
HCT VFR BLD AUTO: 26 % (ref 35–47)
HGB BLD-MCNC: 7.5 G/DL (ref 11.5–16)
HGB BLD-MCNC: 7.9 G/DL (ref 11.5–16)
MCH RBC QN AUTO: 27 PG (ref 26–34)
MCHC RBC AUTO-ENTMCNC: 30.7 G/DL (ref 30–36.5)
MCV RBC AUTO: 87.8 FL (ref 80–99)
NRBC # BLD: 0 K/UL (ref 0–0.01)
NRBC BLD-RTO: 0 PER 100 WBC
PLATELET # BLD AUTO: 233 K/UL (ref 150–400)
PMV BLD AUTO: 11.2 FL (ref 8.9–12.9)
POTASSIUM SERPL-SCNC: 3.7 MMOL/L (ref 3.5–5.1)
RBC # BLD AUTO: 2.78 M/UL (ref 3.8–5.2)
SODIUM SERPL-SCNC: 145 MMOL/L (ref 136–145)
WBC # BLD AUTO: 7.4 K/UL (ref 3.6–11)

## 2020-04-16 PROCEDURE — 80048 BASIC METABOLIC PNL TOTAL CA: CPT

## 2020-04-16 PROCEDURE — 94760 N-INVAS EAR/PLS OXIMETRY 1: CPT

## 2020-04-16 PROCEDURE — 74011250636 HC RX REV CODE- 250/636: Performed by: GENERAL ACUTE CARE HOSPITAL

## 2020-04-16 PROCEDURE — 85027 COMPLETE CBC AUTOMATED: CPT

## 2020-04-16 PROCEDURE — 74011000250 HC RX REV CODE- 250: Performed by: GENERAL ACUTE CARE HOSPITAL

## 2020-04-16 PROCEDURE — 36415 COLL VENOUS BLD VENIPUNCTURE: CPT

## 2020-04-16 PROCEDURE — 74011250637 HC RX REV CODE- 250/637: Performed by: SPECIALIST

## 2020-04-16 PROCEDURE — 85018 HEMOGLOBIN: CPT

## 2020-04-16 PROCEDURE — 65660000000 HC RM CCU STEPDOWN

## 2020-04-16 PROCEDURE — C9113 INJ PANTOPRAZOLE SODIUM, VIA: HCPCS | Performed by: GENERAL ACUTE CARE HOSPITAL

## 2020-04-16 RX ORDER — SUCRALFATE 1 G/1
1 TABLET ORAL
Status: DISCONTINUED | OUTPATIENT
Start: 2020-04-16 | End: 2020-04-19 | Stop reason: HOSPADM

## 2020-04-16 RX ADMIN — SUCRALFATE 1 G: 1 TABLET ORAL at 16:05

## 2020-04-16 RX ADMIN — Medication 10 ML: at 13:24

## 2020-04-16 RX ADMIN — SODIUM CHLORIDE 75 ML/HR: 900 INJECTION, SOLUTION INTRAVENOUS at 22:10

## 2020-04-16 RX ADMIN — Medication 10 ML: at 21:39

## 2020-04-16 RX ADMIN — SODIUM CHLORIDE 40 MG: 9 INJECTION INTRAMUSCULAR; INTRAVENOUS; SUBCUTANEOUS at 09:18

## 2020-04-16 RX ADMIN — SODIUM CHLORIDE 40 MG: 9 INJECTION INTRAMUSCULAR; INTRAVENOUS; SUBCUTANEOUS at 21:38

## 2020-04-16 RX ADMIN — SUCRALFATE 1 G: 1 TABLET ORAL at 21:39

## 2020-04-16 RX ADMIN — SODIUM CHLORIDE 75 ML/HR: 900 INJECTION, SOLUTION INTRAVENOUS at 05:07

## 2020-04-16 RX ADMIN — Medication 10 ML: at 06:49

## 2020-04-16 RX ADMIN — SUCRALFATE 1 G: 1 TABLET ORAL at 13:23

## 2020-04-16 RX ADMIN — LATANOPROST 1 DROP: 50 SOLUTION OPHTHALMIC at 21:38

## 2020-04-16 NOTE — PROGRESS NOTES
General Surgery End of Shift Nursing Note        Shift worked:  [id number]   Summary of shift:  No acute events. No abdominal pain or nausea; but c/o acid reflux that radiates into chest. VSS. 2030: Pt c/o of similar chest tightness like described to paula TRINIDAD. \"Not pain, just tight, but then goes away. \"       No further compaints of this throughout shift. 0509: Pt c/o of mild shoulder soreness. VSS. No other complaints.  \"It feels like someone punched me in the shoulder\"      Issues for physician to address:      Number times ambulated in hallway past shift: 0    Number of times OOB to chair past shift: 0    Pain Management:  Current medication: 0  Patient states pain is manageable on current pain medication: YES    GI:    Current diet:  DIET NPO    Tolerating current diet: YES  Passing flatus: YES  Last Bowel Movement: several days ago       Eli Walls RN

## 2020-04-16 NOTE — PROGRESS NOTES
Hospitalist Progress Note    NAME: Jorge Overton   :  1945   MRN:  [id number]       Assessment / Plan:  Acute upper GIB   -brisk upper bleed on admission requiring emergent EGD  -EGD showing oozing vessel w large mucosal tear at GE junction s/p epi and endoclips  -Hb 10.4 on admission from baseline around 12  -8.1 this am. Cont to monitor Hb closely  -no hematemesis, wretching has subsided  -keep npo today per GI recs  -zofran for nausea  -cont IV PPI  -hold asa and plavix    Hx of stroke  HLD  HTN  -resume statin when taking PO  -holding antihypertensives, BP still low/normal    Code Status: Full Code  Surrogate Decision Maker: Full Code  DVT Prophylaxis: SCD  GI Prophylaxis: not indicated  Baseline: Independent IADLs, uses cane at times     Subjective:     Chief Complaint / Reason for Physician Visit  Episode of chest tightness this am, transient non-radiating. No n/v/hematemesis    Discussed with RN events overnight. Review of Systems:  Symptom Y/N Comments  Symptom Y/N Comments   Fever/Chills n   Chest Pain y resolved   Poor Appetite n   Edema n    Cough n   Abdominal Pain n    Sputum n   Joint Pain     SOB/JHA n   Pruritis/Rash     Nausea/vomit n   Tolerating PT/OT     Diarrhea n   Tolerating Diet  npo   Constipation n   Other       Could NOT obtain due to:      Objective:     VITALS:   Last 24hrs VS reviewed since prior progress note.  Most recent are:  Patient Vitals for the past 24 hrs:   Temp Pulse Resp BP SpO2   04/15/20 1903 97.8 °F (36.6 °C) 93 18 129/44 100 %   04/15/20 1514 97.8 °F (36.6 °C) 92 18 116/44 92 %   04/15/20 1437 98.3 °F (36.8 °C) 89 18 109/45 96 %   04/15/20 1041 98.3 °F (36.8 °C) 96 18 103/53 92 %   04/15/20 0719 97.6 °F (36.4 °C) (!) 107 19 129/55 94 %   04/15/20 0407 98.2 °F (36.8 °C) (!) 105 19 117/46 92 %       Intake/Output Summary (Last 24 hours) at 4/15/2020 2243  Last data filed at 4/15/2020 1845  Gross per 24 hour   Intake 1591.25 ml   Output    Net 1591.25 ml PHYSICAL EXAM:  General: WD, WN. Alert, cooperative, no acute distress    EENT:  EOMI. Anicteric sclerae. MMM  Resp:  CTA bilaterally, no wheezing or rales. No accessory muscle use  CV:  Regular  rhythm,  No edema  GI:  Soft, Non distended, Non tender.  +Bowel sounds  Neurologic:  Alert and oriented X 3, normal speech,   Psych:   Good insight. Not anxious nor agitated  Skin:  No rashes. No jaundice    Reviewed most current lab test results and cultures  YES  Reviewed most current radiology test results   YES  Review and summation of old records today    NO  Reviewed patient's current orders and MAR    YES  PMH/SH reviewed - no change compared to H&P  ________________________________________________________________________  Care Plan discussed with:    Comments   Patient x    Family      RN x    Care Manager     Consultant                        Multidiciplinary team rounds were held today with , nursing, pharmacist and clinical coordinator. Patient's plan of care was discussed; medications were reviewed and discharge planning was addressed. ________________________________________________________________________  Total NON critical care TIME: 35  Minutes    Total CRITICAL CARE TIME Spent:   Minutes non procedure based      Comments   >50% of visit spent in counseling and coordination of care     ________________________________________________________________________  Bud Clock, DO     Procedures: see electronic medical records for all procedures/Xrays and details which were not copied into this note but were reviewed prior to creation of Plan. LABS:  I reviewed today's most current labs and imaging studies.   Pertinent labs include:  Recent Labs     04/15/20  1856 04/15/20  0948 04/15/20  0007 04/14/20  1533   WBC  --   --   --  12.3*   HGB 8.9* 7.9* 8.1* 10.4*   HCT 28.7* 24.8* 25.7* 32.5*   PLT  --   --   --  328     Recent Labs     04/14/20  1533      K 3.9    CO2 29   *   BUN 60*   CREA 1.49*   CA 10.0   ALB 3.5   TBILI 0.3   SGOT 23   ALT 26   INR 1.0       Signed: Nadia Bulls, DO

## 2020-04-16 NOTE — PROGRESS NOTES
General Surgery End of Shift Nursing Note    Bedside shift change report given to Wilfrid Schilder, RN (oncoming nurse) by Munir Becerra RN (offgoing nurse). Report included the following information SBAR, Kardex, Intake/Output, MAR and Recent Results. Shift worked:   7am-7pm   Summary of shift:    Pt advanced to clear liquid diet. Pt had a black watery stool. Pt performed self hygiene this afternoon. No complaints of pain. Issues for physician to address:  Discharge     Number times ambulated in hallway past shift: 0    Number of times OOB to chair past shift: 1    Pain Management:  Current medication: None  Patient states pain is manageable on current pain medication: YES    GI:    Current diet:  DIET CLEAR LIQUID    Tolerating current diet: YES  Passing flatus: YES  Last Bowel Movement: today   Appearance: black watery    Respiratory:    Incentive Spirometer at bedside: YES  Patient instructed on use: YES    Patient Safety:    Falls Score: 2  Bed Alarm On? No  Sitter?  Not applicable    Cely Olivia

## 2020-04-16 NOTE — PROGRESS NOTES
Gastroenterology Daily Progress Note (Dr. Elly Aquino)   [address] [place] [address] Dr Isabel Date: 4/14/2020       Subjective:       Spoke with nurse on duty: no stools since yesterday. Patient has some pain in shoulder. No nausea. Slept well. Current Facility-Administered Medications   Medication Dose Route Frequency    simethicone (MYLICON) 49HX/8.9DD oral drops 80 mg  1.2 mL Oral Multiple    pantoprazole (PROTONIX) 40 mg in 0.9% sodium chloride 10 mL injection  40 mg IntraVENous Q12H    latanoprost (XALATAN) 0.005 % ophthalmic solution 1 Drop  1 Drop Both Eyes QHS    sodium chloride (NS) flush 5-40 mL  5-40 mL IntraVENous Q8H    sodium chloride (NS) flush 5-40 mL  5-40 mL IntraVENous PRN    0.9% sodium chloride infusion  75 mL/hr IntraVENous CONTINUOUS    sodium chloride (NS) flush 5-40 mL  5-40 mL IntraVENous PRN    0.9% sodium chloride infusion 250 mL  250 mL IntraVENous PRN    ondansetron (ZOFRAN) injection 4 mg  4 mg IntraVENous Q6H        Objective:     Visit Vitals  /42 (BP 1 Location: Left arm, BP Patient Position: At rest)   Pulse 89   Temp 97.6 °F (36.4 °C)   Resp 16   Ht 5' 6\" (1.676 m)   Wt 85.3 kg (188 lb 0.8 oz)   SpO2 91%   BMI 30.35 kg/m²   Blood pressure 132/42, pulse 89, temperature 97.6 °F (36.4 °C), resp. rate 16, height 5' 6\" (1.676 m), weight 85.3 kg (188 lb 0.8 oz), SpO2 91 %. No intake/output data recorded. 04/14 1901 - 04/16 0700  In: 2426.3 [I.V.:2426.3]  Out: -       Intake/Output Summary (Last 24 hours) at 4/16/2020 0807  Last data filed at 4/16/2020 0553  Gross per 24 hour   Intake 1677.5 ml   Output    Net 1677.5 ml     Physical Exam:     General: awake and alert BF in NAD  Chest:  CTA, No rhonchi, rales or rubs.   Heart: S1, S2, RRR  GI: Soft, NT, ND + bowel sounds    Labs:     Recent Results (from the past 24 hour(s))   HGB & HCT    Collection Time: 04/15/20  9:48 AM   Result Value Ref Range    HGB 7.9 (L) 11.5 - 16.0 g/dL    HCT 24.8 (L) 35.0 - 47.0 %   HGB & HCT    Collection Time: 04/15/20  6:56 PM   Result Value Ref Range    HGB 8.9 (L) 11.5 - 16.0 g/dL    HCT 28.7 (L) 35.0 - 47.0 %   CBC W/O DIFF    Collection Time: 04/16/20  2:57 AM   Result Value Ref Range    WBC 7.4 3.6 - 11.0 K/uL    RBC 2.78 (L) 3.80 - 5.20 M/uL    HGB 7.5 (L) 11.5 - 16.0 g/dL    HCT 24.4 (L) 35.0 - 47.0 %    MCV 87.8 80.0 - 99.0 FL    MCH 27.0 26.0 - 34.0 PG    MCHC 30.7 30.0 - 36.5 g/dL    RDW 15.4 (H) 11.5 - 14.5 %    PLATELET 335 741 - 905 K/uL    MPV 11.2 8.9 - 12.9 FL    NRBC 0.0 0  WBC    ABSOLUTE NRBC 0.00 0.00 - 7.46 K/uL   METABOLIC PANEL, BASIC    Collection Time: 04/16/20  2:57 AM   Result Value Ref Range    Sodium 145 136 - 145 mmol/L    Potassium 3.7 3.5 - 5.1 mmol/L    Chloride 115 (H) 97 - 108 mmol/L    CO2 27 21 - 32 mmol/L    Anion gap 3 (L) 5 - 15 mmol/L    Glucose 69 65 - 100 mg/dL    BUN 34 (H) 6 - 20 MG/DL    Creatinine 1.15 (H) 0.55 - 1.02 MG/DL    BUN/Creatinine ratio 30 (H) 12 - 20      GFR est AA 56 (L) >60 ml/min/1.73m2    GFR est non-AA 46 (L) >60 ml/min/1.73m2    Calcium 8.1 (L) 8.5 - 10.1 MG/DL     Recent Labs     04/16/20  0257 04/14/20  1533    140   K 3.7 3.9   * 104   CO2 27 29   BUN 34* 60*   CREA 1.15* 1.49*   GLU 69 153*   CA 8.1* 10.0     Recent Labs     04/14/20  1533   INR 1.0   PTP 10.7   APTT 21.4*     Recent Labs     04/14/20  1533   SGOT 23   *   TP 7.7   ALB 3.5   GLOB 4.2*     Lab Results   Component Value Date/Time    Folate 14.3 08/21/2014 01:14 PM     Recent Labs     04/14/20  1533   TROIQ <0.05       4/14/2020 15:33 4/15/2020 00:07 4/15/2020 09:48 4/15/2020 18:56 4/16/2020 02:57   HGB  10.4 (L) 8.1 (L) 7.9 (L) 8.9 (L) 7.5 (L)   HCT  32.5 (L) 25.7 (L) 24.8 (L) 28.7 (L) 24.4 (L)     Impression:  Acute Upper GI Hemorrhage on anticoagulation (ASA and Plavix PTA)  Deep Farideh Piper esophageal tear         Plan:  Hgb level is mostly stable between 7.5 and 8.1 and no new GI bleeding/melena and remains NPO.   Will try ice chips this am and then proceed with clear liquid diet later this afternoon.    -continue IV PPI BID  -start Carafate QID  -antiemetics  -elevate HOB  -Dr. Anshul Becker to see tomorrow       Eder Cleary MD    4/16/2020  [facility] [facility] [address] [place] [address], [address] [place] [address]  [phone]  [address] [place] [address] [place]: [phone]

## 2020-04-16 NOTE — PROGRESS NOTES
Problem: Falls - Risk of  Goal: *Absence of Falls  Description: Document Javydiana Salvador Fall Risk and appropriate interventions in the flowsheet. Outcome: Progressing Towards Goal  Note: Fall Risk Interventions:  Mobility Interventions: Utilize walker, cane, or other assistive device         Medication Interventions: Patient to call before getting OOB    Elimination Interventions: Call light in reach, Patient to call for help with toileting needs              Problem: Patient Education: Go to Patient Education Activity  Goal: Patient/Family Education  Outcome: Progressing Towards Goal     Problem: Hypertension  Goal: *Blood pressure within specified parameters  Outcome: Progressing Towards Goal  Goal: *Fluid volume balance  Outcome: Progressing Towards Goal  Goal: *Labs within defined limits  Outcome: Progressing Towards Goal     Problem: Patient Education: Go to Patient Education Activity  Goal: Patient/Family Education  Outcome: Progressing Towards Goal     Problem: Pressure Injury - Risk of  Goal: *Prevention of pressure injury  Description: Document Surya Scale and appropriate interventions in the flowsheet.   Outcome: Progressing Towards Goal  Note: Pressure Injury Interventions:       Moisture Interventions: Absorbent underpads    Activity Interventions: Increase time out of bed         Nutrition Interventions: Document food/fluid/supplement intake                     Problem: Patient Education: Go to Patient Education Activity  Goal: Patient/Family Education  Outcome: Progressing Towards Goal

## 2020-04-16 NOTE — PROGRESS NOTES
Hospitalist Progress Note    NAME: Thomas Cuba   :  1945   MRN:  [id number]       Assessment / Plan:  Acute upper GIB   -brisk upper bleed on admission requiring emergent EGD  -EGD showing oozing vessel w large mucosal tear at GE junction s/p epi and endoclips  -Hb 10.4 on admission from baseline around 12  Hb stable ,no further hematemesis or wretching, Hb 7.9  -no hematemesis, wretching has subsided  -keep npo today per GI recs  -zofran for nausea  -cont IV PPI  -hold asa and plavix    Hx of stroke  HLD  HTN  -resume statin when taking PO  -holding antihypertensives, BP still low/normal    Code Status: Full Code  Surrogate Decision Maker: Full Code  DVT Prophylaxis: SCD  GI Prophylaxis: not indicated  Baseline: Independent IADLs, uses cane at times     Subjective:     Chief Complaint / Reason for Physician Visit  No new complaints, tolerating PO. No chest pain/n/v    Discussed with RN events overnight. Review of Systems:  Symptom Y/N Comments  Symptom Y/N Comments   Fever/Chills n   Chest Pain y resolved   Poor Appetite n   Edema n    Cough n   Abdominal Pain n    Sputum n   Joint Pain     SOB/JHA n   Pruritis/Rash     Nausea/vomit n   Tolerating PT/OT     Diarrhea n   Tolerating Diet  npo   Constipation n   Other       Could NOT obtain due to:      Objective:     VITALS:   Last 24hrs VS reviewed since prior progress note.  Most recent are:  Patient Vitals for the past 24 hrs:   Temp Pulse Resp BP SpO2   20 0800  82      20 0720 97.6 °F (36.4 °C) 89 16 132/42 91 %   20 0225 98.5 °F (36.9 °C) 93 16 134/54 93 %   04/15/20 2347 97.3 °F (36.3 °C) 88 16 112/54 93 %   04/15/20 1903 97.8 °F (36.6 °C) 93 18 129/44 100 %   04/15/20 1514 97.8 °F (36.6 °C) 92 18 116/44 92 %   04/15/20 1437 98.3 °F (36.8 °C) 89 18 109/45 96 %   04/15/20 1041 98.3 °F (36.8 °C) 96 18 103/53 92 %       Intake/Output Summary (Last 24 hours) at 2020 0926  Last data filed at 2020 0553  Gross per 24 hour   Intake 1677.5 ml   Output    Net 1677.5 ml        PHYSICAL EXAM:  General: WD, WN. Alert, cooperative, no acute distress    EENT:  EOMI. Anicteric sclerae. MMM  Resp:  CTA bilaterally, no wheezing or rales. No accessory muscle use  CV:  Regular  rhythm,  No edema  GI:  Soft, Non distended, Non tender.  +Bowel sounds  Neurologic:  Alert and oriented X 3, normal speech,   Psych:   Good insight. Not anxious nor agitated  Skin:  No rashes. No jaundice    Reviewed most current lab test results and cultures  YES  Reviewed most current radiology test results   YES  Review and summation of old records today    NO  Reviewed patient's current orders and MAR    YES  PMH/SH reviewed - no change compared to H&P  ________________________________________________________________________  Care Plan discussed with:    Comments   Patient x    Family      RN x    Care Manager     Consultant                        Multidiciplinary team rounds were held today with , nursing, pharmacist and clinical coordinator. Patient's plan of care was discussed; medications were reviewed and discharge planning was addressed. ________________________________________________________________________  Total NON critical care TIME: 35  Minutes    Total CRITICAL CARE TIME Spent:   Minutes non procedure based      Comments   >50% of visit spent in counseling and coordination of care     ________________________________________________________________________  Sukhdev Peterson DO     Procedures: see electronic medical records for all procedures/Xrays and details which were not copied into this note but were reviewed prior to creation of Plan. LABS:  I reviewed today's most current labs and imaging studies.   Pertinent labs include:  Recent Labs     04/16/20  0257 04/15/20  1856 04/15/20  0948  04/14/20  1533   WBC 7.4  --   --   --  12.3*   HGB 7.5* 8.9* 7.9*   < > 10.4*   HCT 24.4* 28.7* 24.8*   < > 32.5*     -- --   --  328    < > = values in this interval not displayed.      Recent Labs     04/16/20  0257 04/14/20  1533    140   K 3.7 3.9   * 104   CO2 27 29   GLU 69 153*   BUN 34* 60*   CREA 1.15* 1.49*   CA 8.1* 10.0   ALB  --  3.5   TBILI  --  0.3   SGOT  --  23   ALT  --  26   INR  --  1.0       Signed: Jose Fernandez, DO

## 2020-04-17 LAB
ABO + RH BLD: NORMAL
ANION GAP SERPL CALC-SCNC: 4 MMOL/L (ref 5–15)
BLD PROD TYP BPU: NORMAL
BLD PROD TYP BPU: NORMAL
BLOOD GROUP ANTIBODIES SERPL: NORMAL
BPU ID: NORMAL
BPU ID: NORMAL
BUN SERPL-MCNC: 17 MG/DL (ref 6–20)
BUN/CREAT SERPL: 20 (ref 12–20)
CALCIUM SERPL-MCNC: 7.8 MG/DL (ref 8.5–10.1)
CHLORIDE SERPL-SCNC: 114 MMOL/L (ref 97–108)
CO2 SERPL-SCNC: 25 MMOL/L (ref 21–32)
CREAT SERPL-MCNC: 0.86 MG/DL (ref 0.55–1.02)
CROSSMATCH RESULT,%XM: NORMAL
CROSSMATCH RESULT,%XM: NORMAL
ERYTHROCYTE [DISTWIDTH] IN BLOOD BY AUTOMATED COUNT: 15.1 % (ref 11.5–14.5)
GLUCOSE SERPL-MCNC: 77 MG/DL (ref 65–100)
HCT VFR BLD AUTO: 25.4 % (ref 35–47)
HCT VFR BLD AUTO: 25.8 % (ref 35–47)
HGB BLD-MCNC: 7.8 G/DL (ref 11.5–16)
HGB BLD-MCNC: 8 G/DL (ref 11.5–16)
MCH RBC QN AUTO: 27.2 PG (ref 26–34)
MCHC RBC AUTO-ENTMCNC: 30.7 G/DL (ref 30–36.5)
MCV RBC AUTO: 88.5 FL (ref 80–99)
NRBC # BLD: 0 K/UL (ref 0–0.01)
NRBC BLD-RTO: 0 PER 100 WBC
PLATELET # BLD AUTO: 257 K/UL (ref 150–400)
PMV BLD AUTO: 11 FL (ref 8.9–12.9)
POTASSIUM SERPL-SCNC: 3.6 MMOL/L (ref 3.5–5.1)
RBC # BLD AUTO: 2.87 M/UL (ref 3.8–5.2)
SODIUM SERPL-SCNC: 143 MMOL/L (ref 136–145)
SPECIMEN EXP DATE BLD: NORMAL
STATUS OF UNIT,%ST: NORMAL
STATUS OF UNIT,%ST: NORMAL
UNIT DIVISION, %UDIV: 0
UNIT DIVISION, %UDIV: 0
WBC # BLD AUTO: 6.9 K/UL (ref 3.6–11)

## 2020-04-17 PROCEDURE — 65660000000 HC RM CCU STEPDOWN

## 2020-04-17 PROCEDURE — 74011250636 HC RX REV CODE- 250/636: Performed by: GENERAL ACUTE CARE HOSPITAL

## 2020-04-17 PROCEDURE — C9113 INJ PANTOPRAZOLE SODIUM, VIA: HCPCS | Performed by: GENERAL ACUTE CARE HOSPITAL

## 2020-04-17 PROCEDURE — 80048 BASIC METABOLIC PNL TOTAL CA: CPT

## 2020-04-17 PROCEDURE — 74011250636 HC RX REV CODE- 250/636: Performed by: INTERNAL MEDICINE

## 2020-04-17 PROCEDURE — 36415 COLL VENOUS BLD VENIPUNCTURE: CPT

## 2020-04-17 PROCEDURE — 74011000250 HC RX REV CODE- 250: Performed by: GENERAL ACUTE CARE HOSPITAL

## 2020-04-17 PROCEDURE — 85027 COMPLETE CBC AUTOMATED: CPT

## 2020-04-17 PROCEDURE — 74011250637 HC RX REV CODE- 250/637: Performed by: SPECIALIST

## 2020-04-17 PROCEDURE — 94760 N-INVAS EAR/PLS OXIMETRY 1: CPT

## 2020-04-17 PROCEDURE — 85018 HEMOGLOBIN: CPT

## 2020-04-17 RX ADMIN — LATANOPROST 1 DROP: 50 SOLUTION OPHTHALMIC at 23:07

## 2020-04-17 RX ADMIN — SUCRALFATE 1 G: 1 TABLET ORAL at 23:08

## 2020-04-17 RX ADMIN — Medication 10 ML: at 23:10

## 2020-04-17 RX ADMIN — SUCRALFATE 1 G: 1 TABLET ORAL at 10:10

## 2020-04-17 RX ADMIN — ONDANSETRON 4 MG: 2 INJECTION INTRAMUSCULAR; INTRAVENOUS at 23:09

## 2020-04-17 RX ADMIN — SUCRALFATE 1 G: 1 TABLET ORAL at 12:14

## 2020-04-17 RX ADMIN — SODIUM CHLORIDE 40 MG: 9 INJECTION INTRAMUSCULAR; INTRAVENOUS; SUBCUTANEOUS at 23:08

## 2020-04-17 RX ADMIN — SUCRALFATE 1 G: 1 TABLET ORAL at 17:03

## 2020-04-17 RX ADMIN — Medication 10 ML: at 14:30

## 2020-04-17 NOTE — PROGRESS NOTES
Hospitalist Progress Note    NAME: Evelyn Marx   :  1945   MRN:  [id number]       Assessment / Plan:    Acute blood loss anemia POA Hgb dropped 10. Acute upper GI bleed POA  Esophageal kobe younger tears POA although history a bit atypical  -brisk upper bleed on admission requiring emergent EGD  -EGD showing oozing vessel w large mucosal tear x 2 at GE junction s/p epi and endoclips  - IV PPI and carafate  -Hb 10.4 on admit, prior baseline around 12, dropped to 7.5       HgB now stable 7.5 to 7.9 past 24 hours  - no further vomiting, did pass one back stool overnight, suspect residual blood  -advance to full liquids per GI, if stable, hopefully discharge by AM  -zofran for nausea  -hold asa and plavix x 7 days, then start back ASA initially    Hx of stroke  HLD  HTN  -statin at discharge  -holding antihypertensives, BP still low/normal, add back as needed    Code Status: Full Code  Surrogate Decision Maker: Full Code  DVT Prophylaxis: SCD  GI Prophylaxis: not indicated  Baseline: Independent IADLs, uses cane at times     Subjective:     Chief Complaint / Reason for Physician Visit  \"I had one black bowel movement yesterday\" , tolerating PO. No chest pain/n/v  No further vomiting, tolerating clears well  One black BM yesterday, no blood  No SOB, CP or abdominal pain  Discussed with RN events overnight. Review of Systems:  Symptom Y/N Comments  Symptom Y/N Comments   Fever/Chills n   Chest Pain n    Poor Appetite    Edema n    Cough n   Abdominal Pain n    Sputum    Joint Pain     SOB/JHA n   Pruritis/Rash     Nausea/vomit n   Tolerating PT/OT     Diarrhea n   Tolerating Diet y clears   Constipation    Other       Could NOT obtain due to:      Objective:     VITALS:   Last 24hrs VS reviewed since prior progress note.  Most recent are:  Patient Vitals for the past 24 hrs:   Temp Pulse Resp BP SpO2   20 0817 97.7 °F (36.5 °C) 93 18 140/53 98 %   20 0015 98.6 °F (37 °C) 86 18 129/51 98 % 04/16/20 1836 98.4 °F (36.9 °C) 89 20 123/51 95 %   04/16/20 1600  96      04/16/20 1422 97.7 °F (36.5 °C) 90 16 159/48 95 %   04/16/20 1200  84      04/16/20 1037 98.6 °F (37 °C) 85 16 131/46 91 %       Intake/Output Summary (Last 24 hours) at 4/17/2020 0954  Last data filed at 4/17/2020 1166  Gross per 24 hour   Intake 2128.75 ml   Output    Net 2128.75 ml        PHYSICAL EXAM:  General: WD, WN. Alert, cooperative, no acute distress    EENT:   Anicteric sclerae. MMM  Resp:  CTA bilaterally, no wheezing or rales. No accessory muscle use  CV:  Regular  rhythm,  No edema  GI:  Soft, Non distended, Non tender.  +Bowel sounds  Neurologic:  Alert and oriented X 3, normal speech,   Psych:   Good insight. Not anxious nor agitated  Skin:  No rashes. No jaundice    Reviewed most current lab test results and cultures  YES  Reviewed most current radiology test results   YES  Review and summation of old records today    NO  Reviewed patient's current orders and MAR    YES  PMH/ reviewed - no change compared to H&P  ________________________________________________________________________  Care Plan discussed with:    Comments   Patient x    Family      RN x    Care Manager     Consultant                        Multidiciplinary team rounds were held today with , nursing, pharmacist and clinical coordinator. Patient's plan of care was discussed; medications were reviewed and discharge planning was addressed.      ________________________________________________________________________  Total NON critical care TIME: 35  Minutes    Total CRITICAL CARE TIME Spent:   Minutes non procedure based      Comments   >50% of visit spent in counseling and coordination of care     ________________________________________________________________________  Waldo Tyson MD     Procedures: see electronic medical records for all procedures/Xrays and details which were not copied into this note but were reviewed prior to creation of Plan. LABS:  I reviewed today's most current labs and imaging studies. Pertinent labs include:  Recent Labs     04/17/20  0522 04/16/20  1724 04/16/20 0257 04/14/20  1533   WBC 6.9  --  7.4  --  12.3*   HGB 7.8* 7.9* 7.5*   < > 10.4*   HCT 25.4* 26.0* 24.4*   < > 32.5*     --  233  --  328    < > = values in this interval not displayed.      Recent Labs     04/17/20 0522 04/16/20 0257 04/14/20  1533    145 140   K 3.6 3.7 3.9   * 115* 104   CO2 25 27 29   GLU 77 69 153*   BUN 17 34* 60*   CREA 0.86 1.15* 1.49*   CA 7.8* 8.1* 10.0   ALB  --   --  3.5   TBILI  --   --  0.3   SGOT  --   --  23   ALT  --   --  26   INR  --   --  1.0       Signed: Jolly Maldonado MD

## 2020-04-17 NOTE — PROGRESS NOTES
General Surgery End of Shift Nursing Note      Shift worked: [id number]   Summary of shift:   No acute events. Slept well. VSS.     Issues for physician to address:  None     Number times ambulated in hallway past shift: 0    Number of times OOB to chair past shift: 0    Pain Management:  Current medication: 0  Patient states pain is manageable on current pain medication: YES    GI:    Current diet:  DIET CLEAR LIQUID    Tolerating current diet: YES  Passing flatus: YES  Last Bowel Movement: yesterday

## 2020-04-17 NOTE — PROGRESS NOTES
Gastroenterology Daily Progress Note   Elle Loaiza PA-C for Dr. Kyra Garner)   [facility]    Admit Date: 4/14/2020       Subjective:       1 dark stool yesterday. Tolerating CLD. No nausea or dry heaving. No abd pain or indigestion. No light headedness, SOB, CP. Hgb stable at 7.8    Current Facility-Administered Medications   Medication Dose Route Frequency    sucralfate (CARAFATE) tablet 1 g  1 g Oral AC&HS    simethicone (MYLICON) 43XW/6.6CD oral drops 80 mg  1.2 mL Oral Multiple    pantoprazole (PROTONIX) 40 mg in 0.9% sodium chloride 10 mL injection  40 mg IntraVENous Q12H    latanoprost (XALATAN) 0.005 % ophthalmic solution 1 Drop  1 Drop Both Eyes QHS    sodium chloride (NS) flush 5-40 mL  5-40 mL IntraVENous Q8H    sodium chloride (NS) flush 5-40 mL  5-40 mL IntraVENous PRN    sodium chloride (NS) flush 5-40 mL  5-40 mL IntraVENous PRN    0.9% sodium chloride infusion 250 mL  250 mL IntraVENous PRN    ondansetron (ZOFRAN) injection 4 mg  4 mg IntraVENous Q6H        Objective:     Visit Vitals  /53   Pulse 93   Temp 97.7 °F (36.5 °C)   Resp 18   Ht 5' 6\" (1.676 m)   Wt 85.3 kg (188 lb 0.8 oz)   SpO2 98%   Breastfeeding No   BMI 30.35 kg/m²   Blood pressure 140/53, pulse 93, temperature 97.7 °F (36.5 °C), resp. rate 18, height 5' 6\" (1.676 m), weight 85.3 kg (188 lb 0.8 oz), SpO2 98 %, not currently breastfeeding. 04/17 0701 - 04/17 1900  In: 102.5 [I.V.:102.5]  Out: -     04/15 1901 - 04/17 0700  In: 3057.5 [P.O.:340; I.V.:2717.5]  Out: -       Intake/Output Summary (Last 24 hours) at 4/17/2020 0832  Last data filed at 4/17/2020 0821  Gross per 24 hour   Intake 2128.75 ml   Output    Net 2128.75 ml     Physical Exam:     General: awake and alert BF in NAD    Results for Brenda Benjamin (MRN [id number]) as of 4/17/2020 08:33   Ref.  Range 4/16/2020 02:57 4/16/2020 17:24 4/17/2020 05:22   HGB Latest Ref Range: 11.5 - 16.0 g/dL 7.5 (L) 7.9 (L) 7.8 (L)   HCT Latest Ref Range: 35.0 - 47.0 % 24.4 (L) 26.0 (L) 25.4 (L)       Labs:     Recent Results (from the past 24 hour(s))   HGB & HCT    Collection Time: 04/16/20  5:24 PM   Result Value Ref Range    HGB 7.9 (L) 11.5 - 16.0 g/dL    HCT 26.0 (L) 35.0 - 47.0 %   CBC W/O DIFF    Collection Time: 04/17/20  5:22 AM   Result Value Ref Range    WBC 6.9 3.6 - 11.0 K/uL    RBC 2.87 (L) 3.80 - 5.20 M/uL    HGB 7.8 (L) 11.5 - 16.0 g/dL    HCT 25.4 (L) 35.0 - 47.0 %    MCV 88.5 80.0 - 99.0 FL    MCH 27.2 26.0 - 34.0 PG    MCHC 30.7 30.0 - 36.5 g/dL    RDW 15.1 (H) 11.5 - 14.5 %    PLATELET 736 792 - 145 K/uL    MPV 11.0 8.9 - 12.9 FL    NRBC 0.0 0  WBC    ABSOLUTE NRBC 0.00 0.00 - 9.54 K/uL   METABOLIC PANEL, BASIC    Collection Time: 04/17/20  5:22 AM   Result Value Ref Range    Sodium 143 136 - 145 mmol/L    Potassium 3.6 3.5 - 5.1 mmol/L    Chloride 114 (H) 97 - 108 mmol/L    CO2 25 21 - 32 mmol/L    Anion gap 4 (L) 5 - 15 mmol/L    Glucose 77 65 - 100 mg/dL    BUN 17 6 - 20 MG/DL    Creatinine 0.86 0.55 - 1.02 MG/DL    BUN/Creatinine ratio 20 12 - 20      GFR est AA >60 >60 ml/min/1.73m2    GFR est non-AA >60 >60 ml/min/1.73m2    Calcium 7.8 (L) 8.5 - 10.1 MG/DL     Recent Labs     04/17/20  0522 04/16/20  0257 04/14/20  1533    145 140   K 3.6 3.7 3.9   * 115* 104   CO2 25 27 29   BUN 17 34* 60*   CREA 0.86 1.15* 1.49*   GLU 77 69 153*   CA 7.8* 8.1* 10.0     Recent Labs     04/14/20  1533   INR 1.0   PTP 10.7   APTT 21.4*     Recent Labs     04/14/20  1533   SGOT 23   *   TP 7.7   ALB 3.5   GLOB 4.2*     Lab Results   Component Value Date/Time    Folate 14.3 08/21/2014 01:14 PM     Recent Labs     04/14/20  1533   TROIQ <0.05       4/14/2020 15:33 4/15/2020 00:07 4/15/2020 09:48 4/15/2020 18:56 4/16/2020 02:57   HGB  10.4 (L) 8.1 (L) 7.9 (L) 8.9 (L) 7.5 (L)   HCT  32.5 (L) 25.7 (L) 24.8 (L) 28.7 (L) 24.4 (L)     Impression:  Acute Upper GI Hemorrhage on anticoagulation (ASA and Plavix PTA)  Deep Fito Phlegm esophageal tear         Plan:  Hgb level is stable. Dark stool yesterday was likely passage of old blood from initial bleed. Will advance to full liquid diet but not beyond there for another 2 days due to risk of dislodging clips.      -continue IV PPI BID and carafate and Zofran  -d/c planning per hospitalist  -f/u with Dr. Wiliam Brown in several weeks with CBC prior       BETTY Chicas  4/17/2020   8:38 AM    [id number] [facility], [address] [place] [address]  [phone]  [address] [place] [address] [place]: [phone]

## 2020-04-17 NOTE — PROGRESS NOTES
Problem: Falls - Risk of  Goal: *Absence of Falls  Description: Document Vaughn Major Fall Risk and appropriate interventions in the flowsheet. Outcome: Progressing Towards Goal  Note: Fall Risk Interventions:  Mobility Interventions: Utilize walker, cane, or other assistive device         Medication Interventions: Patient to call before getting OOB    Elimination Interventions: Call light in reach              Problem: Patient Education: Go to Patient Education Activity  Goal: Patient/Family Education  Outcome: Progressing Towards Goal     Problem: Hypertension  Goal: *Blood pressure within specified parameters  Outcome: Progressing Towards Goal  Goal: *Fluid volume balance  Outcome: Progressing Towards Goal  Goal: *Labs within defined limits  Outcome: Progressing Towards Goal     Problem: Patient Education: Go to Patient Education Activity  Goal: Patient/Family Education  Outcome: Progressing Towards Goal     Problem: Pressure Injury - Risk of  Goal: *Prevention of pressure injury  Description: Document Surya Scale and appropriate interventions in the flowsheet.   Outcome: Progressing Towards Goal  Note: Pressure Injury Interventions:       Moisture Interventions: Absorbent underpads    Activity Interventions: Increase time out of bed         Nutrition Interventions: Document food/fluid/supplement intake                     Problem: Patient Education: Go to Patient Education Activity  Goal: Patient/Family Education  Outcome: Progressing Towards Goal

## 2020-04-17 NOTE — PROGRESS NOTES
Reason for Admission:   Anemia                   RUR Score:          15           Plan for utilizing home health:      Not homebound    PCP: First and Last name:   Mathew Siegel   Name of Practice:    Are you a current patient: Yes/No:    Approximate date of last visit:                     Current Advanced Directive/Advance Care Plan: not in epic                         Transition of Care Plan:                    -MD anticipates d/c Saturday to home without d/c needs  -full code  -family to transport  -has [employer]

## 2020-04-18 LAB
ANION GAP SERPL CALC-SCNC: 6 MMOL/L (ref 5–15)
BUN SERPL-MCNC: 9 MG/DL (ref 6–20)
BUN/CREAT SERPL: 11 (ref 12–20)
CALCIUM SERPL-MCNC: 8.1 MG/DL (ref 8.5–10.1)
CHLORIDE SERPL-SCNC: 111 MMOL/L (ref 97–108)
CO2 SERPL-SCNC: 25 MMOL/L (ref 21–32)
COMMENT, HOLDF: NORMAL
CREAT SERPL-MCNC: 0.84 MG/DL (ref 0.55–1.02)
ERYTHROCYTE [DISTWIDTH] IN BLOOD BY AUTOMATED COUNT: 14.9 % (ref 11.5–14.5)
GLUCOSE SERPL-MCNC: 97 MG/DL (ref 65–100)
HCT VFR BLD AUTO: 24.3 % (ref 35–47)
HGB BLD-MCNC: 7.4 G/DL (ref 11.5–16)
MCH RBC QN AUTO: 26.7 PG (ref 26–34)
MCHC RBC AUTO-ENTMCNC: 30.5 G/DL (ref 30–36.5)
MCV RBC AUTO: 87.7 FL (ref 80–99)
NRBC # BLD: 0.02 K/UL (ref 0–0.01)
NRBC BLD-RTO: 0.3 PER 100 WBC
PLATELET # BLD AUTO: 273 K/UL (ref 150–400)
PMV BLD AUTO: 11.1 FL (ref 8.9–12.9)
POTASSIUM SERPL-SCNC: 3.4 MMOL/L (ref 3.5–5.1)
RBC # BLD AUTO: 2.77 M/UL (ref 3.8–5.2)
SAMPLES BEING HELD,HOLD: NORMAL
SODIUM SERPL-SCNC: 142 MMOL/L (ref 136–145)
WBC # BLD AUTO: 6.9 K/UL (ref 3.6–11)

## 2020-04-18 PROCEDURE — 94760 N-INVAS EAR/PLS OXIMETRY 1: CPT

## 2020-04-18 PROCEDURE — 74011250636 HC RX REV CODE- 250/636: Performed by: GENERAL ACUTE CARE HOSPITAL

## 2020-04-18 PROCEDURE — 65660000000 HC RM CCU STEPDOWN

## 2020-04-18 PROCEDURE — 74011250636 HC RX REV CODE- 250/636: Performed by: INTERNAL MEDICINE

## 2020-04-18 PROCEDURE — 85027 COMPLETE CBC AUTOMATED: CPT

## 2020-04-18 PROCEDURE — C9113 INJ PANTOPRAZOLE SODIUM, VIA: HCPCS | Performed by: GENERAL ACUTE CARE HOSPITAL

## 2020-04-18 PROCEDURE — 74011250637 HC RX REV CODE- 250/637: Performed by: SPECIALIST

## 2020-04-18 PROCEDURE — 94762 N-INVAS EAR/PLS OXIMTRY CONT: CPT

## 2020-04-18 PROCEDURE — 74011000250 HC RX REV CODE- 250: Performed by: GENERAL ACUTE CARE HOSPITAL

## 2020-04-18 PROCEDURE — 80048 BASIC METABOLIC PNL TOTAL CA: CPT

## 2020-04-18 PROCEDURE — 74011250637 HC RX REV CODE- 250/637: Performed by: INTERNAL MEDICINE

## 2020-04-18 PROCEDURE — 36415 COLL VENOUS BLD VENIPUNCTURE: CPT

## 2020-04-18 RX ORDER — SUCRALFATE 1 G/1
1 TABLET ORAL
Qty: 56 TAB | Refills: 0 | Status: SHIPPED | OUTPATIENT
Start: 2020-04-18 | End: 2020-05-02

## 2020-04-18 RX ORDER — LANOLIN ALCOHOL/MO/W.PET/CERES
325 CREAM (GRAM) TOPICAL
Qty: 90 TAB | Refills: 0 | Status: SHIPPED | OUTPATIENT
Start: 2020-04-18 | End: 2020-07-17

## 2020-04-18 RX ORDER — OMEPRAZOLE 40 MG/1
CAPSULE, DELAYED RELEASE ORAL
Qty: 60 CAP | Refills: 3 | Status: SHIPPED | OUTPATIENT
Start: 2020-04-18

## 2020-04-18 RX ADMIN — SUCRALFATE 1 G: 1 TABLET ORAL at 18:52

## 2020-04-18 RX ADMIN — Medication 10 ML: at 05:51

## 2020-04-18 RX ADMIN — ONDANSETRON 4 MG: 2 INJECTION INTRAMUSCULAR; INTRAVENOUS at 05:50

## 2020-04-18 RX ADMIN — SUCRALFATE 1 G: 1 TABLET ORAL at 21:30

## 2020-04-18 RX ADMIN — LATANOPROST 1 DROP: 50 SOLUTION OPHTHALMIC at 21:31

## 2020-04-18 RX ADMIN — SODIUM CHLORIDE 40 MG: 9 INJECTION INTRAMUSCULAR; INTRAVENOUS; SUBCUTANEOUS at 21:30

## 2020-04-18 RX ADMIN — Medication 10 ML: at 21:31

## 2020-04-18 RX ADMIN — POTASSIUM BICARBONATE 40 MEQ: 782 TABLET, EFFERVESCENT ORAL at 10:53

## 2020-04-18 RX ADMIN — SUCRALFATE 1 G: 1 TABLET ORAL at 07:05

## 2020-04-18 RX ADMIN — SUCRALFATE 1 G: 1 TABLET ORAL at 10:53

## 2020-04-18 RX ADMIN — SODIUM CHLORIDE 40 MG: 9 INJECTION INTRAMUSCULAR; INTRAVENOUS; SUBCUTANEOUS at 08:34

## 2020-04-18 NOTE — PROGRESS NOTES
General Surgery End of Shift Nursing Note    Bedside shift change report given to Mikhail Solis (oncoming nurse) by RYAN LIU [facility] (offgoing nurse). Report included the following information SBAR, Kardex, Intake/Output, MAR and Recent Results. Shift worked:   7a-7p   Summary of shift:    Diet advanced to GI lite and tolerated well.  Expected to discharge in AM    Issues for physician to address: none         Amelia Ames

## 2020-04-18 NOTE — DISCHARGE SUMMARY
Hospitalist Discharge Note    NAME: Aydee Mcgrath   :     MRN:  [id number]     Admit date: 2020    Discharge date: 20    PCP: Tra Alejandro NP    Discharge Diagnoses:    Acute blood loss anemia POA Hgb dropped 10.5 to 7.4 -8.0    Acute upper GI bleed POA    Esophageal kobe younger tears POA    History of stroke    Hyperlipidemia POA    Essential HTN POA    Code Status: Full Code        Discharge Medications:  Current Discharge Medication List      START taking these medications    Details   sucralfate (CARAFATE) 1 gram tablet Take 1 Tab by mouth Before breakfast, lunch, dinner and at bedtime for 14 days. Qty: 56 Tab, Refills: 0      omeprazole (PRILOSEC) 40 mg capsule 40 mg twice per day x 14 days, then daily till stopped by gastroenterologist  Indications: inflammation of the esophagus with erosion  Qty: 60 Cap, Refills: 3      ferrous sulfate (Iron, Ferrous Sulfate,) 325 mg (65 mg iron) tablet Take 1 Tab by mouth Daily (before breakfast) for 90 days. Qty: 90 Tab, Refills: 0         CONTINUE these medications which have NOT CHANGED    Details   MYRBETRIQ 25 mg ER tablet TK 1 T PO ONCE D      atorvastatin (LIPITOR) 40 mg tablet Take 1 Tab by mouth daily. Qty: 30 Tab, Refills: 3    Associated Diagnoses: Mixed hyperlipidemia      latanoprost (XALATAN) 0.005 % ophthalmic solution Administer 1 Drop to both eyes nightly. hydroCHLOROthiazide (HYDRODIURIL) 12.5 mg tablet       calcium carbonate (CALTREX) 600 mg calcium (1,500 mg) tablet Take 600 mg by mouth daily. mirtazapine (REMERON) 30 mg tablet 30 mg nightly. lisinopril (PRINIVIL, ZESTRIL) 10 mg tablet       citalopram (CELEXA) 20 mg tablet Take 20 mg by mouth daily as needed. multivitamin (ONE A DAY) tablet Take 1 Tab by mouth daily.     Associated Diagnoses: Depression, acute; Tiredness         STOP taking these medications       clopidogreL (PLAVIX) 75 mg tab Comments:   Reason for Stopping:         aspirin 81 mg chewable tablet Comments:   Reason for Stopping: Follow-up Information     Follow up With Specialties Details Why Contact Info    Abdirashid Flower NP Nurse Practitioner Schedule an appointment as soon as possible for a visit in 1 week follow gi bleed and blood counts [facility] [phone]      Wander Diop MD Gastroenterology Schedule an appointment as soon as possible for a visit in 3 weeks gastroenterology follow for esophageal ulcers [address] [place] [address]  [address]  [phone]            Time spent on discharge:   I spent greater than 30 minutes on discharge, seeing and examining the patient, reconciling home meds and new meds, coordinating care with case management, doing the discharge papers and the D/C summary    Discharge disposition: home    Discharge Condition: Stable    Summary of admission H+P(copied from Dr Beau Jones  Note):     CHIEF COMPLAINT: nausea, vomiting blood and tar     HISTORY OF PRESENT ILLNESS:     Thomas Cuba is a 69 y.o.  female who presents with CC listed above. Pt states that she was feeling well up until yesterday evening when she noticed abdominal pain, and nausea. She states that she \"vomited blood\" at that time. She went to bed afterwards and this morning \"vomited tar. \" She states that she tried to get to her PCP's office but was too weak to make it from the bus to inside the facility. She denies any previous history of GI bleed. She denies any history of NSAID abuse.  Endorses taking ASA and Plavix daily for previous stroke.     We were asked to admit for work up and evaluation of the above problems.           Past Medical History:   Diagnosis Date    Depression      Fall at home 2/10/2014   [facility] or floaters of right eye 8/21/2014    High cholesterol      History of stroke      Hypertension      Left acoustic neuroma ([facility] [address]      Stroke [facility])       2010 and 2019      EGD per Dr Garth Gross note   \" Esophagus: The esophageal mucosa in the proximal and mid   esophagus is normal.   The squamo-columnar junction is at 40 cm where the Z-line was noted. A vessel with a large mucosal tear is noted at 5 o clock at the GE junction. Oozing is noted. 2 BS Resolution clips were applied to this area after 4 cc of 1:91214 epinephrine injected and area cauterized. On the opposing wall is a much larger ulcerated oozing area with bright red laminar flow. I initially injected 6 cc of Epinephrine and then applied 3 clips to the site. Bleeding continued. I then used a Gold probe to cauterize--oozing did not stop. 3 more clips were applied(one in retroflexed view) applied to the site. Oozing slowed doen but did not completely seize likely secondary to AS/plavix effect. Further cautery not performed for risk of perforating esophagus. Stomach: The gastric mucosa has retch erythema in the fundus and moderate body erythema(c/w gastritis). The angularis is normal   Duodenum:   The bulb and post bulbar mucosa is normal in appearance. The duodenal folds are normal.   Therapies:    injection of 10 cc of  SM Epinephrine,   Cautery of GE junction ulcers/MWT  endoclip x 7  placed for hemorrhage  Specimen: none          Complications:   None were encountered during the procedure. EBL:  50 ml  Recommendations:   -Acid suppression with a proton pump inhibitor. ,   -Start Zofran to decrease retching.  -Hold ASA/Plavix. \"    Hospital course:         Acute blood loss anemia POA Hgb dropped 10. Acute upper GI bleed POA  Esophageal kobe younger tears POA although history a bit atypical  -brisk upper bleed on admission requiring emergent EGD  -EGD showing oozing vessel w large mucosal tear x 2 at GE junction s/p epi and endoclips  - IV PPI and carafate  -Hb 10.4 on admit, prior baseline around 12, dropped to 7.5       HgB now stable 7.4 to 8.0 past 24 hours  - no further vomiting, did pass one back stool overnight, suspect residual blood  -advance to full liquids per GI for 2 days, recommended soft foods for next week  -zofran for nausea  -hold asa and plavix x 7 days, then start back ASA initially  - hopefully discharge home today    Hx of stroke  HLD  HTN  - statin at discharge  - holding antihypertensives, BP still low/normal, add back as needed  - add back ASA and plavix    Code Status: Full Code  Surrogate Decision Maker: Full Code  DVT Prophylaxis: SCD  GI Prophylaxis: not indicated  Baseline: Independent IADLs, uses cane at times     Subjective:     Chief Complaint / Reason for Physician Visit f/u upper GI bleed  \"Ready to go home\" , tolerating PO. No chest pain/n/v  No further BMs or bleeding  No SOB, CP or abdominal pain  Could not get ride last night  Spoke with daughter last night, discussed hospital course, findings and treatment plan going forward  Discussed with RN events overnight. Review of Systems:  Symptom Y/N Comments  Symptom Y/N Comments   Fever/Chills n   Chest Pain n    Poor Appetite    Edema n    Cough n   Abdominal Pain n    Sputum    Joint Pain     SOB/JHA n   Pruritis/Rash     Nausea/vomit n   Tolerating PT/OT     Diarrhea n   Tolerating Diet y Soft diet   Constipation    Other       Could NOT obtain due to:      Objective:     VITALS:   Last 24hrs VS reviewed since prior progress note. Most recent are:  Patient Vitals for the past 24 hrs:   Temp Pulse Resp BP SpO2   04/19/20 0227 98.1 °F (36.7 °C) 93 17 137/61 92 %   04/18/20 2214 97.7 °F (36.5 °C) 85 16 131/50 98 %   04/18/20 1942 97.6 °F (36.4 °C) 88 16 147/57 98 %   04/18/20 1503 98.6 °F (37 °C) 87 18 148/55 98 %   04/18/20 1042 98.4 °F (36.9 °C) 87 18 114/55 99 %       Intake/Output Summary (Last 24 hours) at 4/18/2020 1707  Last data filed at 4/18/2020 1316  Gross per 24 hour   Intake 840 ml   Output 300 ml   Net 540 ml        PHYSICAL EXAM:  General: WD, WN. Alert, cooperative, no acute distress    EENT:   Anicteric sclerae. MMM  Resp:  CTA bilaterally, no wheezing or rales. No accessory muscle use  CV:  Regular  rhythm,  No edema  GI:  Soft, Non distended, Non tender.  +Bowel sounds  Neurologic:  Alert and oriented X 3, normal speech,   Psych:   Good insight. Not anxious nor agitated  Skin:  No rashes. No jaundice    Reviewed most current lab test results and cultures  YES  Reviewed most current radiology test results   YES  Review and summation of old records today    NO  Reviewed patient's current orders and MAR    YES  PMH/SH reviewed - no change compared to H&P  ________________________________________________________________________  Care Plan discussed with:    Comments   Patient x    Family  x Daughter by phone last night   RN x    Care Manager     Consultant                        Multidiciplinary team rounds were held today with , nursing, pharmacist and clinical coordinator. Patient's plan of care was discussed; medications were reviewed and discharge planning was addressed. __________________________________________________________________      Comments   >50% of visit spent in counseling and coordination of care     ________________________________________________________________________  Denton Doan MD     Procedures: see electronic medical records for all procedures/Xrays and details which were not copied into this note but were reviewed prior to creation of Plan. LABS:  I reviewed today's most current labs and imaging studies.   Pertinent labs include:  Recent Labs     04/19/20  0226 04/18/20  0519 04/17/20  1900 04/17/20  0522   WBC 6.7 6.9  --  6.9   HGB 7.5* 7.4* 8.0* 7.8*   HCT 24.1* 24.3* 25.8* 25.4*    273  --  257     Recent Labs     04/18/20  0519 04/17/20  0522    143   K 3.4* 3.6   * 114*   CO2 25 25   GLU 97 77   BUN 9 17   CREA 0.84 0.86   CA 8.1* 7.8*       Signed: Denton Doan MD

## 2020-04-18 NOTE — PROGRESS NOTES
Hospitalist Progress Note    NAME: Kobe Snow   :  1945   MRN:  [id number]       Assessment / Plan:    Acute blood loss anemia POA Hgb dropped 10. Acute upper GI bleed POA  Esophageal kobe younger tears POA although history a bit atypical  -brisk upper bleed on admission requiring emergent EGD  -EGD showing oozing vessel w large mucosal tear x 2 at GE junction s/p epi and endoclips  - IV PPI and carafate  -Hb 10.4 on admit, prior baseline around 12, dropped to 7.5       HgB now stable 7.4 to 8.0 past 24 hours  - no further vomiting, did pass one back stool overnight, suspect residual blood  -advance to full liquids per GI for 2 days, recommended soft foods for next week  -zofran for nausea  -hold asa and plavix x 7 days, then start back ASA initially  - hopefully discharge home today    Hx of stroke  HLD  HTN  - statin at discharge  - holding antihypertensives, BP still low/normal, add back as needed  - add back ASA and plavix    Code Status: Full Code  Surrogate Decision Maker: Full Code  DVT Prophylaxis: SCD  GI Prophylaxis: not indicated  Baseline: Independent IADLs, uses cane at times     Subjective:     Chief Complaint / Reason for Physician Visit f/u upper GI bleed  \"I did not sleep well last night\" , tolerating PO. No chest pain/n/v  No further vomiting, tolerating diet  No further BMs  No SOB, CP or abdominal pain  Feels ready to go home  Discussed with RN events overnight. Review of Systems:  Symptom Y/N Comments  Symptom Y/N Comments   Fever/Chills n   Chest Pain n    Poor Appetite    Edema n    Cough n   Abdominal Pain n    Sputum    Joint Pain     SOB/JHA n   Pruritis/Rash     Nausea/vomit n   Tolerating PT/OT     Diarrhea n   Tolerating Diet y Full liquids   Constipation    Other       Could NOT obtain due to:      Objective:     VITALS:   Last 24hrs VS reviewed since prior progress note.  Most recent are:  Patient Vitals for the past 24 hrs:   Temp Pulse Resp BP SpO2   20 1042 98.4 °F (36.9 °C) 87 18 114/55 99 %   04/18/20 0721 98.4 °F (36.9 °C) 97 17 158/61 98 %   04/18/20 0000  85      04/17/20 2330 98.2 °F (36.8 °C) 85 16 148/48 96 %   04/17/20 2000  83      04/17/20 1944 97.9 °F (36.6 °C) 83 16 139/61 100 %   04/17/20 1600  84      04/17/20 1521 97.9 °F (36.6 °C) 81 18 148/69 97 %   04/17/20 1211 97.6 °F (36.4 °C) 81 18 142/71 96 %       Intake/Output Summary (Last 24 hours) at 4/18/2020 1201  Last data filed at 4/18/2020 1010  Gross per 24 hour   Intake 360 ml   Output    Net 360 ml        PHYSICAL EXAM:  General: WD, WN. Alert, cooperative, no acute distress    EENT:   Anicteric sclerae. MMM  Resp:  CTA bilaterally, no wheezing or rales. No accessory muscle use  CV:  Regular  rhythm,  No edema  GI:  Soft, Non distended, Non tender.  +Bowel sounds  Neurologic:  Alert and oriented X 3, normal speech,   Psych:   Good insight. Not anxious nor agitated  Skin:  No rashes. No jaundice    Reviewed most current lab test results and cultures  YES  Reviewed most current radiology test results   YES  Review and summation of old records today    NO  Reviewed patient's current orders and MAR    YES  PMH/ reviewed - no change compared to H&P  ________________________________________________________________________  Care Plan discussed with:    Comments   Patient x    Family      RN x    Care Manager     Consultant                        Multidiciplinary team rounds were held today with , nursing, pharmacist and clinical coordinator. Patient's plan of care was discussed; medications were reviewed and discharge planning was addressed.      __________________________________________________________________      Comments   >50% of visit spent in counseling and coordination of care     ________________________________________________________________________  Tori Benoit MD     Procedures: see electronic medical records for all procedures/Xrays and details which were not copied into this note but were reviewed prior to creation of Plan. LABS:  I reviewed today's most current labs and imaging studies. Pertinent labs include:  Recent Labs     04/18/20 0519 04/17/20  1900 04/17/20 0522 04/16/20 0257   WBC 6.9  --  6.9  --  7.4   HGB 7.4* 8.0* 7.8*   < > 7.5*   HCT 24.3* 25.8* 25.4*   < > 24.4*     --  257  --  233    < > = values in this interval not displayed.      Recent Labs     04/18/20 0519 04/17/20 0522 04/16/20 0257    143 145   K 3.4* 3.6 3.7   * 114* 115*   CO2 25 25 27   GLU 97 77 69   BUN 9 17 34*   CREA 0.84 0.86 1.15*   CA 8.1* 7.8* 8.1*       Signed: Adam Rose MD

## 2020-04-18 NOTE — DISCHARGE INSTRUCTIONS
Patient Discharge Instructions    Evelyn Marx / [id number] : 1945    Admitted 2020 Discharged: 2020         DISCHARGE DIAGNOSIS:     GI bleed (2020)     Esophageal tears/ulcers    Acute blood loss anemia         What to do at Home    1. Recommended diet: cardiac Diet, soft foods for next week, no uncooked vegetables, tough meats    2. Recommended activity: Activity as tolerated    3. If you experience any of the following symptoms then please call your primary care physician or return to the emergency room if you cannot get hold of your doctor:   Fevers > 100.5, chills   Nausea or vomiting, persistent diarrhea > 24 hours   Blood in stool or black stools   Chest pain or SOB      Follow-up Information     Follow up With Specialties Details Why Contact Info    Brynn Aaron NP Nurse Practitioner Schedule an appointment as soon as possible for a visit in 1 week follow gi bleed and blood counts [address]  [phone]      Molly Salinas MD Gastroenterology Schedule an appointment as soon as possible for a visit in 3 weeks gastroenterology follow for esophageal ulcers [address]  [address]  [phone]          Hold aspirin and plavix for 1 week, resume if no further bleeding after the 7 days        Information obtained by :  I understand that if any problems occur once I am at home I am to contact my physician. I understand and acknowledge receipt of the instructions indicated above.                                                                                                                                            Physician's or R.N.'s Signature                                                                  Date/Time                                                                                                                                              Patient or Representative Signature Date/Time

## 2020-04-18 NOTE — PROGRESS NOTES
Problem: Falls - Risk of  Goal: *Absence of Falls  Description: Document Carlos Cease Fall Risk and appropriate interventions in the flowsheet. Outcome: Progressing Towards Goal  Note: Fall Risk Interventions:  Mobility Interventions: Utilize walker, cane, or other assistive device         Medication Interventions: Patient to call before getting OOB    Elimination Interventions: Call light in reach              Problem: Patient Education: Go to Patient Education Activity  Goal: Patient/Family Education  Outcome: Progressing Towards Goal     Problem: Hypertension  Goal: *Blood pressure within specified parameters  Outcome: Progressing Towards Goal  Goal: *Fluid volume balance  Outcome: Progressing Towards Goal  Goal: *Labs within defined limits  Outcome: Progressing Towards Goal     Problem: Patient Education: Go to Patient Education Activity  Goal: Patient/Family Education  Outcome: Progressing Towards Goal     Problem: Pressure Injury - Risk of  Goal: *Prevention of pressure injury  Description: Document Surya Scale and appropriate interventions in the flowsheet.   Outcome: Progressing Towards Goal  Note: Pressure Injury Interventions:  Sensory Interventions: Maintain/enhance activity level    Moisture Interventions: Absorbent underpads    Activity Interventions: Increase time out of bed    Mobility Interventions: HOB 30 degrees or less(pt to ask for help as needed)    Nutrition Interventions: Document food/fluid/supplement intake    Friction and Shear Interventions: Minimize layers                Problem: Patient Education: Go to Patient Education Activity  Goal: Patient/Family Education  Outcome: Progressing Towards Goal

## 2020-04-18 NOTE — PROGRESS NOTES
F/U for Melena  Farideh Reggy Estimable Tear  [place] for Rica Portillo     S: Ms. Ирина Lan was seen by me today during rounds. At this time, she is resting + comfortably. The patient has no new complaints today. No black stools, no bm  Eating clears ok. No abdominal pain . Please see admission consult for details of ROS; there are no changes today. O: Blood pressure 114/55, pulse 87, temperature 98.4 °F (36.9 °C), resp. rate 18, height 5' 6\" (1.676 m), weight 85.3 kg (188 lb 0.8 oz), SpO2 99 %, not currently breastfeeding. Gen: Patient is in no acute distress. There is no jaundice. Alert and oriented nad. Recent Labs     04/18/20  0519 04/17/20  1900 04/17/20  0522   WBC 6.9  --  6.9   HGB 7.4* 8.0* 7.8*   HCT 24.3* 25.8* 25.4*     --  257     Recent Labs     04/18/20  0519 04/17/20  0522 04/16/20  0257    143 145   K 3.4* 3.6 3.7   * 114* 115*   CO2 25 25 27   BUN 9 17 34*   CREA 0.84 0.86 1. 15*   GLU 97 77 69   CA 8.1* 7.8* 8.1*     No results for input(s): SGOT, GPT, ALT, AP, TBIL, TBILI, TP, ALB, GLOB, GGT, AML, LPSE in the last 72 hours. No lab exists for component: AMYP, HLPSE  No results for input(s): INR, PTP, APTT, INREXT in the last 72 hours. No results for input(s): FE, TIBC, PSAT, FERR in the last 72 hours. Lab Results   Component Value Date/Time    Folate 14.3 08/21/2014 01:14 PM      No results for input(s): PH, PCO2, PO2 in the last 72 hours. No results for input(s): CPK, CKNDX, TROIQ in the last 72 hours.     No lab exists for component: CPKMB  Lab Results   Component Value Date/Time    Cholesterol, total 142 12/02/2019 03:45 AM    HDL Cholesterol 69 12/02/2019 03:45 AM    LDL, calculated 59.8 12/02/2019 03:45 AM    Triglyceride 66 12/02/2019 03:45 AM    CHOL/HDL Ratio 2.1 12/02/2019 03:45 AM     Lab Results   Component Value Date/Time    Glucose (POC) 126 (H) 12/05/2019 04:46 PM    Glucose (POC) 96 12/01/2019 11:51 AM    Glucose (POC) 79 07/15/2019 01:55 PM Lab Results   Component Value Date/Time    Color YELLOW/STRAW 12/01/2019 12:05 PM    Appearance CLEAR 12/01/2019 12:05 PM    Specific gravity 1.008 12/01/2019 12:05 PM    pH (UA) 8.0 12/01/2019 12:05 PM    Protein NEGATIVE  12/01/2019 12:05 PM    Glucose NEGATIVE  12/01/2019 12:05 PM    Ketone NEGATIVE  12/01/2019 12:05 PM    Bilirubin NEGATIVE  12/01/2019 12:05 PM    Urobilinogen 0.2 12/01/2019 12:05 PM    Nitrites NEGATIVE  12/01/2019 12:05 PM    Leukocyte Esterase NEGATIVE  12/01/2019 12:05 PM    Epithelial cells FEW 12/01/2019 12:05 PM    Bacteria NEGATIVE  12/01/2019 12:05 PM    WBC 0-4 12/01/2019 12:05 PM    RBC 0-5 12/01/2019 12:05 PM       Cross sectional imaging:  None new    A: Active Problems:    GI bleed (4/14/2020)        Comment:  Doing well  P:  Soft diet at evening meal today    Marino Lopez MD  12:36 PM  4/18/2020

## 2020-04-19 VITALS
BODY MASS INDEX: 30.22 KG/M2 | SYSTOLIC BLOOD PRESSURE: 137 MMHG | WEIGHT: 188.05 LBS | OXYGEN SATURATION: 92 % | TEMPERATURE: 98.1 F | HEART RATE: 93 BPM | RESPIRATION RATE: 17 BRPM | HEIGHT: 66 IN | DIASTOLIC BLOOD PRESSURE: 61 MMHG

## 2020-04-19 LAB
BASOPHILS # BLD: 0 K/UL (ref 0–0.1)
BASOPHILS NFR BLD: 0 % (ref 0–1)
DIFFERENTIAL METHOD BLD: ABNORMAL
EOSINOPHIL # BLD: 0.3 K/UL (ref 0–0.4)
EOSINOPHIL NFR BLD: 4 % (ref 0–7)
ERYTHROCYTE [DISTWIDTH] IN BLOOD BY AUTOMATED COUNT: 15 % (ref 11.5–14.5)
HCT VFR BLD AUTO: 24.1 % (ref 35–47)
HGB BLD-MCNC: 7.5 G/DL (ref 11.5–16)
IMM GRANULOCYTES # BLD AUTO: 0 K/UL (ref 0–0.04)
IMM GRANULOCYTES NFR BLD AUTO: 0 % (ref 0–0.5)
LYMPHOCYTES # BLD: 2.4 K/UL (ref 0.8–3.5)
LYMPHOCYTES NFR BLD: 36 % (ref 12–49)
MCH RBC QN AUTO: 27 PG (ref 26–34)
MCHC RBC AUTO-ENTMCNC: 31.1 G/DL (ref 30–36.5)
MCV RBC AUTO: 86.7 FL (ref 80–99)
MONOCYTES # BLD: 0.7 K/UL (ref 0–1)
MONOCYTES NFR BLD: 11 % (ref 5–13)
NEUTS SEG # BLD: 3.2 K/UL (ref 1.8–8)
NEUTS SEG NFR BLD: 49 % (ref 32–75)
NRBC # BLD: 0 K/UL (ref 0–0.01)
NRBC BLD-RTO: 0 PER 100 WBC
PLATELET # BLD AUTO: 265 K/UL (ref 150–400)
PMV BLD AUTO: 10.5 FL (ref 8.9–12.9)
RBC # BLD AUTO: 2.78 M/UL (ref 3.8–5.2)
WBC # BLD AUTO: 6.7 K/UL (ref 3.6–11)

## 2020-04-19 PROCEDURE — 74011250636 HC RX REV CODE- 250/636: Performed by: GENERAL ACUTE CARE HOSPITAL

## 2020-04-19 PROCEDURE — 36415 COLL VENOUS BLD VENIPUNCTURE: CPT

## 2020-04-19 PROCEDURE — C9113 INJ PANTOPRAZOLE SODIUM, VIA: HCPCS | Performed by: GENERAL ACUTE CARE HOSPITAL

## 2020-04-19 PROCEDURE — 74011250637 HC RX REV CODE- 250/637: Performed by: SPECIALIST

## 2020-04-19 PROCEDURE — 74011000250 HC RX REV CODE- 250: Performed by: GENERAL ACUTE CARE HOSPITAL

## 2020-04-19 PROCEDURE — 74011250636 HC RX REV CODE- 250/636: Performed by: INTERNAL MEDICINE

## 2020-04-19 PROCEDURE — 85025 COMPLETE CBC W/AUTO DIFF WBC: CPT

## 2020-04-19 RX ADMIN — SUCRALFATE 1 G: 1 TABLET ORAL at 06:36

## 2020-04-19 RX ADMIN — Medication 10 ML: at 06:36

## 2020-04-19 RX ADMIN — ONDANSETRON 4 MG: 2 INJECTION INTRAMUSCULAR; INTRAVENOUS at 06:37

## 2020-04-19 RX ADMIN — ONDANSETRON 4 MG: 2 INJECTION INTRAMUSCULAR; INTRAVENOUS at 02:23

## 2020-04-19 RX ADMIN — SODIUM CHLORIDE 40 MG: 9 INJECTION INTRAMUSCULAR; INTRAVENOUS; SUBCUTANEOUS at 08:41

## 2020-04-19 NOTE — PROGRESS NOTES
F/U for mw tear with bleeding s/p therapeutic rx    S: Ms. Maciel Bruno was seen by me today during rounds. At this time, she is resting +comfortably. The patient has no new complaints today. Please see admission consult for details of ROS; there are no changes today. Tolerating gi lite diet. O: Blood pressure 137/61, pulse 93, temperature 98.1 °F (36.7 °C), resp. rate 17, height 5' 6\" (1.676 m), weight 85.3 kg (188 lb 0.8 oz), SpO2 92 %, not currently breastfeeding. Gen: Patient is in no acute distress. There is no jaundice. Cross sectional imaging:  None new    Lab Results   Component Value Date/Time    WBC 6.7 04/19/2020 02:26 AM    HGB 7.5 (L) 04/19/2020 02:26 AM    HCT 24.1 (L) 04/19/2020 02:26 AM    PLATELET 438 02/50/9753 02:26 AM    MCV 86.7 04/19/2020 02:26 AM     Lab Results   Component Value Date/Time    Sodium 142 04/18/2020 05:19 AM    Potassium 3.4 (L) 04/18/2020 05:19 AM    Chloride 111 (H) 04/18/2020 05:19 AM    CO2 25 04/18/2020 05:19 AM    Anion gap 6 04/18/2020 05:19 AM    Glucose 97 04/18/2020 05:19 AM    BUN 9 04/18/2020 05:19 AM    Creatinine 0.84 04/18/2020 05:19 AM    BUN/Creatinine ratio 11 (L) 04/18/2020 05:19 AM    GFR est AA >60 04/18/2020 05:19 AM    GFR est non-AA >60 04/18/2020 05:19 AM    Calcium 8.1 (L) 04/18/2020 05:19 AM    Bilirubin, total 0.3 04/14/2020 03:33 PM    AST (SGOT) 23 04/14/2020 03:33 PM    Alk.  phosphatase 127 (H) 04/14/2020 03:33 PM    Protein, total 7.7 04/14/2020 03:33 PM    Albumin 3.5 04/14/2020 03:33 PM    Globulin 4.2 (H) 04/14/2020 03:33 PM    A-G Ratio 0.8 (L) 04/14/2020 03:33 PM    ALT (SGPT) 26 04/14/2020 03:33 PM        A: Active Problems:    GI bleed (4/14/2020)        Comment:  I have spoken in person with Dr Otoniel Jovel regarding this patient  P:  Home today  Follow up Dr Anders Gilliam in a month with Stephany Bush MD  8:58 AM  4/19/2020

## 2020-04-19 NOTE — PROGRESS NOTES
.General Surgery End of Shift Nursing Note    Bedside shift change report given to Derrick Ward (oncoming nurse) by Mikhail Solis RN (offgoing nurse). Report included the following information SBAR, ED Summary, Procedure Summary, Intake/Output, MAR and Recent Results. Shift worked:   [id number]   Summary of shift:    Patient has had an uneventful evening and slept most of the shift. She ran NSR and some PVC's on telemetry monitor. She continues to use the bedside commode. She is anticipating her discharge. Issues for physician to address:   Dc planning - Hgb 7.5     Number times ambulated in hallway past shift: 0  Number of times OOB to chair past shift: 1    Pain Management:  Current medication: denies pain  Patient states pain is manageable on current pain medication:denies pain    GI:    Current diet:  DIET GI LITE (POST SURGICAL)    Tolerating current diet: yes  Passing flatus:yes  Last Bowel Movement: 4/18/20  Respiratory:    Incentive Spirometer at bedside: no  Patient instructed on use:no    Patient Safety:    Falls Score: 1  Bed Alarm On? No  Sitter? no    Regine Garcia RN

## 2020-04-19 NOTE — PROGRESS NOTES
Problem: Falls - Risk of  Goal: *Absence of Falls  Description: Document Fam[place] Rod Fall Risk and appropriate interventions in the flowsheet. Outcome: Progressing Towards Goal  Note: Fall Risk Interventions:  Mobility Interventions: Utilize walker, cane, or other assistive device         Medication Interventions: Patient to call before getting OOB    Elimination Interventions: Call light in reach              Problem: Patient Education: Go to Patient Education Activity  Goal: Patient/Family Education  Outcome: Progressing Towards Goal     Problem: Hypertension  Goal: *Blood pressure within specified parameters  Outcome: Progressing Towards Goal  Goal: *Fluid volume balance  Outcome: Progressing Towards Goal  Goal: *Labs within defined limits  Outcome: Progressing Towards Goal     Problem: Hypertension  Goal: *Blood pressure within specified parameters  Outcome: Progressing Towards Goal  Goal: *Fluid volume balance  Outcome: Progressing Towards Goal  Goal: *Labs within defined limits  Outcome: Progressing Towards Goal     Problem: Patient Education: Go to Patient Education Activity  Goal: Patient/Family Education  Outcome: Progressing Towards Goal     Problem: Pressure Injury - Risk of  Goal: *Prevention of pressure injury  Description: Document Surya Scale and appropriate interventions in the flowsheet.   Outcome: Progressing Towards Goal  Note: Pressure Injury Interventions:  Sensory Interventions: Maintain/enhance activity level    Moisture Interventions: Absorbent underpads    Activity Interventions: Increase time out of bed    Mobility Interventions: HOB 30 degrees or less    Nutrition Interventions: Document food/fluid/supplement intake    Friction and Shear Interventions: Minimize layers                Problem: Patient Education: Go to Patient Education Activity  Goal: Patient/Family Education  Outcome: Progressing Towards Goal

## 2020-04-19 NOTE — PROGRESS NOTES
PCP SADA appt scheduled with [facility] to see PT in 24-48 hours after discharge at 9:00am. Appt added to [address] CM Specialist

## 2020-06-09 ENCOUNTER — OFFICE VISIT (OUTPATIENT)
Dept: NEUROLOGY | Age: 75
End: 2020-06-09

## 2020-06-09 VITALS
HEART RATE: 94 BPM | DIASTOLIC BLOOD PRESSURE: 64 MMHG | BODY MASS INDEX: 30.35 KG/M2 | TEMPERATURE: 96.3 F | RESPIRATION RATE: 20 BRPM | HEIGHT: 66 IN | SYSTOLIC BLOOD PRESSURE: 122 MMHG | OXYGEN SATURATION: 95 %

## 2020-06-09 DIAGNOSIS — K22.11 ESOPHAGEAL ULCER WITH BLEEDING: ICD-10-CM

## 2020-06-09 DIAGNOSIS — E78.2 MIXED HYPERLIPIDEMIA: ICD-10-CM

## 2020-06-09 DIAGNOSIS — Z86.73 HISTORY OF CVA (CEREBROVASCULAR ACCIDENT): Primary | ICD-10-CM

## 2020-06-09 DIAGNOSIS — I10 ESSENTIAL HYPERTENSION: ICD-10-CM

## 2020-06-09 DIAGNOSIS — I65.02 VERTEBRAL ARTERY OCCLUSION, LEFT: ICD-10-CM

## 2020-06-09 DIAGNOSIS — D33.3 UNILATERAL VESTIBULAR SCHWANNOMA (HCC): ICD-10-CM

## 2020-06-09 RX ORDER — CLOPIDOGREL BISULFATE 75 MG/1
75 TABLET ORAL DAILY
Qty: 90 TAB | Refills: 3 | Status: SHIPPED | OUTPATIENT
Start: 2020-06-09 | End: 2021-07-26

## 2020-06-09 NOTE — PATIENT INSTRUCTIONS
Learning About How to Prevent a Stroke What is a stroke? A stroke occurs when a blood vessel in the brain bursts or is blocked by a blood clot. Without blood and the oxygen it carries, part of the brain starts to die. The part of the body controlled by the damaged area of the brain can't work properly. But there are many things you can do to help lower your stroke risk. What increases your risk for stroke? A risk factor is anything that makes you more likely to have a particular health problem. Risk factors for stroke that you can treat or change include: 
· Health problems like high blood pressure, atrial fibrillation, diabetes, and high cholesterol. · Smoking. · Heavy use of alcohol. · Being overweight. · Not getting enough physical activity. Risk factors you can't change include: · Age. The risk of stroke goes up as you get older. · Race.  Americans, Native Americans, and [place] Natives have a higher risk than those of other races. · Being female. Women have a higher risk of stroke than men. · Family history of stroke. Your doctor can help you know your risk. Then you and your can doctor talk about whether you need to lower it. What can you do to prevent a stroke? · Treat any health problems you have that raise your risk. · Adopt a heart-healthy lifestyle: ? Don't smoke. If you need help quitting, talk to your doctor about stop-smoking programs and medicines. These can increase your chances of quitting for good. ? Limit alcohol to 2 drinks a day for men and 1 drink a day for women. ? Stay at a healthy weight. Lose weight if you need to. 
? If your doctor recommends it, get more exercise. Walking is a good choice. Bit by bit, increase the amount you walk every day. Try for at least 30 minutes on most days of the week. ? Eat heart-healthy foods. These include fruits, vegetables, high-fiber foods, and fish. Choose foods that are low in sodium, saturated fat, and trans fat. · Decide with your doctor whether you will also take medicines to help lower your risk. For example, you and your doctor may decide you will take a medicine that prevents blood clots. What are the symptoms of a stroke? Symptoms of a stroke happen quickly. A stroke may cause: 
· Sudden numbness, tingling, weakness, or loss of movement in your face, arm, or leg, especially on only one side of your body. · Sudden vision changes. · Sudden trouble speaking. · Sudden confusion or trouble understanding simple statements. · Sudden problems with walking or balance. · A sudden, severe headache that is different from past headaches. FAST is a simple way to remember the main symptoms of stroke. Recognizing these symptoms helps you know when to call for medical help. FAST stands for: · F ace drooping. · A rm weakness. · S peech difficulty. · T martha to call 911. It's important to call for medical help if you have stroke symptoms. Quick treatment may save your life. And it may reduce the damage in your brain so that you have fewer problems after the stroke. Follow-up care is a key part of your treatment and safety. Be sure to make and go to all appointments, and call your doctor if you are having problems. It's also a good idea to know your test results and keep a list of the medicines you take. Where can you learn more? Go to http://jose c-lencho.info/ Enter G757 in the search box to learn more about \"Learning About How to Prevent a Stroke. \" Current as of: March 4, 2020               Content Version: 12.5 © 6566-6895 Healthwise, [employer]orporated. Care instructions adapted under license by [employer] (which disclaims liability or warranty for this information). If you have questions about a medical condition or this instruction, always ask your healthcare professional. [address] any warranty or liability for your use of this information. Learning About How to Prevent Another Stroke What can you do to prevent another stroke? After a stroke, people feel lots of different emotions. Some people are worried that they could have another stroke. Or they may feel overwhelmed by how much there is to learn and do. Some people feel sad or depressed. No matter what emotions you are feeling, you can give yourself some control and peace of mind by making a plan to lower your risk of having another stroke. Take your medicines You'll need to take medicines to help prevent another stroke. Be sure to take your medicines exactly as prescribed. And don't stop taking them unless your doctor tells you to. If you stop taking your medicines, you can increase your risk of having another stroke. Some of the medicines your doctor may prescribe include: · Aspirin or some other blood thinner to prevent blood clots. · Statins and other medicines to lower cholesterol. · Blood pressure medicines to lower blood pressure. Manage other health problems You can help lower your chance of having another stroke by managing certain other health problems. Problems that increase your risk of having another stroke include: · High blood pressure. · High cholesterol. · Atrial fibrillation. · Diabetes. If you have any of these health problems, you can manage them with healthy lifestyle changes along with medicine. Adopt a healthy lifestyle · Do not smoke or allow others to smoke around you. If you need help quitting, talk to your doctor about stop-smoking programs and medicines. These can increase your chances of quitting for good. Smoking makes a stroke more likely. · Limit alcohol to 2 drinks a day for men and 1 drink a day for women. · Lose weight if you need to. Controlling your weight will help you keep your heart and body healthy. · Be active. Ask your doctor what type and level of activity is safe for you. · Eat heart-healthy foods, like fruits, vegetables, and high-fiber foods. It's also important to: · Get a flu shot every year. · Ask for help if you think you are depressed. Do stroke rehab Taking part in a stroke rehabilitation (rehab) program will help you to regain skills you lost or make the most of your abilities after a stroke. It also helps you take steps to prevent another stroke. Your rehab team will give you education and support to help you build new, healthy habits. You'll learn how to manage any other health problems that you might have. Marcin Gruber also learn how to exercise safely, eat a healthy diet, and quit smoking if you smoke. Marcin Gruber work with your team to decide what lifestyle choices are best for you. If your doctor hasn't already suggested it, ask him or her if stroke rehab is right for you. Know stroke symptoms Make sure you know the symptoms of stroke. FAST is a simple way to remember. Recognizing these symptoms helps you know when to call for medical help. FAST stands for: · F ace drooping. · A rm weakness. · S peech difficulty. · T martha to call 911. Follow-up care is a key part of your treatment and safety. Be sure to make and go to all appointments, and call your doctor if you are having problems. It's also a good idea to know your test results and keep a list of the medicines you take. Where can you learn more? Go to http://jose c-lencho.info/ Enter D128 in the search box to learn more about \"Learning About How to Prevent Another Stroke. \" Current as of: March 4, 2020               Content Version: 12.5 © 0009-7179 Healthwise, Incorporated. Care instructions adapted under license by [employer] (which disclaims liability or warranty for this information).  If you have questions about a medical condition or this instruction, always ask your healthcare professional. [employer] disclaims any warranty or liability for your use of this information.

## 2020-06-09 NOTE — PROGRESS NOTES
NEUROLOGY CLINIC NOTE    Patient ID:  Iris Phillips  [id number]  89 y.o.  1945    Date of Visit:  June 9, 2020    Reason for Visit:  Recurrent stroke    Chief Complaint   Patient presents with    Follow-up     stroke        History of Present Illness:     Patient Active Problem List    Diagnosis Date Noted    GI bleed 04/14/2020    Right pontine stroke ([facility] 12/02/2019    Bilateral carotid artery stenosis 12/02/2019    Diplopia 12/02/2019    Left acoustic neuroma ([facility] 12/02/2019    History of stroke     Acute CVA (cerebrovascular accident) ([facility] 12/01/2019    Stroke ([facility] 07/16/2019    TIA (transient ischemic attack) 07/15/2019    Flashers or floaters of right eye 08/21/2014    Fall at home 02/10/2014    Knee pain, left 02/10/2014    Prediabetes 02/10/2014    HTN, goal below 130/80 01/06/2014    Vitamin D deficiency 01/06/2014    Hypercholesterolemia 06/27/2013    Tiredness 06/06/2013    Depression, acute 06/06/2013     Past Medical History:   Diagnosis Date    Depression     Fall at home 2/10/2014   [facility] or floaters of right eye 8/21/2014    High cholesterol     History of stroke     Hypertension     Left [facility] neuroma ([facility]     Stroke ([facility]     2010 and 2019      Past Surgical History:   Procedure Laterality Date    HX TUBAL LIGATION  1981    HX TUMOR REMOVAL  2008    brain tumor      Prior to Admission medications    Medication Sig Start Date End Date Taking? Authorizing Provider   omeprazole (PRILOSEC) 40 mg capsule 40 mg twice per day x 14 days, then daily till stopped by gastroenterologist  Indications: inflammation of the esophagus with erosion 4/18/20  Yes Tri Long MD   ferrous sulfate (Iron, Ferrous Sulfate,) 325 mg (65 mg iron) tablet Take 1 Tab by mouth Daily (before breakfast) for 90 days.  4/18/20 7/17/20 Yes Tri Long MD   MYRBETRIQ 25 mg ER tablet TK 1 T PO ONCE D 1/7/20  Yes Provider, Historical   hydroCHLOROthiazide (HYDRODIURIL) 12.5 mg tablet  20  Yes Provider, Historical   calcium carbonate (CALTREX) 600 mg calcium (1,500 mg) tablet Take 600 mg by mouth daily. Yes Provider, Historical   mirtazapine (REMERON) 30 mg tablet 30 mg nightly. 10/4/19  Yes Provider, Historical   lisinopril (PRINIVIL, ZESTRIL) 10 mg tablet  19  Yes Provider, Historical   atorvastatin (LIPITOR) 40 mg tablet Take 1 Tab by mouth daily. 19  Yes Yana Suazo MD   citalopram (CELEXA) 20 mg tablet Take 20 mg by mouth daily as needed. Yes Provider, Historical   latanoprost (XALATAN) 0.005 % ophthalmic solution Administer 1 Drop to both eyes nightly. Yes Provider, Historical   multivitamin (ONE A DAY) tablet Take 1 Tab by mouth daily. Yes Provider, Historical     No Known Allergies   Social History     Tobacco Use    Smoking status: Former Smoker     Packs/day: 0.25     Years: 20.00     Pack years: 5.00     Last attempt to quit: 10/26/2019     Years since quittin.6    Smokeless tobacco: Never Used   Substance Use Topics    Alcohol use: No      Family History   Problem Relation Age of Onset    Hypertension Mother     Other Father         Unknown    Dementia Brother     Alcohol abuse Sister     Alcohol abuse Sister     No Known Problems Sister     No Known Problems Sister     No Known Problems Sister     Dementia Brother     No Known Problems Brother     No Known Problems Child         Subjective:      Sabiha Royal is a 69 y.o. RHAAF with history of depression, hypertension, vitamin D deficiency and hypercholesterolemia who is here for follow up after a recent stroke. Patient was admitted to [facility] 7/15/2019 for right arm weakness and slurred speech. Day of admission, while she was driving she noticed numbness of the right arm. Felt heavy. Denies any overt weakness. She tried to rub it to bring back the sensation. Then she went into the grocery store and was dropping things with her right hand.   She is also dropping groceries that she was loading into her truck. She then drove home to see if symptoms would resolve. At home she also noted that she was starting to have slurring of her speech. Also developed moderate intensity headaches. Talk to her granddaughter on the phone and eventually her daughter and was brought to the emergency room. In the ER blood pressure was 160/75. Laboratory work-up was unremarkable except for LDL of 97.2. Head CT without contrast did not reveal any acute process. Moderate to severe white matter disease. CT perfusion did not reveal any abnormality. Head and neck CTA did not reveal any flow-limiting stenosis or aneurysm. Noted necrotic 1.8 cm right thyroid nodule. Brain MRI revealed small left parietal lobe and right centrum semiovale infarct. Severe periventricular and subcortical white matter disease. When seen, patient reports resolution of her symptoms. Echocardiogram done was unremarkable. Patient was discharged 7/17/2019 on atorvastatin 40 mg daily and aspirin 81 mg daily. Patient reportedly was doing fine until 11/1/2019 when she developed sudden onset of dizziness and when she was walking she was leaning to the left. Also noted slurring of her speech and blurred vision. Denies any weakness or sensory loss. Patient saw her PCP last 11/4/2019 for evaluation. Labs done 11/4/2019 revealed unremarkable CBC, INR, PT, PTT, B12, folate, homocysteine and LDL of 68. Brain MRI without contrast 11/5/2019 revealed new small infarct left middle cerebellar peduncle. Abnormal T2 hypointense signal seen throughout the left internal auditory canal with an apparent small soft tissue lesion in the left cerebellopontine angle cistern measuring 7 x 7 mm. 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 Abnormal flow void signal within the left vertebral artery V4 segment. Unchanged severe chronic microvascular ischemic disease.   Unchanged numerous small chronic infarcts including bilateral thalami, autumn and middle cerebellar peduncles. Unchanged few scattered chronic microhemorrhages (left superior parietal lobe, right lateral frontal lobe, right parietal lobe, bilateral temporal and occipital lobes). A left suboccipital cranioplasty is again seen. Patient reports she is improving. Less issue with imbalance. Still having some slurred speech. Blurring vision is also improved. She apparently has not been taking aspirin 81 mg daily for several months now. Patient was seen in the ER 11/9/2009 for waking up not feeling right, felt nauseous and weak. Blood pressure was 154/69. CBC, CMP and urinalysis were unremarkable. Head CT without contrast revealed no acute intracranial hemorrhage, mass or infarct. Confluent white matter hypodensities consistent with chronic microvascular ischemic changes. Patient was discharged and told to follow-up with PCP. Patient was seen by endocrinology last 11/25/2019 for evaluation of a thyroid nodule. Fine-needle aspiration was done. Then 12/1/2019, patient woke up seeing double no matter where she looks and increase wobbliness. In the ER, BP was 185/68. ER note mentions left eye deviated down but with individual ocular testing movements are full but there is presence of nystagmus. Right eye noted to be unable to adduct. CBC, CMP, troponin and urinalysis were unremarkable. Head CT without contrast revealed no acute process. Extensive periventricular white matter disease and low-density lesions involving bilateral thalami and mid autumn. Head and neck CTA revealed occlusion of the distal left vertebral artery but the right vertebral and basilar artery are patent. Chronic ischemic change in the white matter. Small left acoustic neuroma.   Brain MRI without contrast done 12/2/2019 revealed unchanged generalized parenchymal volume loss and severe chronic microvascular ischemic disease with numerous small chronic supratentorial and infratentorial infarcts and microhemorrhages. Distribution raises suspicion for cerebral amyloid angiopathy. There is left vestibular schwannoma within the internal auditory canal and cerebellopontine angle cistern. MRI of the IAC with and without contrast revealed 2 separate small foci of acute to subacute infarction right dorsal autumn and left brachium pontis. Left cerebellopontine angle/IAC mass most consistent with adenoma (17 x 8 x 9 mm). Small 7 x 7 mm meningioma planum sphenoidale. LDL was 59.8. Hemoglobin A1c slightly elevated at 5.8. Echocardiogram done 12/2/2019 revealed EF of 56 to 60%. Age-appropriate left ventricular diastolic function. Normal TSH. Patient was seen by Dr. Ann Ray (neurology) and exam revealed decreased medial rectus function with nystagmus in the left eye on left gaze indicating right medial longitudinal fasciculus syndrome or right pontine segmental infarct. Cerebellar ataxia. Plavix was added to patient's aspirin for stroke prophylaxis. Patient was discharged 12/6/2019 to inpatient rehab.    12/16/2019, aspirin test and P2Y12 platelet response were appropriate. Since the last visit on 2/11/2020, patient was admitted to [facility] last 4/14/2020 for acute upper GI bleed while on antiplatelet therapy. EGD was done and found to have esophageal ulcer with bleeding and gastritis without bleeding. Found to have Farideh-Piper tear. Hemoglobin dropped from 10.5-7.4. Patient was discharged on omeprazole and sucralfate and told to discontinue aspirin and Plavix for 2 weeks. Patient has since then started back on taking aspirin 81 mg daily and Plavix 75 mg daily for stroke prophylaxis. Denies any signs of overt bleeding. Per patient she has been doing well from a stroke standpoint. No recurrence of the double vision. Balance continues to improve and sometimes patient is able to walk without any aid at all. No falls. BP monitoring reports good blood pressures at home.  She is also compliant with her statin therapy. No new complaint. Outside reports reviewed: hospitalization records, EGD, labs    Review of Systems:    A comprehensive review of systems was negative except for:   balance issues    Objective:     Visit Vitals  /64   Pulse 94   Temp (!) 96.3 °F (35.7 °C)   Resp 20   Ht 5' 6\" (1.676 m)   SpO2 95%   BMI 30.35 kg/m²       PHYSICAL EXAM:    NEUROLOGICAL EXAM:     Appearance: The patient is well developed, well nourished, provides a coherent history and is in no acute distress. Mental Status: Oriented to time, place and person. Fluent, no aphasia but with mild ataxic dysarthria. Mood and affect appropriate. Cranial Nerves:   Intact visual fields. LOYD, EOM's full, no nystagmus, no ptosis. Facial sensation is normal. Corneal reflexes are intact. Facial movement is symmetric. Hearing is normal bilaterally. Palate is midline with normal sternocleidomastoid and trapezius muscles are normal. Tongue is midline. Motor:  5/5 strength. Normal bulk and tone. No fasciculations. No pronator drift. Reflexes:   Deep tendon reflexes 1+/4 and symmetrical.   Sensory:   Normal to cold, pinprick and vibration. Gait:  Steady. No Romberg and mild truncal instability. Tremor:   No tremor noted. Cerebellar:  Intact FTN/SUSAN/HTS       Assessment:   History CVA  Left vertebral artery occlusion  Left vestibular schwannoma  Hyperlipidemia  Essential hypertension  Esophageal ulcer with bleeding    Plan:   Neurological examination reveals sustained improvement. No left medial rectus paresis, mild ataxic dysarthria, no left-sided ataxia and clumsiness and mild gait ataxia. Status post infarction right dorsal autumn and left brachium pontis. Previous left middle cerebellar peduncle infarct. Recent brain and IAC MRI confirms these acute finding. Continue exercises taught by physical therapy. Continue Aspirin 81 mg daily and Plavix 75 mg daily for stroke prophylaxis.  Refer to GI to make sure that there are no ongoing issues or risk for GI bleed. Previous labs done and confirms efficacy of both regimens. Repeat echocardiogram was unremarkable. Patient was advised to call 911 if new deficits occur. Head and neck CTA revealed occlusion of the distal left vertebral artery but the right vertebral and basilar artery are patent. Continue dual antiplatelet therapy. MRI of the IAC with and without contrast left cerebellopontine angle/IAC mass most consistent with adenoma (17 x 8 x 9 mm). Small 7 x 7 mm meningioma planum sphenoidale. Patient has had a prior left cranioplasty. I would suspect this was related to this issue. May need neurosurgery re-evaluation. LDL last check was at goal of <70. Continue atorvastatin 40 mg daily. Advised compliance with dietary modifications and medications for hyperlipidemia. BP in better control. Continue none weightbearing exercises (aquatics, stationary bike, elliptical and portable pedal exerciser). Advised strict compliance with dietary modifications and medications for hypertension. Patient referred to GI for re-evaluation from recent UGI bleed. All questions and concerns were answered. This note was created using voice recognition software. Despite editing, there may be syntax errors.

## 2020-10-29 ENCOUNTER — TRANSCRIBE ORDER (OUTPATIENT)
Dept: SCHEDULING | Age: 75
End: 2020-10-29

## 2020-10-29 DIAGNOSIS — M81.0 SENILE OSTEOPOROSIS: ICD-10-CM

## 2020-10-29 DIAGNOSIS — Z12.31 SCREENING MAMMOGRAM FOR HIGH-RISK PATIENT: Primary | ICD-10-CM

## 2020-11-20 ENCOUNTER — VIRTUAL VISIT (OUTPATIENT)
Dept: ENDOCRINOLOGY | Age: 75
End: 2020-11-20
Payer: MEDICARE

## 2020-11-20 DIAGNOSIS — E04.1 THYROID NODULE: Primary | ICD-10-CM

## 2020-11-20 PROCEDURE — 1101F PT FALLS ASSESS-DOCD LE1/YR: CPT | Performed by: INTERNAL MEDICINE

## 2020-11-20 PROCEDURE — 3017F COLORECTAL CA SCREEN DOC REV: CPT | Performed by: INTERNAL MEDICINE

## 2020-11-20 PROCEDURE — G8427 DOCREV CUR MEDS BY ELIG CLIN: HCPCS | Performed by: INTERNAL MEDICINE

## 2020-11-20 PROCEDURE — G9899 SCRN MAM PERF RSLTS DOC: HCPCS | Performed by: INTERNAL MEDICINE

## 2020-11-20 PROCEDURE — G8756 NO BP MEASURE DOC: HCPCS | Performed by: INTERNAL MEDICINE

## 2020-11-20 PROCEDURE — G8399 PT W/DXA RESULTS DOCUMENT: HCPCS | Performed by: INTERNAL MEDICINE

## 2020-11-20 PROCEDURE — 99213 OFFICE O/P EST LOW 20 MIN: CPT | Performed by: INTERNAL MEDICINE

## 2020-11-20 PROCEDURE — G9717 DOC PT DX DEP/BP F/U NT REQ: HCPCS | Performed by: INTERNAL MEDICINE

## 2020-11-20 PROCEDURE — 1090F PRES/ABSN URINE INCON ASSESS: CPT | Performed by: INTERNAL MEDICINE

## 2020-11-20 RX ORDER — LANOLIN ALCOHOL/MO/W.PET/CERES
CREAM (GRAM) TOPICAL
COMMUNITY
Start: 2020-10-14

## 2020-11-20 RX ORDER — AMLODIPINE BESYLATE 5 MG/1
TABLET ORAL
COMMUNITY
Start: 2020-10-22

## 2020-11-20 NOTE — PROGRESS NOTES
**DUE TO PANDEMIC AND HEALTH CONCERNS IN THE COMMUNITY, THIS PATIENT WAS EITHER ILL OR FOUND TO BE HIGH RISK FOR IN-PERSON EVALUATION WITHIN THE CLINIC. THE FOLLOWING IS A VIRTUAL TELEMEDICINE VIDEO ENCOUNTER VIA [employer], TO WHICH THE PATIENT AGREED. THE PURPOSE IS TO LIMIT INTERRUPTIONS IN HEALTHCARE AND TO PROVIDE FOR ONGOING URGENT NEEDS UNDER THE CURRENT CONDITIONS. CHIEF COMPLAINT: Thyroid nodule    HISTORY OF PRESENT ILLNESS:   Anna Marie Chavez is a 70 y.o. female with a PMHx as noted below who presents for f/u evaluation of a thyroid nodule. Last seen 1 year ago,  Patient described that she had a stroke in July 2019,   CT imaging revealed a thyroid nodule for which thyroid Ultrasound was obtained. Family history is not significant for thyroid nodules or thyroid cancers. Patient denies any history of radiation exposure. She denies any dysphagia or dyspnea. Patient remains without symptoms of hyper or hypothyroidism. 7/15/19: Thyroid Ultrasound:    Right dominant 2.9 x 1.2 x 2.2 cm solid nodule   Left dominant 1 x 0.6 x 1 cm nodule    8/28/19: Thyroid Ultrasound:    Right dominant 2.7 x 1.8 x 1.3 cm solid nodule, minimal change    On prior visit 11/25/19 we had planned for a biopsy of the right dominant 2.7 cm nodule however this was never completed. It may have been due to the subsequent pandempic. There are no recent thyroid ultrasounds. Review of thyroid labs suggest stable levels over the years. Today she notes she is feeling well, picked up some weight, but is swallowing well.       Review of most recent thyroid function:  Lab Results   Component Value Date    TSH 1.05 12/02/2019    TSH 0.73 07/16/2019    TSH 0.944 08/21/2014    FT4 1.1 07/16/2019      TSILT = Thyroid stimulating antibodies  TMCLT = TPO antibodies  T3LT = Total T3 levels    PAST MEDICAL/SURGICAL HISTORY:   Past Medical History:   Diagnosis Date    Depression     Fall at home 2/10/2014   [facility] or floaters of right eye 8/21/2014    High cholesterol     History of stroke     Hypertension     Left acoustic neuroma ([facility]     Stroke ([facility]     2010 and 2019     Past Surgical History:   Procedure Laterality Date    HX TUBAL LIGATION  1981    HX TUMOR REMOVAL  2008    brain tumor       ALLERGIES:   No Known Allergies    MEDICATIONS ON ADMISSION:     Current Outpatient Medications:     amLODIPine (NORVASC) 5 mg tablet, , Disp: , Rfl:     ferrous sulfate 325 mg (65 mg iron) tablet, , Disp: , Rfl:     clopidogreL (Plavix) 75 mg tab, Take 1 Tab by mouth daily. , Disp: 90 Tab, Rfl: 3    omeprazole (PRILOSEC) 40 mg capsule, 40 mg twice per day x 14 days, then daily till stopped by gastroenterologist  Indications: inflammation of the esophagus with erosion, Disp: 60 Cap, Rfl: 3    MYRBETRIQ 25 mg ER tablet, TK 1 T PO ONCE D, Disp: , Rfl:     hydroCHLOROthiazide (HYDRODIURIL) 12.5 mg tablet, , Disp: , Rfl:     calcium carbonate (CALTREX) 600 mg calcium (1,500 mg) tablet, Take 600 mg by mouth daily. , Disp: , Rfl:     mirtazapine (REMERON) 30 mg tablet, 30 mg nightly., Disp: , Rfl:     lisinopril (PRINIVIL, ZESTRIL) 10 mg tablet, , Disp: , Rfl:     atorvastatin (LIPITOR) 40 mg tablet, Take 1 Tab by mouth daily. , Disp: 30 Tab, Rfl: 3    citalopram (CELEXA) 20 mg tablet, Take 20 mg by mouth daily as needed. , Disp: , Rfl:     latanoprost (XALATAN) 0.005 % ophthalmic solution, Administer 1 Drop to both eyes nightly., Disp: , Rfl:     multivitamin (ONE A DAY) tablet, Take 1 Tab by mouth daily. , Disp: , Rfl:     SOCIAL HISTORY:   Social History     Socioeconomic History    Marital status:      Spouse name: Not on file    Number of children: Not on file    Years of education: Not on file    Highest education level: Not on file   Occupational History    Not on file   Social Needs    Financial resource strain: Not on file    Food insecurity     Worry: Not on file     Inability: Not on file   [employer] needs Medical: Not on file     Non-medical: Not on file   Tobacco Use    Smoking status: Former Smoker     Packs/day: 0.25     Years: 20.00     Pack years: 5.00     Last attempt to quit: 10/26/2019     Years since quittin.0    Smokeless tobacco: Never Used   Substance and Sexual Activity    Alcohol use: No    Drug use: No    Sexual activity: Never   Lifestyle    Physical activity     Days per week: Not on file     Minutes per session: Not on file    Stress: Not on file   Relationships    Social connections     Talks on phone: Not on file     Gets together: Not on file     Attends Taoist service: Not on file     Active member of club or organization: Not on file     Attends meetings of clubs or organizations: Not on file     Relationship status: Not on file    Intimate partner violence     Fear of current or ex partner: Not on file     Emotionally abused: Not on file     Physically abused: Not on file     Forced sexual activity: Not on file   Other Topics Concern    Not on file   Social History Narrative    Not on file       FAMILY HISTORY:  Family History   Problem Relation Age of Onset    Hypertension Mother     Other Father         Unknown    Dementia Brother     Alcohol abuse Sister     Alcohol abuse Sister     No Known Problems Sister     No Known Problems Sister     No Known Problems Sister     Dementia Brother     No Known Problems Brother     No Known Problems Child        REVIEW OF SYSTEMS: Complete ROS assessed and noted for that which is described above, all else are negative.   Eyes: normal  ENT: normal  CVS: normal  Resp: normal  GI: normal  : normal  GYN: normal  Endocrine: normal  Integument: normal  Musculoskeletal: normal  Neuro: normal  Psych: normal    PHYSICAL EXAMINATION:  Telemedicine Visit    GENERAL: NCAT, Appears well nourished  EYES: EOMI, non-icteric, no proptosis  Ear/Nose/Throat: NCAT, no visible inflammation or masses  CARDIOVASCULAR: no cyanosis, no visible JVD  RESPIRATORY: comfortable respirations observed, no cyanosis  MUSCULOSKELETAL: Normal ROM of upper extremities observed  SKIN: No edema, rash, or other significant changes observed  NEUROLOGIC:  AAOx3  PSYCHIATRIC: Normal affect, Normal insight and judgement    REVIEW OF LABORATORY AND RADIOLOGY DATA:   Labs and documentation have been reviewed as described above. ASSESSMENT AND PLAN:   Shereen Najjar is a 70 y.o. female with a PMHx as noted above who presents for f/u evaluation of a thyroid nodule. Thyroid Nodule    It is not totally clear why the biopsy was not completed after her last visit but the pandemic is a good explanation. We noted that it would be ideal to complete this for reassurance, and now would be the best time before elective procedures get cancelled again due to any closures from the ongoing pandemic. Plan as follows:     FNA biopsy of right dominant 2.7 x 1.8 x 1.3 cm solid nodule  Will discuss results by phone when it becomes available,  She is advised to notify me in 1 week if she does not hear about scheduling this,    RTC: 1 year, Nov 19 of 2021 at 3:50 PM    20 minutes spent toward telemedicine visit today of which >50% of this time was spent in counseling and coordination of care. Katerin Pablo.  [address] [facility]

## 2020-11-25 ENCOUNTER — TRANSCRIBE ORDER (OUTPATIENT)
Dept: SCHEDULING | Age: 75
End: 2020-11-25

## 2020-11-25 DIAGNOSIS — E07.9 THYROID DISEASE: Primary | ICD-10-CM

## 2020-12-01 ENCOUNTER — HOSPITAL ENCOUNTER (OUTPATIENT)
Dept: ULTRASOUND IMAGING | Age: 75
Discharge: HOME | End: 2020-12-01
Attending: INTERNAL MEDICINE
Payer: MEDICARE

## 2020-12-01 DIAGNOSIS — E07.9 THYROID DISEASE: ICD-10-CM

## 2020-12-01 PROCEDURE — 76536 US EXAM OF HEAD AND NECK: CPT

## 2020-12-02 ENCOUNTER — TELEPHONE (OUTPATIENT)
Dept: ENDOCRINOLOGY | Age: 75
End: 2020-12-02

## 2020-12-02 DIAGNOSIS — E04.1 THYROID NODULE: Primary | ICD-10-CM

## 2020-12-02 NOTE — TELEPHONE ENCOUNTER
Jodie Canas,     On recent visit, this patient was sent for a thyroid FNA biopsy, it appears that she has instead received a thyroid ultrasound instead of a biopsy. Looking at my last encounter, I did order a biopsy and not an ultrasound. Looking at the ultrasound order, it appears ordered by the following person:    11/25/2020  9:00 AM  Susan Matthew        Can you check with radiology about this ? She still needs the biopsy that I had requested. Thanks ! Shukri Romano.  [address] [facility]  [address] [place] [address]

## 2020-12-04 NOTE — TELEPHONE ENCOUNTER
I attempted to call Ms. Norma Bergman to see if she had any preference for a day and time for the thyroid biopsy and reached a voice mail on both home and cell phone numbers. I asked her to give me a call back. I did call the radiology dept yesterday and they said that a US was done instead of the Biopsy because they needed updated images to see what was going on. I have let Dr. Jessica Velasco know this.   Verónica Kim

## 2020-12-09 ENCOUNTER — TELEPHONE (OUTPATIENT)
Dept: ENDOCRINOLOGY | Age: 75
End: 2020-12-09

## 2020-12-09 NOTE — TELEPHONE ENCOUNTER
I called central scheduling and scheduled the thyroid biopsy for 12/16/2020. I then called Ms. Kiana Coreas and let her know that she needed to be at [facility] at out patient registration at 1 PM. She wrote this down and said she would do as instructed.   Osmin Hoover

## 2020-12-09 NOTE — TELEPHONE ENCOUNTER
I attempted to call Ms. Kenneth Arteaga again and reached voice mails on both home and cell numbers. I left messages on both numbers asking her to give me a call back.   Jermaine Araujo

## 2020-12-09 NOTE — TELEPHONE ENCOUNTER
----- Message from Bora Maloney sent at 12/9/2020 10:29 AM EST -----  Regarding: Dr. Love Sutton Patient return call     Caller's first and last name and relationship (if not the patient):        Best contact number(s): [phone]        Whose call is being returned: Returning missed call from practice.         Details to clarify the request:        Enriqueta Bailey

## 2020-12-14 ENCOUNTER — OFFICE VISIT (OUTPATIENT)
Dept: NEUROLOGY | Age: 75
End: 2020-12-14
Payer: MEDICARE

## 2020-12-14 VITALS
SYSTOLIC BLOOD PRESSURE: 124 MMHG | DIASTOLIC BLOOD PRESSURE: 64 MMHG | HEART RATE: 95 BPM | RESPIRATION RATE: 20 BRPM | TEMPERATURE: 97.2 F | OXYGEN SATURATION: 95 %

## 2020-12-14 DIAGNOSIS — Z86.73 HISTORY OF CVA (CEREBROVASCULAR ACCIDENT): Primary | ICD-10-CM

## 2020-12-14 DIAGNOSIS — I65.02 VERTEBRAL ARTERY OCCLUSION, LEFT: ICD-10-CM

## 2020-12-14 DIAGNOSIS — K22.11 ESOPHAGEAL ULCER WITH BLEEDING: ICD-10-CM

## 2020-12-14 DIAGNOSIS — I10 ESSENTIAL HYPERTENSION: ICD-10-CM

## 2020-12-14 DIAGNOSIS — D33.3 UNILATERAL VESTIBULAR SCHWANNOMA (HCC): ICD-10-CM

## 2020-12-14 DIAGNOSIS — E78.2 MIXED HYPERLIPIDEMIA: ICD-10-CM

## 2020-12-14 PROCEDURE — 99215 OFFICE O/P EST HI 40 MIN: CPT | Performed by: PSYCHIATRY & NEUROLOGY

## 2020-12-14 PROCEDURE — G8754 DIAS BP LESS 90: HCPCS | Performed by: PSYCHIATRY & NEUROLOGY

## 2020-12-14 PROCEDURE — G9899 SCRN MAM PERF RSLTS DOC: HCPCS | Performed by: PSYCHIATRY & NEUROLOGY

## 2020-12-14 PROCEDURE — 3017F COLORECTAL CA SCREEN DOC REV: CPT | Performed by: PSYCHIATRY & NEUROLOGY

## 2020-12-14 PROCEDURE — G8752 SYS BP LESS 140: HCPCS | Performed by: PSYCHIATRY & NEUROLOGY

## 2020-12-14 PROCEDURE — G8399 PT W/DXA RESULTS DOCUMENT: HCPCS | Performed by: PSYCHIATRY & NEUROLOGY

## 2020-12-14 PROCEDURE — G9717 DOC PT DX DEP/BP F/U NT REQ: HCPCS | Performed by: PSYCHIATRY & NEUROLOGY

## 2020-12-14 PROCEDURE — G8417 CALC BMI ABV UP PARAM F/U: HCPCS | Performed by: PSYCHIATRY & NEUROLOGY

## 2020-12-14 PROCEDURE — 1101F PT FALLS ASSESS-DOCD LE1/YR: CPT | Performed by: PSYCHIATRY & NEUROLOGY

## 2020-12-14 PROCEDURE — 1090F PRES/ABSN URINE INCON ASSESS: CPT | Performed by: PSYCHIATRY & NEUROLOGY

## 2020-12-14 PROCEDURE — G8427 DOCREV CUR MEDS BY ELIG CLIN: HCPCS | Performed by: PSYCHIATRY & NEUROLOGY

## 2020-12-14 PROCEDURE — G8536 NO DOC ELDER MAL SCRN: HCPCS | Performed by: PSYCHIATRY & NEUROLOGY

## 2020-12-14 NOTE — LETTER
12/14/20 Patient: Olman Long YOB: 1945 Date of Visit: 12/14/2020 Boo Hogan NP 
[address] [address] VIA Facsimile: [phone] Dear Boo Hogan NP, Thank you for referring Ms. Olman Long to [facility] for evaluation. My notes for this consultation are attached. If you have questions, please do not hesitate to call me. I look forward to following your patient along with you. Sincerely, Jaqueline Rendon MD

## 2020-12-14 NOTE — PROGRESS NOTES
Has been doing okay, she just dislikes cold weather     Had a fall last week, feel on her hand   She went to step down and missed the step

## 2020-12-14 NOTE — PROGRESS NOTES
NEUROLOGY CLINIC NOTE    Patient ID:  Hayden Mathews  [id number]  34 y.o.  1945    Date of Visit:  December 14, 2020    Reason for Visit:  Recurrent stroke    Chief Complaint   Patient presents with    Follow-up     stroke        History of Present Illness:     Patient Active Problem List    Diagnosis Date Noted    GI bleed 04/14/2020    Right pontine stroke ([facility] [address] 12/02/2019    Bilateral carotid artery stenosis 12/02/2019    Diplopia 12/02/2019    Left acoustic neuroma ([address] 12/02/2019    History of stroke     Acute CVA (cerebrovascular accident) ([facility] [address] 12/01/2019    Stroke ([facility] [address] 07/16/2019    TIA (transient ischemic attack) 07/15/2019    Flashers or floaters of right eye 08/21/2014    Fall at home 02/10/2014    Knee pain, left 02/10/2014    Prediabetes 02/10/2014    HTN, goal below 130/80 01/06/2014    Vitamin D deficiency 01/06/2014    Hypercholesterolemia 06/27/2013    Tiredness 06/06/2013    Depression, acute 06/06/2013     Past Medical History:   Diagnosis Date    Depression     Fall at home 2/10/2014   [facility] or floaters of right eye 8/21/2014    High cholesterol     History of stroke     Hypertension     Left acoustic neuroma ([facility] [address]     Stroke ([facility] [address]     2010 and 2019      Past Surgical History:   Procedure Laterality Date    HX TUBAL LIGATION  1981    HX TUMOR REMOVAL  2008    brain tumor      Prior to Admission medications    Medication Sig Start Date End Date Taking? Authorizing Provider   amLODIPine (NORVASC) 5 mg tablet  10/22/20  Yes Provider, Historical   ferrous sulfate 325 mg (65 mg iron) tablet  10/14/20  Yes Provider, Historical   clopidogreL (Plavix) 75 mg tab Take 1 Tab by mouth daily.  6/9/20  Yes Gagan Chu MD   omeprazole (PRILOSEC) 40 mg capsule 40 mg twice per day x 14 days, then daily till stopped by gastroenterologist  Indications: inflammation of the esophagus with erosion 4/18/20  Yes Shena Renee MD   MYRBETRIQ 25 mg ER tablet TK 1 T PO ONCE D 20  Yes Provider, Historical   hydroCHLOROthiazide (HYDRODIURIL) 12.5 mg tablet  20  Yes Provider, Historical   calcium carbonate (CALTREX) 600 mg calcium (1,500 mg) tablet Take 600 mg by mouth daily. Yes Provider, Historical   mirtazapine (REMERON) 30 mg tablet 30 mg nightly. 10/4/19  Yes Provider, Historical   lisinopril (PRINIVIL, ZESTRIL) 10 mg tablet  19  Yes Provider, Historical   atorvastatin (LIPITOR) 40 mg tablet Take 1 Tab by mouth daily. 19  Yes Desi Dubois MD   citalopram (CELEXA) 20 mg tablet Take 20 mg by mouth daily as needed. Yes Provider, Historical   latanoprost (XALATAN) 0.005 % ophthalmic solution Administer 1 Drop to both eyes nightly. Yes Provider, Historical   multivitamin (ONE A DAY) tablet Take 1 Tab by mouth daily. Yes Provider, Historical     No Known Allergies   Social History     Tobacco Use    Smoking status: Former Smoker     Packs/day: 0.25     Years: 20.00     Pack years: 5.00     Last attempt to quit: 10/26/2019     Years since quittin.1    Smokeless tobacco: Never Used   Substance Use Topics    Alcohol use: No      Family History   Problem Relation Age of Onset    Hypertension Mother     Other Father         Unknown    Dementia Brother     Alcohol abuse Sister     Alcohol abuse Sister     No Known Problems Sister     No Known Problems Sister     No Known Problems Sister     Dementia Brother     No Known Problems Brother     No Known Problems Child         Subjective:      Patsy Chakraborty is a 70 y.o. RHAAF with history of depression, hypertension, vitamin D deficiency and hypercholesterolemia who is here for follow up after a recent stroke. Patient was admitted to [facility] 7/15/2019 for right arm weakness and slurred speech. Day of admission, while she was driving she noticed numbness of the right arm. Felt heavy. Denies any overt weakness. She tried to rub it to bring back the sensation.   Then she went into the grocery store and was dropping things with her right hand. She is also dropping groceries that she was loading into her truck. She then drove home to see if symptoms would resolve. At home she also noted that she was starting to have slurring of her speech. Also developed moderate intensity headaches. Talk to her granddaughter on the phone and eventually her daughter and was brought to the emergency room. In the ER blood pressure was 160/75. Laboratory work-up was unremarkable except for LDL of 97.2. Head CT without contrast did not reveal any acute process. Moderate to severe white matter disease. CT perfusion did not reveal any abnormality. Head and neck CTA did not reveal any flow-limiting stenosis or aneurysm. Noted necrotic 1.8 cm right thyroid nodule. Brain MRI revealed small left parietal lobe and right centrum semiovale infarct. Severe periventricular and subcortical white matter disease. When seen, patient reports resolution of her symptoms. Echocardiogram done was unremarkable. Patient was discharged 7/17/2019 on atorvastatin 40 mg daily and aspirin 81 mg daily. Patient reportedly was doing fine until 11/1/2019 when she developed sudden onset of dizziness and when she was walking she was leaning to the left. Also noted slurring of her speech and blurred vision. Denies any weakness or sensory loss. Patient saw her PCP last 11/4/2019 for evaluation. Labs done 11/4/2019 revealed unremarkable CBC, INR, PT, PTT, B12, folate, homocysteine and LDL of 68. Brain MRI without contrast 11/5/2019 revealed new small infarct left middle cerebellar peduncle. Abnormal T2 hypointense signal seen throughout the left internal auditory canal with an apparent small soft tissue lesion in the left cerebellopontine angle cistern measuring 7 x 7 mm. 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 Abnormal flow void signal within the left vertebral artery V4 segment.   Unchanged severe chronic microvascular ischemic disease. Unchanged numerous small chronic infarcts including bilateral thalami, autumn and middle cerebellar peduncles. Unchanged few scattered chronic microhemorrhages (left superior parietal lobe, right lateral frontal lobe, right parietal lobe, bilateral temporal and occipital lobes). A left suboccipital cranioplasty is again seen. Patient reports she is improving. Less issue with imbalance. Still having some slurred speech. Blurring vision is also improved. She apparently has not been taking aspirin 81 mg daily for several months now. Patient was seen in the ER 11/9/2009 for waking up not feeling right, felt nauseous and weak. Blood pressure was 154/69. CBC, CMP and urinalysis were unremarkable. Head CT without contrast revealed no acute intracranial hemorrhage, mass or infarct. Confluent white matter hypodensities consistent with chronic microvascular ischemic changes. Patient was discharged and told to follow-up with PCP. Patient was seen by endocrinology last 11/25/2019 for evaluation of a thyroid nodule. Fine-needle aspiration was done. Then 12/1/2019, patient woke up seeing double no matter where she looks and increase wobbliness. In the ER, BP was 185/68. ER note mentions left eye deviated down but with individual ocular testing movements are full but there is presence of nystagmus. Right eye noted to be unable to adduct. CBC, CMP, troponin and urinalysis were unremarkable. Head CT without contrast revealed no acute process. Extensive periventricular white matter disease and low-density lesions involving bilateral thalami and mid autumn. Head and neck CTA revealed occlusion of the distal left vertebral artery but the right vertebral and basilar artery are patent. Chronic ischemic change in the white matter. Small left acoustic neuroma.   Brain MRI without contrast done 12/2/2019 revealed unchanged generalized parenchymal volume loss and severe chronic microvascular ischemic disease with numerous small chronic supratentorial and infratentorial infarcts and microhemorrhages. Distribution raises suspicion for cerebral amyloid angiopathy. There is left vestibular schwannoma within the internal auditory canal and cerebellopontine angle cistern. MRI of the IAC with and without contrast revealed 2 separate small foci of acute to subacute infarction right dorsal autumn and left brachium pontis. Left cerebellopontine angle/IAC mass most consistent with adenoma (17 x 8 x 9 mm). Small 7 x 7 mm meningioma planum sphenoidale. LDL was 59.8. Hemoglobin A1c slightly elevated at 5.8. Echocardiogram done 12/2/2019 revealed EF of 56 to 60%. Age-appropriate left ventricular diastolic function. Normal TSH. Patient was seen by Dr. Harrison Hsieh (neurology) and exam revealed decreased medial rectus function with nystagmus in the left eye on left gaze indicating right medial longitudinal fasciculus syndrome or right pontine segmental infarct. Cerebellar ataxia. Plavix was added to patient's aspirin for stroke prophylaxis. Patient was discharged 12/6/2019 to inpatient rehab.    12/16/2019, aspirin test and P2Y12 platelet response were appropriate. Patient was admitted to [facility] last 4/14/2020 for acute upper GI bleed while on antiplatelet therapy. EGD was done and found to have esophageal ulcer with bleeding and gastritis without bleeding. Found to have Farideh-Piper tear. Hemoglobin dropped from 10.5-7.4. Patient was discharged on omeprazole and sucralfate and told to discontinue aspirin and Plavix for 2 weeks. Patient has since then started back on taking aspirin 81 mg daily and Plavix 75 mg daily for stroke prophylaxis. Since the last visit on 6/9/2020, patient reports that she did see GI and plan was to do a EGD but needed clearance regarding stoppage of Plavix but daughter who helps arrange things gave birth during that time.  From a stroke standpoint however, patient has been doing well. No new deficits. No recurrence of the double vision. Balance continues to improve and patient is now walking without an aid. Admits to a fall the other day at a store without injury after missing a step. She continues to take Aspirin 81 mg daily and Plavix 75 mg daily for stroke prophylaxis. No signs of bleeding. She is also compliant with her statin therapy. She is back to smoking again. Outside reports reviewed: none    Review of Systems:    A comprehensive review of systems was negative except for:   balance issues    Objective:     Visit Vitals  /64   Pulse 95   Temp 97.2 °F (36.2 °C)   Resp 20   SpO2 95%       PHYSICAL EXAM:    NEUROLOGICAL EXAM:     Appearance: The patient is well developed, well nourished, provides a coherent history and is in no acute distress. Mental Status: Oriented to time, place and person. Fluent, no aphasia but with mild ataxic dysarthria. Mood and affect appropriate. Cranial Nerves:   II - XII were intact. Motor:  5/5 strength. Normal bulk and tone. No fasciculations. No pronator drift. Reflexes:   Deep tendon reflexes 1+/4 and symmetrical.   Sensory:   Normal to cold, pinprick and vibration. Gait:  Wide based. No Romberg and mild truncal instability. Tremor:   No tremor noted. Cerebellar:  Intact FTN/SUSAN/HTS       Assessment:   History CVA  Left vertebral artery occlusion  Left vestibular schwannoma  Hyperlipidemia  Essential hypertension  Esophageal ulcer with bleeding    Plan:   Neurological examination reveals sustained improvement. No left medial rectus paresis, mild ataxic dysarthria, no left-sided ataxia and clumsiness and mild gait ataxia. Status post infarction right dorsal autumn and left brachium pontis. Previous left middle cerebellar peduncle infarct. Recent brain and IAC MRI confirms these acute finding. Continue exercises taught by physical therapy. Continue Aspirin 81 mg daily and Plavix 75 mg daily for stroke prophylaxis. Advised to get in touch with GI to make sure that there are no ongoing issues or risk for GI bleed. Previous labs done and confirms efficacy of both regimens. Repeat echocardiogram was unremarkable. Patient was advised to call 911 if new deficits occur. Advised to stop smoking. Head and neck CTA revealed occlusion of the distal left vertebral artery but the right vertebral and basilar artery are patent. Continue dual antiplatelet therapy. MRI of the IAC with and without contrast left cerebellopontine angle/IAC mass most consistent with adenoma (17 x 8 x 9 mm). Small 7 x 7 mm meningioma planum sphenoidale. Patient has had a prior left cranioplasty. I would suspect this was related to this issue. May need neurosurgery re-evaluation. LDL last check was at goal of <70. Continue atorvastatin 40 mg daily. Advised compliance with dietary modifications and medications for hyperlipidemia. BP in better control. Continue none weightbearing exercises (aquatics, stationary bike, elliptical and portable pedal exerciser). Advised strict compliance with dietary modifications and medications for hypertension. Advised to get back with GI for re-evaluation given history of UGI bleed. All questions and concerns were answered. This note was created using voice recognition software. Despite editing, there may be syntax errors.

## 2020-12-16 ENCOUNTER — HOSPITAL ENCOUNTER (OUTPATIENT)
Dept: ULTRASOUND IMAGING | Age: 75
Discharge: HOME | End: 2020-12-16
Attending: INTERNAL MEDICINE
Payer: MEDICARE

## 2020-12-16 DIAGNOSIS — E04.1 THYROID NODULE: ICD-10-CM

## 2020-12-16 PROCEDURE — 74011000250 HC RX REV CODE- 250: Performed by: RADIOLOGY

## 2020-12-16 PROCEDURE — 10005 FNA BX W/US GDN 1ST LES: CPT

## 2020-12-16 PROCEDURE — 88172 CYTP DX EVAL FNA 1ST EA SITE: CPT

## 2020-12-16 PROCEDURE — 88173 CYTOPATH EVAL FNA REPORT: CPT

## 2020-12-16 RX ORDER — LIDOCAINE HYDROCHLORIDE 10 MG/ML
8 INJECTION, SOLUTION EPIDURAL; INFILTRATION; INTRACAUDAL; PERINEURAL
Status: COMPLETED | OUTPATIENT
Start: 2020-12-16 | End: 2020-12-16

## 2020-12-16 RX ADMIN — LIDOCAINE HYDROCHLORIDE 8 ML: 10 INJECTION, SOLUTION EPIDURAL; INFILTRATION; INTRACAUDAL; PERINEURAL at 17:00

## 2020-12-18 ENCOUNTER — HOSPITAL ENCOUNTER (OUTPATIENT)
Dept: MAMMOGRAPHY | Age: 75
Discharge: HOME | End: 2020-12-18
Attending: NURSE PRACTITIONER
Payer: MEDICARE

## 2020-12-18 ENCOUNTER — HOSPITAL ENCOUNTER (OUTPATIENT)
Dept: BONE DENSITY | Age: 75
Discharge: HOME | End: 2020-12-18
Attending: NURSE PRACTITIONER
Payer: MEDICARE

## 2020-12-18 ENCOUNTER — TELEPHONE (OUTPATIENT)
Dept: ENDOCRINOLOGY | Age: 75
End: 2020-12-18

## 2020-12-18 DIAGNOSIS — E04.1 THYROID NODULE: Primary | ICD-10-CM

## 2020-12-18 DIAGNOSIS — Z12.31 SCREENING MAMMOGRAM FOR HIGH-RISK PATIENT: ICD-10-CM

## 2020-12-18 DIAGNOSIS — M81.0 SENILE OSTEOPOROSIS: ICD-10-CM

## 2020-12-18 PROCEDURE — 77067 SCR MAMMO BI INCL CAD: CPT

## 2020-12-18 PROCEDURE — 77080 DXA BONE DENSITY AXIAL: CPT

## 2020-12-18 NOTE — TELEPHONE ENCOUNTER
----- Message from Jennifer Valles MD sent at 12/18/2020 11:45 AM EST -----  Марина Moffett,   Patients thyroid biopsy suggested that her thyroid nodule is benign, and this is reassuring,   Let her know also that she will have a thyroid level to repeat at the lab few days before her next visit, next year, thanks    Josefa El.  [facility]  [address] [place] [address]

## 2020-12-18 NOTE — PROGRESS NOTES
Stefano Rosenthal,   Patients thyroid biopsy suggested that her thyroid nodule is benign, and this is reassuring,   Let her know also that she will have a thyroid level to repeat at the lab few days before her next visit, next year, thanks    Wade Lee.  [facility]  [address] [place] [address]

## 2020-12-18 NOTE — TELEPHONE ENCOUNTER
I called Ms. Geoff Vaughn and relayed the message from Dr. Daysi Howell. She understood this information and said she would do as instructed.   Bruce Lopes

## 2021-02-18 NOTE — PROGRESS NOTES
F/U for UGIB    S: Ms. Marianela Fritz was seen by me today during rounds. At this time, she is resting comfortably. The patient has no new complaints today. Please see admission consult for details of ROS; there are +changes today. Emergent EGD yesterday by Dr. Roxane Romero in the ER revealed a vessel with a large mucosal tear at the GEJ with active bleeding. It was injected with epi, cauterized and 7 clips were placed. Oozing slowed but did not stop completely. She has been kept NPO and on scheduled Zofran to prevent retching. She is on BID PPI and her ASA/Plavix are on hold. Hgb has decreased from 10.4 to 8.1 to 7.9. BUN and Crt are elevated. Her tachycardia has improved. She was hypertensive and now is normotensive. Never hypotensive. She feels fine. Denies lightheadedness, chest pain, SOB or nausea. Her last BM was yesterday and was brown. O: Blood pressure 103/53, pulse 96, temperature 98.3 °F (36.8 °C), resp. rate 18, height 5' 6\" (1.676 m), weight 85.3 kg (188 lb 0.8 oz), SpO2 92 %. Gen: Patient is in no acute distress. There is no jaundice. Lungs: Clear to auscultation bilaterally . Heart: RRR. Abd: Soft, non tender, non-distended, bowel sounds present. Extremities: Warm. A: Active Problems:    GI bleed (4/14/2020)        Comment:  She has improved but still needs to be monitored closely. P: Keep NPO and monitor hgb closely. Continue BID PPI and Zofran and continue to hold ASA/Plavix. If stable and no further bleeding tomorrow, may start clears.       BETTY Blanchard  04/15/20  11:12 AM Detail Level: Detailed Quality 110: Preventive Care And Screening: Influenza Immunization: Influenza Immunization Administered during Influenza season Quality 226: Preventive Care And Screening: Tobacco Use: Screening And Cessation Intervention: Tobacco Screening not Performed for Medical Reasons Quality 111:Pneumonia Vaccination Status For Older Adults: Pneumococcal Vaccination Previously Received

## 2021-03-01 ENCOUNTER — HOSPITAL ENCOUNTER (OUTPATIENT)
Dept: PREADMISSION TESTING | Age: 76
Discharge: HOME | End: 2021-03-01
Payer: MEDICARE

## 2021-03-01 LAB — SARS-COV-2, COV2: NORMAL

## 2021-03-01 PROCEDURE — U0003 INFECTIOUS AGENT DETECTION BY NUCLEIC ACID (DNA OR RNA); SEVERE ACUTE RESPIRATORY SYNDROME CORONAVIRUS 2 (SARS-COV-2) (CORONAVIRUS DISEASE [COVID-19]), AMPLIFIED PROBE TECHNIQUE, MAKING USE OF HIGH THROUGHPUT TECHNOLOGIES AS DESCRIBED BY CMS-2020-01-R: HCPCS

## 2021-03-02 LAB — SARS-COV-2, COV2NT: NOT DETECTED

## 2021-03-05 ENCOUNTER — ANESTHESIA (OUTPATIENT)
Dept: ENDOSCOPY | Age: 76
End: 2021-03-05
Payer: MEDICARE

## 2021-03-05 ENCOUNTER — HOSPITAL ENCOUNTER (OUTPATIENT)
Age: 76
Setting detail: OUTPATIENT SURGERY
Discharge: HOME | End: 2021-03-05
Attending: INTERNAL MEDICINE | Admitting: INTERNAL MEDICINE
Payer: MEDICARE

## 2021-03-05 ENCOUNTER — ANESTHESIA EVENT (OUTPATIENT)
Dept: ENDOSCOPY | Age: 76
End: 2021-03-05
Payer: MEDICARE

## 2021-03-05 VITALS
RESPIRATION RATE: 17 BRPM | HEIGHT: 67 IN | WEIGHT: 184 LBS | BODY MASS INDEX: 28.88 KG/M2 | SYSTOLIC BLOOD PRESSURE: 138 MMHG | TEMPERATURE: 97.5 F | DIASTOLIC BLOOD PRESSURE: 59 MMHG | OXYGEN SATURATION: 95 % | HEART RATE: 89 BPM

## 2021-03-05 PROCEDURE — 74011250637 HC RX REV CODE- 250/637: Performed by: INTERNAL MEDICINE

## 2021-03-05 PROCEDURE — 77030013992 HC SNR POLYP ENDOSC BSC -B: Performed by: INTERNAL MEDICINE

## 2021-03-05 PROCEDURE — 2709999900 HC NON-CHARGEABLE SUPPLY: Performed by: INTERNAL MEDICINE

## 2021-03-05 PROCEDURE — 74011000250 HC RX REV CODE- 250: Performed by: ANESTHESIOLOGY

## 2021-03-05 PROCEDURE — 77030019988 HC FCPS ENDOSC DISP BSC -B: Performed by: INTERNAL MEDICINE

## 2021-03-05 PROCEDURE — 74011250636 HC RX REV CODE- 250/636: Performed by: ANESTHESIOLOGY

## 2021-03-05 PROCEDURE — 76040000007: Performed by: INTERNAL MEDICINE

## 2021-03-05 PROCEDURE — 76060000032 HC ANESTHESIA 0.5 TO 1 HR: Performed by: INTERNAL MEDICINE

## 2021-03-05 PROCEDURE — 88305 TISSUE EXAM BY PATHOLOGIST: CPT

## 2021-03-05 PROCEDURE — 88342 IMHCHEM/IMCYTCHM 1ST ANTB: CPT

## 2021-03-05 PROCEDURE — 74011250636 HC RX REV CODE- 250/636: Performed by: INTERNAL MEDICINE

## 2021-03-05 RX ORDER — MIDAZOLAM HYDROCHLORIDE 1 MG/ML
.25-5 INJECTION, SOLUTION INTRAMUSCULAR; INTRAVENOUS
Status: DISCONTINUED | OUTPATIENT
Start: 2021-03-05 | End: 2021-03-05 | Stop reason: HOSPADM

## 2021-03-05 RX ORDER — SODIUM CHLORIDE 9 MG/ML
75 INJECTION, SOLUTION INTRAVENOUS CONTINUOUS
Status: DISCONTINUED | OUTPATIENT
Start: 2021-03-05 | End: 2021-03-05 | Stop reason: HOSPADM

## 2021-03-05 RX ORDER — SODIUM CHLORIDE 0.9 % (FLUSH) 0.9 %
5-40 SYRINGE (ML) INJECTION AS NEEDED
Status: DISCONTINUED | OUTPATIENT
Start: 2021-03-05 | End: 2021-03-05 | Stop reason: HOSPADM

## 2021-03-05 RX ORDER — NALOXONE HYDROCHLORIDE 0.4 MG/ML
0.4 INJECTION, SOLUTION INTRAMUSCULAR; INTRAVENOUS; SUBCUTANEOUS
Status: DISCONTINUED | OUTPATIENT
Start: 2021-03-05 | End: 2021-03-05 | Stop reason: HOSPADM

## 2021-03-05 RX ORDER — DEXTROMETHORPHAN/PSEUDOEPHED 2.5-7.5/.8
1.2 DROPS ORAL
Status: DISCONTINUED | OUTPATIENT
Start: 2021-03-05 | End: 2021-03-05 | Stop reason: HOSPADM

## 2021-03-05 RX ORDER — PROPOFOL 10 MG/ML
INJECTION, EMULSION INTRAVENOUS AS NEEDED
Status: DISCONTINUED | OUTPATIENT
Start: 2021-03-05 | End: 2021-03-05 | Stop reason: HOSPADM

## 2021-03-05 RX ORDER — SODIUM CHLORIDE 9 MG/ML
50 INJECTION, SOLUTION INTRAVENOUS CONTINUOUS
Status: DISCONTINUED | OUTPATIENT
Start: 2021-03-05 | End: 2021-03-05 | Stop reason: HOSPADM

## 2021-03-05 RX ORDER — LIDOCAINE HYDROCHLORIDE 20 MG/ML
INJECTION, SOLUTION EPIDURAL; INFILTRATION; INTRACAUDAL; PERINEURAL AS NEEDED
Status: DISCONTINUED | OUTPATIENT
Start: 2021-03-05 | End: 2021-03-05 | Stop reason: HOSPADM

## 2021-03-05 RX ORDER — GLYCOPYRROLATE 0.2 MG/ML
INJECTION INTRAMUSCULAR; INTRAVENOUS AS NEEDED
Status: DISCONTINUED | OUTPATIENT
Start: 2021-03-05 | End: 2021-03-05 | Stop reason: HOSPADM

## 2021-03-05 RX ORDER — SODIUM CHLORIDE 0.9 % (FLUSH) 0.9 %
5-40 SYRINGE (ML) INJECTION EVERY 8 HOURS
Status: DISCONTINUED | OUTPATIENT
Start: 2021-03-05 | End: 2021-03-05 | Stop reason: HOSPADM

## 2021-03-05 RX ORDER — EPINEPHRINE 0.1 MG/ML
1 INJECTION INTRACARDIAC; INTRAVENOUS
Status: DISCONTINUED | OUTPATIENT
Start: 2021-03-05 | End: 2021-03-05 | Stop reason: HOSPADM

## 2021-03-05 RX ORDER — FLUMAZENIL 0.1 MG/ML
0.2 INJECTION INTRAVENOUS
Status: DISCONTINUED | OUTPATIENT
Start: 2021-03-05 | End: 2021-03-05 | Stop reason: HOSPADM

## 2021-03-05 RX ORDER — ATROPINE SULFATE 0.1 MG/ML
0.5 INJECTION INTRAVENOUS
Status: DISCONTINUED | OUTPATIENT
Start: 2021-03-05 | End: 2021-03-05 | Stop reason: HOSPADM

## 2021-03-05 RX ADMIN — PROPOFOL 350 MG: 10 INJECTION, EMULSION INTRAVENOUS at 14:22

## 2021-03-05 RX ADMIN — GLYCOPYRROLATE 0.2 MG: 0.2 INJECTION, SOLUTION INTRAMUSCULAR; INTRAVENOUS at 13:46

## 2021-03-05 RX ADMIN — LIDOCAINE HYDROCHLORIDE 100 MG: 20 INJECTION, SOLUTION EPIDURAL; INFILTRATION; INTRACAUDAL; PERINEURAL at 13:46

## 2021-03-05 RX ADMIN — SIMETHICONE 80 MG: 20 SUSPENSION/ DROPS ORAL at 14:14

## 2021-03-05 NOTE — PROGRESS NOTES
Cordelia Josse  1945  [id number]    Situation:  Verbal report received from: Arias Agee RN  Procedure: Procedure(s):  ESOPHAGOGASTRODUODENOSCOPY (EGD)  COLONOSCOPY  ESOPHAGOGASTRODUODENAL (EGD) BIOPSY  ENDOSCOPIC POLYPECTOMY    Background:    Preoperative diagnosis: ANEMIA, GI BLEED  Postoperative diagnosis: EGD: hiatal hernia, gastropathy, gastritis  Colon: hemorrhoids, polyp, diverticulosis    :  Dr. Niko Lagos  Assistant(s): Endoscopy Technician-1: Claudene Lacrosse  Endoscopy RN-1: Ana Luisa Bai RN    Specimens:   ID Type Source Tests Collected by Time Destination   1 : Bx Preservative Gastric  Familia Oquendo MD 3/5/2021 1407 Pathology   2 : Polyps Preservative Sigmoid  Familia Oquendo MD 3/5/2021 1407 Pathology   3 : Polyp Preservative Rectal  Familia Oquendo MD 3/5/2021 1417 Pathology     H. Pylori  no    Assessment:  Intra-procedure medications     Anesthesia gave intra-procedure sedation and medications, see anesthesia flow sheet yes    Intravenous fluids: NS@ KVO     Vital signs stable yes    Abdominal assessment: round and soft yes    Recommendation:  Discharge patient per MD order yes.   Family or Friend star Pedro  Permission to share finding with family or friend yes

## 2021-03-05 NOTE — ANESTHESIA POSTPROCEDURE EVALUATION
Procedure(s):  ESOPHAGOGASTRODUODENOSCOPY (EGD)  COLONOSCOPY  ESOPHAGOGASTRODUODENAL (EGD) BIOPSY  ENDOSCOPIC POLYPECTOMY. total IV anesthesia    Anesthesia Post Evaluation        Patient location during evaluation: PACU  Note status: Adequate. Level of consciousness: responsive to verbal stimuli and sleepy but conscious  Pain management: satisfactory to patient  Airway patency: patent  Anesthetic complications: no  Cardiovascular status: acceptable  Respiratory status: acceptable  Hydration status: acceptable  Comments: +Post-Anesthesia Evaluation and Assessment    Patient: Pro Sutton MRN: [id number]  SSN: [id number]   YOB: 1945  Age: 70 y.o. Sex: female      Cardiovascular Function/Vital Signs    BP (!) 138/59   Pulse 89   Temp 36.4 °C (97.5 °F)   Resp 17   Ht 5' 7\" (1.702 m)   Wt 83.5 kg (184 lb)   SpO2 95%   Breastfeeding Yes   BMI 28.82 kg/m²     Patient is status post Procedure(s):  ESOPHAGOGASTRODUODENOSCOPY (EGD)  COLONOSCOPY  ESOPHAGOGASTRODUODENAL (EGD) BIOPSY  ENDOSCOPIC POLYPECTOMY. Nausea/Vomiting: Controlled. Postoperative hydration reviewed and adequate. Pain:  Pain Scale 1: Numeric (0 - 10) (03/05/21 1448)  Pain Intensity 1: 0 (03/05/21 1448)   Managed. Neurological Status: At baseline. Mental Status and Level of Consciousness: Arousable. Pulmonary Status:   O2 Device: Room air (03/05/21 1448)   Adequate oxygenation and airway patent. Complications related to anesthesia: None    Post-anesthesia assessment completed. No concerns. Signed By: Julieth Briggs DO    3/5/2021  Post anesthesia nausea and vomiting:  controlled      INITIAL Post-op Vital signs:   Vitals Value Taken Time   /59 03/05/21 1451   Temp 36.4 °C (97.5 °F) 03/05/21 1433   Pulse 89 03/05/21 1454   Resp 20 03/05/21 1454   SpO2 86 % 03/05/21 1452   Vitals shown include unvalidated device data.

## 2021-03-05 NOTE — PROCEDURES
Colonoscopy Procedure Note    Shari Alejandro  1945  [id number]    Indications:  Please see below. Pre-operative Diagnosis: ANEMIA, GI BLEED    Post-operative Diagnosis: internal hemorrhoids, colon polyps, diverticulosis      : Ronnie Mark MD    Referring Provider: Marty Cleary NP    Sedation:  MAC anesthesia Propofol        Procedure Details:    After detailed informed consent was obtained with all risks and benefits of procedure explained and preoperative exam completed, the patient was taken to the endoscopy suite and placed in the left lateral decubitus position. Upon sequential sedation as per above, a digital rectal exam was performed  And was normal.  The Olympus videocolonoscope  was inserted in the rectum and carefully advanced to the cecum, which was identified by the ileocecal valve and appendiceal orifice. The quality of preparation was fair. The colonoscope was slowly withdrawn with careful evaluation between folds. Retroflexion in the rectum was performed. Findings:   · The Olympus Video High-Definition Colonoscope was advanced to the cecum identified by its typical land marks with ease. · A 1 cm sigmoid pedunculated polyps(hot snare), another 7 mm sigmoid sessile(cold snare) and 1 cm rectal(semi pedunculated polyp-hot snare) were noted and removed. · Mild pan-colonic diverticulosis noted (spares cecum/rectum). · Medium sized internal hemorrhoids. Therapies:  3 complete polypectomy were performed using hot/cold snare  and the polyps were  retrieved    Specimen:  - Specimens were collected as described above and sent to pathology. Complications: None were encountered during the procedure. EBL:  None. Recommendations:     -If adenoma is present, repeat colonoscopy in 2-3 years.  -High fiber diet. Naturally, for new bleeding, unexplained weight loss,change in bowel habits and anemia, an earlier colonoscopy should be considered. Ronnie WYNN Kwesi Posey MD  3/5/2021  2:17 PM

## 2021-03-05 NOTE — PROGRESS NOTES
Endoscope was pre-cleaned at the bedside immediately following procedure by Peggy Augustin (colonoscope)

## 2021-03-05 NOTE — DISCHARGE INSTRUCTIONS
[place] Office: [phone]    Shyann Reynolds  [id number]  1945    EGD/COLONOSCOPY DISCHARGE INSTRUCTIONS  Discomfort:  Sore throat- throat lozenges or warm salt water gargle  redness at IV site- apply warm compress to area; if redness or soreness persist- contact your physician  Gaseous discomfort- walking, belching will help relieve any discomfort  You may not operate a vehicle for 12 hours  You may not engage in an occupation involving machinery or appliances for rest of today. You may not drink alcoholic beverages for at least 12 hours  Avoid making any critical decisions for at least 24 hour  DIET  You may resume your regular diet - however -  remember your colon is empty and a heavy meal will produce gas. Avoid these foods:  fried / greasy foods, excessive carbonated drinks or too much caffeine  MEDICATIONS   Regarding Aspirin or Nonsteroidal medications specifically, please see below. ACTIVITY  You may resume your normal daily activities. Spend the remainder of the day resting -  avoid any strenuous activity. CALL M.D. ANY SIGN OF   Increasing pain, nausea, vomiting  Abdominal distension (swelling)  New increased bleeding (oral or rectal)  Fever (chills)  Pain in chest area  Bloody discharge from nose or mouth  Shortness of breath    You may not take any Advil, Aspirin, Ibuprofen, Motrin, Aleve, or Goodys for 7 days, ONLY  Tylenol as needed for pain. Start Plavix in 4-5 days' time  Follow-up Instructions:   Call  [facility] [phone].  Candace Zacarias MD for any questions or concerns  Results of procedure / biopsy in 7 days   Telephone # [phone]      Follow-up Information    None

## 2021-03-05 NOTE — ANESTHESIA PREPROCEDURE EVALUATION
Relevant Problems   No relevant active problems       Anesthetic History   No history of anesthetic complications            Review of Systems / Medical History  Patient summary reviewed and pertinent labs reviewed    Pulmonary  Within defined limits                 Neuro/Psych       CVA: no residual symptoms  TIA and psychiatric history     Cardiovascular    Hypertension          Hyperlipidemia    Exercise tolerance: >4 METS  Comments: TTE 2019  · Left Ventricle: Normal cavity size, wall thickness and systolic function (ejection fraction normal). Estimated left ventricular ejection fraction is 56 - 60%. Visually measured ejection fraction. No regional wall motion abnormality noted. Age-appropriate left ventricular diastolic function. · Aortic Valve: Mild aortic valve sclerosis. Mild aortic valve regurgitation is present. · Mitral Valve: Trace mitral valve regurgitation is present. · Pulmonary Artery: There is no evidence of pulmonary hypertension.       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

## 2021-03-05 NOTE — PROGRESS NOTES
Endoscope was pre-cleaned at the bedside immediately following procedure by Deborah [facility])

## 2021-03-05 NOTE — H&P
Pre-endoscopy H and P    The patient was seen and examined in the room/pre-op holding area. The airway was assessed and documented. The problem list, past medical history, and medications were reviewed. Patient Active Problem List   Diagnosis Code    Tiredness R53.83    Depression, acute F32.9    Hypercholesterolemia E78.00    HTN, goal below 130/80 I10    Vitamin D deficiency E55.9    Fall at home [address] [place], Y92.009    Knee pain, left M25.562    Prediabetes R73.03    Flashers or floaters of right eye H43.391    TIA (transient ischemic attack) G45.9    Stroke (HCC) I63.9    Acute CVA (cerebrovascular accident) ([facility] [address] I63.9    Right pontine stroke (HCC) I63.50    Bilateral carotid artery stenosis I65.23    Diplopia H53.2    Left acoustic neuroma (HCC) D33.3    History of stroke Z86.73    GI bleed K92.2     Social History     Socioeconomic History    Marital status:      Spouse name: Not on file    Number of children: Not on file    Years of education: Not on file    Highest education level: Not on file   Occupational History    Not on file   Social Needs    Financial resource strain: Not on file    Food insecurity     Worry: Not on file     Inability: Not on file    Transportation needs     Medical: Not on file     Non-medical: Not on file   Tobacco Use    Smoking status: Former Smoker     Packs/day: 0.25     Years: 20.00     Pack years: 5.00     Quit date: 10/26/2019     Years since quittin.3    Smokeless tobacco: Never Used   Substance and Sexual Activity    Alcohol use: No    Drug use: No    Sexual activity: Never   Lifestyle    Physical activity     Days per week: Not on file     Minutes per session: Not on file    Stress: Not on file   Relationships    Social connections     Talks on phone: Not on file     Gets together: Not on file     Attends Quaker service: Not on file     Active member of club or organization: Not on file     Attends meetings of clubs or organizations: Not on file     Relationship status: Not on file    Intimate partner violence     Fear of current or ex partner: Not on file     Emotionally abused: Not on file     Physically abused: Not on file     Forced sexual activity: Not on file   Other Topics Concern    Not on file   Social History Narrative    Not on file     Past Medical History:   Diagnosis Date    Depression     Fall at home 2/10/2014   [facility] or floaters of right eye 2014    High cholesterol     History of stroke     Hypertension     [facility] acoustic [facility]a [facility] [address]     Stroke ([facility] [address]      and          Prior to Admission Medications   Prescriptions Last Dose Informant Patient Reported? Taking? MYRBETRIQ 25 mg ER tablet 3/4/2021 at Unknown time  Yes Yes   Sig: TK 1 T PO ONCE D   amLODIPine (NORVASC) 5 mg tablet 3/4/2021 at Unknown time  Yes Yes   atorvastatin (LIPITOR) 40 mg tablet 3/4/2021 at Unknown time  No Yes   Sig: Take 1 Tab by mouth daily. calcium carbonate (CALTREX) 600 mg calcium (1,500 mg) tablet 3/4/2021 at Unknown time  Yes Yes   Sig: Take 600 mg by mouth daily. citalopram (CELEXA) 20 mg tablet 3/4/2021 at Unknown time Self Yes Yes   Sig: Take 20 mg by mouth daily as needed. clopidogreL (Plavix) 75 mg tab 3/2/2021  No No   Sig: Take 1 Tab by mouth daily. ferrous sulfate 325 mg (65 mg iron) tablet 3/4/2021 at Unknown time  Yes Yes   hydroCHLOROthiazide (HYDRODIURIL) 12.5 mg tablet 3/4/2021 at Unknown time  Yes Yes   latanoprost (XALATAN) 0.005 % ophthalmic solution  Self Yes No   Sig: Administer 1 Drop to both eyes nightly. lisinopril (PRINIVIL, ZESTRIL) 10 mg tablet 3/4/2021 at Unknown time  Yes Yes   mirtazapine (REMERON) 30 mg tablet 3/4/2021 at Unknown time  Yes Yes   Si mg nightly. multivitamin (ONE A DAY) tablet 3/4/2021 at Unknown time Self Yes Yes   Sig: Take 1 Tab by mouth daily.    omeprazole (PRILOSEC) 40 mg capsule 3/4/2021 at Unknown time  No Yes   Si mg twice per day x 14 days, then daily till stopped by gastroenterologist  Indications: inflammation of the esophagus with erosion      Facility-Administered Medications: None           The review of systems is:  Negative  for shortness of breath or chest pain      The heart, lungs, and mental status were satisfactory for the administration of deep sedation and for the procedure. I discussed with the patient the objectives, risks, consequences and alternatives to the procedure.       Little Worrell MD  3/5/2021  1:52 PM

## 2021-03-05 NOTE — PROCEDURES
[place] Office: [phone]      Esophagogastroduodenoscopy Procedure Note      Sofia Solis  1945  [id number]    Indication: Anemia     : Little Worrell MD    Referring Provider:  Jc Whyte NP    Sedation:  MAC anesthesia Propofol    Procedure Details:  After detailed informed consent was obtained for the procedure, with all risks and benefits of procedure explained the patient was taken to the endoscopy suite and placed in the left lateral decubitus position. Following sequential administration of sedation as per above, the endoscope was inserted into the mouth and advanced under direct vision to second portion of the duodenum. A careful inspection was made as the gastroscope was withdrawn, including a retroflexed view of the proximal stomach; findings and interventions are described below. Findings:     Esophagus: The esophageal mucosa in the proximal, mid and distal esophagus is normal.   The squamo-columnar junction is at 40 cm where the Z-line was noted. There is a 2 cm type 1,  sliding hiatal hernia. Mildly torturous esophagus. Stomach: The gastric mucosa has erythema in the body: a single biopsy was taken(She took Plavix 3 days ago). The fundus was found to be normal with no lesions noted on retroflexion. The angularis is normal as well. Duodenum:   The bulb and post bulbar mucosa is normal in appearance. The duodenal folds are normal.     Therapies:  biopsy of stomach body    Specimen:  Specimens were collected as described and send to the laboratory. Complications:   None were encountered during the procedure. EBL:  None. Recommendations:     -Acid suppression with a proton pump inhibitor. ,   -Await pathology. ,   -Follow up with primary care physician        Ronnie Crockett MD  3/5/2021  1:59 PM

## 2021-07-11 ENCOUNTER — HOSPITAL ENCOUNTER (EMERGENCY)
Age: 76
Discharge: HOME | End: 2021-07-11
Attending: EMERGENCY MEDICINE
Payer: MEDICARE

## 2021-07-11 ENCOUNTER — APPOINTMENT (OUTPATIENT)
Dept: GENERAL RADIOLOGY | Age: 76
End: 2021-07-11
Attending: EMERGENCY MEDICINE
Payer: MEDICARE

## 2021-07-11 VITALS
RESPIRATION RATE: 14 BRPM | SYSTOLIC BLOOD PRESSURE: 104 MMHG | OXYGEN SATURATION: 97 % | TEMPERATURE: 99.2 F | HEART RATE: 90 BPM | DIASTOLIC BLOOD PRESSURE: 82 MMHG

## 2021-07-11 DIAGNOSIS — T78.3XXA ANGIOEDEMA, INITIAL ENCOUNTER: Primary | ICD-10-CM

## 2021-07-11 LAB
ALBUMIN SERPL-MCNC: 3.3 G/DL (ref 3.5–5)
ALBUMIN/GLOB SERPL: 0.6 {RATIO} (ref 1.1–2.2)
ALP SERPL-CCNC: 151 U/L (ref 45–117)
ALT SERPL-CCNC: 16 U/L (ref 12–78)
ANION GAP SERPL CALC-SCNC: 5 MMOL/L (ref 5–15)
AST SERPL-CCNC: 22 U/L (ref 15–37)
BASOPHILS # BLD: 0.1 K/UL (ref 0–0.1)
BASOPHILS NFR BLD: 1 % (ref 0–1)
BILIRUB SERPL-MCNC: 0.3 MG/DL (ref 0.2–1)
BNP SERPL-MCNC: 114 PG/ML
BUN SERPL-MCNC: 15 MG/DL (ref 6–20)
BUN/CREAT SERPL: 13 (ref 12–20)
CALCIUM SERPL-MCNC: 9.2 MG/DL (ref 8.5–10.1)
CHLORIDE SERPL-SCNC: 104 MMOL/L (ref 97–108)
CO2 SERPL-SCNC: 30 MMOL/L (ref 21–32)
CREAT SERPL-MCNC: 1.17 MG/DL (ref 0.55–1.02)
DIFFERENTIAL METHOD BLD: ABNORMAL
EOSINOPHIL # BLD: 0.2 K/UL (ref 0–0.4)
EOSINOPHIL NFR BLD: 2 % (ref 0–7)
ERYTHROCYTE [DISTWIDTH] IN BLOOD BY AUTOMATED COUNT: 15.3 % (ref 11.5–14.5)
GLOBULIN SER CALC-MCNC: 5.3 G/DL (ref 2–4)
GLUCOSE SERPL-MCNC: 94 MG/DL (ref 65–100)
HCT VFR BLD AUTO: 42.1 % (ref 35–47)
HGB BLD-MCNC: 13.3 G/DL (ref 11.5–16)
IMM GRANULOCYTES # BLD AUTO: 0 K/UL (ref 0–0.04)
IMM GRANULOCYTES NFR BLD AUTO: 0 % (ref 0–0.5)
LYMPHOCYTES # BLD: 2.3 K/UL (ref 0.8–3.5)
LYMPHOCYTES NFR BLD: 26 % (ref 12–49)
MCH RBC QN AUTO: 27.5 PG (ref 26–34)
MCHC RBC AUTO-ENTMCNC: 31.6 G/DL (ref 30–36.5)
MCV RBC AUTO: 87 FL (ref 80–99)
MONOCYTES # BLD: 0.6 K/UL (ref 0–1)
MONOCYTES NFR BLD: 7 % (ref 5–13)
NEUTS SEG # BLD: 5.8 K/UL (ref 1.8–8)
NEUTS SEG NFR BLD: 64 % (ref 32–75)
NRBC # BLD: 0 K/UL (ref 0–0.01)
NRBC BLD-RTO: 0 PER 100 WBC
PLATELET # BLD AUTO: 413 K/UL (ref 150–400)
PMV BLD AUTO: 10.6 FL (ref 8.9–12.9)
POTASSIUM SERPL-SCNC: 3.5 MMOL/L (ref 3.5–5.1)
PROT SERPL-MCNC: 8.6 G/DL (ref 6.4–8.2)
RBC # BLD AUTO: 4.84 M/UL (ref 3.8–5.2)
SODIUM SERPL-SCNC: 139 MMOL/L (ref 136–145)
TROPONIN I SERPL-MCNC: <0.05 NG/ML
WBC # BLD AUTO: 9 K/UL (ref 3.6–11)

## 2021-07-11 PROCEDURE — 36415 COLL VENOUS BLD VENIPUNCTURE: CPT

## 2021-07-11 PROCEDURE — 74011000250 HC RX REV CODE- 250: Performed by: EMERGENCY MEDICINE

## 2021-07-11 PROCEDURE — 96372 THER/PROPH/DIAG INJ SC/IM: CPT

## 2021-07-11 PROCEDURE — 71045 X-RAY EXAM CHEST 1 VIEW: CPT

## 2021-07-11 PROCEDURE — 74011000258 HC RX REV CODE- 258: Performed by: EMERGENCY MEDICINE

## 2021-07-11 PROCEDURE — 96365 THER/PROPH/DIAG IV INF INIT: CPT

## 2021-07-11 PROCEDURE — 96375 TX/PRO/DX INJ NEW DRUG ADDON: CPT

## 2021-07-11 PROCEDURE — 99285 EMERGENCY DEPT VISIT HI MDM: CPT

## 2021-07-11 PROCEDURE — 85025 COMPLETE CBC W/AUTO DIFF WBC: CPT

## 2021-07-11 PROCEDURE — 84484 ASSAY OF TROPONIN QUANT: CPT

## 2021-07-11 PROCEDURE — 80053 COMPREHEN METABOLIC PANEL: CPT

## 2021-07-11 PROCEDURE — 74011250636 HC RX REV CODE- 250/636: Performed by: EMERGENCY MEDICINE

## 2021-07-11 PROCEDURE — 83880 ASSAY OF NATRIURETIC PEPTIDE: CPT

## 2021-07-11 PROCEDURE — 93005 ELECTROCARDIOGRAM TRACING: CPT

## 2021-07-11 RX ORDER — ONDANSETRON 2 MG/ML
4 INJECTION INTRAMUSCULAR; INTRAVENOUS
Status: DISCONTINUED | OUTPATIENT
Start: 2021-07-11 | End: 2021-07-12 | Stop reason: HOSPADM

## 2021-07-11 RX ORDER — EPINEPHRINE 1 MG/ML
0.3 INJECTION, SOLUTION, CONCENTRATE INTRAVENOUS
Status: COMPLETED | OUTPATIENT
Start: 2021-07-11 | End: 2021-07-11

## 2021-07-11 RX ORDER — FAMOTIDINE 20 MG/1
20 TABLET, FILM COATED ORAL 2 TIMES DAILY
Qty: 20 TABLET | Refills: 0 | Status: SHIPPED | OUTPATIENT
Start: 2021-07-11 | End: 2021-07-21

## 2021-07-11 RX ORDER — PREDNISONE 10 MG/1
TABLET ORAL
Qty: 42 TABLET | Refills: 0 | Status: SHIPPED | OUTPATIENT
Start: 2021-07-11

## 2021-07-11 RX ORDER — DIPHENHYDRAMINE HCL 25 MG
25 TABLET ORAL
Qty: 30 TABLET | Refills: 0 | Status: SHIPPED | OUTPATIENT
Start: 2021-07-11

## 2021-07-11 RX ADMIN — METHYLPREDNISOLONE SODIUM SUCCINATE 125 MG: 125 INJECTION, POWDER, FOR SOLUTION INTRAMUSCULAR; INTRAVENOUS at 16:22

## 2021-07-11 RX ADMIN — EPINEPHRINE 0.3 MG: 1 INJECTION INTRAMUSCULAR; INTRAVENOUS; SUBCUTANEOUS at 16:08

## 2021-07-11 RX ADMIN — TRANEXAMIC ACID 1000 MG: 1 INJECTION, SOLUTION INTRAVENOUS at 17:36

## 2021-07-11 RX ADMIN — FAMOTIDINE 20 MG: 10 INJECTION, SOLUTION INTRAVENOUS at 16:22

## 2021-07-11 NOTE — ED NOTES
At bedside to initiate txa infusion. IV will not flush or draw back. ED tech to attempt ultrasound IV placement.

## 2021-07-11 NOTE — ED PROVIDER NOTES
EMERGENCY DEPARTMENT HISTORY AND PHYSICAL EXAM      Date: 7/11/2021  Patient Name: Marion Gaxiola    History of Presenting Illness     Chief Complaint   Patient presents with    Tongue Swelling     received benadryl 50mg IM by EMS PTA       History Provided By: Patient    HPI: Marion Gaxiola, 70 y.o. female with a past medical history significant for history of stroke, hypertension, hyperlipidemia, medical problems as stated below presents to the ED with cc of moderate to severe right-sided tongue swelling and facial swelling over the last 48 hours. Swelling started on the left side of the face and then spread to the right. Today she noticed the swelling was on her tongue as well and she was having trouble speaking. She reports she has had a cold and cough for the last 2 weeks and she is taking both Tessalon Perles and Mucinex for this. She is on an ACE inhibitor, lisinopril, which she has been on for many years. She denies any chest pain, fevers, abdominal pain, vomiting, diarrhea, or any other associated symptoms. No other exacerbating ameliorating factors. There are no other complaints, changes, or physical findings at this time. PCP: Calvin Alex NP    No current facility-administered medications on file prior to encounter. Current Outpatient Medications on File Prior to Encounter   Medication Sig Dispense Refill    amLODIPine (NORVASC) 5 mg tablet       ferrous sulfate 325 mg (65 mg iron) tablet       clopidogreL (Plavix) 75 mg tab Take 1 Tab by mouth daily. 90 Tab 3    omeprazole (PRILOSEC) 40 mg capsule 40 mg twice per day x 14 days, then daily till stopped by gastroenterologist  Indications: inflammation of the esophagus with erosion 60 Cap 3    MYRBETRIQ 25 mg ER tablet TK 1 T PO ONCE D      hydroCHLOROthiazide (HYDRODIURIL) 12.5 mg tablet       calcium carbonate (CALTREX) 600 mg calcium (1,500 mg) tablet Take 600 mg by mouth daily.       mirtazapine (REMERON) 30 mg tablet 30 mg nightly.  lisinopril (PRINIVIL, ZESTRIL) 10 mg tablet       atorvastatin (LIPITOR) 40 mg tablet Take 1 Tab by mouth daily. 30 Tab 3    citalopram (CELEXA) 20 mg tablet Take 20 mg by mouth daily as needed.  latanoprost (XALATAN) 0.005 % ophthalmic solution Administer 1 Drop to both eyes nightly.  multivitamin (ONE A DAY) tablet Take 1 Tab by mouth daily. Past History     Past Medical History:  Past Medical History:   Diagnosis Date    Depression     Fall at home 2/10/2014   [facility] or floaters of right eye 2014    High cholesterol     History of stroke     Hypertension     Left acoustic neuroma ([facility] [address]     Stroke ([facility] [address]      and        Past Surgical History:  Past Surgical History:   Procedure Laterality Date    COLONOSCOPY N/A 3/5/2021    COLONOSCOPY performed by Aric Hopkins MD at [facility] ENDOSCOPY    [facility]X [facility]    HX TUMOR REMOVAL      brain tumor       Family History:  Family History   Problem Relation Age of Onset   Paula Malone Hypertension Mother     Other Father         Unknown    Dementia Brother     Alcohol abuse Sister     Alcohol abuse Sister     No Known Problems Sister     No Known Problems Sister     No Known Problems Sister     Dementia Brother     No Known Problems Brother     No Known Problems Child        Social History:  Social History     Tobacco Use    Smoking status: Former Smoker     Packs/day: 0.25     Years: 20.00     Pack years: 5.00     Quit date: 10/26/2019     Years since quittin.7    Smokeless tobacco: Never Used   Substance Use Topics    Alcohol use: No    Drug use: No       Allergies:  No Known Allergies      Review of Systems   Review of Systems   Constitutional: Negative for chills, diaphoresis, fatigue and fever. HENT: Negative for ear pain and sore throat. Eyes: Negative for pain and redness. Respiratory: Positive for cough. Negative for shortness of breath.     Cardiovascular: Negative for chest pain and leg swelling. Gastrointestinal: Negative for abdominal pain, diarrhea, nausea and vomiting. Endocrine: Negative for cold intolerance and heat intolerance. Genitourinary: Negative for flank pain and hematuria. Musculoskeletal: Negative for back pain and neck stiffness. Skin: Negative for rash and wound. Neurological: Negative for dizziness, syncope and headaches. All other systems reviewed and are negative. Physical Exam   Physical Exam  Vitals and nursing note reviewed. Constitutional:       General: She is not in acute distress. Appearance: She is well-developed. She is not ill-appearing. HENT:      Head: Normocephalic and atraumatic. Mouth/Throat:      Pharynx: No oropharyngeal exudate. Comments: Moderate right-sided tongue swelling, able to visualize the posterior pharynx with tongue blade. No swelling to the mouth floor. No notable facial swelling. Neck is supple with no meningismus. Eyes:      Conjunctiva/sclera: Conjunctivae normal.      Pupils: Pupils are equal, round, and reactive to light. Cardiovascular:      Rate and Rhythm: Normal rate and regular rhythm. Heart sounds: No murmur heard. Pulmonary:      Effort: Pulmonary effort is normal. No respiratory distress. Breath sounds: Normal breath sounds. No wheezing. Abdominal:      General: Bowel sounds are normal. There is no distension. Palpations: Abdomen is soft. Tenderness: There is no abdominal tenderness. Musculoskeletal:         General: No deformity. Normal range of motion. Cervical back: Normal range of motion. Skin:     General: Skin is warm and dry. Findings: No rash. Neurological:      Mental Status: She is alert and oriented to person, place, and time.       Coordination: Coordination normal.   Psychiatric:         Behavior: Behavior normal.         Diagnostic Study Results     Labs -     Recent Results (from the past 24 hour(s))   EKG, 12 LEAD, INITIAL Collection Time: 07/11/21  3:56 PM   Result Value Ref Range    Ventricular Rate 90 BPM    Atrial Rate 90 BPM    P-R Interval 142 ms    QRS Duration 86 ms    Q-T Interval 350 ms    QTC Calculation (Bezet) 428 ms    Calculated P Axis 63 degrees    Calculated R Axis 18 degrees    Calculated T Axis 5 degrees    Diagnosis       Normal sinus rhythm  Nonspecific T wave abnormality  When compared with ECG of 14-APR-2020 15:07,  QT has shortened     CBC WITH AUTOMATED DIFF    Collection Time: 07/11/21  4:11 PM   Result Value Ref Range    WBC 9.0 3.6 - 11.0 K/uL    RBC 4.84 3.80 - 5.20 M/uL    HGB 13.3 11.5 - 16.0 g/dL    HCT 42.1 35.0 - 47.0 %    MCV 87.0 80.0 - 99.0 FL    MCH 27.5 26.0 - 34.0 PG    MCHC 31.6 30.0 - 36.5 g/dL    RDW 15.3 (H) 11.5 - 14.5 %    PLATELET 953 (H) 774 - 400 K/uL    MPV 10.6 8.9 - 12.9 FL    NRBC 0.0 0  WBC    ABSOLUTE NRBC 0.00 0.00 - 0.01 K/uL    NEUTROPHILS 64 32 - 75 %    LYMPHOCYTES 26 12 - 49 %    MONOCYTES 7 5 - 13 %    EOSINOPHILS 2 0 - 7 %    BASOPHILS 1 0 - 1 %    IMMATURE GRANULOCYTES 0 0.0 - 0.5 %    ABS. NEUTROPHILS 5.8 1.8 - 8.0 K/UL    ABS. LYMPHOCYTES 2.3 0.8 - 3.5 K/UL    ABS. MONOCYTES 0.6 0.0 - 1.0 K/UL    ABS. EOSINOPHILS 0.2 0.0 - 0.4 K/UL    ABS. BASOPHILS 0.1 0.0 - 0.1 K/UL    ABS. IMM. GRANS. 0.0 0.00 - 0.04 K/UL    DF AUTOMATED     METABOLIC PANEL, COMPREHENSIVE    Collection Time: 07/11/21  4:11 PM   Result Value Ref Range    Sodium 139 136 - 145 mmol/L    Potassium 3.5 3.5 - 5.1 mmol/L    Chloride 104 97 - 108 mmol/L    CO2 30 21 - 32 mmol/L    Anion gap 5 5 - 15 mmol/L    Glucose 94 65 - 100 mg/dL    BUN 15 6 - 20 MG/DL    Creatinine 1.17 (H) 0.55 - 1.02 MG/DL    BUN/Creatinine ratio 13 12 - 20      GFR est AA 55 (L) >60 ml/min/1.73m2    GFR est non-AA 45 (L) >60 ml/min/1.73m2    Calcium 9.2 8.5 - 10.1 MG/DL    Bilirubin, total 0.3 0.2 - 1.0 MG/DL    ALT (SGPT) 16 12 - 78 U/L    AST (SGOT) 22 15 - 37 U/L    Alk.  phosphatase 151 (H) 45 - 117 U/L    Protein, total 8.6 (H) 6.4 - 8.2 g/dL    Albumin 3.3 (L) 3.5 - 5.0 g/dL    Globulin 5.3 (H) 2.0 - 4.0 g/dL    A-G Ratio 0.6 (L) 1.1 - 2.2     TROPONIN I    Collection Time: 07/11/21  4:11 PM   Result Value Ref Range    Troponin-I, Qt. <0.05 <0.05 ng/mL   NT-PRO BNP    Collection Time: 07/11/21  4:11 PM   Result Value Ref Range    NT pro- <125 PG/ML       Radiologic Studies -   XR CHEST PORT   Final Result   No acute process. CT Results  (Last 48 hours)    None        CXR Results  (Last 48 hours)               07/11/21 1642  XR CHEST PORT Final result    Impression:  No acute process. Narrative:  INDICATION: sob, hypoxia       EXAM:  AP CHEST RADIOGRAPH       COMPARISON: December 1, 2019       FINDINGS:       AP portable view of the chest demonstrates a normal cardiomediastinal   silhouette. There is no edema, effusion, consolidation, or pneumothorax. The   osseous structures are unremarkable. Medical Decision Making   I am the first provider for this patient. I reviewed the vital signs, available nursing notes, past medical history, past surgical history, family history and social history. Vital Signs-Reviewed the patient's vital signs. Patient Vitals for the past 12 hrs:   Temp Pulse Resp BP SpO2   07/11/21 1945  93 17 (!) 130/55 92 %   07/11/21 1930  94 18 137/83 (!) 88 %   07/11/21 1900  94 16 135/64 93 %   07/11/21 1849  97 13 137/63 93 %   07/11/21 1730  (!) 102 18 (!) 81/51 92 %   07/11/21 1645  96 19 (!) 115/50 90 %   07/11/21 1631  91 14 (!) 125/55 91 %   07/11/21 1530  92 16 110/67 93 %   07/11/21 1505     94 %   07/11/21 1502 99.2 °F (37.3 °C) 93 16 122/63 90 %       Records Reviewed: Nursing records and medical records reviewed    MDM:  Angioedema versus allergic reaction versus pneumonia    Provider Notes (Medical Decision Making):   Patient is a 72-year-old female presenting with symptoms consistent with acute angioedema.   Chest x-ray showed no evidence of pneumonia or infiltrate. Patient's lip and tongue swelling did not improve with IV steroids and antihistamines initially. However after 2 hours after receiving tranexamic acid there was notable decrease in her tongue swelling. Unclear if this was related to the steroids or to the TXA. Shared decision-making was made with both the daughter the patient was determined the patient can be safely monitored by her brother since they live together at home. She will continue steroids, antihistamines at home and discontinue her lisinopril until seen by her primary care doctor. She does take 2 other medications for her blood pressure. ED Course:   Initial assessment performed. The patients presenting problems have been discussed, and they are in agreement with the care plan formulated and outlined with them. I have encouraged them to ask questions as they arise throughout their visit. Critical Care:  None      Disposition:  8:19 PM  Joo Jimenes's  results have been reviewed with her. She has been counseled regarding her diagnosis. She verbally conveys understanding and agreement of the signs, symptoms, diagnosis, treatment and prognosis and additionally agrees to follow up as recommended with Dr. Allen Napier, NP in 24 - 48 hours. She also agrees with the care-plan and conveys that all of her questions have been answered. I have also put together some discharge instructions for her that include: 1) educational information regarding their diagnosis, 2) how to care for their diagnosis at home, as well a 3) list of reasons why they would want to return to the ED prior to their follow-up appointment, should their condition change. DISCHARGE PLAN:  1. Current Discharge Medication List      START taking these medications    Details   predniSONE (STERAPRED DS) 10 mg dose pack Take 5tab(50mg)x3days, 4tab(40mg)x3days, 3tab(30mg)x3days, 2tab(20mg)x2days, 1tab(10mg)x2days.  #42  Qty: 42 Tablet, Refills: 0  Start date: 7/11/2021      famotidine (Pepcid) 20 mg tablet Take 1 Tablet by mouth two (2) times a day for 10 days. Qty: 20 Tablet, Refills: 0  Start date: 7/11/2021, End date: 7/21/2021      diphenhydrAMINE (BENADRYL) 25 mg tablet Take 1 Tablet by mouth every six (6) hours as needed (Lip swelling). Qty: 30 Tablet, Refills: 0  Start date: 7/11/2021           2. Follow-up Information     Follow up With Specialties Details Why Contact Christine Herring NP Nurse Practitioner In 2 days For a follow-up evaluation. Please discontinue your lisinopril to be seen by your primary care doctor. [address]  [phone]          3. Return to ED if worse     Diagnosis     Clinical Impression:   1. Angioedema, initial encounter        Attestations:    Letty Ferguson MD    Please note that this dictation was completed with [employer], the computer voice recognition software. Quite often unanticipated grammatical, syntax, homophones, and other interpretive errors are inadvertently transcribed by the computer software. Please disregard these errors. Please excuse any errors that have escaped final proofreading. Thank you.

## 2021-07-11 NOTE — ED NOTES
Pt resting on stretcher. No acute distress at this time, able to converse with family at bedside without difficulty. sats 91% on 2lpm at this time.

## 2021-07-11 NOTE — ED NOTES
Assumed care of pt from EMS. Pt reports swelling to tongue that started today. Notes last week she lip swelling that began after taking tessalon. She was advised by her doctor to discontinue this, which she did. She notes she took mucinex today. Pt given benadryl 50mg IM by EMS prior to arrival in ED. Positioned for comfort on stretcher. Call bell within reach. Family bedside. Pt and family updated on plan of care.

## 2021-07-11 NOTE — ED NOTES
Pt assisted to bedside commode to void. Returned to stretcher and positioned for comfort. Updated on plan of care. Call bell within reach.

## 2021-07-12 LAB
ATRIAL RATE: 90 BPM
CALCULATED P AXIS, ECG09: 63 DEGREES
CALCULATED R AXIS, ECG10: 18 DEGREES
CALCULATED T AXIS, ECG11: 5 DEGREES
DIAGNOSIS, 93000: NORMAL
P-R INTERVAL, ECG05: 142 MS
Q-T INTERVAL, ECG07: 350 MS
QRS DURATION, ECG06: 86 MS
QTC CALCULATION (BEZET), ECG08: 428 MS
VENTRICULAR RATE, ECG03: 90 BPM

## 2021-07-12 NOTE — DISCHARGE INSTRUCTIONS
It was a pleasure taking care of you in our Emergency Department today. We know that when you come to [facility], you are entrusting us with your health, comfort, and safety. Our physicians and nurses honor that trust, and truly appreciate the opportunity to care for you and your loved ones. We also value your feedback. If you receive a survey about your Emergency Department experience today, please fill it out. We care about our patients' feedback, and we listen to what you have to say. Thank you!

## 2021-07-12 NOTE — ED NOTES
Pt given discharge instructions. Saline lock removed. Pt discharged via wheelchair. Accompanied by family. No acute distress at time of departure.

## 2021-07-19 DIAGNOSIS — Z86.73 HISTORY OF CVA (CEREBROVASCULAR ACCIDENT): ICD-10-CM

## 2021-07-19 DIAGNOSIS — I65.02 VERTEBRAL ARTERY OCCLUSION, LEFT: ICD-10-CM

## 2021-07-26 RX ORDER — CLOPIDOGREL BISULFATE 75 MG/1
TABLET ORAL
Qty: 90 TABLET | Refills: 3 | Status: SHIPPED | OUTPATIENT
Start: 2021-07-26

## 2021-08-03 PROBLEM — I63.9 STROKE (HCC): Status: RESOLVED | Noted: 2019-07-16 | Resolved: 2021-08-03

## 2021-11-01 ENCOUNTER — TRANSCRIBE ORDER (OUTPATIENT)
Dept: SCHEDULING | Age: 76
End: 2021-11-01

## 2021-11-01 DIAGNOSIS — E04.1 THYROID NODULE: Primary | ICD-10-CM

## 2021-11-30 ENCOUNTER — TRANSCRIBE ORDER (OUTPATIENT)
Dept: SCHEDULING | Age: 76
End: 2021-11-30

## 2021-11-30 DIAGNOSIS — Z12.31 SCREENING MAMMOGRAM FOR HIGH-RISK PATIENT: Primary | ICD-10-CM

## 2022-03-01 ENCOUNTER — HOSPITAL ENCOUNTER (OUTPATIENT)
Dept: ULTRASOUND IMAGING | Age: 77
Discharge: HOME | End: 2022-03-01
Attending: NURSE PRACTITIONER
Payer: MEDICARE

## 2022-03-01 DIAGNOSIS — E04.1 THYROID NODULE: ICD-10-CM

## 2022-03-01 PROCEDURE — 76536 US EXAM OF HEAD AND NECK: CPT

## 2022-03-17 ENCOUNTER — HOSPITAL ENCOUNTER (OUTPATIENT)
Dept: MAMMOGRAPHY | Age: 77
Discharge: HOME | End: 2022-03-17
Attending: NURSE PRACTITIONER
Payer: MEDICARE

## 2022-03-17 DIAGNOSIS — Z12.31 SCREENING MAMMOGRAM FOR HIGH-RISK PATIENT: ICD-10-CM

## 2022-03-17 PROCEDURE — 77067 SCR MAMMO BI INCL CAD: CPT

## 2022-03-18 PROBLEM — I63.9 ACUTE CVA (CEREBROVASCULAR ACCIDENT) (HCC): Status: ACTIVE | Noted: 2019-12-01

## 2022-03-19 PROBLEM — K92.2 GI BLEED: Status: ACTIVE | Noted: 2020-04-14

## 2022-03-19 PROBLEM — H53.2 DIPLOPIA: Status: ACTIVE | Noted: 2019-12-02

## 2022-03-19 PROBLEM — I65.23 BILATERAL CAROTID ARTERY STENOSIS: Status: ACTIVE | Noted: 2019-12-02

## 2022-03-19 PROBLEM — I63.50 RIGHT PONTINE STROKE (HCC): Status: ACTIVE | Noted: 2019-12-02

## 2022-03-19 PROBLEM — G45.9 TIA (TRANSIENT ISCHEMIC ATTACK): Status: ACTIVE | Noted: 2019-07-15

## 2022-03-19 PROBLEM — D33.3 LEFT ACOUSTIC NEUROMA (HCC): Status: ACTIVE | Noted: 2019-12-02

## 2023-07-13 ENCOUNTER — HOSPITAL ENCOUNTER (OUTPATIENT)
Facility: HOSPITAL | Age: 78
Discharge: HOME | End: 2023-07-13
Payer: MEDICARE

## 2023-07-13 ENCOUNTER — HOSPITAL ENCOUNTER (OUTPATIENT)
Facility: HOSPITAL | Age: 78
End: 2023-07-13
Payer: MEDICARE

## 2023-07-13 DIAGNOSIS — Z78.0 ENCOUNTER FOR OSTEOPOROSIS SCREENING IN ASYMPTOMATIC POSTMENOPAUSAL PATIENT: ICD-10-CM

## 2023-07-13 DIAGNOSIS — Z13.820 ENCOUNTER FOR OSTEOPOROSIS SCREENING IN ASYMPTOMATIC POSTMENOPAUSAL PATIENT: ICD-10-CM

## 2023-07-13 DIAGNOSIS — Z12.31 ENCOUNTER FOR SCREENING MAMMOGRAM FOR MALIGNANT NEOPLASM OF BREAST: ICD-10-CM

## 2023-07-13 PROCEDURE — 77063 BREAST TOMOSYNTHESIS BI: CPT

## 2023-07-13 PROCEDURE — 77080 DXA BONE DENSITY AXIAL: CPT

## 2023-09-15 ENCOUNTER — TRANSCRIBE ORDERS (OUTPATIENT)
Facility: HOSPITAL | Age: 78
End: 2023-09-15

## 2023-09-15 DIAGNOSIS — E04.1 THYROID NODULE: Primary | ICD-10-CM

## 2023-10-06 ENCOUNTER — HOSPITAL ENCOUNTER (OUTPATIENT)
Facility: HOSPITAL | Age: 78
End: 2023-10-06
Payer: MEDICARE

## 2023-10-06 DIAGNOSIS — E04.1 THYROID NODULE: ICD-10-CM

## 2023-10-06 PROCEDURE — 76536 US EXAM OF HEAD AND NECK: CPT

## 2023-12-21 ENCOUNTER — HOSPITAL ENCOUNTER (INPATIENT)
Facility: HOSPITAL | Age: 78
LOS: 8 days | Discharge: NURSING, REHAB OR LONG-TERM CARE | DRG: 481 | End: 2023-12-29
Attending: STUDENT IN AN ORGANIZED HEALTH CARE EDUCATION/TRAINING PROGRAM | Admitting: STUDENT IN AN ORGANIZED HEALTH CARE EDUCATION/TRAINING PROGRAM
Payer: MEDICARE

## 2023-12-21 ENCOUNTER — APPOINTMENT (OUTPATIENT)
Facility: HOSPITAL | Age: 78
DRG: 481 | End: 2023-12-21
Payer: MEDICARE
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
## 2023-12-21 DIAGNOSIS — S72.491A CLOSED COMMINUTED INTRA-ARTICULAR FRACTURE OF DISTAL END OF RIGHT FEMUR, INITIAL ENCOUNTER (HCC): Primary | ICD-10-CM

## 2023-12-21 LAB
COMMENT:: NORMAL
EKG ATRIAL RATE: 85 BPM
EKG DIAGNOSIS: NORMAL
EKG P AXIS: 84 DEGREES
EKG P-R INTERVAL: 132 MS
EKG Q-T INTERVAL: 384 MS
EKG QRS DURATION: 82 MS
EKG QTC CALCULATION (BAZETT): 456 MS
EKG R AXIS: 66 DEGREES
EKG T AXIS: 267 DEGREES
EKG VENTRICULAR RATE: 85 BPM
SPECIMEN HOLD: NORMAL

## 2023-12-21 PROCEDURE — 73700 CT LOWER EXTREMITY W/O DYE: CPT

## 2023-12-21 PROCEDURE — 72170 X-RAY EXAM OF PELVIS: CPT

## 2023-12-21 PROCEDURE — 99285 EMERGENCY DEPT VISIT HI MDM: CPT

## 2023-12-21 PROCEDURE — APPNB15 APP NON BILLABLE TIME 0-15 MINS: Performed by: PHYSICIAN ASSISTANT

## 2023-12-21 PROCEDURE — 36415 COLL VENOUS BLD VENIPUNCTURE: CPT

## 2023-12-21 PROCEDURE — 73562 X-RAY EXAM OF KNEE 3: CPT

## 2023-12-21 PROCEDURE — 6370000000 HC RX 637 (ALT 250 FOR IP): Performed by: STUDENT IN AN ORGANIZED HEALTH CARE EDUCATION/TRAINING PROGRAM

## 2023-12-21 PROCEDURE — 73552 X-RAY EXAM OF FEMUR 2/>: CPT

## 2023-12-21 PROCEDURE — 6360000002 HC RX W HCPCS: Performed by: STUDENT IN AN ORGANIZED HEALTH CARE EDUCATION/TRAINING PROGRAM

## 2023-12-21 PROCEDURE — 1100000000 HC RM PRIVATE

## 2023-12-21 RX ORDER — LATANOPROST 50 UG/ML
1 SOLUTION/ DROPS OPHTHALMIC DAILY
Status: DISCONTINUED | OUTPATIENT
Start: 2023-12-22 | End: 2023-12-29 | Stop reason: HOSPADM

## 2023-12-21 RX ORDER — ONDANSETRON 4 MG/1
4 TABLET, ORALLY DISINTEGRATING ORAL EVERY 8 HOURS PRN
Status: DISCONTINUED | OUTPATIENT
Start: 2023-12-21 | End: 2023-12-29 | Stop reason: HOSPADM

## 2023-12-21 RX ORDER — CITALOPRAM 20 MG/1
20 TABLET ORAL DAILY
Status: DISCONTINUED | OUTPATIENT
Start: 2023-12-22 | End: 2023-12-29 | Stop reason: HOSPADM

## 2023-12-21 RX ORDER — SODIUM CHLORIDE 0.9 % (FLUSH) 0.9 %
5-40 SYRINGE (ML) INJECTION PRN
Status: DISCONTINUED | OUTPATIENT
Start: 2023-12-21 | End: 2023-12-26 | Stop reason: SDUPTHER

## 2023-12-21 RX ORDER — ACETAMINOPHEN 650 MG/1
650 SUPPOSITORY RECTAL EVERY 6 HOURS PRN
Status: DISCONTINUED | OUTPATIENT
Start: 2023-12-21 | End: 2023-12-29 | Stop reason: HOSPADM

## 2023-12-21 RX ORDER — AMLODIPINE BESYLATE 5 MG/1
5 TABLET ORAL NIGHTLY
Status: DISCONTINUED | OUTPATIENT
Start: 2023-12-21 | End: 2023-12-29 | Stop reason: HOSPADM

## 2023-12-21 RX ORDER — OXYCODONE HYDROCHLORIDE 5 MG/1
5 TABLET ORAL EVERY 4 HOURS PRN
Status: DISCONTINUED | OUTPATIENT
Start: 2023-12-21 | End: 2023-12-29 | Stop reason: HOSPADM

## 2023-12-21 RX ORDER — LANOLIN ALCOHOL/MO/W.PET/CERES
3 CREAM (GRAM) TOPICAL NIGHTLY
Status: DISCONTINUED | OUTPATIENT
Start: 2023-12-21 | End: 2023-12-29 | Stop reason: HOSPADM

## 2023-12-21 RX ORDER — OXYCODONE HYDROCHLORIDE AND ACETAMINOPHEN 5; 325 MG/1; MG/1
1 TABLET ORAL
Status: COMPLETED | OUTPATIENT
Start: 2023-12-21 | End: 2023-12-21

## 2023-12-21 RX ORDER — MIRTAZAPINE 15 MG/1
30 TABLET, FILM COATED ORAL NIGHTLY PRN
Status: DISCONTINUED | OUTPATIENT
Start: 2023-12-21 | End: 2023-12-29 | Stop reason: HOSPADM

## 2023-12-21 RX ORDER — SODIUM CHLORIDE 9 MG/ML
INJECTION, SOLUTION INTRAVENOUS PRN
Status: DISCONTINUED | OUTPATIENT
Start: 2023-12-21 | End: 2023-12-29 | Stop reason: HOSPADM

## 2023-12-21 RX ORDER — FENTANYL CITRATE 50 UG/ML
25 INJECTION, SOLUTION INTRAMUSCULAR; INTRAVENOUS
Status: ACTIVE | OUTPATIENT
Start: 2023-12-21 | End: 2023-12-22

## 2023-12-21 RX ORDER — ONDANSETRON 2 MG/ML
4 INJECTION INTRAMUSCULAR; INTRAVENOUS EVERY 6 HOURS PRN
Status: DISCONTINUED | OUTPATIENT
Start: 2023-12-21 | End: 2023-12-29 | Stop reason: HOSPADM

## 2023-12-21 RX ORDER — CLOPIDOGREL BISULFATE 75 MG/1
75 TABLET ORAL DAILY
Status: DISCONTINUED | OUTPATIENT
Start: 2023-12-22 | End: 2023-12-29 | Stop reason: HOSPADM

## 2023-12-21 RX ORDER — ATORVASTATIN CALCIUM 40 MG/1
40 TABLET, FILM COATED ORAL DAILY
Status: DISCONTINUED | OUTPATIENT
Start: 2023-12-22 | End: 2023-12-29 | Stop reason: HOSPADM

## 2023-12-21 RX ORDER — POLYETHYLENE GLYCOL 3350 17 G/17G
17 POWDER, FOR SOLUTION ORAL DAILY PRN
Status: DISCONTINUED | OUTPATIENT
Start: 2023-12-21 | End: 2023-12-29 | Stop reason: HOSPADM

## 2023-12-21 RX ORDER — HYDROCHLOROTHIAZIDE 25 MG/1
12.5 TABLET ORAL DAILY
Status: DISCONTINUED | OUTPATIENT
Start: 2023-12-22 | End: 2023-12-29 | Stop reason: HOSPADM

## 2023-12-21 RX ORDER — SODIUM CHLORIDE 0.9 % (FLUSH) 0.9 %
5-40 SYRINGE (ML) INJECTION EVERY 12 HOURS SCHEDULED
Status: DISCONTINUED | OUTPATIENT
Start: 2023-12-21 | End: 2023-12-26 | Stop reason: SDUPTHER

## 2023-12-21 RX ORDER — ACETAMINOPHEN 325 MG/1
650 TABLET ORAL EVERY 6 HOURS PRN
Status: DISCONTINUED | OUTPATIENT
Start: 2023-12-21 | End: 2023-12-29 | Stop reason: HOSPADM

## 2023-12-21 RX ORDER — FERROUS SULFATE 325(65) MG
325 TABLET ORAL
Status: DISCONTINUED | OUTPATIENT
Start: 2023-12-22 | End: 2023-12-23

## 2023-12-21 RX ORDER — MORPHINE SULFATE 4 MG/ML
4 INJECTION, SOLUTION INTRAMUSCULAR; INTRAVENOUS
Status: DISCONTINUED | OUTPATIENT
Start: 2023-12-21 | End: 2023-12-21

## 2023-12-21 RX ORDER — MORPHINE SULFATE 10 MG/ML
6 INJECTION, SOLUTION INTRAMUSCULAR; INTRAVENOUS
Status: COMPLETED | OUTPATIENT
Start: 2023-12-21 | End: 2023-12-21

## 2023-12-21 RX ORDER — 0.9 % SODIUM CHLORIDE 0.9 %
30 INTRAVENOUS SOLUTION INTRAVENOUS ONCE
Status: COMPLETED | OUTPATIENT
Start: 2023-12-21 | End: 2023-12-22

## 2023-12-21 RX ORDER — PANTOPRAZOLE SODIUM 40 MG/1
40 TABLET, DELAYED RELEASE ORAL
Status: DISCONTINUED | OUTPATIENT
Start: 2023-12-22 | End: 2023-12-29 | Stop reason: HOSPADM

## 2023-12-21 RX ADMIN — MORPHINE SULFATE 6 MG: 10 INJECTION INTRAVENOUS at 21:36

## 2023-12-21 RX ADMIN — OXYCODONE AND ACETAMINOPHEN 1 TABLET: 5; 325 TABLET ORAL at 16:53
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
## 2023-12-22 ENCOUNTER — ANESTHESIA (OUTPATIENT)
Facility: HOSPITAL | Age: 78
DRG: 481 | End: 2023-12-22
Payer: MEDICARE

## 2023-12-22 ENCOUNTER — ANESTHESIA EVENT (OUTPATIENT)
Facility: HOSPITAL | Age: 78
DRG: 481 | End: 2023-12-22
Payer: MEDICARE

## 2023-12-22 PROCEDURE — 2580000003 HC RX 258: Performed by: NURSE PRACTITIONER

## 2023-12-22 PROCEDURE — 6370000000 HC RX 637 (ALT 250 FOR IP): Performed by: STUDENT IN AN ORGANIZED HEALTH CARE EDUCATION/TRAINING PROGRAM

## 2023-12-22 PROCEDURE — 2580000003 HC RX 258: Performed by: STUDENT IN AN ORGANIZED HEALTH CARE EDUCATION/TRAINING PROGRAM

## 2023-12-22 PROCEDURE — 2580000003 HC RX 258: Performed by: EMERGENCY MEDICINE

## 2023-12-22 PROCEDURE — 1100000000 HC RM PRIVATE

## 2023-12-22 RX ORDER — 0.9 % SODIUM CHLORIDE 0.9 %
500 INTRAVENOUS SOLUTION INTRAVENOUS ONCE
Status: COMPLETED | OUTPATIENT
Start: 2023-12-22 | End: 2023-12-22

## 2023-12-22 RX ADMIN — SODIUM CHLORIDE 2181 ML: 9 INJECTION, SOLUTION INTRAVENOUS at 00:05

## 2023-12-22 RX ADMIN — LATANOPROST 1 DROP: 50 SOLUTION OPHTHALMIC at 12:29

## 2023-12-22 RX ADMIN — SODIUM CHLORIDE, PRESERVATIVE FREE 10 ML: 5 INJECTION INTRAVENOUS at 10:00

## 2023-12-22 RX ADMIN — MELATONIN 3 MG: at 00:13

## 2023-12-22 RX ADMIN — SODIUM CHLORIDE, PRESERVATIVE FREE 10 ML: 5 INJECTION INTRAVENOUS at 00:15

## 2023-12-22 RX ADMIN — SODIUM CHLORIDE, PRESERVATIVE FREE 10 ML: 5 INJECTION INTRAVENOUS at 21:08

## 2023-12-22 RX ADMIN — MELATONIN 3 MG: at 21:08

## 2023-12-22 RX ADMIN — OXYCODONE HYDROCHLORIDE 5 MG: 5 TABLET ORAL at 19:27

## 2023-12-22 RX ADMIN — SODIUM CHLORIDE 500 ML: 9 INJECTION, SOLUTION INTRAVENOUS at 06:53

## 2023-12-22 RX ADMIN — AMLODIPINE BESYLATE 5 MG: 5 TABLET ORAL at 21:08

## 2023-12-22 RX ADMIN — PANTOPRAZOLE SODIUM 40 MG: 40 TABLET, DELAYED RELEASE ORAL at 06:03

## 2023-12-22 RX ADMIN — ACETAMINOPHEN 650 MG: 325 TABLET ORAL at 21:08

## 2023-12-22 NOTE — ED NOTES
Bedside shift change report given to [address] [place] [address] (oncoming nurse) by Justin Barraza (offgoing nurse). Report included the following information Nurse Handoff Report, ED Encounter Summary, ED SBAR, and MAR.

## 2023-12-22 NOTE — ANESTHESIA PRE PROCEDURE
Department of Anesthesiology  Preprocedure Note       Name:  Debra Multani   Age:  70 y.o.  :  1945                                          MRN:  [id number]         Date:  2023      Surgeon: Tye Steen):  Luis Florez MD    Procedure: Procedure(s):  RIGHT DISTAL FEMUR OPEN REDUCTION INTERNAL FIXATION    Medications prior to admission:   Prior to Admission medications    Medication Sig Start Date End Date Taking?  Authorizing Provider   amLODIPine (NORVASC) 5 MG tablet ceived the following from [facility]: Outside name: amLODIPine (NORVASC) 5 mg tablet 10/22/20   Automatic Reconciliation, Ar   atorvastatin (LIPITOR) 40 MG tablet Take 1 tablet by mouth daily 19   Automatic Reconciliation, Ar   calcium carbonate 1500 (600 Ca) MG TABS tablet Take 1 tablet by mouth daily    Automatic Reconciliation, Ar   citalopram (CELEXA) 20 MG tablet Take 1 tablet by mouth daily as needed    Automatic Reconciliation, Ar   clopidogrel (PLAVIX) 75 MG tablet TAKE 1 TABLET BY MOUTH EVERY DAY 21   Automatic Reconciliation, Ar   diphenhydrAMINE (SOMINEX) 25 MG tablet Take 25 mg by mouth every 6 hours as needed  Patient not taking: Reported on 2023   Automatic Reconciliation, Ar   ferrous sulfate (IRON 325) 325 (65 Fe) MG tablet [facility] the [facility]: [facility] name: ferrous sulfate 325 mg (65 mg iron) tablet 10/14/20   Automatic Reconciliation, Ar   hydroCHLOROthiazide (HYDRODIURIL) 12.5 MG tablet [facility] the following [facility]  [facility]: Outside name: hydroCHLOROthiazide (HYDRODIURIL) 12.5 mg tablet  Patient not taking: Reported on 2023   Automatic Reconciliation, Ar   latanoprost (XALATAN) 0.005 % ophthalmic solution Apply 1 drop to eye    Automatic Reconciliation, Ar   lisinopril (PRINIVIL;ZESTRIL) 10 MG tablet ceived the following from [facility]: Outside name: lisinopril (PRINIVIL, ZESTRIL) 10 mg tablet
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
## 2023-12-23 ENCOUNTER — APPOINTMENT (OUTPATIENT)
Facility: HOSPITAL | Age: 78
DRG: 481 | End: 2023-12-23
Payer: MEDICARE

## 2023-12-23 LAB
ALBUMIN SERPL-MCNC: 2.6 G/DL (ref 3.5–5)
ALBUMIN/GLOB SERPL: 0.7 (ref 1.1–2.2)
ALP SERPL-CCNC: 102 U/L (ref 45–117)
ALT SERPL-CCNC: 14 U/L (ref 12–78)
ANION GAP SERPL CALC-SCNC: 5 MMOL/L (ref 5–15)
AST SERPL-CCNC: 19 U/L (ref 15–37)
BILIRUB SERPL-MCNC: 0.3 MG/DL (ref 0.2–1)
BUN SERPL-MCNC: 16 MG/DL (ref 6–20)
BUN/CREAT SERPL: 16 (ref 12–20)
CALCIUM SERPL-MCNC: 8.6 MG/DL (ref 8.5–10.1)
CHLORIDE SERPL-SCNC: 115 MMOL/L (ref 97–108)
CO2 SERPL-SCNC: 25 MMOL/L (ref 21–32)
CREAT SERPL-MCNC: 1 MG/DL (ref 0.55–1.02)
ERYTHROCYTE [DISTWIDTH] IN BLOOD BY AUTOMATED COUNT: 15.2 % (ref 11.5–14.5)
GLOBULIN SER CALC-MCNC: 3.7 G/DL (ref 2–4)
GLUCOSE SERPL-MCNC: 119 MG/DL (ref 65–100)
HCT VFR BLD AUTO: 23.4 % (ref 35–47)
HCT VFR BLD AUTO: 24.8 % (ref 35–47)
HGB BLD-MCNC: 7.1 G/DL (ref 11.5–16)
HGB BLD-MCNC: 7.9 G/DL (ref 11.5–16)
HISTORY CHECK: NORMAL
HISTORY CHECK: NORMAL
MAGNESIUM SERPL-MCNC: 1.9 MG/DL (ref 1.6–2.4)
MCH RBC QN AUTO: 27.6 PG (ref 26–34)
MCHC RBC AUTO-ENTMCNC: 30.3 G/DL (ref 30–36.5)
MCV RBC AUTO: 91.1 FL (ref 80–99)
NRBC # BLD: 0 K/UL (ref 0–0.01)
NRBC BLD-RTO: 0 PER 100 WBC
PHOSPHATE SERPL-MCNC: 2.6 MG/DL (ref 2.6–4.7)
PLATELET # BLD AUTO: 335 K/UL (ref 150–400)
PMV BLD AUTO: 10.8 FL (ref 8.9–12.9)
POTASSIUM SERPL-SCNC: 3.9 MMOL/L (ref 3.5–5.1)
PROT SERPL-MCNC: 6.3 G/DL (ref 6.4–8.2)
RBC # BLD AUTO: 2.57 M/UL (ref 3.8–5.2)
SODIUM SERPL-SCNC: 145 MMOL/L (ref 136–145)
WBC # BLD AUTO: 11.2 K/UL (ref 3.6–11)

## 2023-12-23 PROCEDURE — 6370000000 HC RX 637 (ALT 250 FOR IP): Performed by: STUDENT IN AN ORGANIZED HEALTH CARE EDUCATION/TRAINING PROGRAM

## 2023-12-23 PROCEDURE — 94760 N-INVAS EAR/PLS OXIMETRY 1: CPT

## 2023-12-23 PROCEDURE — 6360000002 HC RX W HCPCS: Performed by: NURSE ANESTHETIST, CERTIFIED REGISTERED

## 2023-12-23 PROCEDURE — 3700000000 HC ANESTHESIA ATTENDED CARE: Performed by: ORTHOPAEDIC SURGERY

## 2023-12-23 PROCEDURE — 2500000003 HC RX 250 WO HCPCS: Performed by: NURSE ANESTHETIST, CERTIFIED REGISTERED

## 2023-12-23 PROCEDURE — 86901 BLOOD TYPING SEROLOGIC RH(D): CPT

## 2023-12-23 PROCEDURE — C1713 ANCHOR/SCREW BN/BN,TIS/BN: HCPCS | Performed by: ORTHOPAEDIC SURGERY

## 2023-12-23 PROCEDURE — 7100000000 HC PACU RECOVERY - FIRST 15 MIN: Performed by: ORTHOPAEDIC SURGERY

## 2023-12-23 PROCEDURE — C1769 GUIDE WIRE: HCPCS | Performed by: ORTHOPAEDIC SURGERY

## 2023-12-23 PROCEDURE — 86850 RBC ANTIBODY SCREEN: CPT

## 2023-12-23 PROCEDURE — 71045 X-RAY EXAM CHEST 1 VIEW: CPT

## 2023-12-23 PROCEDURE — 6370000000 HC RX 637 (ALT 250 FOR IP): Performed by: ORTHOPAEDIC SURGERY

## 2023-12-23 PROCEDURE — P9045 ALBUMIN (HUMAN), 5%, 250 ML: HCPCS | Performed by: NURSE ANESTHETIST, CERTIFIED REGISTERED

## 2023-12-23 PROCEDURE — 85014 HEMATOCRIT: CPT

## 2023-12-23 PROCEDURE — 7100000001 HC PACU RECOVERY - ADDTL 15 MIN: Performed by: ORTHOPAEDIC SURGERY

## 2023-12-23 PROCEDURE — 36415 COLL VENOUS BLD VENIPUNCTURE: CPT

## 2023-12-23 PROCEDURE — 3600000004 HC SURGERY LEVEL 4 BASE: Performed by: ORTHOPAEDIC SURGERY

## 2023-12-23 PROCEDURE — 3700000001 HC ADD 15 MINUTES (ANESTHESIA): Performed by: ORTHOPAEDIC SURGERY

## 2023-12-23 PROCEDURE — P9016 RBC LEUKOCYTES REDUCED: HCPCS

## 2023-12-23 PROCEDURE — 3600000014 HC SURGERY LEVEL 4 ADDTL 15MIN: Performed by: ORTHOPAEDIC SURGERY

## 2023-12-23 PROCEDURE — 2700000000 HC OXYGEN THERAPY PER DAY

## 2023-12-23 PROCEDURE — 85027 COMPLETE CBC AUTOMATED: CPT

## 2023-12-23 PROCEDURE — 86900 BLOOD TYPING SEROLOGIC ABO: CPT

## 2023-12-23 PROCEDURE — 6360000002 HC RX W HCPCS: Performed by: STUDENT IN AN ORGANIZED HEALTH CARE EDUCATION/TRAINING PROGRAM

## 2023-12-23 PROCEDURE — 84100 ASSAY OF PHOSPHORUS: CPT

## 2023-12-23 PROCEDURE — 36430 TRANSFUSION BLD/BLD COMPNT: CPT

## 2023-12-23 PROCEDURE — 2709999900 HC NON-CHARGEABLE SUPPLY: Performed by: ORTHOPAEDIC SURGERY

## 2023-12-23 PROCEDURE — 0QSB04Z REPOSITION RIGHT LOWER FEMUR WITH INTERNAL FIXATION DEVICE, OPEN APPROACH: ICD-10-PCS | Performed by: ORTHOPAEDIC SURGERY

## 2023-12-23 PROCEDURE — 6360000002 HC RX W HCPCS: Performed by: ORTHOPAEDIC SURGERY

## 2023-12-23 PROCEDURE — 64447 NJX AA&/STRD FEMORAL NRV IMG: CPT | Performed by: STUDENT IN AN ORGANIZED HEALTH CARE EDUCATION/TRAINING PROGRAM

## 2023-12-23 PROCEDURE — 1100000000 HC RM PRIVATE

## 2023-12-23 PROCEDURE — 2580000003 HC RX 258: Performed by: ORTHOPAEDIC SURGERY

## 2023-12-23 PROCEDURE — 83735 ASSAY OF MAGNESIUM: CPT

## 2023-12-23 PROCEDURE — 85018 HEMOGLOBIN: CPT

## 2023-12-23 PROCEDURE — 2580000003 HC RX 258: Performed by: NURSE PRACTITIONER

## 2023-12-23 PROCEDURE — 80053 COMPREHEN METABOLIC PANEL: CPT

## 2023-12-23 PROCEDURE — 86923 COMPATIBILITY TEST ELECTRIC: CPT

## 2023-12-23 DEVICE — IMPLANTABLE DEVICE: Type: IMPLANTABLE DEVICE | Site: FEMUR | Status: FUNCTIONAL

## 2023-12-23 DEVICE — SCREW BNE L60MM DIA5MM S STL CANN ST VAR ANG LOK THRD T25: Type: IMPLANTABLE DEVICE | Site: FEMUR | Status: FUNCTIONAL

## 2023-12-23 DEVICE — PLATE BNE L195MM 8 H ST R CNDYL S STL CRV LOK COMPR VAR ANG: Type: IMPLANTABLE DEVICE | Site: FEMUR | Status: FUNCTIONAL

## 2023-12-23 DEVICE — SCREW BNE L36MM DIA5MM CNDYL S STL ST VAR ANG LOK COMPR T25: Type: IMPLANTABLE DEVICE | Site: FEMUR | Status: FUNCTIONAL

## 2023-12-23 DEVICE — SCREW BNE L38MM DIA5MM CNDYL S STL ST VAR ANG LOK T25: Type: IMPLANTABLE DEVICE | Site: FEMUR | Status: FUNCTIONAL

## 2023-12-23 DEVICE — SCREW BNE L36MM DIA4.5MM PROX CORT TIB S STL ST LOK FULL: Type: IMPLANTABLE DEVICE | Site: FEMUR | Status: FUNCTIONAL

## 2023-12-23 RX ORDER — SODIUM CHLORIDE 0.9 % (FLUSH) 0.9 %
5-40 SYRINGE (ML) INJECTION EVERY 12 HOURS SCHEDULED
Status: DISCONTINUED | OUTPATIENT
Start: 2023-12-23 | End: 2023-12-23 | Stop reason: HOSPADM

## 2023-12-23 RX ORDER — BUPIVACAINE HYDROCHLORIDE 5 MG/ML
INJECTION, SOLUTION EPIDURAL; INTRACAUDAL PRN
Status: DISCONTINUED | OUTPATIENT
Start: 2023-12-23 | End: 2023-12-23 | Stop reason: SDUPTHER

## 2023-12-23 RX ORDER — SODIUM CHLORIDE 9 MG/ML
INJECTION, SOLUTION INTRAVENOUS PRN
Status: DISCONTINUED | OUTPATIENT
Start: 2023-12-23 | End: 2023-12-23 | Stop reason: HOSPADM

## 2023-12-23 RX ORDER — ESMOLOL HYDROCHLORIDE 10 MG/ML
INJECTION INTRAVENOUS PRN
Status: DISCONTINUED | OUTPATIENT
Start: 2023-12-23 | End: 2023-12-23 | Stop reason: SDUPTHER

## 2023-12-23 RX ORDER — BISACODYL 10 MG
10 SUPPOSITORY, RECTAL RECTAL DAILY PRN
Status: DISCONTINUED | OUTPATIENT
Start: 2023-12-23 | End: 2023-12-29 | Stop reason: HOSPADM

## 2023-12-23 RX ORDER — SENNA AND DOCUSATE SODIUM 50; 8.6 MG/1; MG/1
1 TABLET, FILM COATED ORAL 2 TIMES DAILY
Status: DISCONTINUED | OUTPATIENT
Start: 2023-12-23 | End: 2023-12-29 | Stop reason: HOSPADM

## 2023-12-23 RX ORDER — SODIUM CHLORIDE 0.9 % (FLUSH) 0.9 %
5-40 SYRINGE (ML) INJECTION PRN
Status: DISCONTINUED | OUTPATIENT
Start: 2023-12-23 | End: 2023-12-29 | Stop reason: HOSPADM

## 2023-12-23 RX ORDER — SODIUM CHLORIDE 9 MG/ML
INJECTION, SOLUTION INTRAVENOUS PRN
Status: DISCONTINUED | OUTPATIENT
Start: 2023-12-23 | End: 2023-12-26 | Stop reason: SDUPTHER

## 2023-12-23 RX ORDER — LIDOCAINE HYDROCHLORIDE 20 MG/ML
INJECTION, SOLUTION EPIDURAL; INFILTRATION; INTRACAUDAL; PERINEURAL PRN
Status: DISCONTINUED | OUTPATIENT
Start: 2023-12-23 | End: 2023-12-23 | Stop reason: SDUPTHER

## 2023-12-23 RX ORDER — POLYETHYLENE GLYCOL 3350 17 G/17G
17 POWDER, FOR SOLUTION ORAL DAILY
Status: DISCONTINUED | OUTPATIENT
Start: 2023-12-24 | End: 2023-12-29 | Stop reason: HOSPADM

## 2023-12-23 RX ORDER — ONDANSETRON 2 MG/ML
4 INJECTION INTRAMUSCULAR; INTRAVENOUS
Status: DISCONTINUED | OUTPATIENT
Start: 2023-12-23 | End: 2023-12-23 | Stop reason: HOSPADM

## 2023-12-23 RX ORDER — SODIUM CHLORIDE 9 MG/ML
INJECTION, SOLUTION INTRAVENOUS CONTINUOUS
Status: DISCONTINUED | OUTPATIENT
Start: 2023-12-23 | End: 2023-12-28

## 2023-12-23 RX ORDER — FENTANYL CITRATE 50 UG/ML
INJECTION, SOLUTION INTRAMUSCULAR; INTRAVENOUS PRN
Status: DISCONTINUED | OUTPATIENT
Start: 2023-12-23 | End: 2023-12-23 | Stop reason: SDUPTHER

## 2023-12-23 RX ORDER — ACETAMINOPHEN 325 MG/1
650 TABLET ORAL EVERY 6 HOURS
Status: DISCONTINUED | OUTPATIENT
Start: 2023-12-23 | End: 2023-12-29 | Stop reason: HOSPADM

## 2023-12-23 RX ORDER — DEXTROSE MONOHYDRATE 100 MG/ML
INJECTION, SOLUTION INTRAVENOUS CONTINUOUS PRN
Status: DISCONTINUED | OUTPATIENT
Start: 2023-12-23 | End: 2023-12-23 | Stop reason: HOSPADM

## 2023-12-23 RX ORDER — ROCURONIUM BROMIDE 10 MG/ML
INJECTION, SOLUTION INTRAVENOUS PRN
Status: DISCONTINUED | OUTPATIENT
Start: 2023-12-23 | End: 2023-12-23 | Stop reason: SDUPTHER

## 2023-12-23 RX ORDER — FENTANYL CITRATE 50 UG/ML
INJECTION, SOLUTION INTRAMUSCULAR; INTRAVENOUS
Status: DISCONTINUED
Start: 2023-12-23 | End: 2023-12-23

## 2023-12-23 RX ORDER — HYDROMORPHONE HYDROCHLORIDE 2 MG/ML
INJECTION, SOLUTION INTRAMUSCULAR; INTRAVENOUS; SUBCUTANEOUS PRN
Status: DISCONTINUED | OUTPATIENT
Start: 2023-12-23 | End: 2023-12-23 | Stop reason: SDUPTHER

## 2023-12-23 RX ORDER — CEFAZOLIN SODIUM 1 G/3ML
INJECTION, POWDER, FOR SOLUTION INTRAMUSCULAR; INTRAVENOUS
Status: COMPLETED
Start: 2023-12-23 | End: 2023-12-23

## 2023-12-23 RX ORDER — ALBUMIN, HUMAN INJ 5% 5 %
SOLUTION INTRAVENOUS PRN
Status: DISCONTINUED | OUTPATIENT
Start: 2023-12-23 | End: 2023-12-23 | Stop reason: SDUPTHER

## 2023-12-23 RX ORDER — CEFAZOLIN SODIUM 1 G/3ML
INJECTION, POWDER, FOR SOLUTION INTRAMUSCULAR; INTRAVENOUS PRN
Status: DISCONTINUED | OUTPATIENT
Start: 2023-12-23 | End: 2023-12-23 | Stop reason: SDUPTHER

## 2023-12-23 RX ORDER — ASPIRIN 81 MG/1
81 TABLET ORAL 2 TIMES DAILY
Status: DISCONTINUED | OUTPATIENT
Start: 2023-12-23 | End: 2023-12-29 | Stop reason: HOSPADM

## 2023-12-23 RX ORDER — SODIUM CHLORIDE 0.9 % (FLUSH) 0.9 %
5-40 SYRINGE (ML) INJECTION EVERY 12 HOURS SCHEDULED
Status: DISCONTINUED | OUTPATIENT
Start: 2023-12-23 | End: 2023-12-29 | Stop reason: HOSPADM

## 2023-12-23 RX ORDER — HYDROMORPHONE HYDROCHLORIDE 1 MG/ML
0.5 INJECTION, SOLUTION INTRAMUSCULAR; INTRAVENOUS; SUBCUTANEOUS EVERY 5 MIN PRN
Status: DISCONTINUED | OUTPATIENT
Start: 2023-12-23 | End: 2023-12-23 | Stop reason: HOSPADM

## 2023-12-23 RX ORDER — PROCHLORPERAZINE EDISYLATE 5 MG/ML
5 INJECTION INTRAMUSCULAR; INTRAVENOUS
Status: DISCONTINUED | OUTPATIENT
Start: 2023-12-23 | End: 2023-12-23 | Stop reason: HOSPADM

## 2023-12-23 RX ORDER — EPHEDRINE SULFATE/0.9% NACL/PF 50 MG/5 ML
SYRINGE (ML) INTRAVENOUS PRN
Status: DISCONTINUED | OUTPATIENT
Start: 2023-12-23 | End: 2023-12-23 | Stop reason: SDUPTHER

## 2023-12-23 RX ORDER — SODIUM CHLORIDE, SODIUM LACTATE, POTASSIUM CHLORIDE, CALCIUM CHLORIDE 600; 310; 30; 20 MG/100ML; MG/100ML; MG/100ML; MG/100ML
INJECTION, SOLUTION INTRAVENOUS CONTINUOUS
Status: DISCONTINUED | OUTPATIENT
Start: 2023-12-23 | End: 2023-12-26

## 2023-12-23 RX ORDER — FENTANYL CITRATE 50 UG/ML
25 INJECTION, SOLUTION INTRAMUSCULAR; INTRAVENOUS EVERY 5 MIN PRN
Status: DISCONTINUED | OUTPATIENT
Start: 2023-12-23 | End: 2023-12-23 | Stop reason: HOSPADM

## 2023-12-23 RX ORDER — DEXAMETHASONE SODIUM PHOSPHATE 4 MG/ML
INJECTION, SOLUTION INTRA-ARTICULAR; INTRALESIONAL; INTRAMUSCULAR; INTRAVENOUS; SOFT TISSUE PRN
Status: DISCONTINUED | OUTPATIENT
Start: 2023-12-23 | End: 2023-12-23 | Stop reason: SDUPTHER

## 2023-12-23 RX ORDER — SODIUM CHLORIDE 0.9 % (FLUSH) 0.9 %
5-40 SYRINGE (ML) INJECTION PRN
Status: DISCONTINUED | OUTPATIENT
Start: 2023-12-23 | End: 2023-12-23 | Stop reason: HOSPADM

## 2023-12-23 RX ORDER — ONDANSETRON 2 MG/ML
INJECTION INTRAMUSCULAR; INTRAVENOUS PRN
Status: DISCONTINUED | OUTPATIENT
Start: 2023-12-23 | End: 2023-12-23 | Stop reason: SDUPTHER

## 2023-12-23 RX ORDER — PHENYLEPHRINE HCL IN 0.9% NACL 0.4MG/10ML
SYRINGE (ML) INTRAVENOUS PRN
Status: DISCONTINUED | OUTPATIENT
Start: 2023-12-23 | End: 2023-12-23 | Stop reason: SDUPTHER

## 2023-12-23 RX ORDER — DEXMEDETOMIDINE HYDROCHLORIDE 100 UG/ML
INJECTION, SOLUTION INTRAVENOUS PRN
Status: DISCONTINUED | OUTPATIENT
Start: 2023-12-23 | End: 2023-12-23 | Stop reason: SDUPTHER

## 2023-12-23 RX ADMIN — DOCUSATE SODIUM 50 MG AND SENNOSIDES 8.6 MG 1 TABLET: 8.6; 5 TABLET, FILM COATED ORAL at 23:08

## 2023-12-23 RX ADMIN — FENTANYL CITRATE 50 MCG: 50 INJECTION, SOLUTION INTRAMUSCULAR; INTRAVENOUS at 20:24

## 2023-12-23 RX ADMIN — SODIUM CHLORIDE: 9 INJECTION, SOLUTION INTRAVENOUS at 22:49

## 2023-12-23 RX ADMIN — ALBUMIN (HUMAN) 12.5 G: 12.5 INJECTION, SOLUTION INTRAVENOUS at 21:11

## 2023-12-23 RX ADMIN — ESMOLOL HYDROCHLORIDE 20 MG: 10 INJECTION, SOLUTION INTRAVENOUS at 18:58

## 2023-12-23 RX ADMIN — SODIUM CHLORIDE, POTASSIUM CHLORIDE, SODIUM LACTATE AND CALCIUM CHLORIDE: 600; 310; 30; 20 INJECTION, SOLUTION INTRAVENOUS at 03:07

## 2023-12-23 RX ADMIN — HYDROCHLOROTHIAZIDE 12.5 MG: 25 TABLET ORAL at 13:54

## 2023-12-23 RX ADMIN — Medication 80 MCG: at 20:56

## 2023-12-23 RX ADMIN — CEFAZOLIN 2000 MG: 1 INJECTION, POWDER, FOR SOLUTION INTRAMUSCULAR; INTRAVENOUS at 23:04

## 2023-12-23 RX ADMIN — FENTANYL CITRATE 50 MCG: 50 INJECTION, SOLUTION INTRAMUSCULAR; INTRAVENOUS at 18:57

## 2023-12-23 RX ADMIN — ESMOLOL HYDROCHLORIDE 20 MG: 10 INJECTION, SOLUTION INTRAVENOUS at 21:36

## 2023-12-23 RX ADMIN — Medication 40 MCG: at 19:04

## 2023-12-23 RX ADMIN — SUGAMMADEX 50 MG: 100 INJECTION, SOLUTION INTRAVENOUS at 21:32

## 2023-12-23 RX ADMIN — PROPOFOL 90 MG: 10 INJECTION, EMULSION INTRAVENOUS at 18:52

## 2023-12-23 RX ADMIN — Medication 10 MG: at 21:15

## 2023-12-23 RX ADMIN — ASPIRIN 81 MG: 81 TABLET, COATED ORAL at 23:05

## 2023-12-23 RX ADMIN — LATANOPROST 1 DROP: 50 SOLUTION OPHTHALMIC at 10:20

## 2023-12-23 RX ADMIN — ROCURONIUM BROMIDE 50 MG: 10 INJECTION INTRAVENOUS at 18:53

## 2023-12-23 RX ADMIN — FENTANYL CITRATE 50 MCG: 50 INJECTION, SOLUTION INTRAMUSCULAR; INTRAVENOUS at 20:10

## 2023-12-23 RX ADMIN — FENTANYL CITRATE 50 MCG: 50 INJECTION, SOLUTION INTRAMUSCULAR; INTRAVENOUS at 18:51

## 2023-12-23 RX ADMIN — DEXMEDETOMIDINE HYDROCHLORIDE 4 MCG: 100 INJECTION, SOLUTION, CONCENTRATE INTRAVENOUS at 19:10

## 2023-12-23 RX ADMIN — ACETAMINOPHEN 650 MG: 325 TABLET ORAL at 23:05

## 2023-12-23 RX ADMIN — Medication 80 MCG: at 20:59

## 2023-12-23 RX ADMIN — SUGAMMADEX 100 MG: 100 INJECTION, SOLUTION INTRAVENOUS at 21:34

## 2023-12-23 RX ADMIN — DEXAMETHASONE SODIUM PHOSPHATE 4 MG: 4 INJECTION, SOLUTION INTRAMUSCULAR; INTRAVENOUS at 18:53

## 2023-12-23 RX ADMIN — AMLODIPINE BESYLATE 5 MG: 5 TABLET ORAL at 23:05

## 2023-12-23 RX ADMIN — LIDOCAINE HYDROCHLORIDE 80 MG: 20 INJECTION, SOLUTION EPIDURAL; INFILTRATION; INTRACAUDAL; PERINEURAL at 18:51

## 2023-12-23 RX ADMIN — SODIUM CHLORIDE, PRESERVATIVE FREE 10 ML: 5 INJECTION INTRAVENOUS at 22:50

## 2023-12-23 RX ADMIN — HYDROMORPHONE HYDROCHLORIDE 0.6 MG: 2 INJECTION INTRAMUSCULAR; INTRAVENOUS; SUBCUTANEOUS at 20:37

## 2023-12-23 RX ADMIN — DEXMEDETOMIDINE HYDROCHLORIDE 2 MCG: 100 INJECTION, SOLUTION, CONCENTRATE INTRAVENOUS at 19:15

## 2023-12-23 RX ADMIN — BUPIVACAINE HYDROCHLORIDE 20 ML: 5 INJECTION, SOLUTION EPIDURAL; INTRACAUDAL; PERINEURAL at 18:41

## 2023-12-23 RX ADMIN — PANTOPRAZOLE SODIUM 40 MG: 40 TABLET, DELAYED RELEASE ORAL at 05:34

## 2023-12-23 RX ADMIN — Medication 80 MCG: at 21:02

## 2023-12-23 RX ADMIN — PROPOFOL 20 MG: 10 INJECTION, EMULSION INTRAVENOUS at 18:53

## 2023-12-23 RX ADMIN — OXYCODONE HYDROCHLORIDE 5 MG: 5 TABLET ORAL at 14:40

## 2023-12-23 RX ADMIN — ONDANSETRON HYDROCHLORIDE 4 MG: 2 INJECTION, SOLUTION INTRAMUSCULAR; INTRAVENOUS at 18:47

## 2023-12-23 RX ADMIN — Medication 120 MCG: at 20:58

## 2023-12-23 RX ADMIN — Medication 10 MG: at 21:11

## 2023-12-23 RX ADMIN — Medication 10 MG: at 21:06

## 2023-12-23 RX ADMIN — Medication 10 MG: at 21:19

## 2023-12-23 RX ADMIN — SUGAMMADEX 50 MG: 100 INJECTION, SOLUTION INTRAVENOUS at 21:31

## 2023-12-23 RX ADMIN — CEFAZOLIN 2 G: 1 INJECTION, POWDER, FOR SOLUTION INTRAMUSCULAR; INTRAVENOUS; PARENTERAL at 18:47

## 2023-12-23 RX ADMIN — ROCURONIUM BROMIDE 20 MG: 10 INJECTION INTRAVENOUS at 20:34

## 2023-12-23 RX ADMIN — DEXMEDETOMIDINE HYDROCHLORIDE 4 MCG: 100 INJECTION, SOLUTION, CONCENTRATE INTRAVENOUS at 19:13
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
## 2023-12-23 NOTE — ANESTHESIA PROCEDURE NOTES
Central Venous Line:    A central venous line was placed using ultrasound guidance, in the pre-op for the following indication(s): central venous access and CVP monitoring. 12/23/2023 6:00 PM12/23/2023 6:20 PM    Sterility preparation included the following: hand hygiene performed prior to procedure, maximum sterile barriers used and sterile technique used to drape from head to toe. The patient was placed in Trendelenburg position. The right internal jugular vein was prepped. The site was prepped with Chloraprep. A 7 Fr (size), 16 (length), triple lumen was placed. During the procedure, the following specific steps were taken: target vein identified, needle advanced into vein and blood aspirated and guidewire advanced into vein. Intravenous verification was obtained by ultrasound, venous blood return, x-ray and manometry. Post insertion care included: all ports aspirated, all ports flushed easily, guidewire removed intact, line sutured in place and dressing applied. During the procedure the patient experienced: patient tolerated procedure well with no complications.       Outcomes: uncomplicated and patient tolerated procedure well  Anesthesia type: local  Staffing  Performed: Anesthesiologist   Anesthesiologist: Jamari Nolasco MD  Performed by: Jamari Nolasco MD  Authorized by: Jamari Nolasco MD    Preanesthetic Checklist  Completed: patient identified, risks and benefits discussed, surgical/procedural consents, pre-op evaluation, timeout performed, oxygen available and monitors applied/VS acknowledged

## 2023-12-23 NOTE — ANESTHESIA PROCEDURE NOTES
Peripheral Block    Patient location during procedure: holding area  Reason for block: procedure for pain, post-op pain management and at surgeon's request  Start time: 12/23/2023 6:30 PM  End time: 12/23/2023 6:40 PM  Staffing  Performed: anesthesiologist   Anesthesiologist: Saira Serna MD  Performed by: Saira Serna MD  Authorized by: Saira Serna MD    Preanesthetic Checklist  Completed: patient identified, IV checked, site marked, risks and benefits discussed, surgical/procedural consents, equipment checked, pre-op evaluation, timeout performed, anesthesia consent given, oxygen available, monitors applied/VS acknowledged, fire risk safety assessment completed and verbalized and blood product R/B/A discussed and consented  Peripheral Block   Patient position: supine  Prep: ChloraPrep  Provider prep: mask and sterile gloves  Patient monitoring: continuous pulse ox, continuous capnometry, frequent blood pressure checks, IV access, oxygen, responsive to questions and cardiac monitor  Block type: Femoral  Femoral crease  Laterality: right  Injection technique: single-shot  Guidance: ultrasound guided    Needle   Needle type: insulated echogenic nerve stimulator needle   Needle gauge: 20 G  Needle localization: ultrasound guidance  Needle length: 10 cm  Assessment   Injection assessment: negative aspiration for heme, no paresthesia on injection, local visualized surrounding nerve on ultrasound and no intravascular symptoms  Paresthesia pain: none  Slow fractionated injection: yes  Hemodynamics: stable  Outcomes: uncomplicated and patient tolerated procedure well

## 2023-12-24 LAB
ALBUMIN SERPL-MCNC: 2.5 G/DL (ref 3.5–5)
ALBUMIN/GLOB SERPL: 0.7 (ref 1.1–2.2)
ALP SERPL-CCNC: 87 U/L (ref 45–117)
ALT SERPL-CCNC: 15 U/L (ref 12–78)
ANION GAP SERPL CALC-SCNC: 4 MMOL/L (ref 5–15)
AST SERPL-CCNC: 35 U/L (ref 15–37)
BILIRUB SERPL-MCNC: 0.6 MG/DL (ref 0.2–1)
BUN SERPL-MCNC: 12 MG/DL (ref 6–20)
BUN/CREAT SERPL: 12 (ref 12–20)
CALCIUM SERPL-MCNC: 8.5 MG/DL (ref 8.5–10.1)
CHLORIDE SERPL-SCNC: 109 MMOL/L (ref 97–108)
CO2 SERPL-SCNC: 25 MMOL/L (ref 21–32)
CREAT SERPL-MCNC: 1.01 MG/DL (ref 0.55–1.02)
ERYTHROCYTE [DISTWIDTH] IN BLOOD BY AUTOMATED COUNT: 15.7 % (ref 11.5–14.5)
GLOBULIN SER CALC-MCNC: 3.7 G/DL (ref 2–4)
GLUCOSE SERPL-MCNC: 202 MG/DL (ref 65–100)
HCT VFR BLD AUTO: 22.4 % (ref 35–47)
HCT VFR BLD AUTO: 25.8 % (ref 35–47)
HGB BLD-MCNC: 7.1 G/DL (ref 11.5–16)
HGB BLD-MCNC: 8.3 G/DL (ref 11.5–16)
HISTORY CHECK: NORMAL
MAGNESIUM SERPL-MCNC: 1.6 MG/DL (ref 1.6–2.4)
MCH RBC QN AUTO: 27.7 PG (ref 26–34)
MCHC RBC AUTO-ENTMCNC: 31.7 G/DL (ref 30–36.5)
MCV RBC AUTO: 87.5 FL (ref 80–99)
NRBC # BLD: 0 K/UL (ref 0–0.01)
NRBC BLD-RTO: 0 PER 100 WBC
PLATELET # BLD AUTO: 290 K/UL (ref 150–400)
PMV BLD AUTO: 10.6 FL (ref 8.9–12.9)
POTASSIUM SERPL-SCNC: 3.9 MMOL/L (ref 3.5–5.1)
PROT SERPL-MCNC: 6.2 G/DL (ref 6.4–8.2)
RBC # BLD AUTO: 2.56 M/UL (ref 3.8–5.2)
SODIUM SERPL-SCNC: 138 MMOL/L (ref 136–145)
WBC # BLD AUTO: 14.5 K/UL (ref 3.6–11)

## 2023-12-24 PROCEDURE — 85014 HEMATOCRIT: CPT

## 2023-12-24 PROCEDURE — 2700000000 HC OXYGEN THERAPY PER DAY

## 2023-12-24 PROCEDURE — 2580000003 HC RX 258: Performed by: ORTHOPAEDIC SURGERY

## 2023-12-24 PROCEDURE — 83735 ASSAY OF MAGNESIUM: CPT

## 2023-12-24 PROCEDURE — 6370000000 HC RX 637 (ALT 250 FOR IP): Performed by: ORTHOPAEDIC SURGERY

## 2023-12-24 PROCEDURE — 1100000000 HC RM PRIVATE

## 2023-12-24 PROCEDURE — 80053 COMPREHEN METABOLIC PANEL: CPT

## 2023-12-24 PROCEDURE — 94760 N-INVAS EAR/PLS OXIMETRY 1: CPT

## 2023-12-24 PROCEDURE — 97530 THERAPEUTIC ACTIVITIES: CPT

## 2023-12-24 PROCEDURE — 36415 COLL VENOUS BLD VENIPUNCTURE: CPT

## 2023-12-24 PROCEDURE — 85018 HEMOGLOBIN: CPT

## 2023-12-24 PROCEDURE — APPNB15 APP NON BILLABLE TIME 0-15 MINS: Performed by: ORTHOPAEDIC SURGERY

## 2023-12-24 PROCEDURE — 6360000002 HC RX W HCPCS: Performed by: INTERNAL MEDICINE

## 2023-12-24 PROCEDURE — P9016 RBC LEUKOCYTES REDUCED: HCPCS

## 2023-12-24 PROCEDURE — 85027 COMPLETE CBC AUTOMATED: CPT

## 2023-12-24 PROCEDURE — 6360000002 HC RX W HCPCS: Performed by: ORTHOPAEDIC SURGERY

## 2023-12-24 PROCEDURE — 97166 OT EVAL MOD COMPLEX 45 MIN: CPT

## 2023-12-24 PROCEDURE — 36430 TRANSFUSION BLD/BLD COMPNT: CPT

## 2023-12-24 PROCEDURE — 97161 PT EVAL LOW COMPLEX 20 MIN: CPT

## 2023-12-24 RX ORDER — MAGNESIUM SULFATE 1 G/100ML
1000 INJECTION INTRAVENOUS ONCE
Status: COMPLETED | OUTPATIENT
Start: 2023-12-24 | End: 2023-12-24

## 2023-12-24 RX ORDER — SODIUM CHLORIDE 9 MG/ML
INJECTION, SOLUTION INTRAVENOUS PRN
Status: DISCONTINUED | OUTPATIENT
Start: 2023-12-24 | End: 2023-12-29 | Stop reason: HOSPADM

## 2023-12-24 RX ADMIN — LATANOPROST 1 DROP: 50 SOLUTION OPHTHALMIC at 09:56

## 2023-12-24 RX ADMIN — CEFAZOLIN 2000 MG: 1 INJECTION, POWDER, FOR SOLUTION INTRAMUSCULAR; INTRAVENOUS at 06:06

## 2023-12-24 RX ADMIN — SODIUM CHLORIDE, PRESERVATIVE FREE 10 ML: 5 INJECTION INTRAVENOUS at 20:38

## 2023-12-24 RX ADMIN — ASPIRIN 81 MG: 81 TABLET, COATED ORAL at 20:38

## 2023-12-24 RX ADMIN — DOCUSATE SODIUM 50 MG AND SENNOSIDES 8.6 MG 1 TABLET: 8.6; 5 TABLET, FILM COATED ORAL at 10:27

## 2023-12-24 RX ADMIN — SODIUM CHLORIDE: 9 INJECTION, SOLUTION INTRAVENOUS at 14:36

## 2023-12-24 RX ADMIN — HYDROCHLOROTHIAZIDE 12.5 MG: 25 TABLET ORAL at 10:25

## 2023-12-24 RX ADMIN — MELATONIN 3 MG: at 20:38

## 2023-12-24 RX ADMIN — POLYETHYLENE GLYCOL 3350 17 G: 17 POWDER, FOR SOLUTION ORAL at 10:26

## 2023-12-24 RX ADMIN — SODIUM CHLORIDE, PRESERVATIVE FREE 10 ML: 5 INJECTION INTRAVENOUS at 09:57

## 2023-12-24 RX ADMIN — ATORVASTATIN CALCIUM 40 MG: 40 TABLET, FILM COATED ORAL at 10:25

## 2023-12-24 RX ADMIN — ACETAMINOPHEN 650 MG: 325 TABLET ORAL at 10:27

## 2023-12-24 RX ADMIN — AMLODIPINE BESYLATE 5 MG: 5 TABLET ORAL at 20:38

## 2023-12-24 RX ADMIN — CITALOPRAM HYDROBROMIDE 20 MG: 20 TABLET ORAL at 10:25

## 2023-12-24 RX ADMIN — OXYCODONE HYDROCHLORIDE 5 MG: 5 TABLET ORAL at 10:26

## 2023-12-24 RX ADMIN — PANTOPRAZOLE SODIUM 40 MG: 40 TABLET, DELAYED RELEASE ORAL at 06:06

## 2023-12-24 RX ADMIN — ACETAMINOPHEN 650 MG: 325 TABLET ORAL at 21:07

## 2023-12-24 RX ADMIN — MAGNESIUM SULFATE HEPTAHYDRATE 1000 MG: 1 INJECTION, SOLUTION INTRAVENOUS at 12:03

## 2023-12-24 RX ADMIN — SODIUM CHLORIDE, PRESERVATIVE FREE 10 ML: 5 INJECTION INTRAVENOUS at 09:56

## 2023-12-24 RX ADMIN — DOCUSATE SODIUM 50 MG AND SENNOSIDES 8.6 MG 1 TABLET: 8.6; 5 TABLET, FILM COATED ORAL at 20:38

## 2023-12-24 RX ADMIN — ASPIRIN 81 MG: 81 TABLET, COATED ORAL at 10:25

## 2023-12-24 RX ADMIN — ACETAMINOPHEN 650 MG: 325 TABLET ORAL at 16:56

## 2023-12-24 RX ADMIN — ACETAMINOPHEN 650 MG: 325 TABLET ORAL at 06:06

## 2023-12-24 RX ADMIN — MAGNESIUM SULFATE HEPTAHYDRATE 1000 MG: 1 INJECTION, SOLUTION INTRAVENOUS at 10:36

## 2023-12-24 RX ADMIN — CLOPIDOGREL BISULFATE 75 MG: 75 TABLET ORAL at 10:26

## 2023-12-24 NOTE — OP NOTE
[facility]  OPERATIVE REPORT    Name:  Yared Ku  MR#:  [id number]  :  1945  ACCOUNT #:  [de-identified]  DATE OF SERVICE:  2023    PREOPERATIVE DIAGNOSIS:  Closed bicondylar fracture, right femur. POSTOPERATIVE DIAGNOSIS:  Closed bicondylar fracture, right femur. PROCEDURE PERFORMED:  Open reduction internal fixation of displaced intercondylar fracture, right distal femur. SURGEON:  Danika Contreras MD    ASSISTANT:  Rock Ohara    ANESTHESIA:  General anesthesia. COMPLICATIONS:  None. SPECIMENS REMOVED:  None. IMPLANTS:  195-mm 8-hole Synthes condylar plate with appropriate locking and non-locking screws. ESTIMATED BLOOD LOSS:  Less than 100 mL. DRAINS:  None. SUMMARY:  The patient was brought into the operating room and general anesthetic administered in the bed. She was then transferred over to the operating room table. Note that the operative site had been identified in preop holding, and appropriate preoperative antibiotic prophylaxis administered. The right lower extremity was prepped and draped in the usual manner. A sterile tourniquet was placed proximally on the thigh. After time-out, the limb was elevated, exsanguinated with Esmarch bandage and tourniquet inflated 350 mmHg. A swashbuckler incision was made beginning at the tibial tubercle, extending over the patella and then top of the thigh, curving posteriorly at its proximal aspect. Subcutaneous tissues were divided, and the fascia overlying the quadriceps muscle was divided as well. At the knee, the capsule was divided starting at the lateral aspect of the patellar tendon and extending lateral to the patella and then heading posteriorly so as not to cut any of the quadriceps muscle. The quadriceps muscle was  from the fascia down to the linea alba posteriorly. This let us straight directly to the femur.   The quadriceps was cut at the posterior femur and mobilized

## 2023-12-24 NOTE — BRIEF OP NOTE
Brief Postoperative Note      Patient: Laina Tobias  YOB: 1945  MRN: [id number]    Date of Procedure: 12/23/2023    Pre-Op Diagnosis Codes:     * Closed bicondylar fracture of right femur, initial encounter ([address]) [S72.421A, S72.431A]    Post-Op Diagnosis: Same       Procedure(s):  RIGHT DISTAL FEMUR OPEN REDUCTION INTERNAL FIXATION    Surgeon(s):  Karolina Benoit MD    Assistant:  Surgical Assistant: Nolan Grey    Anesthesia: General    Estimated Blood Loss (mL): less than 522     Complications: None    Specimens:   * No specimens in log *    Implants:  Implant Name Type Inv. Item Serial No.  Lot No. LRB No. Used Action   PLATE BNE [id number] 8 H ST R CNDYL S STL CRV KENNY COMPR JERE ANG - [id number]  PLATE BNE [id number] 8 H ST R CNDYL S STL CRV KENNY COMPR JERE ANG  DEPUY SYNTHES [employer]-WD  Right 1 Implanted   SCREW BNE L36MM DIA4. 5MM PROX MOY TIB S STL ST KENNY FULL - [id number]  SCREW BNE L36MM DIA4. 5MM PROX MOY TIB S STL ST KENNY FULL  DEPUY SYNTHES [employer]-WD  Right 1 Implanted   SCREW BNE L70MM DIA5MM S STL SELF DRL JERE ANG THOMAS KENNY FULL - [id number]  SCREW BNE L70MM DIA5MM S STL SELF DRL JERE ANG THOMAS KENNY FULL  DEPUY SYNTHES USA-WD  Right 3 Implanted   SCREW BNE L36MM DIA5MM CNDYL S STL ST JREE ANG KENNY COMPR T25 - [id number]  SCREW BNE L36MM DIA5MM CNDYL S STL ST JERE ANG KENNY COMPR T25  DEPUY SYNTHES USA-WD  Right 2 Implanted   SCREW BNE L25MM DIA5MM CNDYL S STL ST JERE ANG THOMAS KENNY FULL - [id number]  SCREW BNE L25MM DIA5MM CNDYL S STL ST JERE ANG THOMAS KENNY FULL  DEPUY SYNTHES USA-WD  Right 1 Implanted   SCREW BNE L75MM DIA5MM CNDYL S STL ST JERE ANG THOMAS KENNY FULL - [id number]  SCREW BNE L75MM DIA5MM CNDYL S STL ST JERE ANG THOMAS KENNY FULL  DEPUY SYNTHES USA-WD  Right 1 Implanted   SCREW BNE L60MM DIA5MM S STL THOMAS ST JERE ANG KENNY THRD T25 - [id number]  SCREW BNE L60MM DIA5MM S STL THOMAS ST JERE ANG KENNY THRD T25  DEPUY SYNTHES USA-WD  Right 1 Implanted   SCREW BNE L38MM DIA5MM CNDYL S STL ST JERE ANG KENNY T25 -

## 2023-12-25 LAB
ALBUMIN SERPL-MCNC: 2 G/DL (ref 3.5–5)
ALBUMIN/GLOB SERPL: 0.6 (ref 1.1–2.2)
ALP SERPL-CCNC: 88 U/L (ref 45–117)
ALT SERPL-CCNC: 10 U/L (ref 12–78)
ANION GAP SERPL CALC-SCNC: 5 MMOL/L (ref 5–15)
AST SERPL-CCNC: 38 U/L (ref 15–37)
BILIRUB SERPL-MCNC: 0.7 MG/DL (ref 0.2–1)
BUN SERPL-MCNC: 9 MG/DL (ref 6–20)
BUN/CREAT SERPL: 12 (ref 12–20)
CALCIUM SERPL-MCNC: 7.4 MG/DL (ref 8.5–10.1)
CHLORIDE SERPL-SCNC: 115 MMOL/L (ref 97–108)
CO2 SERPL-SCNC: 25 MMOL/L (ref 21–32)
CREAT SERPL-MCNC: 0.78 MG/DL (ref 0.55–1.02)
ERYTHROCYTE [DISTWIDTH] IN BLOOD BY AUTOMATED COUNT: 15.3 % (ref 11.5–14.5)
GLOBULIN SER CALC-MCNC: 3.3 G/DL (ref 2–4)
GLUCOSE SERPL-MCNC: 116 MG/DL (ref 65–100)
HCT VFR BLD AUTO: 23.3 % (ref 35–47)
HGB BLD-MCNC: 7.4 G/DL (ref 11.5–16)
MAGNESIUM SERPL-MCNC: 1.7 MG/DL (ref 1.6–2.4)
MCH RBC QN AUTO: 28.7 PG (ref 26–34)
MCHC RBC AUTO-ENTMCNC: 31.8 G/DL (ref 30–36.5)
MCV RBC AUTO: 90.3 FL (ref 80–99)
NRBC # BLD: 0 K/UL (ref 0–0.01)
NRBC BLD-RTO: 0 PER 100 WBC
PHOSPHATE SERPL-MCNC: 1.8 MG/DL (ref 2.6–4.7)
PLATELET # BLD AUTO: 272 K/UL (ref 150–400)
PMV BLD AUTO: 11.1 FL (ref 8.9–12.9)
POTASSIUM SERPL-SCNC: 3.3 MMOL/L (ref 3.5–5.1)
PROT SERPL-MCNC: 5.3 G/DL (ref 6.4–8.2)
RBC # BLD AUTO: 2.58 M/UL (ref 3.8–5.2)
SODIUM SERPL-SCNC: 145 MMOL/L (ref 136–145)
WBC # BLD AUTO: 13.3 K/UL (ref 3.6–11)

## 2023-12-25 PROCEDURE — 2580000003 HC RX 258: Performed by: ORTHOPAEDIC SURGERY

## 2023-12-25 PROCEDURE — APPNB15 APP NON BILLABLE TIME 0-15 MINS: Performed by: ORTHOPAEDIC SURGERY

## 2023-12-25 PROCEDURE — 87449 NOS EACH ORGANISM AG IA: CPT

## 2023-12-25 PROCEDURE — 6370000000 HC RX 637 (ALT 250 FOR IP): Performed by: ORTHOPAEDIC SURGERY

## 2023-12-25 PROCEDURE — 83735 ASSAY OF MAGNESIUM: CPT

## 2023-12-25 PROCEDURE — 6370000000 HC RX 637 (ALT 250 FOR IP): Performed by: INTERNAL MEDICINE

## 2023-12-25 PROCEDURE — 36415 COLL VENOUS BLD VENIPUNCTURE: CPT

## 2023-12-25 PROCEDURE — 94761 N-INVAS EAR/PLS OXIMETRY MLT: CPT

## 2023-12-25 PROCEDURE — 6360000002 HC RX W HCPCS: Performed by: INTERNAL MEDICINE

## 2023-12-25 PROCEDURE — 84100 ASSAY OF PHOSPHORUS: CPT

## 2023-12-25 PROCEDURE — 1100000000 HC RM PRIVATE

## 2023-12-25 PROCEDURE — 80053 COMPREHEN METABOLIC PANEL: CPT

## 2023-12-25 PROCEDURE — 87324 CLOSTRIDIUM AG IA: CPT

## 2023-12-25 PROCEDURE — 85027 COMPLETE CBC AUTOMATED: CPT

## 2023-12-25 RX ORDER — LOPERAMIDE HYDROCHLORIDE 2 MG/1
4 CAPSULE ORAL ONCE
Status: COMPLETED | OUTPATIENT
Start: 2023-12-25 | End: 2023-12-25

## 2023-12-25 RX ORDER — MAGNESIUM SULFATE 1 G/100ML
1000 INJECTION INTRAVENOUS
Status: COMPLETED | OUTPATIENT
Start: 2023-12-25 | End: 2023-12-25

## 2023-12-25 RX ORDER — POTASSIUM CHLORIDE 20 MEQ/1
40 TABLET, EXTENDED RELEASE ORAL
Status: COMPLETED | OUTPATIENT
Start: 2023-12-25 | End: 2023-12-25

## 2023-12-25 RX ADMIN — ASPIRIN 81 MG: 81 TABLET, COATED ORAL at 21:58

## 2023-12-25 RX ADMIN — POLYETHYLENE GLYCOL 3350 17 G: 17 POWDER, FOR SOLUTION ORAL at 09:07

## 2023-12-25 RX ADMIN — MAGNESIUM SULFATE HEPTAHYDRATE 1000 MG: 1 INJECTION, SOLUTION INTRAVENOUS at 12:07

## 2023-12-25 RX ADMIN — CITALOPRAM HYDROBROMIDE 20 MG: 20 TABLET ORAL at 09:07

## 2023-12-25 RX ADMIN — MELATONIN 3 MG: at 21:57

## 2023-12-25 RX ADMIN — ACETAMINOPHEN 650 MG: 325 TABLET ORAL at 17:35

## 2023-12-25 RX ADMIN — ACETAMINOPHEN 650 MG: 325 TABLET ORAL at 12:04

## 2023-12-25 RX ADMIN — AMLODIPINE BESYLATE 5 MG: 5 TABLET ORAL at 21:58

## 2023-12-25 RX ADMIN — POTASSIUM CHLORIDE 40 MEQ: 20 TABLET, EXTENDED RELEASE ORAL at 10:22

## 2023-12-25 RX ADMIN — MAGNESIUM SULFATE HEPTAHYDRATE 1000 MG: 1 INJECTION, SOLUTION INTRAVENOUS at 10:27

## 2023-12-25 RX ADMIN — SODIUM CHLORIDE, PRESERVATIVE FREE 10 ML: 5 INJECTION INTRAVENOUS at 21:58

## 2023-12-25 RX ADMIN — POTASSIUM CHLORIDE 40 MEQ: 20 TABLET, EXTENDED RELEASE ORAL at 12:04

## 2023-12-25 RX ADMIN — ACETAMINOPHEN 650 MG: 325 TABLET ORAL at 21:57

## 2023-12-25 RX ADMIN — HYDROCHLOROTHIAZIDE 12.5 MG: 25 TABLET ORAL at 09:07

## 2023-12-25 RX ADMIN — ASPIRIN 81 MG: 81 TABLET, COATED ORAL at 09:07

## 2023-12-25 RX ADMIN — ACETAMINOPHEN 650 MG: 325 TABLET ORAL at 05:26

## 2023-12-25 RX ADMIN — SODIUM CHLORIDE: 9 INJECTION, SOLUTION INTRAVENOUS at 20:43

## 2023-12-25 RX ADMIN — LATANOPROST 1 DROP: 50 SOLUTION OPHTHALMIC at 09:10

## 2023-12-25 RX ADMIN — POTASSIUM & SODIUM PHOSPHATES POWDER PACK 280-160-250 MG 1000 MG: 280-160-250 PACK at 10:23

## 2023-12-25 RX ADMIN — PANTOPRAZOLE SODIUM 40 MG: 40 TABLET, DELAYED RELEASE ORAL at 05:26

## 2023-12-25 RX ADMIN — LOPERAMIDE HYDROCHLORIDE 4 MG: 2 CAPSULE ORAL at 14:35

## 2023-12-25 RX ADMIN — ATORVASTATIN CALCIUM 40 MG: 40 TABLET, FILM COATED ORAL at 09:07

## 2023-12-25 RX ADMIN — DOCUSATE SODIUM 50 MG AND SENNOSIDES 8.6 MG 1 TABLET: 8.6; 5 TABLET, FILM COATED ORAL at 09:06

## 2023-12-25 RX ADMIN — CLOPIDOGREL BISULFATE 75 MG: 75 TABLET ORAL at 09:06

## 2023-12-25 NOTE — ANESTHESIA POSTPROCEDURE EVALUATION
Department of Anesthesiology  Postprocedure Note    Patient: Donnell David  MRN: [id number]  YOB: 1945  Date of evaluation: 12/25/2023    Procedure Summary       Date: 12/23/23 Room / Location: [facility] MAIN OR M8 / [facility] MAIN OR    Anesthesia Start: 1847 Anesthesia Stop: 2157    Procedure: RIGHT DISTAL FEMUR OPEN REDUCTION INTERNAL FIXATION (Right: Leg Upper) Diagnosis:       Closed bicondylar fracture of right femur, initial encounter ([address])      (Closed bicondylar fracture of right femur, initial encounter ([address]) [U53.064T, S72.431A])    Providers: Clyde Moreno MD Responsible Provider: Carolina Fuller MD    Anesthesia Type: General, Regional ASA Status: 3            Anesthesia Type: General, Regional    Abilio Phase I: Abilio Score: 8    Abilio Phase II:      Anesthesia Post Evaluation    Patient location during evaluation: PACU  Patient participation: complete - patient participated  Level of consciousness: awake and alert  Airway patency: patent  Nausea & Vomiting: no nausea  Cardiovascular status: hemodynamically stable  Respiratory status: acceptable  Hydration status: euvolemic  Multimodal analgesia pain management approach  Pain management: adequate        No notable events documented.

## 2023-12-26 LAB
ALBUMIN SERPL-MCNC: 2.2 G/DL (ref 3.5–5)
ALBUMIN/GLOB SERPL: 0.6 (ref 1.1–2.2)
ALP SERPL-CCNC: 108 U/L (ref 45–117)
ALT SERPL-CCNC: 18 U/L (ref 12–78)
ANION GAP SERPL CALC-SCNC: 3 MMOL/L (ref 5–15)
AST SERPL-CCNC: 45 U/L (ref 15–37)
BILIRUB SERPL-MCNC: 0.7 MG/DL (ref 0.2–1)
BUN SERPL-MCNC: 8 MG/DL (ref 6–20)
BUN/CREAT SERPL: 11 (ref 12–20)
CALCIUM SERPL-MCNC: 8.3 MG/DL (ref 8.5–10.1)
CHLORIDE SERPL-SCNC: 112 MMOL/L (ref 97–108)
CO2 SERPL-SCNC: 27 MMOL/L (ref 21–32)
CREAT SERPL-MCNC: 0.74 MG/DL (ref 0.55–1.02)
ERYTHROCYTE [DISTWIDTH] IN BLOOD BY AUTOMATED COUNT: 15.3 % (ref 11.5–14.5)
GLOBULIN SER CALC-MCNC: 3.8 G/DL (ref 2–4)
GLUCOSE BLD STRIP.AUTO-MCNC: 109 MG/DL (ref 65–117)
GLUCOSE BLD STRIP.AUTO-MCNC: 115 MG/DL (ref 65–117)
GLUCOSE BLD STRIP.AUTO-MCNC: 190 MG/DL (ref 65–117)
GLUCOSE SERPL-MCNC: 110 MG/DL (ref 65–100)
HCT VFR BLD AUTO: 24.7 % (ref 35–47)
HGB BLD-MCNC: 7.7 G/DL (ref 11.5–16)
MAGNESIUM SERPL-MCNC: 1.8 MG/DL (ref 1.6–2.4)
MCH RBC QN AUTO: 27.7 PG (ref 26–34)
MCHC RBC AUTO-ENTMCNC: 31.2 G/DL (ref 30–36.5)
MCV RBC AUTO: 88.8 FL (ref 80–99)
NRBC # BLD: 0.03 K/UL (ref 0–0.01)
NRBC BLD-RTO: 0.2 PER 100 WBC
PHOSPHATE SERPL-MCNC: 2.3 MG/DL (ref 2.6–4.7)
PLATELET # BLD AUTO: 351 K/UL (ref 150–400)
PMV BLD AUTO: 10.5 FL (ref 8.9–12.9)
POTASSIUM SERPL-SCNC: 4.2 MMOL/L (ref 3.5–5.1)
PROT SERPL-MCNC: 6 G/DL (ref 6.4–8.2)
RBC # BLD AUTO: 2.78 M/UL (ref 3.8–5.2)
SERVICE CMNT-IMP: ABNORMAL
SERVICE CMNT-IMP: NORMAL
SERVICE CMNT-IMP: NORMAL
SODIUM SERPL-SCNC: 142 MMOL/L (ref 136–145)
WBC # BLD AUTO: 13.1 K/UL (ref 3.6–11)

## 2023-12-26 PROCEDURE — 97530 THERAPEUTIC ACTIVITIES: CPT

## 2023-12-26 PROCEDURE — 6370000000 HC RX 637 (ALT 250 FOR IP): Performed by: ORTHOPAEDIC SURGERY

## 2023-12-26 PROCEDURE — 83735 ASSAY OF MAGNESIUM: CPT

## 2023-12-26 PROCEDURE — 1100000000 HC RM PRIVATE

## 2023-12-26 PROCEDURE — 85027 COMPLETE CBC AUTOMATED: CPT

## 2023-12-26 PROCEDURE — 2580000003 HC RX 258: Performed by: ORTHOPAEDIC SURGERY

## 2023-12-26 PROCEDURE — 80053 COMPREHEN METABOLIC PANEL: CPT

## 2023-12-26 PROCEDURE — 84100 ASSAY OF PHOSPHORUS: CPT

## 2023-12-26 PROCEDURE — APPNB15 APP NON BILLABLE TIME 0-15 MINS: Performed by: PHYSICIAN ASSISTANT

## 2023-12-26 PROCEDURE — 82962 GLUCOSE BLOOD TEST: CPT

## 2023-12-26 PROCEDURE — 6360000002 HC RX W HCPCS: Performed by: INTERNAL MEDICINE

## 2023-12-26 PROCEDURE — 94761 N-INVAS EAR/PLS OXIMETRY MLT: CPT

## 2023-12-26 PROCEDURE — 36415 COLL VENOUS BLD VENIPUNCTURE: CPT

## 2023-12-26 RX ORDER — MAGNESIUM SULFATE 1 G/100ML
1000 INJECTION INTRAVENOUS ONCE
Status: COMPLETED | OUTPATIENT
Start: 2023-12-26 | End: 2023-12-26

## 2023-12-26 RX ADMIN — MELATONIN 3 MG: at 21:34

## 2023-12-26 RX ADMIN — ACETAMINOPHEN 650 MG: 325 TABLET ORAL at 23:08

## 2023-12-26 RX ADMIN — ACETAMINOPHEN 650 MG: 325 TABLET ORAL at 16:56

## 2023-12-26 RX ADMIN — ASPIRIN 81 MG: 81 TABLET, COATED ORAL at 21:34

## 2023-12-26 RX ADMIN — SODIUM CHLORIDE, PRESERVATIVE FREE 10 ML: 5 INJECTION INTRAVENOUS at 21:35

## 2023-12-26 RX ADMIN — ACETAMINOPHEN 650 MG: 325 TABLET ORAL at 04:49

## 2023-12-26 RX ADMIN — ACETAMINOPHEN 650 MG: 325 TABLET ORAL at 11:32

## 2023-12-26 RX ADMIN — MAGNESIUM SULFATE IN DEXTROSE 1000 MG: 10 INJECTION, SOLUTION INTRAVENOUS at 09:09

## 2023-12-26 RX ADMIN — CITALOPRAM HYDROBROMIDE 20 MG: 20 TABLET ORAL at 09:10

## 2023-12-26 RX ADMIN — ATORVASTATIN CALCIUM 40 MG: 40 TABLET, FILM COATED ORAL at 09:10

## 2023-12-26 RX ADMIN — ASPIRIN 81 MG: 81 TABLET, COATED ORAL at 09:10

## 2023-12-26 RX ADMIN — LATANOPROST 1 DROP: 50 SOLUTION OPHTHALMIC at 09:12

## 2023-12-26 RX ADMIN — PANTOPRAZOLE SODIUM 40 MG: 40 TABLET, DELAYED RELEASE ORAL at 06:33

## 2023-12-26 RX ADMIN — CLOPIDOGREL BISULFATE 75 MG: 75 TABLET ORAL at 09:10

## 2023-12-26 RX ADMIN — AMLODIPINE BESYLATE 5 MG: 5 TABLET ORAL at 21:35

## 2023-12-27 LAB
ABO + RH BLD: NORMAL
ALBUMIN SERPL-MCNC: 2.2 G/DL (ref 3.5–5)
ALBUMIN/GLOB SERPL: 0.5 (ref 1.1–2.2)
ALP SERPL-CCNC: 124 U/L (ref 45–117)
ALT SERPL-CCNC: 20 U/L (ref 12–78)
ANION GAP SERPL CALC-SCNC: 3 MMOL/L (ref 5–15)
AST SERPL-CCNC: 42 U/L (ref 15–37)
BILIRUB SERPL-MCNC: 0.7 MG/DL (ref 0.2–1)
BLD PROD TYP BPU: NORMAL
BLOOD BANK DISPENSE STATUS: NORMAL
BLOOD GROUP ANTIBODIES SERPL: NORMAL
BPU ID: NORMAL
BUN SERPL-MCNC: 10 MG/DL (ref 6–20)
BUN/CREAT SERPL: 13 (ref 12–20)
CALCIUM SERPL-MCNC: 9.3 MG/DL (ref 8.5–10.1)
CHLORIDE SERPL-SCNC: 109 MMOL/L (ref 97–108)
CO2 SERPL-SCNC: 27 MMOL/L (ref 21–32)
CREAT SERPL-MCNC: 0.77 MG/DL (ref 0.55–1.02)
CROSSMATCH RESULT: NORMAL
ERYTHROCYTE [DISTWIDTH] IN BLOOD BY AUTOMATED COUNT: 15.6 % (ref 11.5–14.5)
GLOBULIN SER CALC-MCNC: 4.3 G/DL (ref 2–4)
GLUCOSE BLD STRIP.AUTO-MCNC: 103 MG/DL (ref 65–117)
GLUCOSE BLD STRIP.AUTO-MCNC: 118 MG/DL (ref 65–117)
GLUCOSE BLD STRIP.AUTO-MCNC: 146 MG/DL (ref 65–117)
GLUCOSE SERPL-MCNC: 102 MG/DL (ref 65–100)
HCT VFR BLD AUTO: 25.3 % (ref 35–47)
HGB BLD-MCNC: 7.8 G/DL (ref 11.5–16)
MAGNESIUM SERPL-MCNC: 1.9 MG/DL (ref 1.6–2.4)
MCH RBC QN AUTO: 27.7 PG (ref 26–34)
MCHC RBC AUTO-ENTMCNC: 30.8 G/DL (ref 30–36.5)
MCV RBC AUTO: 89.7 FL (ref 80–99)
NRBC # BLD: 0.03 K/UL (ref 0–0.01)
NRBC BLD-RTO: 0.3 PER 100 WBC
PHOSPHATE SERPL-MCNC: 3.1 MG/DL (ref 2.6–4.7)
PLATELET # BLD AUTO: 428 K/UL (ref 150–400)
PMV BLD AUTO: 10.8 FL (ref 8.9–12.9)
POTASSIUM SERPL-SCNC: 4 MMOL/L (ref 3.5–5.1)
PROT SERPL-MCNC: 6.5 G/DL (ref 6.4–8.2)
RBC # BLD AUTO: 2.82 M/UL (ref 3.8–5.2)
SERVICE CMNT-IMP: ABNORMAL
SERVICE CMNT-IMP: ABNORMAL
SERVICE CMNT-IMP: NORMAL
SODIUM SERPL-SCNC: 139 MMOL/L (ref 136–145)
SPECIMEN EXP DATE BLD: NORMAL
UNIT DIVISION: 0
WBC # BLD AUTO: 11.2 K/UL (ref 3.6–11)

## 2023-12-27 PROCEDURE — 97535 SELF CARE MNGMENT TRAINING: CPT | Performed by: OCCUPATIONAL THERAPIST

## 2023-12-27 PROCEDURE — 82962 GLUCOSE BLOOD TEST: CPT

## 2023-12-27 PROCEDURE — 97530 THERAPEUTIC ACTIVITIES: CPT | Performed by: OCCUPATIONAL THERAPIST

## 2023-12-27 PROCEDURE — 1100000000 HC RM PRIVATE

## 2023-12-27 PROCEDURE — 80053 COMPREHEN METABOLIC PANEL: CPT

## 2023-12-27 PROCEDURE — 83735 ASSAY OF MAGNESIUM: CPT

## 2023-12-27 PROCEDURE — 97530 THERAPEUTIC ACTIVITIES: CPT

## 2023-12-27 PROCEDURE — 85027 COMPLETE CBC AUTOMATED: CPT

## 2023-12-27 PROCEDURE — 2580000003 HC RX 258: Performed by: ORTHOPAEDIC SURGERY

## 2023-12-27 PROCEDURE — 84100 ASSAY OF PHOSPHORUS: CPT

## 2023-12-27 PROCEDURE — 94760 N-INVAS EAR/PLS OXIMETRY 1: CPT

## 2023-12-27 PROCEDURE — 6370000000 HC RX 637 (ALT 250 FOR IP): Performed by: ORTHOPAEDIC SURGERY

## 2023-12-27 PROCEDURE — 36415 COLL VENOUS BLD VENIPUNCTURE: CPT

## 2023-12-27 RX ADMIN — SODIUM CHLORIDE, PRESERVATIVE FREE 10 ML: 5 INJECTION INTRAVENOUS at 21:03

## 2023-12-27 RX ADMIN — ACETAMINOPHEN 650 MG: 325 TABLET ORAL at 05:56

## 2023-12-27 RX ADMIN — LATANOPROST 1 DROP: 50 SOLUTION OPHTHALMIC at 08:52

## 2023-12-27 RX ADMIN — MELATONIN 3 MG: at 21:03

## 2023-12-27 RX ADMIN — CLOPIDOGREL BISULFATE 75 MG: 75 TABLET ORAL at 08:51

## 2023-12-27 RX ADMIN — ASPIRIN 81 MG: 81 TABLET, COATED ORAL at 08:51

## 2023-12-27 RX ADMIN — POLYETHYLENE GLYCOL 3350 17 G: 17 POWDER, FOR SOLUTION ORAL at 08:51

## 2023-12-27 RX ADMIN — DOCUSATE SODIUM 50 MG AND SENNOSIDES 8.6 MG 1 TABLET: 8.6; 5 TABLET, FILM COATED ORAL at 08:51

## 2023-12-27 RX ADMIN — ACETAMINOPHEN 650 MG: 325 TABLET ORAL at 17:21

## 2023-12-27 RX ADMIN — ATORVASTATIN CALCIUM 40 MG: 40 TABLET, FILM COATED ORAL at 08:51

## 2023-12-27 RX ADMIN — DOCUSATE SODIUM 50 MG AND SENNOSIDES 8.6 MG 1 TABLET: 8.6; 5 TABLET, FILM COATED ORAL at 21:03

## 2023-12-27 RX ADMIN — PANTOPRAZOLE SODIUM 40 MG: 40 TABLET, DELAYED RELEASE ORAL at 05:56

## 2023-12-27 RX ADMIN — ACETAMINOPHEN 650 MG: 325 TABLET ORAL at 12:03

## 2023-12-27 RX ADMIN — ASPIRIN 81 MG: 81 TABLET, COATED ORAL at 21:03

## 2023-12-27 RX ADMIN — SODIUM CHLORIDE, PRESERVATIVE FREE 5 ML: 5 INJECTION INTRAVENOUS at 08:51

## 2023-12-27 RX ADMIN — AMLODIPINE BESYLATE 5 MG: 5 TABLET ORAL at 21:03

## 2023-12-27 RX ADMIN — CITALOPRAM HYDROBROMIDE 20 MG: 20 TABLET ORAL at 08:51

## 2023-12-28 LAB
ALBUMIN SERPL-MCNC: 2.2 G/DL (ref 3.5–5)
ALBUMIN/GLOB SERPL: 0.5 (ref 1.1–2.2)
ALP SERPL-CCNC: 132 U/L (ref 45–117)
ALT SERPL-CCNC: 21 U/L (ref 12–78)
ANION GAP SERPL CALC-SCNC: 3 MMOL/L (ref 5–15)
AST SERPL-CCNC: 40 U/L (ref 15–37)
BILIRUB SERPL-MCNC: 0.9 MG/DL (ref 0.2–1)
BUN SERPL-MCNC: 12 MG/DL (ref 6–20)
BUN/CREAT SERPL: 15 (ref 12–20)
CALCIUM SERPL-MCNC: 9.4 MG/DL (ref 8.5–10.1)
CHLORIDE SERPL-SCNC: 108 MMOL/L (ref 97–108)
CO2 SERPL-SCNC: 27 MMOL/L (ref 21–32)
CREAT SERPL-MCNC: 0.8 MG/DL (ref 0.55–1.02)
ERYTHROCYTE [DISTWIDTH] IN BLOOD BY AUTOMATED COUNT: 15.8 % (ref 11.5–14.5)
GLOBULIN SER CALC-MCNC: 4.5 G/DL (ref 2–4)
GLUCOSE BLD STRIP.AUTO-MCNC: 136 MG/DL (ref 65–117)
GLUCOSE BLD STRIP.AUTO-MCNC: 153 MG/DL (ref 65–117)
GLUCOSE BLD STRIP.AUTO-MCNC: 163 MG/DL (ref 65–117)
GLUCOSE SERPL-MCNC: 101 MG/DL (ref 65–100)
HCT VFR BLD AUTO: 26.8 % (ref 35–47)
HGB BLD-MCNC: 8.2 G/DL (ref 11.5–16)
MAGNESIUM SERPL-MCNC: 1.8 MG/DL (ref 1.6–2.4)
MCH RBC QN AUTO: 27.2 PG (ref 26–34)
MCHC RBC AUTO-ENTMCNC: 30.6 G/DL (ref 30–36.5)
MCV RBC AUTO: 89 FL (ref 80–99)
NRBC # BLD: 0.02 K/UL (ref 0–0.01)
NRBC BLD-RTO: 0.2 PER 100 WBC
PHOSPHATE SERPL-MCNC: 3.5 MG/DL (ref 2.6–4.7)
PLATELET # BLD AUTO: 504 K/UL (ref 150–400)
PMV BLD AUTO: 10.4 FL (ref 8.9–12.9)
POTASSIUM SERPL-SCNC: 4 MMOL/L (ref 3.5–5.1)
PROT SERPL-MCNC: 6.7 G/DL (ref 6.4–8.2)
RBC # BLD AUTO: 3.01 M/UL (ref 3.8–5.2)
SERVICE CMNT-IMP: ABNORMAL
SODIUM SERPL-SCNC: 138 MMOL/L (ref 136–145)
WBC # BLD AUTO: 10.2 K/UL (ref 3.6–11)

## 2023-12-28 PROCEDURE — 1100000000 HC RM PRIVATE

## 2023-12-28 PROCEDURE — 6370000000 HC RX 637 (ALT 250 FOR IP): Performed by: ORTHOPAEDIC SURGERY

## 2023-12-28 PROCEDURE — 97530 THERAPEUTIC ACTIVITIES: CPT | Performed by: PHYSICAL THERAPIST

## 2023-12-28 PROCEDURE — 97530 THERAPEUTIC ACTIVITIES: CPT | Performed by: OCCUPATIONAL THERAPIST

## 2023-12-28 PROCEDURE — 2580000003 HC RX 258: Performed by: ORTHOPAEDIC SURGERY

## 2023-12-28 PROCEDURE — 83735 ASSAY OF MAGNESIUM: CPT

## 2023-12-28 PROCEDURE — 84100 ASSAY OF PHOSPHORUS: CPT

## 2023-12-28 PROCEDURE — 82962 GLUCOSE BLOOD TEST: CPT

## 2023-12-28 PROCEDURE — 80053 COMPREHEN METABOLIC PANEL: CPT

## 2023-12-28 PROCEDURE — 36415 COLL VENOUS BLD VENIPUNCTURE: CPT

## 2023-12-28 PROCEDURE — 85027 COMPLETE CBC AUTOMATED: CPT

## 2023-12-28 RX ADMIN — ACETAMINOPHEN 650 MG: 325 TABLET ORAL at 05:33

## 2023-12-28 RX ADMIN — ASPIRIN 81 MG: 81 TABLET, COATED ORAL at 20:55

## 2023-12-28 RX ADMIN — DOCUSATE SODIUM 50 MG AND SENNOSIDES 8.6 MG 1 TABLET: 8.6; 5 TABLET, FILM COATED ORAL at 20:55

## 2023-12-28 RX ADMIN — ACETAMINOPHEN 650 MG: 325 TABLET ORAL at 23:54

## 2023-12-28 RX ADMIN — SODIUM CHLORIDE, PRESERVATIVE FREE 10 ML: 5 INJECTION INTRAVENOUS at 08:39

## 2023-12-28 RX ADMIN — ATORVASTATIN CALCIUM 40 MG: 40 TABLET, FILM COATED ORAL at 08:37

## 2023-12-28 RX ADMIN — ACETAMINOPHEN 650 MG: 325 TABLET ORAL at 12:39

## 2023-12-28 RX ADMIN — AMLODIPINE BESYLATE 5 MG: 5 TABLET ORAL at 20:55

## 2023-12-28 RX ADMIN — ASPIRIN 81 MG: 81 TABLET, COATED ORAL at 08:37

## 2023-12-28 RX ADMIN — POLYETHYLENE GLYCOL 3350 17 G: 17 POWDER, FOR SOLUTION ORAL at 08:37

## 2023-12-28 RX ADMIN — ACETAMINOPHEN 650 MG: 325 TABLET ORAL at 00:26

## 2023-12-28 RX ADMIN — PANTOPRAZOLE SODIUM 40 MG: 40 TABLET, DELAYED RELEASE ORAL at 05:33

## 2023-12-28 RX ADMIN — ACETAMINOPHEN 650 MG: 325 TABLET ORAL at 16:55

## 2023-12-28 RX ADMIN — SODIUM CHLORIDE, PRESERVATIVE FREE 10 ML: 5 INJECTION INTRAVENOUS at 20:55

## 2023-12-28 RX ADMIN — LATANOPROST 1 DROP: 50 SOLUTION OPHTHALMIC at 08:37

## 2023-12-28 RX ADMIN — CLOPIDOGREL BISULFATE 75 MG: 75 TABLET ORAL at 08:37

## 2023-12-28 RX ADMIN — CITALOPRAM HYDROBROMIDE 20 MG: 20 TABLET ORAL at 08:37

## 2023-12-28 RX ADMIN — MELATONIN 3 MG: at 20:55

## 2023-12-28 RX ADMIN — DOCUSATE SODIUM 50 MG AND SENNOSIDES 8.6 MG 1 TABLET: 8.6; 5 TABLET, FILM COATED ORAL at 08:37

## 2023-12-29 VITALS
HEART RATE: 98 BPM | SYSTOLIC BLOOD PRESSURE: 148 MMHG | RESPIRATION RATE: 16 BRPM | WEIGHT: 160.27 LBS | DIASTOLIC BLOOD PRESSURE: 61 MMHG | OXYGEN SATURATION: 100 % | HEIGHT: 66 IN | BODY MASS INDEX: 25.76 KG/M2 | TEMPERATURE: 97.7 F

## 2023-12-29 LAB
GLUCOSE BLD STRIP.AUTO-MCNC: 115 MG/DL (ref 65–117)
GLUCOSE BLD STRIP.AUTO-MCNC: 130 MG/DL (ref 65–117)
SERVICE CMNT-IMP: ABNORMAL
SERVICE CMNT-IMP: NORMAL

## 2023-12-29 PROCEDURE — 82962 GLUCOSE BLOOD TEST: CPT

## 2023-12-29 PROCEDURE — 6370000000 HC RX 637 (ALT 250 FOR IP): Performed by: ORTHOPAEDIC SURGERY

## 2023-12-29 PROCEDURE — 2580000003 HC RX 258: Performed by: ORTHOPAEDIC SURGERY

## 2023-12-29 RX ADMIN — PANTOPRAZOLE SODIUM 40 MG: 40 TABLET, DELAYED RELEASE ORAL at 06:37

## 2023-12-29 RX ADMIN — DOCUSATE SODIUM 50 MG AND SENNOSIDES 8.6 MG 1 TABLET: 8.6; 5 TABLET, FILM COATED ORAL at 08:28

## 2023-12-29 RX ADMIN — ATORVASTATIN CALCIUM 40 MG: 40 TABLET, FILM COATED ORAL at 08:28

## 2023-12-29 RX ADMIN — ASPIRIN 81 MG: 81 TABLET, COATED ORAL at 08:28

## 2023-12-29 RX ADMIN — POLYETHYLENE GLYCOL 3350 17 G: 17 POWDER, FOR SOLUTION ORAL at 08:28

## 2023-12-29 RX ADMIN — CLOPIDOGREL BISULFATE 75 MG: 75 TABLET ORAL at 08:28

## 2023-12-29 RX ADMIN — LATANOPROST 1 DROP: 50 SOLUTION OPHTHALMIC at 08:29

## 2023-12-29 RX ADMIN — ACETAMINOPHEN 650 MG: 325 TABLET ORAL at 06:37

## 2023-12-29 RX ADMIN — CITALOPRAM HYDROBROMIDE 20 MG: 20 TABLET ORAL at 08:28

## 2023-12-29 RX ADMIN — OXYCODONE HYDROCHLORIDE 5 MG: 5 TABLET ORAL at 07:50

## 2023-12-29 RX ADMIN — SODIUM CHLORIDE, PRESERVATIVE FREE 10 ML: 5 INJECTION INTRAVENOUS at 08:28

## 2023-12-29 NOTE — CARE COORDINATION
Transition of Care Plan:     RUR: 18% moderate  Prior Level of Functioning: Home alone, ind  Disposition: SNF - [facility]   If SNF or IPR: Date FOC offered: 12/26  Date [address] received: 12/26  Date authorization started with reference number: 12/28  Date authorization received and expires:   Transportation at discharge: Anticipate need for BLS transport  IM/IMM Medicare/ letter given: To be given prior to d/c  Caregiver Contact: DaughterOmero [phone]   Discharge Caregiver contacted prior to discharge? Yes  Care Conference needed? Not at present   Barriers to discharge: [address],[facility] have accepted for SNF. Priyanka Coral still no response. CM contacted pt's dtr [address] [facility] [phone] and informed her of above. Per daughter she is agreeable to patient going to [facility] since [facility] is not responding. CM has notified [facility] has initiated insurance auth.      [address] [place] [address]
Transition of Care Plan:    RUR: 18% moderate  Prior Level of Functioning: Home alone, ind  Disposition: SNF - referrals pending   If SNF or IPR: Date FOC offered: 12/26  Date FOC received: 12/26  Accepting facility:   Date authorization started with reference number:   Date authorization received and expires: Follow up appointments: PCP/Specialists  DME needed: Defer to SNF   Transportation at discharge: Anticipate need for BLS transport  IM/IMM Medicare/ letter given: To be given prior to d/c  Is patient a  and connected with VA? N/A   If yes, was [place] transfer form completed and VA notified? Caregiver Contact: DaughterYulisa [phone]   Discharge Caregiver contacted prior to discharge? Yes, at bedside today  Care Conference needed? Not at present   Barriers to discharge: Medical clearance, SNF bed, insurance authorization      CM met with pt and pt's daughter, Joya Henriquez, at bedside on today to review SNF recommendations. SNF list provided, pt/daughter have identified top 3 choices: [facility], [facility], and [facility]. Referrals placed via Client Outlook/[employer] and pending. Pt will require insurance auth through MARGOT Nascimento to start promptly once bed is secured.       Erendira Victoria, [address], [address] [facility]  [phone]/Available on Perfect Serve
Capacity    Healthcare Decision Maker:  No healthcare decision makers have been documented. Click here to complete [address] including selection of the Healthcare Decision Maker Relationship (ie \"Primary\")   Today we discussed [address] The patient is considering options.     Content/Action Overview:  DECLINED ACP Conversation - will revisit periodically  Reviewed DNR/DNI and patient elects Full Code (Attempt Resuscitation)        Length of Voluntary ACP Conversation in minutes:  <16 minutes (Non-Billable)    Kal Augustine    
Medicaid Hospice services as set out in [address] [place] [address] 30-  [] [facility] [place] [facility]) staff shall perform screenings of the [facility] clients. Advanced Care Plan:  []Surrogate Decision Maker of Care  []POA  [x]Communicated Code Status and copy sent.     Other:               [facility], [address] [place] [address]

## 2023-12-29 NOTE — PLAN OF CARE
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                                                                                                                                                                                                                                                Barthel Index:    Barthel Index Scale  Feeding: Independent, Able to apply any necessary device. Feeds in reasonable time  Bathing: Cannot perform activity  Grooming: Cannot perform activity  Dressing: Needs help, but does at least half of task within reasonable time  Bowel Control: No accidents. Able to use enema or suppository if needed  Bladder Control: Occasional accidents or needs help with device  Toilet Transfers: Cannot perform activity  Chair/Bed Trannsfers: Able to sit, but needs maximum assistance to transfer  Ambulation: Cannot perform activity  Stairs: Cannot perform activity  Total Barthel Index Score: 35       The Barthel ADL Index: Guidelines  1. The index should be used as a record of what a patient does, not as a record of what a patient could do. 2. The main aim is to establish degree of independence from any help, physical or verbal, however minor and for whatever reason. 3. The need for supervision renders the patient not independent. 4. A patient's performance should be established using the best available evidence. Asking the patient, friends/relatives and nurses are the usual sources, but direct observation and common sense are also important. However direct testing is not needed. 5. Usually the patient's performance over the preceding 24-48 hours is important, but occasionally longer periods will be relevant.   6. Middle categories imply that the patient

## 2024-01-19 ENCOUNTER — APPOINTMENT (OUTPATIENT)
Facility: HOSPITAL | Age: 79
End: 2024-01-19
Payer: MEDICARE

## 2024-01-19 ENCOUNTER — HOSPITAL ENCOUNTER (EMERGENCY)
Facility: HOSPITAL | Age: 79
Discharge: HOME | End: 2024-01-19
Attending: EMERGENCY MEDICINE
Payer: MEDICARE

## 2024-01-19 VITALS
RESPIRATION RATE: 15 BRPM | OXYGEN SATURATION: 94 % | SYSTOLIC BLOOD PRESSURE: 140 MMHG | HEART RATE: 102 BPM | TEMPERATURE: 98.2 F | DIASTOLIC BLOOD PRESSURE: 58 MMHG | BODY MASS INDEX: 25.87 KG/M2 | WEIGHT: 160.27 LBS

## 2024-01-19 DIAGNOSIS — N30.00 ACUTE CYSTITIS WITHOUT HEMATURIA: ICD-10-CM

## 2024-01-19 DIAGNOSIS — M25.571 ACUTE RIGHT ANKLE PAIN: Primary | ICD-10-CM

## 2024-01-19 LAB
ALBUMIN SERPL-MCNC: 3.2 G/DL (ref 3.5–5)
ALBUMIN/GLOB SERPL: 0.7 (ref 1.1–2.2)
ALP SERPL-CCNC: 155 U/L (ref 45–117)
ALT SERPL-CCNC: 18 U/L (ref 12–78)
ANION GAP SERPL CALC-SCNC: 1 MMOL/L (ref 5–15)
APPEARANCE UR: CLEAR
AST SERPL-CCNC: 18 U/L (ref 15–37)
BACTERIA URNS QL MICRO: NEGATIVE /HPF
BASOPHILS # BLD: 0.1 K/UL (ref 0–0.1)
BASOPHILS NFR BLD: 0 % (ref 0–1)
BILIRUB SERPL-MCNC: 0.3 MG/DL (ref 0.2–1)
BILIRUB UR QL: NEGATIVE
BUN SERPL-MCNC: 22 MG/DL (ref 6–20)
BUN/CREAT SERPL: 23 (ref 12–20)
CALCIUM SERPL-MCNC: 10 MG/DL (ref 8.5–10.1)
CAOX CRY URNS QL MICRO: ABNORMAL
CHLORIDE SERPL-SCNC: 110 MMOL/L (ref 97–108)
CO2 SERPL-SCNC: 29 MMOL/L (ref 21–32)
COLOR UR: ABNORMAL
CREAT SERPL-MCNC: 0.97 MG/DL (ref 0.55–1.02)
DIFFERENTIAL METHOD BLD: ABNORMAL
EOSINOPHIL # BLD: 0.2 K/UL (ref 0–0.4)
EOSINOPHIL NFR BLD: 2 % (ref 0–7)
EPITH CASTS URNS QL MICRO: ABNORMAL /LPF
ERYTHROCYTE [DISTWIDTH] IN BLOOD BY AUTOMATED COUNT: 16.2 % (ref 11.5–14.5)
GLOBULIN SER CALC-MCNC: 4.9 G/DL (ref 2–4)
GLUCOSE SERPL-MCNC: 112 MG/DL (ref 65–100)
GLUCOSE UR STRIP.AUTO-MCNC: NEGATIVE MG/DL
HCT VFR BLD AUTO: 29.8 % (ref 35–47)
HGB BLD-MCNC: 9.2 G/DL (ref 11.5–16)
HGB UR QL STRIP: NEGATIVE
IMM GRANULOCYTES # BLD AUTO: 0.1 K/UL (ref 0–0.04)
IMM GRANULOCYTES NFR BLD AUTO: 1 % (ref 0–0.5)
KETONES UR QL STRIP.AUTO: ABNORMAL MG/DL
LEUKOCYTE ESTERASE UR QL STRIP.AUTO: ABNORMAL
LYMPHOCYTES # BLD: 2.6 K/UL (ref 0.8–3.5)
LYMPHOCYTES NFR BLD: 22 % (ref 12–49)
MCH RBC QN AUTO: 28.6 PG (ref 26–34)
MCHC RBC AUTO-ENTMCNC: 30.9 G/DL (ref 30–36.5)
MCV RBC AUTO: 92.5 FL (ref 80–99)
MONOCYTES # BLD: 1 K/UL (ref 0–1)
MONOCYTES NFR BLD: 8 % (ref 5–13)
NEUTS SEG # BLD: 7.7 K/UL (ref 1.8–8)
NEUTS SEG NFR BLD: 67 % (ref 32–75)
NITRITE UR QL STRIP.AUTO: NEGATIVE
NRBC # BLD: 0 K/UL (ref 0–0.01)
NRBC BLD-RTO: 0 PER 100 WBC
PH UR STRIP: 5.5 (ref 5–8)
PLATELET # BLD AUTO: 552 K/UL (ref 150–400)
PMV BLD AUTO: 9.9 FL (ref 8.9–12.9)
POTASSIUM SERPL-SCNC: 4 MMOL/L (ref 3.5–5.1)
PROT SERPL-MCNC: 8.1 G/DL (ref 6.4–8.2)
PROT UR STRIP-MCNC: NEGATIVE MG/DL
RBC # BLD AUTO: 3.22 M/UL (ref 3.8–5.2)
RBC #/AREA URNS HPF: ABNORMAL /HPF (ref 0–5)
SODIUM SERPL-SCNC: 140 MMOL/L (ref 136–145)
SP GR UR REFRACTOMETRY: 1.03
T4 FREE SERPL-MCNC: 1.3 NG/DL (ref 0.8–1.5)
TSH SERPL DL<=0.05 MIU/L-ACNC: 0.82 UIU/ML (ref 0.36–3.74)
URINE CULTURE IF INDICATED: ABNORMAL
UROBILINOGEN UR QL STRIP.AUTO: 1 EU/DL (ref 0.2–1)
WBC # BLD AUTO: 11.6 K/UL (ref 3.6–11)
WBC URNS QL MICRO: ABNORMAL /HPF (ref 0–4)

## 2024-01-19 PROCEDURE — 2580000003 HC RX 258: Performed by: STUDENT IN AN ORGANIZED HEALTH CARE EDUCATION/TRAINING PROGRAM

## 2024-01-19 PROCEDURE — 73610 X-RAY EXAM OF ANKLE: CPT

## 2024-01-19 PROCEDURE — 85025 COMPLETE CBC W/AUTO DIFF WBC: CPT

## 2024-01-19 PROCEDURE — 96361 HYDRATE IV INFUSION ADD-ON: CPT

## 2024-01-19 PROCEDURE — 96365 THER/PROPH/DIAG IV INF INIT: CPT

## 2024-01-19 PROCEDURE — 36415 COLL VENOUS BLD VENIPUNCTURE: CPT

## 2024-01-19 PROCEDURE — 81001 URINALYSIS AUTO W/SCOPE: CPT

## 2024-01-19 PROCEDURE — 87086 URINE CULTURE/COLONY COUNT: CPT

## 2024-01-19 PROCEDURE — 80053 COMPREHEN METABOLIC PANEL: CPT

## 2024-01-19 PROCEDURE — 99284 EMERGENCY DEPT VISIT MOD MDM: CPT

## 2024-01-19 PROCEDURE — 73630 X-RAY EXAM OF FOOT: CPT

## 2024-01-19 PROCEDURE — 84439 ASSAY OF FREE THYROXINE: CPT

## 2024-01-19 PROCEDURE — 93971 EXTREMITY STUDY: CPT

## 2024-01-19 PROCEDURE — 84443 ASSAY THYROID STIM HORMONE: CPT

## 2024-01-19 PROCEDURE — 6360000002 HC RX W HCPCS: Performed by: STUDENT IN AN ORGANIZED HEALTH CARE EDUCATION/TRAINING PROGRAM

## 2024-01-19 RX ORDER — 0.9 % SODIUM CHLORIDE 0.9 %
1000 INTRAVENOUS SOLUTION INTRAVENOUS ONCE
Status: COMPLETED | OUTPATIENT
Start: 2024-01-19 | End: 2024-01-19

## 2024-01-19 RX ORDER — CEPHALEXIN 500 MG/1
500 CAPSULE ORAL 4 TIMES DAILY
Qty: 20 CAPSULE | Refills: 0 | Status: SHIPPED | OUTPATIENT
Start: 2024-01-19 | End: 2024-01-24

## 2024-01-19 RX ADMIN — SODIUM CHLORIDE 1000 ML: 9 INJECTION, SOLUTION INTRAVENOUS at 16:42

## 2024-01-19 RX ADMIN — CEFTRIAXONE 1000 MG: 1 INJECTION, POWDER, FOR SOLUTION INTRAMUSCULAR; INTRAVENOUS at 18:55

## 2024-01-19 ASSESSMENT — PAIN DESCRIPTION - ORIENTATION: ORIENTATION: RIGHT

## 2024-01-19 ASSESSMENT — PAIN SCALES - GENERAL
PAINLEVEL_OUTOF10: 0
PAINLEVEL_OUTOF10: 8

## 2024-01-19 ASSESSMENT — PAIN DESCRIPTION - LOCATION: LOCATION: ANKLE
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
## 2024-01-19 NOTE — ED PROVIDER NOTES
ordered. Decision-making details documented in ED Course.  Radiology: ordered. Decision-making details documented in ED Course.    Risk  Prescription drug management.      X-rays without evidence of acute fracture.  Basic lab work without acute findings.  Patient signed out pending urinalysis and duplex to rule out DVT.    ED Course as of 01/20/24 1644   Fri Jan 19, 2024 2008 Imaging negative for fracture or DVT. UA suspicious for UTI, treated patient with rocephin, will d/c with keflex.  [SP]      ED Course User Index  [SP] Yoandy Bui MD         FINAL IMPRESSION     1. Acute right ankle pain    2. Acute cystitis without hematuria          DISPOSITION/PLAN   Juju Faulkner's  results have been reviewed with her.  She has been counseled regarding her diagnosis, treatment, and plan.  She verbally conveys understanding and agreement of the signs, symptoms, diagnosis, treatment and prognosis and additionally agrees to follow up as discussed.  She also agrees with the care-plan and conveys that all of her questions have been answered.  I have also provided discharge instructions for her that include: educational information regarding their diagnosis and treatment, and list of reasons why they would want to return to the ED prior to their follow-up appointment, should her condition change.     CLINICAL IMPRESSION    Discharge Note: The patient is stable for discharge home. The signs, symptoms, diagnosis, and discharge instructions have been discussed, understanding conveyed, and agreed upon. The patient is to follow up as recommended or return to ER should their symptoms worsen.      PATIENT REFERRED TO:  your orthopedist    Schedule an appointment as soon as possible for a visit       Melany Vernon APRN - NP  [address] [facility] [address] [phone]             DISCHARGE MEDICATIONS:     Medication List        ASK your doctor about these medications      amLODIPine 5 MG tablet  Commonly

## 2024-01-19 NOTE — DISCHARGE INSTRUCTIONS
complete care. It is important that you follow up with a doctor, nurse practitioner, or physician assistant for ongoing care. If your symptoms become worse or you do not improve as expected and you are unable to reach your usual health care provider, you should return to the Emergency Department. We are available 24 hours a day.    Please take your discharge instructions with you when you go to your follow-up appointment.     If a prescription has been provided, please have it filled as soon as possible to prevent a delay in treatment. Read the entire medication instruction sheet provided to you by the pharmacy. If you have any questions or reservations about taking the medication due to side effects or interactions with other medications, please call your primary care physician or contact the ER to speak with the charge nurse.     Please make an appointment with your family doctor or the physician you were referred to for follow-up of this visit as instructed on your discharge paperwork. Return to the ER if you are unable to be seen or if you are unable to be seen in a timely manner.    Should you experience abdominal pain lasting greater than 6 hours, chest pain, difficulty breathing, fever/chills, numbness/tingling, skin changes or other symptoms that concern you, return to the ED sooner. If you feel worse over the next 24 hours, please return to the ED. We are available 24 hours a day. Thank you for trusting us with your care!

## 2024-01-20 LAB
BACTERIA SPEC CULT: NORMAL
SERVICE CMNT-IMP: NORMAL

## 2024-01-20 NOTE — ED NOTES
Discussed and reviewed discharge instructions with patient and daughter. Provided opportunity for questions, patient expressed understanding. Reviewed reasons to return to the Emergency Department. IV cannula removed. Patient stable at time of discharge.  Awaiting for Hospital to Home ETA at 0945H to 10pm.

## 2024-01-20 NOTE — ED NOTES
Assumed care at this time.  Patient on bed with cardiac monitor on, alert and oriented X4,  no signs of distress.  Pain free as claimed by patient.  Call bell within reach.  Patient is awaiting for provider decision and update.

## 2024-02-02 ENCOUNTER — TELEPHONE (OUTPATIENT)
Age: 79
End: 2024-02-02

## 2024-02-02 NOTE — TELEPHONE ENCOUNTER
Patients daughter Misa El called stating that pt was seen in the ER for dementia tor testing. Not showing any referrals in the system. She can be reached at [phone]

## 2024-02-05 NOTE — TELEPHONE ENCOUNTER
Returned call and spoke with patient's daughter. Per Misa the after visit summary dated 12/29/2023 states for patient to schedule with Neuropsych. Appt scheduled.

## 2024-03-26 ENCOUNTER — HOSPITAL ENCOUNTER (EMERGENCY)
Facility: HOSPITAL | Age: 79
Discharge: HOME | End: 2024-03-27
Attending: STUDENT IN AN ORGANIZED HEALTH CARE EDUCATION/TRAINING PROGRAM
Payer: MEDICARE

## 2024-03-26 ENCOUNTER — APPOINTMENT (OUTPATIENT)
Facility: HOSPITAL | Age: 79
End: 2024-03-26
Payer: MEDICARE

## 2024-03-26 DIAGNOSIS — I95.9 HYPOTENSION, UNSPECIFIED HYPOTENSION TYPE: ICD-10-CM

## 2024-03-26 DIAGNOSIS — E86.0 DEHYDRATION: Primary | ICD-10-CM

## 2024-03-26 LAB
ALBUMIN SERPL-MCNC: 3.7 G/DL (ref 3.5–5.2)
ALBUMIN/GLOB SERPL: 0.9 (ref 1.1–2.2)
ALP SERPL-CCNC: 119 U/L (ref 35–104)
ALT SERPL-CCNC: 7 U/L (ref 10–35)
ANION GAP SERPL CALC-SCNC: 13 MMOL/L (ref 5–15)
AST SERPL-CCNC: 38 U/L (ref 10–35)
BASE EXCESS BLD CALC-SCNC: 2.2 MMOL/L
BASOPHILS # BLD: 0 K/UL (ref 0–1)
BASOPHILS NFR BLD: 0 % (ref 0–1)
BILIRUB SERPL-MCNC: 0.2 MG/DL (ref 0.2–1)
BUN SERPL-MCNC: 29 MG/DL (ref 8–23)
BUN/CREAT SERPL: 24 (ref 12–20)
CALCIUM SERPL-MCNC: 9.6 MG/DL (ref 8.8–10.2)
CHLORIDE SERPL-SCNC: 101 MMOL/L (ref 98–107)
CO2 SERPL-SCNC: 24 MMOL/L (ref 22–29)
CREAT SERPL-MCNC: 1.23 MG/DL (ref 0.5–0.9)
DIFFERENTIAL METHOD BLD: ABNORMAL
EOSINOPHIL # BLD: 0 K/UL (ref 0–0.4)
EOSINOPHIL NFR BLD: 0 %
ERYTHROCYTE [DISTWIDTH] IN BLOOD BY AUTOMATED COUNT: 15.7 % (ref 11.5–14.5)
GLOBULIN SER CALC-MCNC: 4.3 G/DL (ref 2–4)
GLUCOSE SERPL-MCNC: 166 MG/DL (ref 65–100)
HCO3 BLD-SCNC: 27.6 MMOL/L (ref 22–26)
HCT VFR BLD AUTO: 25.9 % (ref 35–47)
HGB BLD-MCNC: 7.9 G/DL (ref 11.5–16)
IMM GRANULOCYTES # BLD AUTO: 0 K/UL (ref 0–0.04)
IMM GRANULOCYTES NFR BLD AUTO: 0 % (ref 0–0.5)
LACTATE BLD-SCNC: 2.13 MMOL/L (ref 0.4–2)
LYMPHOCYTES # BLD: 1.4 K/UL (ref 0.8–3.5)
LYMPHOCYTES NFR BLD: 15 % (ref 12–49)
MAGNESIUM SERPL-MCNC: 1.9 MG/DL (ref 1.6–2.4)
MCH RBC QN AUTO: 27.3 PG (ref 26–34)
MCHC RBC AUTO-ENTMCNC: 30.5 G/DL (ref 30–36.5)
MCV RBC AUTO: 89.6 FL (ref 80–99)
MONOCYTES # BLD: 0.4 K/UL (ref 0–1)
MONOCYTES NFR BLD: 4 % (ref 5–13)
NEUTS SEG # BLD: 7.6 K/UL (ref 1.8–8)
NEUTS SEG NFR BLD: 81 % (ref 32–75)
NRBC # BLD: 0 K/UL (ref 0–0.01)
NRBC BLD-RTO: 0 PER 100 WBC
NT PRO BNP: 118 PG/ML
PCO2 BLD: 45.9 MMHG (ref 35–45)
PH BLD: 7.39 (ref 7.35–7.45)
PLATELET # BLD AUTO: 560 K/UL (ref 150–400)
PMV BLD AUTO: 10.4 FL (ref 8.9–12.9)
PO2 BLD: <27 MMHG (ref 80–100)
POTASSIUM SERPL-SCNC: 4.6 MMOL/L (ref 3.5–5.1)
PROT SERPL-MCNC: 8 G/DL (ref 6.4–8.3)
RBC # BLD AUTO: 2.89 M/UL (ref 3.8–5.2)
SERVICE CMNT-IMP: ABNORMAL
SERVICE CMNT-IMP: ABNORMAL
SODIUM SERPL-SCNC: 138 MMOL/L (ref 136–145)
SPECIMEN TYPE: ABNORMAL
WBC # BLD AUTO: 9.5 K/UL (ref 3.6–11)

## 2024-03-26 PROCEDURE — 71045 X-RAY EXAM CHEST 1 VIEW: CPT

## 2024-03-26 PROCEDURE — 93005 ELECTROCARDIOGRAM TRACING: CPT | Performed by: STUDENT IN AN ORGANIZED HEALTH CARE EDUCATION/TRAINING PROGRAM

## 2024-03-26 PROCEDURE — 83880 ASSAY OF NATRIURETIC PEPTIDE: CPT

## 2024-03-26 PROCEDURE — 83735 ASSAY OF MAGNESIUM: CPT

## 2024-03-26 PROCEDURE — 82803 BLOOD GASES ANY COMBINATION: CPT

## 2024-03-26 PROCEDURE — 96360 HYDRATION IV INFUSION INIT: CPT

## 2024-03-26 PROCEDURE — 36415 COLL VENOUS BLD VENIPUNCTURE: CPT

## 2024-03-26 PROCEDURE — 99285 EMERGENCY DEPT VISIT HI MDM: CPT

## 2024-03-26 PROCEDURE — 85025 COMPLETE CBC W/AUTO DIFF WBC: CPT

## 2024-03-26 PROCEDURE — 83605 ASSAY OF LACTIC ACID: CPT

## 2024-03-26 PROCEDURE — 80053 COMPREHEN METABOLIC PANEL: CPT

## 2024-03-26 PROCEDURE — 2580000003 HC RX 258: Performed by: STUDENT IN AN ORGANIZED HEALTH CARE EDUCATION/TRAINING PROGRAM

## 2024-03-26 RX ORDER — 0.9 % SODIUM CHLORIDE 0.9 %
2000 INTRAVENOUS SOLUTION INTRAVENOUS ONCE
Status: COMPLETED | OUTPATIENT
Start: 2024-03-26 | End: 2024-03-26

## 2024-03-26 RX ADMIN — SODIUM CHLORIDE 2000 ML: 9 INJECTION, SOLUTION INTRAVENOUS at 21:43

## 2024-03-26 ASSESSMENT — PAIN DESCRIPTION - ORIENTATION: ORIENTATION: LEFT

## 2024-03-26 ASSESSMENT — PAIN - FUNCTIONAL ASSESSMENT: PAIN_FUNCTIONAL_ASSESSMENT: 0-10

## 2024-03-26 ASSESSMENT — PAIN DESCRIPTION - LOCATION: LOCATION: BACK;HIP;ARM

## 2024-03-26 ASSESSMENT — LIFESTYLE VARIABLES
HOW OFTEN DO YOU HAVE A DRINK CONTAINING ALCOHOL: NEVER
HOW MANY STANDARD DRINKS CONTAINING ALCOHOL DO YOU HAVE ON A TYPICAL DAY: PATIENT DOES NOT DRINK

## 2024-03-26 ASSESSMENT — PAIN SCALES - GENERAL: PAINLEVEL_OUTOF10: 7

## 2024-03-27 VITALS
HEART RATE: 92 BPM | TEMPERATURE: 97.5 F | DIASTOLIC BLOOD PRESSURE: 53 MMHG | BODY MASS INDEX: 24.11 KG/M2 | RESPIRATION RATE: 18 BRPM | SYSTOLIC BLOOD PRESSURE: 133 MMHG | HEIGHT: 66 IN | OXYGEN SATURATION: 93 % | WEIGHT: 150 LBS

## 2024-03-27 LAB
APPEARANCE UR: CLEAR
BACTERIA URNS QL MICRO: NEGATIVE /HPF
BILIRUB UR QL: NEGATIVE
COLOR UR: ABNORMAL
EKG ATRIAL RATE: 81 BPM
EKG DIAGNOSIS: NORMAL
EKG P AXIS: 84 DEGREES
EKG P-R INTERVAL: 144 MS
EKG Q-T INTERVAL: 402 MS
EKG QRS DURATION: 82 MS
EKG QTC CALCULATION (BAZETT): 466 MS
EKG R AXIS: 67 DEGREES
EKG T AXIS: 226 DEGREES
EKG VENTRICULAR RATE: 81 BPM
EPITH CASTS URNS QL MICRO: ABNORMAL /LPF
GLUCOSE UR STRIP.AUTO-MCNC: NEGATIVE MG/DL
HGB UR QL STRIP: NEGATIVE
KETONES UR QL STRIP.AUTO: NEGATIVE MG/DL
LACTATE BLD-SCNC: 0.47 MMOL/L (ref 0.4–2)
LEUKOCYTE ESTERASE UR QL STRIP.AUTO: ABNORMAL
NITRITE UR QL STRIP.AUTO: NEGATIVE
PH UR STRIP: 5.5 (ref 5–8)
PROT UR STRIP-MCNC: NEGATIVE MG/DL
RBC #/AREA URNS HPF: ABNORMAL /HPF
SP GR UR REFRACTOMETRY: 1.01 (ref 1–1.03)
SPECIMEN HOLD: NORMAL
URINE CULTURE IF INDICATED: ABNORMAL
UROBILINOGEN UR QL STRIP.AUTO: 0.2 EU/DL (ref 0.2–1)
WBC URNS QL MICRO: ABNORMAL /HPF (ref 0–4)

## 2024-03-27 PROCEDURE — 83605 ASSAY OF LACTIC ACID: CPT

## 2024-03-27 PROCEDURE — 93010 ELECTROCARDIOGRAM REPORT: CPT | Performed by: INTERNAL MEDICINE

## 2024-03-27 PROCEDURE — 6370000000 HC RX 637 (ALT 250 FOR IP): Performed by: STUDENT IN AN ORGANIZED HEALTH CARE EDUCATION/TRAINING PROGRAM

## 2024-03-27 PROCEDURE — 81001 URINALYSIS AUTO W/SCOPE: CPT

## 2024-03-27 RX ORDER — ACETAMINOPHEN 500 MG
1000 TABLET ORAL
Status: COMPLETED | OUTPATIENT
Start: 2024-03-27 | End: 2024-03-27

## 2024-03-27 RX ADMIN — ACETAMINOPHEN 1000 MG: 500 TABLET ORAL at 00:47

## 2024-03-27 ASSESSMENT — PAIN SCALES - GENERAL: PAINLEVEL_OUTOF10: 7

## 2024-03-27 ASSESSMENT — PAIN DESCRIPTION - LOCATION: LOCATION: GENERALIZED

## 2024-03-27 ASSESSMENT — ENCOUNTER SYMPTOMS: SHORTNESS OF BREATH: 0

## 2024-03-27 NOTE — ED NOTES
Discharge instructions reviewed, discharge papers given and pt teaching completed to pt's daughter. No prescription(s) given. Daughter instructed to follow up with PCP regarding today's visit. Daughter also instructed to report to ED if symptoms return, worsen, don't subside, and/or with onset of new symptoms.

## 2024-03-27 NOTE — ED TRIAGE NOTES
Pt to ED via EMS c/o hypotension and SOB beginning tonight. Pt states she was getting her hair done and suddenly felt \"unwell\"  then her family checked her BP and saw that her diastolic pressure was under 50. Pt's PCP told her whenever its under 50 she should come to the emergency room. Pt appears lethargic at triage. Normal skin color but skin is cold and there is difficulty getting oral and axillary temps. Hx of progressed dementia. A&Ox3. No other issues reported.

## 2024-03-27 NOTE — ED PROVIDER NOTES
[facility] [place]  EMERGENCY DEPARTMENT ENCOUNTER      Pt Name: Juju Faulkner  MRN: [id number]  Birthdate 1945  Date of evaluation: 3/26/2024  Provider: Sathya Guerra MD    CHIEF COMPLAINT       Chief Complaint   Patient presents with    Hypotension         HISTORY OF PRESENT ILLNESS   58-year-old female with history significant for HLD, HTN, stroke presents to the ED via EMS with chief complaint of generalized malaise and fatigue starting tonight.  Patient reports mild associated shortness of breath.  No recent cough, congestion, or sick contacts.  No history of similar symptoms.  Per family patient with blood pressures in the 80s over 40s prompting them to call the ambulance.  EMS says patient had blood pressures in the 70s over 50s initially but improved to 100s over 50 just prior to arrival to the ED.  No fevers, chills, chest pain, abdominal pain, urinary symptoms, bowel symptoms.  No numbness, weakness, vision changes, speech difficulty.  No new leg swelling or pain.    The history is provided by the patient, the EMS personnel and a relative.       Review of External Medical Records:     Nursing Notes were reviewed.    REVIEW OF SYSTEMS       Review of Systems   Respiratory:  Negative for shortness of breath.    Cardiovascular:  Negative for chest pain.       Except as noted above the remainder of the review of systems was reviewed and negative.       PAST MEDICAL HISTORY     Past Medical History:   Diagnosis Date    Depression     Fall at home 2/10/2014    Flashers or floaters of right eye 8/21/2014    Glaucoma     High cholesterol     History of stroke     Hypertension     Left acoustic neuroma (HCC)     Stroke (HCC)     2010 and 2019         SURGICAL HISTORY       Past Surgical History:   Procedure Laterality Date    COLONOSCOPY N/A 3/5/2021    COLONOSCOPY performed by Steve Pearl MD at [facility] ENDOSCOPY    FEMUR FRACTURE SURGERY Right 12/23/2023    RIGHT DISTAL FEMUR OPEN REDUCTION

## 2024-04-05 ENCOUNTER — TRANSCRIBE ORDERS (OUTPATIENT)
Facility: HOSPITAL | Age: 79
End: 2024-04-05

## 2024-04-05 ENCOUNTER — HOSPITAL ENCOUNTER (OUTPATIENT)
Facility: HOSPITAL | Age: 79
End: 2024-04-05
Payer: MEDICARE

## 2024-04-05 DIAGNOSIS — S72.491A CLOSED COMMINUTED INTRA-ARTICULAR FRACTURE OF DISTAL FEMUR, RIGHT, INITIAL ENCOUNTER (HCC): Primary | ICD-10-CM

## 2024-04-05 DIAGNOSIS — S72.491A CLOSED COMMINUTED INTRA-ARTICULAR FRACTURE OF DISTAL FEMUR, RIGHT, INITIAL ENCOUNTER (HCC): ICD-10-CM

## 2024-04-05 PROCEDURE — 73552 X-RAY EXAM OF FEMUR 2/>: CPT

## 2024-05-09 ENCOUNTER — TRANSCRIBE ORDERS (OUTPATIENT)
Facility: HOSPITAL | Age: 79
End: 2024-05-09

## 2024-05-09 DIAGNOSIS — N95.0 POSTMENOPAUSAL BLEEDING: Primary | ICD-10-CM

## 2024-05-23 ENCOUNTER — HOSPITAL ENCOUNTER (OUTPATIENT)
Facility: HOSPITAL | Age: 79
Discharge: HOME | End: 2024-05-23
Payer: MEDICARE

## 2024-05-23 ENCOUNTER — HOSPITAL ENCOUNTER (OUTPATIENT)
Facility: HOSPITAL | Age: 79
End: 2024-05-23
Payer: MEDICARE

## 2024-05-23 DIAGNOSIS — N95.0 POSTMENOPAUSAL BLEEDING: ICD-10-CM

## 2024-05-23 PROCEDURE — 76856 US EXAM PELVIC COMPLETE: CPT

## 2024-05-23 PROCEDURE — 76830 TRANSVAGINAL US NON-OB: CPT

## 2024-08-25 NOTE — PROGRESS NOTES
Hospitalist Progress Note    NAME: Rita Simmons   :  1945   MRN:  [id number]       Assessment / Plan:    Right sided weakness/numbness, dysarthria, resolved  2nd to acute CVA  R/o embolic source  -cont telemetry  -continue on ASA  -Neurochecks  -Patient's MRI reveals an acute infarct in the left frontal parietal lobe and additional focus in the right frontal lobe suspicious for acute infarct in background of severe periventricular and subcortical white matter disease  -Echocardiogram is still pending we will need to make sure her bubble study is negative and there is no evidence of thrombi, may need WILLIAMS given strokes appear to be bilateral  -We will follow on telemetry, so far no signs of arrhythmia  -A1c 5.9, TSH normal  -LDL 97, start atorvastatin high intensity dose  -Patient was seen by PT OT and speech and cleared for outpatient physical therapy  -Case management working on home health nursing    Right thyroid Nodule: CTA Head and Neck in ED showed: \". ..2. Nonspecific 1.8 cm centrally necrotic and peripherally enhancing right thyroid nodule. Follow-up thyroid ultrasound and aspiration is suggested. \"  -thyroid ultrasound: There is a dominant solid right thyroid nodule measuring 2.9 by 1.2  x 2.2 cm and fine-needle aspiration is recommended for further assessment. Other  nodules and cysts noted bilaterally measure 1 cm or less in size and can be  followed on future ultrasound. .    -normal TSH and Free T4  -will need f/u with endo as outpt to consider aspiration     Hypertension:  -permissive hypertension at this time, hold home lisinopril  -prn labetalol     Anxiety/Depression:  -continue home celexa and remeron     Code Status: Full  Surrogate Decision Maker: Daughter     DVT Prophylaxis: sq Lovenox  GI Prophylaxis: not indicated     Baseline: Independent    25.0 - 29.9 Overweight / Body mass index is 26.29 kg/m².     Recommended Disposition: Home w/Family and HH PT, OT, RN echo-p     Subjective: Chief Complaint / Reason for Physician Visit  R sided weakness, slurred speech    Patient feels back to her normal self. We discussed the results of her MRI showing a stroke. Likely, patient does not appear to have significant residual deficits at this point. Patient was evaluated at 1:45 PM      Discussed with RN events overnight. Review of Systems:  Symptom Y/N Comments  Symptom Y/N Comments   Fever/Chills    Chest Pain n    Poor Appetite    Edema     Cough n   Abdominal Pain n    Sputum    Joint Pain     SOB/JHA n   Pruritis/Rash     Nausea/vomit n   Tolerating PT/OT y    Diarrhea    Tolerating Diet y    Constipation    Other       Could NOT obtain due to:      Objective:     VITALS:   Last 24hrs VS reviewed since prior progress note. Most recent are:  Patient Vitals for the past 24 hrs:   Temp Pulse Resp BP SpO2   07/16/19 1636    143/67    07/16/19 1524 98 °F (36.7 °C) 80 16 134/65 94 %   07/16/19 1138 98.4 °F (36.9 °C) 70 16 143/57 98 %   07/16/19 0803 98.6 °F (37 °C) 70 16 125/75 96 %   07/16/19 0332 98.3 °F (36.8 °C) 74 18 130/59 97 %   07/15/19 2332 98.2 °F (36.8 °C) 89 18 150/57 95 %   07/15/19 1942 98.4 °F (36.9 °C) 80 18 139/66 97 %     No intake or output data in the 24 hours ending 07/16/19 1736     PHYSICAL EXAM:  Patient is awake alert oriented x3 no distress. On room air speech is clear no facial asymmetries. Conjunctiva pink mucous memories moist cardiovascular regular rate no murmurs rubs or gallops. Lungs are clear no wheezes rhonchi or crackles. Abdomen bowel sounds present soft nontender. Extremities no clubbing cyanosis or edema.   Neuro exam is nonfocal.        Reviewed most current lab test results and cultures  YES  Reviewed most current radiology test results   YES  Review and summation of old records today    NO  Reviewed patient's current orders and MAR    YES  PMH/SH reviewed - no change compared to H&P  ________________________________________________________________________  Care Plan discussed with:    Comments   Patient y    Family      RN y    Care Manager y    Consultant  y neuro                    y Multidiciplinary team rounds were held today with , nursing, pharmacist and clinical coordinator. Patient's plan of care was discussed; medications were reviewed and discharge planning was addressed. ________________________________________________________________________  Total NON critical care TIME:   Minutes    Total CRITICAL CARE TIME Spent:   Minutes non procedure based      Comments   >50% of visit spent in counseling and coordination of care     ________________________________________________________________________  Chuyita Jung MD     Procedures: see electronic medical records for all procedures/Xrays and details which were not copied into this note but were reviewed prior to creation of Plan. LABS:  I reviewed today's most current labs and imaging studies.   Pertinent labs include:  Recent Labs     07/16/19  0321 07/15/19  1302   WBC 6.5 5.8   HGB 12.5 13.0   HCT 40.8 42.4    337     Recent Labs     07/16/19  0321 07/15/19  1302    143   K 4.0 3.9   * 111*   CO2 25 29   GLU 96 82   BUN 18 14   CREA 0.99 0.94   CA 8.7 8.8   ALB 3.2* 3.6   TBILI 0.3 0.3   SGOT 13* 14*   ALT 12 14   INR  --  1.0       Signed: Chuyita Jung MD No

## 2024-08-29 ENCOUNTER — OFFICE VISIT (OUTPATIENT)
Age: 79
End: 2024-08-29
Payer: MEDICARE

## 2024-08-29 DIAGNOSIS — F33.41 RECURRENT MAJOR DEPRESSIVE DISORDER, IN PARTIAL REMISSION (HCC): ICD-10-CM

## 2024-08-29 DIAGNOSIS — F01.B0 MODERATE VASCULAR DEMENTIA WITHOUT BEHAVIORAL DISTURBANCE, PSYCHOTIC DISTURBANCE, MOOD DISTURBANCE, OR ANXIETY (HCC): Primary | ICD-10-CM

## 2024-08-29 PROCEDURE — 1036F TOBACCO NON-USER: CPT | Performed by: CLINICAL NEUROPSYCHOLOGIST

## 2024-08-29 PROCEDURE — 90791 PSYCH DIAGNOSTIC EVALUATION: CPT | Performed by: CLINICAL NEUROPSYCHOLOGIST

## 2024-08-29 PROCEDURE — 90785 PSYTX COMPLEX INTERACTIVE: CPT | Performed by: CLINICAL NEUROPSYCHOLOGIST

## 2024-08-29 PROCEDURE — 1123F ACP DISCUSS/DSCN MKR DOCD: CPT | Performed by: CLINICAL NEUROPSYCHOLOGIST

## 2024-08-29 NOTE — PROGRESS NOTES
Intake Note      Patient Name: Juju Faulkner  YOB: 1945    Age: 79 y.o.  Date of Intake: 8/29/2024   Education: 12 Ethnicity Black   Gender: Female Referring Provider: Dr. Kohli     REASON FOR REFERRAL AND EVALUATION PROCEDURES:  Juju Faulkner  was referred for evaluation by a hospital attending physician to assist in differential diagnosis and individualized treatment planning. she understood the rationale and procedures for evaluation, as well as the limits to confidentiality, and agreed to participate. she consented to have this report made available to her  treating providers through her  electronic medical records.   History Sources: Patient, Relative (daughter), and Medical Record    HISTORY OF PRESENT ILLNESS:  The patient is a 79-year-old female with pertinent medical history noted for acute CVA, TIA, prediabetes, hypercholesterolemia, vitamin D deficiency, diplopia, hypertension, and depression.  She presented for clinical interview accompanied by her daughter.  The patient's ability as a historian and her insight were compromised so history was primarily obtained through collateral interview.  According to the patient's daughter, the patient retired in 2007 with plans to travel with 2 of her close friends after [employer].  However, there was a friends passed away in 2007 and 2009 and around that time, the patient's family started noticing clinically significant symptoms of depression.  The symptoms were treated with psychotropic medication and the patient attempted to participate in psychotherapy but discontinued the services after a few months.  The patient reports she continues to experience symptoms of depression from time to time but she described her recent mood to be \"good.\"  She stated that being around her grandchildren lifts her mood.    With regard to the patient's cognitive functioning, her family started noticing changes in decision making beginning around 2016 or 2017.   for neuropsychological evaluation secondary concerns regarding her cognitive functioning.  There is certainly a vascular component to the patient's impairment; however, there is concern for the presence of a co-occurring neurodegenerative condition given the extent and progressive nature of impairment.  Comprehensive evaluation will assist with obtaining a quantitative assessment of the patient's cognitive and emotional functioning in order to guide differential diagnosis and treatment planning.  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    PLAN:  Patient will participate in comprehensive evaluation in order to obtain a quantitative assessment of their cognitive and emotional functioning  Differential diagnosis and treatment planning will be based upon results from clinical interview and objective testing  Patient will be provided with review of results, impressions, and recommendations during feedback session  Patient will be encouraged to follow-up with referring provider for ongoing management    TIME DOCUMENTATION:  Clinical Interview: 1210 - 1235 = 25 minutes    Visit was complicated by Patient expressed desire to have others present during visit. Provider spent 10 minutes providing interactive complexity services due to need to obtain or communicate information to those involved in patient's care. The session included the use of the following adaptations in order to overcome the difficulties. Others were included in order to obtain collateral history for individual with significantly impaired cognitive functioning .      BILLING:  90791x1  [id number]

## 2024-08-30 ENCOUNTER — PROCEDURE VISIT (OUTPATIENT)
Age: 79
End: 2024-08-30
Payer: MEDICARE

## 2024-08-30 DIAGNOSIS — F02.B3 MODERATE DEMENTIA ASSOCIATED WITH OTHER UNDERLYING DISEASE, WITH MOOD DISTURBANCE (HCC): Primary | ICD-10-CM

## 2024-08-30 DIAGNOSIS — Z86.73 HISTORY OF STROKE: ICD-10-CM

## 2024-08-30 DIAGNOSIS — F03.90 NEURODEGENERATIVE DEMENTIA (HCC): ICD-10-CM

## 2024-08-30 DIAGNOSIS — I67.9 CEREBROVASCULAR DISEASE: ICD-10-CM

## 2024-08-30 PROCEDURE — 96138 PSYCL/NRPSYC TECH 1ST: CPT | Performed by: CLINICAL NEUROPSYCHOLOGIST

## 2024-08-30 PROCEDURE — 96133 NRPSYC TST EVAL PHYS/QHP EA: CPT | Performed by: CLINICAL NEUROPSYCHOLOGIST

## 2024-08-30 PROCEDURE — 96139 PSYCL/NRPSYC TST TECH EA: CPT | Performed by: CLINICAL NEUROPSYCHOLOGIST

## 2024-08-30 PROCEDURE — 96132 NRPSYC TST EVAL PHYS/QHP 1ST: CPT | Performed by: CLINICAL NEUROPSYCHOLOGIST

## 2024-09-13 ENCOUNTER — OFFICE VISIT (OUTPATIENT)
Age: 79
End: 2024-09-13
Payer: MEDICARE

## 2024-09-13 DIAGNOSIS — I67.9 CEREBROVASCULAR DISEASE: ICD-10-CM

## 2024-09-13 DIAGNOSIS — F02.B3 MODERATE DEMENTIA ASSOCIATED WITH OTHER UNDERLYING DISEASE, WITH MOOD DISTURBANCE (HCC): Primary | ICD-10-CM

## 2024-09-13 DIAGNOSIS — Z86.73 HISTORY OF STROKE: ICD-10-CM

## 2024-09-13 DIAGNOSIS — F03.90 NEURODEGENERATIVE DEMENTIA (HCC): ICD-10-CM

## 2024-09-13 PROCEDURE — 96132 NRPSYC TST EVAL PHYS/QHP 1ST: CPT | Performed by: CLINICAL NEUROPSYCHOLOGIST

## 2024-10-31 ENCOUNTER — TRANSCRIBE ORDERS (OUTPATIENT)
Facility: HOSPITAL | Age: 79
End: 2024-10-31

## 2024-10-31 DIAGNOSIS — I69.354 HEMIPARESIS AFFECTING LEFT SIDE AS LATE EFFECT OF CEREBROVASCULAR ACCIDENT (HCC): Primary | ICD-10-CM

## 2024-11-07 ENCOUNTER — HOSPITAL ENCOUNTER (OUTPATIENT)
Facility: HOSPITAL | Age: 79
Discharge: HOME | End: 2024-11-10

## 2024-11-07 DIAGNOSIS — I69.354 HEMIPARESIS AFFECTING LEFT SIDE AS LATE EFFECT OF CEREBROVASCULAR ACCIDENT (HCC): ICD-10-CM

## 2024-12-07 ENCOUNTER — APPOINTMENT (OUTPATIENT)
Facility: HOSPITAL | Age: 79
DRG: 682 | End: 2024-12-07
Payer: MEDICARE

## 2024-12-07 ENCOUNTER — HOSPITAL ENCOUNTER (INPATIENT)
Facility: HOSPITAL | Age: 79
LOS: 5 days | Discharge: NURSING, REHAB OR LONG-TERM CARE | DRG: 682 | End: 2024-12-12
Attending: EMERGENCY MEDICINE | Admitting: INTERNAL MEDICINE
Payer: MEDICARE

## 2024-12-07 DIAGNOSIS — R79.89 ELEVATED TROPONIN: ICD-10-CM

## 2024-12-07 DIAGNOSIS — E86.0 DEHYDRATION: ICD-10-CM

## 2024-12-07 DIAGNOSIS — N17.9 AKI (ACUTE KIDNEY INJURY) (HCC): ICD-10-CM

## 2024-12-07 DIAGNOSIS — R62.7 FAILURE TO THRIVE IN ADULT: Primary | ICD-10-CM

## 2024-12-07 DIAGNOSIS — R79.89 ELEVATED BRAIN NATRIURETIC PEPTIDE (BNP) LEVEL: ICD-10-CM

## 2024-12-07 PROBLEM — R41.0 ACUTE DELIRIUM: Status: ACTIVE | Noted: 2024-12-07

## 2024-12-07 LAB
ALBUMIN SERPL-MCNC: 3.5 G/DL (ref 3.5–5)
ALBUMIN/GLOB SERPL: 0.7 (ref 1.1–2.2)
ALP SERPL-CCNC: 112 U/L (ref 45–117)
ALT SERPL-CCNC: 26 U/L (ref 12–78)
ANION GAP SERPL CALC-SCNC: 5 MMOL/L (ref 2–12)
AST SERPL-CCNC: 56 U/L (ref 15–37)
BASOPHILS # BLD: 0 K/UL (ref 0–0.1)
BASOPHILS NFR BLD: 0 % (ref 0–1)
BILIRUB SERPL-MCNC: 0.4 MG/DL (ref 0.2–1)
BUN SERPL-MCNC: 59 MG/DL (ref 6–20)
BUN/CREAT SERPL: 30 (ref 12–20)
CALCIUM SERPL-MCNC: 11 MG/DL (ref 8.5–10.1)
CHLORIDE SERPL-SCNC: 111 MMOL/L (ref 97–108)
CO2 SERPL-SCNC: 31 MMOL/L (ref 21–32)
CREAT SERPL-MCNC: 1.98 MG/DL (ref 0.55–1.02)
DIFFERENTIAL METHOD BLD: ABNORMAL
EOSINOPHIL # BLD: 0 K/UL (ref 0–0.4)
EOSINOPHIL NFR BLD: 0 % (ref 0–7)
ERYTHROCYTE [DISTWIDTH] IN BLOOD BY AUTOMATED COUNT: 17.1 % (ref 11.5–14.5)
FLUAV RNA SPEC QL NAA+PROBE: NOT DETECTED
FLUBV RNA SPEC QL NAA+PROBE: NOT DETECTED
GLOBULIN SER CALC-MCNC: 5.3 G/DL (ref 2–4)
GLUCOSE SERPL-MCNC: 115 MG/DL (ref 65–100)
HCT VFR BLD AUTO: 40.4 % (ref 35–47)
HGB BLD-MCNC: 12 G/DL (ref 11.5–16)
IMM GRANULOCYTES # BLD AUTO: 0 K/UL (ref 0–0.04)
IMM GRANULOCYTES NFR BLD AUTO: 0 % (ref 0–0.5)
LYMPHOCYTES # BLD: 2 K/UL (ref 0.8–3.5)
LYMPHOCYTES NFR BLD: 16 % (ref 12–49)
MCH RBC QN AUTO: 26.3 PG (ref 26–34)
MCHC RBC AUTO-ENTMCNC: 29.7 G/DL (ref 30–36.5)
MCV RBC AUTO: 88.6 FL (ref 80–99)
MONOCYTES # BLD: 1 K/UL (ref 0–1)
MONOCYTES NFR BLD: 8 % (ref 5–13)
NEUTS SEG # BLD: 9.8 K/UL (ref 1.8–8)
NEUTS SEG NFR BLD: 76 % (ref 32–75)
NRBC # BLD: 0 K/UL (ref 0–0.01)
NRBC BLD-RTO: 0 PER 100 WBC
NT PRO BNP: 495 PG/ML
PLATELET # BLD AUTO: 700 K/UL (ref 150–400)
PLATELET COMMENT: ABNORMAL
PMV BLD AUTO: 11.5 FL (ref 8.9–12.9)
POTASSIUM SERPL-SCNC: 4.7 MMOL/L (ref 3.5–5.1)
PROCALCITONIN SERPL-MCNC: <0.05 NG/ML
PROT SERPL-MCNC: 8.8 G/DL (ref 6.4–8.2)
RBC # BLD AUTO: 4.56 M/UL (ref 3.8–5.2)
RBC MORPH BLD: ABNORMAL
SARS-COV-2 RNA RESP QL NAA+PROBE: NOT DETECTED
SODIUM SERPL-SCNC: 147 MMOL/L (ref 136–145)
SOURCE: NORMAL
TROPONIN I SERPL HS-MCNC: 175 NG/L (ref 0–51)
TROPONIN I SERPL HS-MCNC: 185 NG/L (ref 0–51)
WBC # BLD AUTO: 12.8 K/UL (ref 3.6–11)

## 2024-12-07 PROCEDURE — 71045 X-RAY EXAM CHEST 1 VIEW: CPT

## 2024-12-07 PROCEDURE — 99285 EMERGENCY DEPT VISIT HI MDM: CPT

## 2024-12-07 PROCEDURE — 87636 SARSCOV2 & INF A&B AMP PRB: CPT

## 2024-12-07 PROCEDURE — 84145 PROCALCITONIN (PCT): CPT

## 2024-12-07 PROCEDURE — 80053 COMPREHEN METABOLIC PANEL: CPT

## 2024-12-07 PROCEDURE — 85025 COMPLETE CBC W/AUTO DIFF WBC: CPT

## 2024-12-07 PROCEDURE — 84484 ASSAY OF TROPONIN QUANT: CPT

## 2024-12-07 PROCEDURE — 70450 CT HEAD/BRAIN W/O DYE: CPT

## 2024-12-07 PROCEDURE — 2580000003 HC RX 258: Performed by: NURSE PRACTITIONER

## 2024-12-07 PROCEDURE — 36415 COLL VENOUS BLD VENIPUNCTURE: CPT

## 2024-12-07 PROCEDURE — 1100000000 HC RM PRIVATE

## 2024-12-07 PROCEDURE — 83880 ASSAY OF NATRIURETIC PEPTIDE: CPT

## 2024-12-07 RX ORDER — FERROUS SULFATE 325(65) MG
325 TABLET ORAL
Status: DISCONTINUED | OUTPATIENT
Start: 2024-12-08 | End: 2024-12-12 | Stop reason: HOSPADM

## 2024-12-07 RX ORDER — SODIUM CHLORIDE 450 MG/100ML
INJECTION, SOLUTION INTRAVENOUS CONTINUOUS
Status: DISCONTINUED | OUTPATIENT
Start: 2024-12-07 | End: 2024-12-08

## 2024-12-07 RX ORDER — SODIUM CHLORIDE 9 MG/ML
INJECTION, SOLUTION INTRAVENOUS PRN
Status: DISCONTINUED | OUTPATIENT
Start: 2024-12-07 | End: 2024-12-12 | Stop reason: HOSPADM

## 2024-12-07 RX ORDER — SODIUM CHLORIDE 0.9 % (FLUSH) 0.9 %
5-40 SYRINGE (ML) INJECTION PRN
Status: DISCONTINUED | OUTPATIENT
Start: 2024-12-07 | End: 2024-12-12 | Stop reason: HOSPADM

## 2024-12-07 RX ORDER — 0.9 % SODIUM CHLORIDE 0.9 %
500 INTRAVENOUS SOLUTION INTRAVENOUS ONCE
Status: COMPLETED | OUTPATIENT
Start: 2024-12-07 | End: 2024-12-07

## 2024-12-07 RX ORDER — CITALOPRAM HYDROBROMIDE 20 MG/1
20 TABLET ORAL DAILY
Status: DISCONTINUED | OUTPATIENT
Start: 2024-12-09 | End: 2024-12-12 | Stop reason: HOSPADM

## 2024-12-07 RX ORDER — ONDANSETRON 4 MG/1
4 TABLET, ORALLY DISINTEGRATING ORAL EVERY 8 HOURS PRN
Status: DISCONTINUED | OUTPATIENT
Start: 2024-12-07 | End: 2024-12-12 | Stop reason: HOSPADM

## 2024-12-07 RX ORDER — ACETAMINOPHEN 650 MG/1
650 SUPPOSITORY RECTAL EVERY 6 HOURS PRN
Status: DISCONTINUED | OUTPATIENT
Start: 2024-12-07 | End: 2024-12-12 | Stop reason: HOSPADM

## 2024-12-07 RX ORDER — HEPARIN SODIUM 5000 [USP'U]/ML
5000 INJECTION, SOLUTION INTRAVENOUS; SUBCUTANEOUS EVERY 8 HOURS SCHEDULED
Status: DISCONTINUED | OUTPATIENT
Start: 2024-12-08 | End: 2024-12-12 | Stop reason: HOSPADM

## 2024-12-07 RX ORDER — CLOPIDOGREL BISULFATE 75 MG/1
75 TABLET ORAL DAILY
Status: DISCONTINUED | OUTPATIENT
Start: 2024-12-08 | End: 2024-12-12 | Stop reason: HOSPADM

## 2024-12-07 RX ORDER — SODIUM CHLORIDE 0.9 % (FLUSH) 0.9 %
5-40 SYRINGE (ML) INJECTION EVERY 12 HOURS SCHEDULED
Status: DISCONTINUED | OUTPATIENT
Start: 2024-12-08 | End: 2024-12-12 | Stop reason: HOSPADM

## 2024-12-07 RX ORDER — MIRTAZAPINE 15 MG/1
30 TABLET, FILM COATED ORAL NIGHTLY
Status: DISCONTINUED | OUTPATIENT
Start: 2024-12-08 | End: 2024-12-12 | Stop reason: HOSPADM

## 2024-12-07 RX ORDER — ATORVASTATIN CALCIUM 20 MG/1
40 TABLET, FILM COATED ORAL DAILY
Status: DISCONTINUED | OUTPATIENT
Start: 2024-12-08 | End: 2024-12-08

## 2024-12-07 RX ORDER — ACETAMINOPHEN 325 MG/1
650 TABLET ORAL EVERY 6 HOURS PRN
Status: DISCONTINUED | OUTPATIENT
Start: 2024-12-07 | End: 2024-12-12 | Stop reason: HOSPADM

## 2024-12-07 RX ORDER — AMLODIPINE BESYLATE 5 MG/1
5 TABLET ORAL DAILY
Status: DISCONTINUED | OUTPATIENT
Start: 2024-12-08 | End: 2024-12-12 | Stop reason: HOSPADM

## 2024-12-07 RX ORDER — PANTOPRAZOLE SODIUM 40 MG/1
40 TABLET, DELAYED RELEASE ORAL
Status: DISCONTINUED | OUTPATIENT
Start: 2024-12-08 | End: 2024-12-12 | Stop reason: HOSPADM

## 2024-12-07 RX ORDER — CALCIUM CARBONATE 500 MG/1
1500 TABLET, CHEWABLE ORAL DAILY
Status: DISCONTINUED | OUTPATIENT
Start: 2024-12-08 | End: 2024-12-12 | Stop reason: HOSPADM

## 2024-12-07 RX ORDER — POLYETHYLENE GLYCOL 3350 17 G/17G
17 POWDER, FOR SOLUTION ORAL DAILY PRN
Status: DISCONTINUED | OUTPATIENT
Start: 2024-12-07 | End: 2024-12-12 | Stop reason: HOSPADM

## 2024-12-07 RX ORDER — ONDANSETRON 2 MG/ML
4 INJECTION INTRAMUSCULAR; INTRAVENOUS EVERY 6 HOURS PRN
Status: DISCONTINUED | OUTPATIENT
Start: 2024-12-07 | End: 2024-12-12 | Stop reason: HOSPADM

## 2024-12-07 RX ADMIN — SODIUM CHLORIDE 500 ML: 9 INJECTION, SOLUTION INTRAVENOUS at 19:50

## 2024-12-07 NOTE — DISCHARGE INSTRUCTIONS
HOSPITALIST DISCHARGE INSTRUCTIONS  NAME:  Juju Faulkner   :  1945   MRN:  [id number]     Date/Time:  2024 7:12 AM    ADMIT DATE: 2024     DISCHARGE DATE: 2024     DISCHARGE DIAGNOSIS:  Dehydration and electrolyte anomalies    DISCHARGE INSTRUCTIONS:  Thank you for allowing us to participate in your care. Your discharging Hospitalist is Ainsley Oliver MD. You were admitted for evaluation and treatment of the above. You were started on IVF and your symptoms improved. You are well enough to be discharged now. Please take your meds as prescribed and follow up with your PCP      MEDICATIONS:    It is important that you take the medication exactly as they are prescribed.   Keep your medication in the bottles provided by the pharmacist and keep a list of the medication names, dosages, and times to be taken in your wallet.   Do not take other medications without consulting your doctor.             If you experience any of the following symptoms then please call your primary care physician or return to the emergency room if you cannot get hold of your doctor:  Fever, chills, nausea, vomiting, diarrhea, change in mentation, falling, bleeding, shortness of breath    Follow Up:  Please call the below provider to arrange hospital follow up appointment      [facility] (Link)  [address] [facility] [address] [phone]        Melany Vernon APRN - NP  [address] [facility] [address] [phone]    Follow up          Information obtained by :  I understand that if any problems occur once I am at home I am to contact my physician.    I understand and acknowledge receipt of the instructions indicated above.                                                                                                                                           Physician's or R.N.'s Signature

## 2024-12-07 NOTE — ED TRIAGE NOTES
Patient arrives via EMS for complaints of decreased oral intake, decreased mentation since Thanksgiving. Coming from [facility] assisted living. Daughter reports patient won't even chew her food.     .    Patient is in hospice for dementia.

## 2024-12-07 NOTE — ED NOTES
Multiple attempts for pulse oximetry. Unable to get a good reading, due to patient movement and skin temperature. Attending provider notified.

## 2024-12-07 NOTE — ED PROVIDER NOTES
MIRABEGRON (MYRBETRIQ) 25 MG TB24    TK 1 T PO ONCE D    MIRTAZAPINE (REMERON) 30 MG TABLET    1 tablet    OMEPRAZOLE (PRILOSEC) 40 MG DELAYED RELEASE CAPSULE    40 mg twice per day x 14 days, then daily till stopped by gastroenterologist  Indications: inflammation of the esophagus with erosion       ALLERGIES     Patient has no known allergies.    FAMILY HISTORY       Family History   Problem Relation Age of Onset    Dementia Brother     Other Father         Unknown    Hypertension Mother     No Known Problems Child     Alcohol Abuse Sister     Alcohol Abuse Sister     No Known Problems Brother     No Known Problems Sister     No Known Problems Sister     Dementia Brother     No Known Problems Sister           SOCIAL HISTORY       Social History     Socioeconomic History    Marital status:    Tobacco Use    Smoking status: Former     Current packs/day: 0.00     Types: Cigarettes     Quit date: 10/26/2019     Years since quittin.1    Smokeless tobacco: Never   Vaping Use    Vaping status: Never Used   Substance and Sexual Activity    Alcohol use: No    Drug use: No    Sexual activity: Not Currently           PHYSICAL EXAM    (up to 7 for level 4, 8 or more for level 5)     ED Triage Vitals [24 1826]   BP Systolic BP Percentile Diastolic BP Percentile Temp Temp Source Pulse Respirations SpO2   (!) 143/76 -- -- 98 °F (36.7 °C) Axillary -- 18 --      Height Weight - Scale         1.676 m (5' 6\") 65.8 kg (145 lb)             Body mass index is 23.4 kg/m².    Physical Exam  Vitals reviewed.   Constitutional:       Appearance: Normal appearance. She is ill-appearing.   HENT:      Head: Normocephalic.      Right Ear: External ear normal.      Left Ear: External ear normal.      Nose: Nose normal.      Mouth/Throat:      Mouth: Mucous membranes are moist.   Eyes:      Extraocular Movements: Extraocular movements intact.      Conjunctiva/sclera: Conjunctivae normal.      Pupils: Pupils are equal, round, and

## 2024-12-08 ENCOUNTER — APPOINTMENT (OUTPATIENT)
Facility: HOSPITAL | Age: 79
DRG: 682 | End: 2024-12-08
Attending: INTERNAL MEDICINE
Payer: MEDICARE

## 2024-12-08 ENCOUNTER — APPOINTMENT (OUTPATIENT)
Facility: HOSPITAL | Age: 79
DRG: 682 | End: 2024-12-08
Payer: MEDICARE

## 2024-12-08 LAB
ALBUMIN SERPL-MCNC: 3.6 G/DL (ref 3.5–5)
ALBUMIN/GLOB SERPL: 0.7 (ref 1.1–2.2)
ALP SERPL-CCNC: 116 U/L (ref 45–117)
ALT SERPL-CCNC: 23 U/L (ref 12–78)
AMMONIA PLAS-SCNC: 31 UMOL/L
AMPHET UR QL SCN: NEGATIVE
ANION GAP SERPL CALC-SCNC: 6 MMOL/L (ref 2–12)
APAP SERPL-MCNC: <2 UG/ML (ref 10–30)
APPEARANCE UR: ABNORMAL
AST SERPL-CCNC: 30 U/L (ref 15–37)
BACTERIA URNS QL MICRO: NEGATIVE /HPF
BARBITURATES UR QL SCN: NEGATIVE
BASOPHILS # BLD: 0 K/UL (ref 0–0.1)
BASOPHILS NFR BLD: 0 % (ref 0–1)
BENZODIAZ UR QL: NEGATIVE
BILIRUB SERPL-MCNC: 0.4 MG/DL (ref 0.2–1)
BILIRUB UR QL: NEGATIVE
BUN SERPL-MCNC: 53 MG/DL (ref 6–20)
BUN/CREAT SERPL: 31 (ref 12–20)
CALCIUM SERPL-MCNC: 10.7 MG/DL (ref 8.5–10.1)
CANNABINOIDS UR QL SCN: NEGATIVE
CHLORIDE SERPL-SCNC: 115 MMOL/L (ref 97–108)
CO2 SERPL-SCNC: 30 MMOL/L (ref 21–32)
COCAINE UR QL SCN: NEGATIVE
COLOR UR: ABNORMAL
CREAT SERPL-MCNC: 1.69 MG/DL (ref 0.55–1.02)
DIFFERENTIAL METHOD BLD: ABNORMAL
EOSINOPHIL # BLD: 0.1 K/UL (ref 0–0.4)
EOSINOPHIL NFR BLD: 1 % (ref 0–7)
EPITH CASTS URNS QL MICRO: ABNORMAL /LPF
ERYTHROCYTE [DISTWIDTH] IN BLOOD BY AUTOMATED COUNT: 15.2 % (ref 11.5–14.5)
FOLATE SERPL-MCNC: 22.6 NG/ML (ref 5–21)
GLOBULIN SER CALC-MCNC: 5.2 G/DL (ref 2–4)
GLUCOSE SERPL-MCNC: 89 MG/DL (ref 65–100)
GLUCOSE UR STRIP.AUTO-MCNC: NEGATIVE MG/DL
HCT VFR BLD AUTO: 43.1 % (ref 35–47)
HGB BLD-MCNC: 12.8 G/DL (ref 11.5–16)
HGB UR QL STRIP: NEGATIVE
HYALINE CASTS URNS QL MICRO: ABNORMAL /LPF (ref 0–2)
IMM GRANULOCYTES # BLD AUTO: 0 K/UL (ref 0–0.04)
IMM GRANULOCYTES NFR BLD AUTO: 0 % (ref 0–0.5)
KETONES UR QL STRIP.AUTO: 15 MG/DL
LEUKOCYTE ESTERASE UR QL STRIP.AUTO: ABNORMAL
LYMPHOCYTES # BLD: 2.2 K/UL (ref 0.8–3.5)
LYMPHOCYTES NFR BLD: 20 % (ref 12–49)
Lab: NORMAL
MAGNESIUM SERPL-MCNC: 2 MG/DL (ref 1.6–2.4)
MCH RBC QN AUTO: 26.2 PG (ref 26–34)
MCHC RBC AUTO-ENTMCNC: 29.7 G/DL (ref 30–36.5)
MCV RBC AUTO: 88.1 FL (ref 80–99)
METHADONE UR QL: NEGATIVE
MONOCYTES # BLD: 0.9 K/UL (ref 0–1)
MONOCYTES NFR BLD: 8 % (ref 5–13)
NEUTS SEG # BLD: 7.7 K/UL (ref 1.8–8)
NEUTS SEG NFR BLD: 71 % (ref 32–75)
NITRITE UR QL STRIP.AUTO: NEGATIVE
NRBC # BLD: 0 K/UL (ref 0–0.01)
NRBC BLD-RTO: 0 PER 100 WBC
OPIATES UR QL: NEGATIVE
PCP UR QL: NEGATIVE
PH UR STRIP: 5 (ref 5–8)
PHOSPHATE SERPL-MCNC: 2.8 MG/DL (ref 2.6–4.7)
PLATELET # BLD AUTO: 599 K/UL (ref 150–400)
PMV BLD AUTO: 10.7 FL (ref 8.9–12.9)
POTASSIUM SERPL-SCNC: 3.5 MMOL/L (ref 3.5–5.1)
PROT SERPL-MCNC: 8.8 G/DL (ref 6.4–8.2)
PROT UR STRIP-MCNC: ABNORMAL MG/DL
RBC # BLD AUTO: 4.89 M/UL (ref 3.8–5.2)
RBC #/AREA URNS HPF: ABNORMAL /HPF (ref 0–5)
SALICYLATES SERPL-MCNC: <1.7 MG/DL (ref 2.8–20)
SODIUM SERPL-SCNC: 151 MMOL/L (ref 136–145)
SP GR UR REFRACTOMETRY: 1.03 (ref 1–1.03)
TROPONIN I SERPL HS-MCNC: 204 NG/L (ref 0–51)
TROPONIN I SERPL HS-MCNC: 216 NG/L (ref 0–51)
TSH SERPL DL<=0.05 MIU/L-ACNC: 0.35 UIU/ML (ref 0.36–3.74)
URINE CULTURE IF INDICATED: ABNORMAL
UROBILINOGEN UR QL STRIP.AUTO: 1 EU/DL (ref 0.2–1)
VIT B12 SERPL-MCNC: 1853 PG/ML (ref 193–986)
WBC # BLD AUTO: 10.9 K/UL (ref 3.6–11)
WBC URNS QL MICRO: ABNORMAL /HPF (ref 0–4)

## 2024-12-08 PROCEDURE — 80053 COMPREHEN METABOLIC PANEL: CPT

## 2024-12-08 PROCEDURE — 93307 TTE W/O DOPPLER COMPLETE: CPT

## 2024-12-08 PROCEDURE — 83735 ASSAY OF MAGNESIUM: CPT

## 2024-12-08 PROCEDURE — 2580000003 HC RX 258: Performed by: STUDENT IN AN ORGANIZED HEALTH CARE EDUCATION/TRAINING PROGRAM

## 2024-12-08 PROCEDURE — 6360000002 HC RX W HCPCS: Performed by: INTERNAL MEDICINE

## 2024-12-08 PROCEDURE — 84484 ASSAY OF TROPONIN QUANT: CPT

## 2024-12-08 PROCEDURE — 74176 CT ABD & PELVIS W/O CONTRAST: CPT

## 2024-12-08 PROCEDURE — 84100 ASSAY OF PHOSPHORUS: CPT

## 2024-12-08 PROCEDURE — 87086 URINE CULTURE/COLONY COUNT: CPT

## 2024-12-08 PROCEDURE — 80143 DRUG ASSAY ACETAMINOPHEN: CPT

## 2024-12-08 PROCEDURE — 94761 N-INVAS EAR/PLS OXIMETRY MLT: CPT

## 2024-12-08 PROCEDURE — 81001 URINALYSIS AUTO W/SCOPE: CPT

## 2024-12-08 PROCEDURE — 1100000000 HC RM PRIVATE

## 2024-12-08 PROCEDURE — 80307 DRUG TEST PRSMV CHEM ANLYZR: CPT

## 2024-12-08 PROCEDURE — 84443 ASSAY THYROID STIM HORMONE: CPT

## 2024-12-08 PROCEDURE — 82140 ASSAY OF AMMONIA: CPT

## 2024-12-08 PROCEDURE — 2580000003 HC RX 258: Performed by: INTERNAL MEDICINE

## 2024-12-08 PROCEDURE — 80179 DRUG ASSAY SALICYLATE: CPT

## 2024-12-08 PROCEDURE — 36415 COLL VENOUS BLD VENIPUNCTURE: CPT

## 2024-12-08 PROCEDURE — 85025 COMPLETE CBC W/AUTO DIFF WBC: CPT

## 2024-12-08 PROCEDURE — 6370000000 HC RX 637 (ALT 250 FOR IP): Performed by: INTERNAL MEDICINE

## 2024-12-08 PROCEDURE — 82746 ASSAY OF FOLIC ACID SERUM: CPT

## 2024-12-08 PROCEDURE — 82607 VITAMIN B-12: CPT

## 2024-12-08 RX ORDER — HYDRALAZINE HYDROCHLORIDE 20 MG/ML
10 INJECTION INTRAMUSCULAR; INTRAVENOUS EVERY 6 HOURS PRN
Status: DISCONTINUED | OUTPATIENT
Start: 2024-12-08 | End: 2024-12-12 | Stop reason: HOSPADM

## 2024-12-08 RX ORDER — SODIUM CHLORIDE 450 MG/100ML
INJECTION, SOLUTION INTRAVENOUS CONTINUOUS
Status: DISCONTINUED | OUTPATIENT
Start: 2024-12-08 | End: 2024-12-09

## 2024-12-08 RX ADMIN — HEPARIN SODIUM 5000 UNITS: 5000 INJECTION INTRAVENOUS; SUBCUTANEOUS at 15:58

## 2024-12-08 RX ADMIN — SODIUM CHLORIDE, PRESERVATIVE FREE 10 ML: 5 INJECTION INTRAVENOUS at 09:00

## 2024-12-08 RX ADMIN — SODIUM CHLORIDE, PRESERVATIVE FREE 10 ML: 5 INJECTION INTRAVENOUS at 21:46

## 2024-12-08 RX ADMIN — HEPARIN SODIUM 5000 UNITS: 5000 INJECTION INTRAVENOUS; SUBCUTANEOUS at 21:45

## 2024-12-08 RX ADMIN — HEPARIN SODIUM 5000 UNITS: 5000 INJECTION INTRAVENOUS; SUBCUTANEOUS at 01:02

## 2024-12-08 RX ADMIN — HEPARIN SODIUM 5000 UNITS: 5000 INJECTION INTRAVENOUS; SUBCUTANEOUS at 06:25

## 2024-12-08 RX ADMIN — SODIUM CHLORIDE: 4.5 INJECTION, SOLUTION INTRAVENOUS at 02:34

## 2024-12-08 RX ADMIN — SODIUM CHLORIDE: 4.5 INJECTION, SOLUTION INTRAVENOUS at 16:32

## 2024-12-08 NOTE — H&P
History and Physical    Date of Service:  12/8/2024  Primary Care Provider: Melany Vernon APRN - NP  Source of information: Chart review    Chief Complaint: Dehydration and Altered Mental Status      History of Presenting Illness:   Juju Faulkner is a 79 y.o. female with past medical history significant for hypertension, dyslipidemia, depression, vascular dementia presented at the emergency room with change in mental status.  Patient symptoms started a couple of weeks ago and progressively getting worse.  Patient is on hospice at assisted living facility because of her vascular dementia and has been at the facility for about 6 weeks.  Family is concerned about the rapid decline in her functional and mental status.  Patient is no longer able to feed herself.  Patient is not able to provide history because of the change in mental status and underlying dementia.  Patient has also had poor oral intake.  Patient hospice was revoked by family and brought to the emergency room for further evaluation.  Patient was last admitted to the hospital from 12/21/2023 to 12/29/2023, she was admitted and treated for acute intra-articular comminuted fracture of right distal femur which required surgical repair.  CT scan of the head done on arrival at the emergency room did not show acute pathology.  Patient was referred to the hospitalist service for admission.         REVIEW OF SYSTEMS:  Review of systems not obtained due to patient factors.     Past Medical History:   Diagnosis Date    Depression     Fall at home 2/10/2014    Flashers or floaters of right eye 8/21/2014    Glaucoma     High cholesterol     History of stroke     Hypertension     Left acoustic neuroma (HCC)     Stroke (HCC)     2010 and 2019      Past Surgical History:   Procedure Laterality Date    COLONOSCOPY N/A 3/5/2021    COLONOSCOPY performed by Steve Pearl MD at [facility] ENDOSCOPY    FEMUR FRACTURE SURGERY Right 12/23/2023    RIGHT DISTAL FEMUR

## 2024-12-08 NOTE — CARE COORDINATION
12/8/2024  1:56 PM      Care Management Initial Assessment  12/8/2024 1:56 PM  If patient is discharged prior to next notation, then this note serves as note for discharge by case management.    Reason for Admission:   Dehydration [E86.0]  Acute delirium [R41.0]  Elevated troponin [R79.89]  Failure to thrive in adult [R62.7]  OSCAR (acute kidney injury) (HCC) [N17.9]         Patient Admission Status: Inpatient  Date Admitted to INP: 12/7/24  RUR: Readmission Risk Score: 14.9    Hospitalization in the last 30 days (Readmission):  No        Advance Care Planning:  Code Status: Full Code  Primary Healthcare Decision Maker: (P) Named in Scanned ACP Document   Advance Directive: has an advanced directive - a copy has been provided     __________________________________________________________________________  Assessment:      12/08/24 1354   Service Assessment   Patient Orientation Unable to Assess   Cognition Dementia / Early Alzheimer's   History Provided By Child/Family  (Misa  EFRAÍN)   Support Systems Children;Family Members   Patient's Healthcare Decision Maker is: Named in Scanned ACP Document   PCP Verified by CM Yes   Last Visit to PCP Within last year   Prior Functional Level Assistance with the following:   Current Functional Level Assistance with the following:   Can patient return to prior living arrangement Unknown at present   Ability to make needs known: Poor   Family able to assist with home care needs: No   Financial Resources Medicare   Community Resources None   Social/Functional History   Lives With Alone   Type of Home Assisted living  ([facility])   Home Layout One level   Discharge Planning   Type of Residence Assisted living   Living Arrangements Alone   Current Services Prior To Admission Hospice Services  (Pt previously open to [facility]   DME Ordered? No   Potential Assistance Purchasing Medications No   Type of Home Care Services None       Comments: CM met with pt and

## 2024-12-08 NOTE — ED PROVIDER NOTES
Unknown   • Hypertension Mother    • No Known Problems Child    • Alcohol Abuse Sister    • Alcohol Abuse Sister    • No Known Problems Brother    • No Known Problems Sister    • No Known Problems Sister    • Dementia Brother    • No Known Problems Sister           SOCIAL HISTORY       Social History     Socioeconomic History   • Marital status:    Tobacco Use   • Smoking status: Former     Current packs/day: 0.00     Types: Cigarettes     Quit date: 10/26/2019     Years since quittin.1   • Smokeless tobacco: Never   Vaping Use   • Vaping status: Never Used   Substance and Sexual Activity   • Alcohol use: No   • Drug use: No   • Sexual activity: Not Currently           PHYSICAL EXAM    (up to 7 for level 4, 8 or more for level 5)     ED Triage Vitals [24 1826]   BP Systolic BP Percentile Diastolic BP Percentile Temp Temp Source Pulse Respirations SpO2   (!) 143/76 -- -- 98 °F (36.7 °C) Axillary -- 18 --      Height Weight - Scale         1.676 m (5' 6\") 65.8 kg (145 lb)             Body mass index is 23.4 kg/m².    Physical Exam    DIAGNOSTIC RESULTS     EKG: All EKG's are interpreted by the Emergency Department Physician who either signs or Co-signs this chart in the absence of a cardiologist.        RADIOLOGY:   Non-plain film images such as CT, Ultrasound and MRI are read by the radiologist. Plain radiographic images are visualized and preliminarily interpreted by the emergency physician with the below findings:        Interpretation per the Radiologist below, if available at the time of this note:    XR CHEST PORTABLE    (Results Pending)   CT HEAD WO CONTRAST    (Results Pending)        LABS:  Labs Reviewed   COVID-19 & INFLUENZA COMBO   CBC WITH AUTO DIFFERENTIAL   COMPREHENSIVE METABOLIC PANEL   PROCALCITONIN   TROPONIN   EXTRA TUBES HOLD   URINALYSIS WITH REFLEX TO CULTURE   BRAIN NATRIURETIC PEPTIDE       All other labs were within normal range or not returned as of this

## 2024-12-09 ENCOUNTER — APPOINTMENT (OUTPATIENT)
Facility: HOSPITAL | Age: 79
DRG: 682 | End: 2024-12-09
Attending: INTERNAL MEDICINE
Payer: MEDICARE

## 2024-12-09 LAB
ANION GAP SERPL CALC-SCNC: 8 MMOL/L (ref 2–12)
BACTERIA SPEC CULT: NORMAL
BUN SERPL-MCNC: 36 MG/DL (ref 6–20)
BUN/CREAT SERPL: 27 (ref 12–20)
CALCIUM SERPL-MCNC: 9.6 MG/DL (ref 8.5–10.1)
CC UR VC: NORMAL
CHLORIDE SERPL-SCNC: 116 MMOL/L (ref 97–108)
CO2 SERPL-SCNC: 25 MMOL/L (ref 21–32)
COMMENT:: NORMAL
CREAT SERPL-MCNC: 1.33 MG/DL (ref 0.55–1.02)
ECHO BSA: 1.75 M2
ECHO LV EF PHYSICIAN: 55 %
GLUCOSE SERPL-MCNC: 77 MG/DL (ref 65–100)
POTASSIUM SERPL-SCNC: 3.5 MMOL/L (ref 3.5–5.1)
SERVICE CMNT-IMP: NORMAL
SODIUM SERPL-SCNC: 149 MMOL/L (ref 136–145)
SPECIMEN HOLD: NORMAL

## 2024-12-09 PROCEDURE — 94761 N-INVAS EAR/PLS OXIMETRY MLT: CPT

## 2024-12-09 PROCEDURE — 2500000003 HC RX 250 WO HCPCS: Performed by: STUDENT IN AN ORGANIZED HEALTH CARE EDUCATION/TRAINING PROGRAM

## 2024-12-09 PROCEDURE — 93308 TTE F-UP OR LMTD: CPT | Performed by: SPECIALIST

## 2024-12-09 PROCEDURE — 97165 OT EVAL LOW COMPLEX 30 MIN: CPT

## 2024-12-09 PROCEDURE — 6360000002 HC RX W HCPCS: Performed by: INTERNAL MEDICINE

## 2024-12-09 PROCEDURE — 97161 PT EVAL LOW COMPLEX 20 MIN: CPT

## 2024-12-09 PROCEDURE — 93321 DOPPLER ECHO F-UP/LMTD STD: CPT | Performed by: SPECIALIST

## 2024-12-09 PROCEDURE — 93308 TTE F-UP OR LMTD: CPT

## 2024-12-09 PROCEDURE — 97530 THERAPEUTIC ACTIVITIES: CPT

## 2024-12-09 PROCEDURE — 93306 TTE W/DOPPLER COMPLETE: CPT

## 2024-12-09 PROCEDURE — 93325 DOPPLER ECHO COLOR FLOW MAPG: CPT | Performed by: SPECIALIST

## 2024-12-09 PROCEDURE — 99223 1ST HOSP IP/OBS HIGH 75: CPT | Performed by: STUDENT IN AN ORGANIZED HEALTH CARE EDUCATION/TRAINING PROGRAM

## 2024-12-09 PROCEDURE — 80048 BASIC METABOLIC PNL TOTAL CA: CPT

## 2024-12-09 PROCEDURE — 6360000002 HC RX W HCPCS: Performed by: STUDENT IN AN ORGANIZED HEALTH CARE EDUCATION/TRAINING PROGRAM

## 2024-12-09 PROCEDURE — 1100000000 HC RM PRIVATE

## 2024-12-09 PROCEDURE — 2580000003 HC RX 258: Performed by: STUDENT IN AN ORGANIZED HEALTH CARE EDUCATION/TRAINING PROGRAM

## 2024-12-09 RX ORDER — DEXTROSE MONOHYDRATE 50 MG/ML
INJECTION, SOLUTION INTRAVENOUS CONTINUOUS
Status: DISCONTINUED | OUTPATIENT
Start: 2024-12-09 | End: 2024-12-12 | Stop reason: HOSPADM

## 2024-12-09 RX ORDER — DEXTROSE MONOHYDRATE, SODIUM CHLORIDE, AND POTASSIUM CHLORIDE 50; 1.49; 4.5 G/1000ML; G/1000ML; G/1000ML
INJECTION, SOLUTION INTRAVENOUS CONTINUOUS
Status: DISCONTINUED | OUTPATIENT
Start: 2024-12-09 | End: 2024-12-09

## 2024-12-09 RX ADMIN — SODIUM CHLORIDE: 4.5 INJECTION, SOLUTION INTRAVENOUS at 01:44

## 2024-12-09 RX ADMIN — HEPARIN SODIUM 5000 UNITS: 5000 INJECTION INTRAVENOUS; SUBCUTANEOUS at 22:22

## 2024-12-09 RX ADMIN — WATER 1000 MG: 1 INJECTION INTRAMUSCULAR; INTRAVENOUS; SUBCUTANEOUS at 09:20

## 2024-12-09 RX ADMIN — HEPARIN SODIUM 5000 UNITS: 5000 INJECTION INTRAVENOUS; SUBCUTANEOUS at 06:23

## 2024-12-09 RX ADMIN — POTASSIUM CHLORIDE, DEXTROSE MONOHYDRATE AND SODIUM CHLORIDE: 150; 5; 450 INJECTION, SOLUTION INTRAVENOUS at 13:03

## 2024-12-09 RX ADMIN — DEXTROSE MONOHYDRATE: 50 INJECTION, SOLUTION INTRAVENOUS at 15:29

## 2024-12-09 RX ADMIN — HEPARIN SODIUM 5000 UNITS: 5000 INJECTION INTRAVENOUS; SUBCUTANEOUS at 15:26

## 2024-12-09 NOTE — FLOWSHEET NOTE
Pt has been resistant with care all shift. She frequently grabs holds of linens and bedrails with strong  and pt turns self back to left side fetal position whenever turned. Pt has super dry mucus membranes and halitosis. Po care attempted with patient resistance as well. Pure-wick frequently repositioned and replaced r/t pt tampering with placement. VSS and afebrile. Fortunately IV sit eis till in place , as it was wrapped with additional ace bandaging  for protection. Bed alarm remains activated.

## 2024-12-09 NOTE — FLOWSHEET NOTE
Pt removed purewick times two within the last 30 minutes and is now cursing at the staff in telligible language.

## 2024-12-10 LAB
ANION GAP SERPL CALC-SCNC: 6 MMOL/L (ref 2–12)
BUN SERPL-MCNC: 20 MG/DL (ref 6–20)
BUN/CREAT SERPL: 17 (ref 12–20)
CALCIUM SERPL-MCNC: 9.3 MG/DL (ref 8.5–10.1)
CHLORIDE SERPL-SCNC: 109 MMOL/L (ref 97–108)
CO2 SERPL-SCNC: 27 MMOL/L (ref 21–32)
CREAT SERPL-MCNC: 1.17 MG/DL (ref 0.55–1.02)
GLUCOSE SERPL-MCNC: 124 MG/DL (ref 65–100)
POTASSIUM SERPL-SCNC: 3.3 MMOL/L (ref 3.5–5.1)
SODIUM SERPL-SCNC: 142 MMOL/L (ref 136–145)

## 2024-12-10 PROCEDURE — 6360000002 HC RX W HCPCS: Performed by: INTERNAL MEDICINE

## 2024-12-10 PROCEDURE — 97530 THERAPEUTIC ACTIVITIES: CPT

## 2024-12-10 PROCEDURE — 80048 BASIC METABOLIC PNL TOTAL CA: CPT

## 2024-12-10 PROCEDURE — 36415 COLL VENOUS BLD VENIPUNCTURE: CPT

## 2024-12-10 PROCEDURE — 6370000000 HC RX 637 (ALT 250 FOR IP): Performed by: INTERNAL MEDICINE

## 2024-12-10 PROCEDURE — 2580000003 HC RX 258: Performed by: STUDENT IN AN ORGANIZED HEALTH CARE EDUCATION/TRAINING PROGRAM

## 2024-12-10 PROCEDURE — 6360000002 HC RX W HCPCS: Performed by: STUDENT IN AN ORGANIZED HEALTH CARE EDUCATION/TRAINING PROGRAM

## 2024-12-10 PROCEDURE — 1100000000 HC RM PRIVATE

## 2024-12-10 PROCEDURE — 2580000003 HC RX 258: Performed by: INTERNAL MEDICINE

## 2024-12-10 PROCEDURE — 94761 N-INVAS EAR/PLS OXIMETRY MLT: CPT

## 2024-12-10 PROCEDURE — 92610 EVALUATE SWALLOWING FUNCTION: CPT | Performed by: SPEECH-LANGUAGE PATHOLOGIST

## 2024-12-10 RX ADMIN — HEPARIN SODIUM 5000 UNITS: 5000 INJECTION INTRAVENOUS; SUBCUTANEOUS at 15:03

## 2024-12-10 RX ADMIN — HEPARIN SODIUM 5000 UNITS: 5000 INJECTION INTRAVENOUS; SUBCUTANEOUS at 06:56

## 2024-12-10 RX ADMIN — DEXTROSE MONOHYDRATE: 50 INJECTION, SOLUTION INTRAVENOUS at 01:57

## 2024-12-10 RX ADMIN — PANTOPRAZOLE SODIUM 40 MG: 40 TABLET, DELAYED RELEASE ORAL at 06:56

## 2024-12-10 RX ADMIN — SODIUM CHLORIDE, PRESERVATIVE FREE 10 ML: 5 INJECTION INTRAVENOUS at 09:13

## 2024-12-10 RX ADMIN — WATER 1000 MG: 1 INJECTION INTRAMUSCULAR; INTRAVENOUS; SUBCUTANEOUS at 09:13

## 2024-12-10 RX ADMIN — HEPARIN SODIUM 5000 UNITS: 5000 INJECTION INTRAVENOUS; SUBCUTANEOUS at 22:25

## 2024-12-10 RX ADMIN — FERROUS SULFATE TAB 325 MG (65 MG ELEMENTAL FE) 325 MG: 325 (65 FE) TAB at 09:13

## 2024-12-10 RX ADMIN — CLOPIDOGREL BISULFATE 75 MG: 75 TABLET ORAL at 09:12

## 2024-12-10 RX ADMIN — MIRTAZAPINE 30 MG: 15 TABLET, FILM COATED ORAL at 22:25

## 2024-12-10 RX ADMIN — CITALOPRAM HYDROBROMIDE 20 MG: 20 TABLET ORAL at 09:13

## 2024-12-10 RX ADMIN — AMLODIPINE BESYLATE 5 MG: 5 TABLET ORAL at 09:13

## 2024-12-10 ASSESSMENT — PAIN SCALES - PAIN ASSESSMENT IN ADVANCED DEMENTIA (PAINAD)
BREATHING: NORMAL
CONSOLABILITY: NO NEED TO CONSOLE
FACIALEXPRESSION: SAD, FRIGHTENED, FROWN
TOTALSCORE: 1
TOTALSCORE: 0
BREATHING: NORMAL
BODYLANGUAGE: RELAXED
BODYLANGUAGE: RELAXED
FACIALEXPRESSION: SMILING OR INEXPRESSIVE
CONSOLABILITY: NO NEED TO CONSOLE

## 2024-12-10 NOTE — CARE COORDINATION
12/10/2024  3:41 PM  Care Management Progress Note    Reason for Admission:   Dehydration [E86.0]  Acute delirium [R41.0]  Elevated troponin [R79.89]  Failure to thrive in adult [R62.7]  OSCAR (acute kidney injury) (HCC) [N17.9]         Patient Admission Status: Inpatient  Date Admitted to IN: 12/7/24 []NA - OBS/Outpatient  RUR: Readmission Risk Score: 14.1    Hospitalization in the last 30 days (Readmission):  No        Transition of care plan:  Awaiting medical clearance and DC order. Therapy following. SLP treating, and pt on pureed diet currently. Palliative following.  TBD - CM spoke with pt's DTR. CM informed pt's DTR that [facility] has no LTC female beds, but would be able to admit to SNF rehab if appropriate. Auth would be required. Pt's DTR expressed that she would only want pt going to rehab if she is able to swallow. Pt's DTR expressed intent to contact [place] [facility] to discuss pt's return with or without hospice pending treatment progression. Pt's DTR will contact  after discussion.   Date IM given: 12/7/24  []NA  Outpatient follow-up.  Discharge transport: [facility]

## 2024-12-11 PROBLEM — R62.7 FAILURE TO THRIVE IN ADULT: Status: ACTIVE | Noted: 2024-12-11

## 2024-12-11 PROBLEM — Z71.89 GOALS OF CARE, COUNSELING/DISCUSSION: Status: ACTIVE | Noted: 2024-12-11

## 2024-12-11 PROBLEM — Z51.5 PALLIATIVE CARE BY SPECIALIST: Status: ACTIVE | Noted: 2024-12-11

## 2024-12-11 LAB
ANION GAP SERPL CALC-SCNC: 6 MMOL/L (ref 2–12)
BUN SERPL-MCNC: 13 MG/DL (ref 6–20)
BUN/CREAT SERPL: 11 (ref 12–20)
CALCIUM SERPL-MCNC: 9.1 MG/DL (ref 8.5–10.1)
CHLORIDE SERPL-SCNC: 109 MMOL/L (ref 97–108)
CO2 SERPL-SCNC: 26 MMOL/L (ref 21–32)
CREAT SERPL-MCNC: 1.18 MG/DL (ref 0.55–1.02)
GLUCOSE SERPL-MCNC: 124 MG/DL (ref 65–100)
POTASSIUM SERPL-SCNC: 3.1 MMOL/L (ref 3.5–5.1)
SODIUM SERPL-SCNC: 141 MMOL/L (ref 136–145)

## 2024-12-11 PROCEDURE — 6360000002 HC RX W HCPCS: Performed by: STUDENT IN AN ORGANIZED HEALTH CARE EDUCATION/TRAINING PROGRAM

## 2024-12-11 PROCEDURE — 6360000002 HC RX W HCPCS: Performed by: INTERNAL MEDICINE

## 2024-12-11 PROCEDURE — 99231 SBSQ HOSP IP/OBS SF/LOW 25: CPT | Performed by: FAMILY MEDICINE

## 2024-12-11 PROCEDURE — 2580000003 HC RX 258: Performed by: STUDENT IN AN ORGANIZED HEALTH CARE EDUCATION/TRAINING PROGRAM

## 2024-12-11 PROCEDURE — 80048 BASIC METABOLIC PNL TOTAL CA: CPT

## 2024-12-11 PROCEDURE — 6370000000 HC RX 637 (ALT 250 FOR IP): Performed by: STUDENT IN AN ORGANIZED HEALTH CARE EDUCATION/TRAINING PROGRAM

## 2024-12-11 PROCEDURE — 1100000000 HC RM PRIVATE

## 2024-12-11 PROCEDURE — 2580000003 HC RX 258: Performed by: INTERNAL MEDICINE

## 2024-12-11 PROCEDURE — 6370000000 HC RX 637 (ALT 250 FOR IP): Performed by: INTERNAL MEDICINE

## 2024-12-11 PROCEDURE — 94761 N-INVAS EAR/PLS OXIMETRY MLT: CPT

## 2024-12-11 RX ORDER — POTASSIUM CHLORIDE 7.45 MG/ML
10 INJECTION INTRAVENOUS
Status: COMPLETED | OUTPATIENT
Start: 2024-12-11 | End: 2024-12-11

## 2024-12-11 RX ADMIN — POTASSIUM CHLORIDE 10 MEQ: 7.45 INJECTION INTRAVENOUS at 18:04

## 2024-12-11 RX ADMIN — POTASSIUM CHLORIDE 10 MEQ: 7.45 INJECTION INTRAVENOUS at 16:55

## 2024-12-11 RX ADMIN — POTASSIUM CHLORIDE 10 MEQ: 7.45 INJECTION INTRAVENOUS at 15:39

## 2024-12-11 RX ADMIN — WATER 1000 MG: 1 INJECTION INTRAMUSCULAR; INTRAVENOUS; SUBCUTANEOUS at 07:54

## 2024-12-11 RX ADMIN — PANTOPRAZOLE SODIUM 40 MG: 40 TABLET, DELAYED RELEASE ORAL at 06:43

## 2024-12-11 RX ADMIN — HEPARIN SODIUM 5000 UNITS: 5000 INJECTION INTRAVENOUS; SUBCUTANEOUS at 14:39

## 2024-12-11 RX ADMIN — HEPARIN SODIUM 5000 UNITS: 5000 INJECTION INTRAVENOUS; SUBCUTANEOUS at 06:42

## 2024-12-11 RX ADMIN — SODIUM CHLORIDE, PRESERVATIVE FREE 10 ML: 5 INJECTION INTRAVENOUS at 07:54

## 2024-12-11 RX ADMIN — POTASSIUM BICARBONATE 40 MEQ: 782 TABLET, EFFERVESCENT ORAL at 07:53

## 2024-12-11 RX ADMIN — MIRTAZAPINE 30 MG: 15 TABLET, FILM COATED ORAL at 20:22

## 2024-12-11 RX ADMIN — SODIUM CHLORIDE, PRESERVATIVE FREE 10 ML: 5 INJECTION INTRAVENOUS at 20:21

## 2024-12-11 RX ADMIN — POTASSIUM CHLORIDE 10 MEQ: 7.45 INJECTION INTRAVENOUS at 20:20

## 2024-12-11 RX ADMIN — HEPARIN SODIUM 5000 UNITS: 5000 INJECTION INTRAVENOUS; SUBCUTANEOUS at 22:17

## 2024-12-11 RX ADMIN — POTASSIUM CHLORIDE 10 MEQ: 7.45 INJECTION INTRAVENOUS at 19:02

## 2024-12-11 NOTE — FLOWSHEET NOTE
Pt with improved mentality/ speech today. Pt actually spoke a consistent sentence and followed this communicator with eye contact and appropiate speech. Although  , she is still classified as confused her demeanor and countenance was improved. Pt allowed self to be turned as well. Took a few sips of fluids.

## 2024-12-11 NOTE — FLOWSHEET NOTE
RRT nurse Terrence here on floor and placed a # 22g to pt left a/c. However, upon flushing prior to restarting maintenance IVF site was infiltrated and no blood return. Charge Nurse Tish Notified to inform him that site was no count.

## 2024-12-11 NOTE — PROGRESS NOTES
824-Informed MD Oliver that pt failed her bedside swallow test with RN last night. Per MD Oliver, hold pt's PO medications until speech evals. Also informed MD that pt's troponin are trending up. MD stated to order speech eval, hydralazine 10 mg IV Q6h SBP/DBP>/= 180/110, and troponins Q6h for 3 more days.    1208-Informed MD Oliver that labs for pt were unsuccessfully drawn after 3 attempts.   1230-Labs were drawn.   
[facility] C[facility]  [address] [place] [address] [phone]        Hospitalist Progress Note      NAME: Juju Faulkner   :  1945  MRM:  [id number]    Date/Time of service: 2024  11:31 AM       Subjective:     Chief Complaint:  Patient was personally seen and examined by me during this time period.  Chart reviewed.  F/up acute delirium on chronic dementia    Not answering questions. Not speaking       Objective:       Vitals:       Last 24hrs VS reviewed since prior progress note. Most recent are:    Vitals:    24 1044   BP: (!) 150/63   Pulse: (!) 121   Resp:    Temp:    SpO2:      SpO2 Readings from Last 6 Encounters:   24 91%   24 93%   24 94%   23 100%        No intake or output data in the 24 hours ending 24 1131     Exam:     Physical Exam:    Gen:  in no acute distress. Elderly, frail and chronically ill appearing.  HEENT:  Pink conjunctivae, EOMI, hearing intact to voice  Resp:  No accessory muscle use, clear breath sounds without wheezes rales or rhonchi  Card:  No murmurs, normal S1, S2 without thrills, bruits or peripheral edema  Abd:  Soft, non-tender, non-distended, no palpable organomegaly and no detectable hernias  Musc:  No cyanosis or clubbing  Skin:  No rashes or ulcers  Psych/neuro:  poor insight. Alert. Not answering questions and following commands      Medications Reviewed: (see below)    Lab Data Reviewed: (see below)    ______________________________________________________________________    Medications:     Current Facility-Administered Medications   Medication Dose Route Frequency    hydrALAZINE (APRESOLINE) injection 10 mg  10 mg IntraVENous Q6H PRN    midazolam (VERSED) 6.6 mg in 1.32 mL intranasal  0.1 mg/kg Nasal Once    0.45 % sodium chloride infusion   IntraVENous Continuous    amLODIPine (NORVASC) tablet 5 mg  5 mg Oral Daily    atorvastatin (LIPITOR) tablet 40 mg  40 mg Oral Daily    calcium 
[facility]ED[facility] CORE[facility]  [address] [place] [address] [phone]        Hospitalist Progress Note      NAME: Juju Faulkner   :  1945  MRM:  [id number]    Date/Time of service: 2024  12:37 PM       Subjective:     Chief Complaint:  Patient was personally seen and examined by me during this time period.  Chart reviewed.  F/up acute delirium on chronic dementia    Denies any pain or other symptoms. More talkative today       Objective:       Vitals:       Last 24hrs VS reviewed since prior progress note. Most recent are:    Vitals:    24 1133   BP: (!) 136/57   Pulse: 79   Resp: 18   Temp: 98.1 °F (36.7 °C)   SpO2: 98%     SpO2 Readings from Last 6 Encounters:   24 98%   24 93%   24 94%   23 100%          Intake/Output Summary (Last 24 hours) at 2024 1237  Last data filed at 2024 0445  Gross per 24 hour   Intake 240 ml   Output 100 ml   Net 140 ml        Exam:     Physical Exam:    Gen:  in no acute distress. Elderly, frail and chronically ill appearing.  HEENT:  Pink conjunctivae, EOMI, hearing intact to voice  Resp:  No accessory muscle use, clear breath sounds without wheezes rales or rhonchi  Card:  No murmurs, normal S1, S2 without thrills, bruits or peripheral edema  Abd:  Soft, non-tender, non-distended, no palpable organomegaly and no detectable hernias  Musc:  No cyanosis or clubbing  Skin:  No rashes or ulcers  Psych/neuro:  poor insight. Alert. answering questions and following commands. Oriented to self      Medications Reviewed: (see below)    Lab Data Reviewed: (see below)    ______________________________________________________________________    Medications:     Current Facility-Administered Medications   Medication Dose Route Frequency    dextrose 5 % solution   IntraVENous Continuous    hydrALAZINE (APRESOLINE) injection 10 mg  10 mg IntraVENous Q6H PRN    midazolam (VERSED) 6.6 mg in 1.32 mL intranasal  0.1 
[facility]FRED[facility] CORE[facility]  [address] [place] [address] [phone]        Hospitalist Progress Note      NAME: Juju Faulkner   :  1945  MRM:  [id number]    Date/Time of service: 2024  10:50 AM       Subjective:     Chief Complaint:  Patient was personally seen and examined by me during this time period.  Chart reviewed.  Patient alone in room and unable to participate meaningfully in conversation.       Objective:       Vitals:       Last 24hrs VS reviewed since prior progress note. Most recent are:    Vitals:    24 1044   BP: (!) 150/63   Pulse: (!) 121   Resp:    Temp:    SpO2:      SpO2 Readings from Last 6 Encounters:   24 91%   24 93%   24 94%   23 100%        No intake or output data in the 24 hours ending 24 1050     Exam:     Physical Exam:    Gen:  frail, ill-appearing, in no acute distress  HEENT:  Pink conjunctivae, hearing intact to voice, moist mucous membranes  Neck:  Supple  Resp:  No accessory muscle use, clear breath sounds without wheezes rales or rhonchi  Card:  No murmurs, normal S1, S2 without thrills, bruits or peripheral edema, mildly tachycardic  Abd:  Soft, non-tender, non-distended  Skin:  No rashes or ulcers, skin turgor is good  Neuro:  alert, confused, does not follow commands appropriately so neuro exam is limited but no focal deficits noted  Psych:  intermittently agitated      Medications Reviewed: (see below)    Lab Data Reviewed: (see below)    ______________________________________________________________________    Medications:     Current Facility-Administered Medications   Medication Dose Route Frequency    hydrALAZINE (APRESOLINE) injection 10 mg  10 mg IntraVENous Q6H PRN    midazolam (VERSED) 6.6 mg in 1.32 mL intranasal  0.1 mg/kg Nasal Once    0.45 % sodium chloride infusion   IntraVENous Continuous    amLODIPine (NORVASC) tablet 5 mg  5 mg Oral Daily    atorvastatin (LIPITOR) tablet 40 mg  
Per Dr. Oliver tdiscontinue every 6hrs troponin check.  
Physical Therapy    Attempted PT treatment. Pt received in bed lying on L side, eyes closed, mumbling with decreased speech intelligibility, c/o being cold, gown not fully donned. Pt did not follow commands to place arms through gown sleeves, therefore provided assist. Attempted to assist pt with roll to supine for repositioning; unsuccessful due to pt holding L bed rail, not following commands. Pt said \"no\" when encouraged to reposition for pressure relief and sit EOB. Session aborted due to pt actively participating at this time. RN updated.    Toya Cee, PT, MPT  
Pt is not allowing Tele electrodes and box. Texted MD. Tele Order is okay to DC.      Patient had ultrasound guided IV placed in ED, pt is a difficult stick. Attempted to get labs,failed.  Texted RRT for help. RRT will help when available.      Labs completed  
SLP attempted to see patient. She initially agreed to PO trials, but then when they were offered, she said \"No\" and proceeded to have a long analysis of how the [place] was [place].  She was not able to redirected to task. Will follow up later if family present.   
SLP has attempted to get patient to take a bite or sip 2 more times. She  declined offers.  Still talking about nonpertinent topics and side lying in fetal position. Spoke with RN.   Unable to eval swallowing. If at some point, she expresses interest in PO and can sit up somewhat, RN can complete the 3oz water test.   SLP will continue to follow   
Spiritual Health History and Assessment/Progress Note  [facility]    Palliative Care, Attempted Encounter,  ,  ,      Name: Juju Faulkner MRN: [id number]    Age: 79 y.o.     Sex: female   Language: English   Episcopalian: Christianity   Acute delirium     Date: 12/10/2024            Total Time Calculated: 8 min              Spiritual Assessment began in [facility] MULTI[facility]SPECIALTY ORTHOPEDICS 2            Encounter Overview/Reason: Palliative Care, Attempted Encounter       Germania, Belief, Meaning:   Patient unable to assess at this time  Family/Friends No family/friends present      Importance and Influence:  Patient unable to assess at this time  Family/Friends No family/friends present    Community:  Patient Other: None  Family/Friends No family/friends present    Assessment and Plan of Care:     Patient Interventions include: Other: None  Family/Friends Interventions include: No family/friends present    Miss Faulkner appeared to be sleeping did not disturb.  NO family present.  Contact Spiritual Health for any further referrals.    Patient Plan of Care: Spiritual Care available upon further referral  Family/Friends Plan of Care: Spiritual Care available upon further referral    Chaplain Josefina Contreras MDiv., MS., [facility]  Page a  [phone]  on 12/10/2024 at 10:47 AM       
IntraVENous Q6H PRN    midazolam (VERSED) 6.6 mg in 1.32 mL intranasal  0.1 mg/kg Nasal Once    amLODIPine (NORVASC) tablet 5 mg  5 mg Oral Daily    calcium carbonate (TUMS) chewable tablet 1,500 mg  1,500 mg Oral Daily    citalopram (CELEXA) tablet 20 mg  20 mg Oral Daily    clopidogrel (PLAVIX) tablet 75 mg  75 mg Oral Daily    ferrous sulfate (IRON 325) tablet 325 mg  325 mg Oral Daily with breakfast    mirtazapine (REMERON) tablet 30 mg  30 mg Oral Nightly    pantoprazole (PROTONIX) tablet 40 mg  40 mg Oral QAM AC    sodium chloride flush 0.9 % injection 5-40 mL  5-40 mL IntraVENous 2 times per day    sodium chloride flush 0.9 % injection 5-40 mL  5-40 mL IntraVENous PRN    0.9 % sodium chloride infusion   IntraVENous PRN    ondansetron (ZOFRAN-ODT) disintegrating tablet 4 mg  4 mg Oral Q8H PRN    Or    ondansetron (ZOFRAN) injection 4 mg  4 mg IntraVENous Q6H PRN    polyethylene glycol (GLYCOLAX) packet 17 g  17 g Oral Daily PRN    acetaminophen (TYLENOL) tablet 650 mg  650 mg Oral Q6H PRN    Or    acetaminophen (TYLENOL) suppository 650 mg  650 mg Rectal Q6H PRN    heparin (porcine) injection 5,000 Units  5,000 Units SubCUTAneous 3 times per day          Lab Review:     Recent Labs     12/07/24 1924 12/08/24 0436   WBC 12.8* 10.9   HGB 12.0 12.8   HCT 40.4 43.1   * 599*     Recent Labs     12/07/24 1924 12/08/24  0436 12/09/24  0510 12/10/24  0731   * 151* 149* 142   K 4.7 3.5 3.5 3.3*   * 115* 116* 109*   CO2 31 30 25 27   BUN 59* 53* 36* 20   MG  --  2.0  --   --    PHOS  --  2.8  --   --    ALT 26 23  --   --      No results found for: \"GLUCPOC\"       Assessment / Plan:     Acute metabolic encephalopathy: POA. Acute. Suspect 2/2 IVVD, OSCAR, hypovolemic hyPERnat. CT head NAP. CXR neg. Had leuks when she came in but this has resolved with IVF - suspect this was hemoconcentration. Procal neg. COVID and flu neg. IMPROVING  - Neuro checks  - fall precs  - orthostatics  - Treat the below  - 
infusion   IntraVENous Continuous    midazolam (VERSED) 6.6 mg in 1.32 mL intranasal  0.1 mg/kg Nasal Once    amLODIPine (NORVASC) tablet 5 mg  5 mg Oral Daily    calcium carbonate (TUMS) chewable tablet 1,500 mg  1,500 mg Oral Daily    citalopram (CELEXA) tablet 20 mg  20 mg Oral Daily    clopidogrel (PLAVIX) tablet 75 mg  75 mg Oral Daily    ferrous sulfate (IRON 325) tablet 325 mg  325 mg Oral Daily with breakfast    mirtazapine (REMERON) tablet 30 mg  30 mg Oral Nightly    pantoprazole (PROTONIX) tablet 40 mg  40 mg Oral QAM AC    sodium chloride flush 0.9 % injection 5-40 mL  5-40 mL IntraVENous 2 times per day    sodium chloride flush 0.9 % injection 5-40 mL  5-40 mL IntraVENous PRN    0.9 % sodium chloride infusion   IntraVENous PRN    ondansetron (ZOFRAN-ODT) disintegrating tablet 4 mg  4 mg Oral Q8H PRN    Or    ondansetron (ZOFRAN) injection 4 mg  4 mg IntraVENous Q6H PRN    polyethylene glycol (GLYCOLAX) packet 17 g  17 g Oral Daily PRN    acetaminophen (TYLENOL) tablet 650 mg  650 mg Oral Q6H PRN    Or    acetaminophen (TYLENOL) suppository 650 mg  650 mg Rectal Q6H PRN    heparin (porcine) injection 5,000 Units  5,000 Units SubCUTAneous 3 times per day          Lab Review:     Recent Labs     12/07/24 1924 12/08/24  0436   WBC 12.8* 10.9   HGB 12.0 12.8   HCT 40.4 43.1   * 599*     Recent Labs     12/07/24 1924 12/08/24  0436 12/09/24  0510   * 151* 149*   K 4.7 3.5 3.5   * 115* 116*   CO2 31 30 25   BUN 59* 53* 36*   MG  --  2.0  --    PHOS  --  2.8  --    ALT 26 23  --      No results found for: \"GLUCPOC\"       Assessment / Plan:     Acute metabolic encephalopathy: POA. Acute. Suspect 2/2 IVVD, OSCAR, hypovolemic hyPERnat. CT head NAP. CXR neg. Had leuks when she came in but this has resolved with IVF - suspect this was hemoconcentration. Procal neg. COVID and flu neg. IMPROVING  - Neuro checks  - fall precs  - orthostatics  - Treat the below  - F/up MRI brain  - SLP eval - failed

## 2024-12-11 NOTE — CARE COORDINATION
12/11/2024  4:22 PM  Care Management Progress Note    Reason for Admission:   Dehydration [E86.0]  Acute delirium [R41.0]  Elevated troponin [R79.89]  Failure to thrive in adult [R62.7]  OSCAR (acute kidney injury) (HCC) [N17.9]         Patient Admission Status: Inpatient  Date Admitted to INP: 12/7/24  []NA - OBS/Outpatient  RUR: Readmission Risk Score: 14.2    Hospitalization in the last 30 days (Readmission):  No        Transition of care plan:  Awaiting medical clearance and DC order. Therapy and SLP following.   Return to [facility] - CM spoke with pt's DTR who reported preference is for pt to return back to correction at [facility]. CM confirmed with DON at [facility], Malou , that they are able to admit pt back, and clinicals faxed per Malou's request to her at . Pt's DTR requested that she talk with family about whether they wanted pt to be a DNR and if they want pt to restart hospice services with [facility]. CM to follow-up.  Date IM given: 12/11/24  []NA  Outpatient follow-up.  Discharge transport: BLS        12/10/24 7814   Service Assessment   Support Systems Children;Family Members   Discharge Planning   Type of Residence Assisted living   Living Arrangements Other (Comment)   Current Services Prior To Admission Hospice Services   Potential Assistance Needed Durable Medical Equipment;Home Care;Skilled Nursing Facility   Type of Home Care Services Hospice   Patient expects to be discharged to: Assisted living   Services At/After Discharge   Services At/After Discharge Assisted living   Mode of Transport at Discharge BLS   Confirm Follow Up Transport Family   Condition of Participation: Discharge Planning   The Plan for Transition of Care is related to the following treatment goals: Confusion   The Patient and/or Patient Representative was provided with a Choice of Provider? Patient Representative   Name of the Patient Representative who was provided with the Choice of Provider and agrees

## 2024-12-12 VITALS
DIASTOLIC BLOOD PRESSURE: 84 MMHG | HEIGHT: 66 IN | WEIGHT: 145 LBS | BODY MASS INDEX: 23.3 KG/M2 | OXYGEN SATURATION: 99 % | HEART RATE: 117 BPM | SYSTOLIC BLOOD PRESSURE: 147 MMHG | TEMPERATURE: 98.8 F | RESPIRATION RATE: 16 BRPM

## 2024-12-12 PROCEDURE — 6360000002 HC RX W HCPCS: Performed by: INTERNAL MEDICINE

## 2024-12-12 PROCEDURE — 6370000000 HC RX 637 (ALT 250 FOR IP): Performed by: INTERNAL MEDICINE

## 2024-12-12 PROCEDURE — 2580000003 HC RX 258: Performed by: STUDENT IN AN ORGANIZED HEALTH CARE EDUCATION/TRAINING PROGRAM

## 2024-12-12 PROCEDURE — 99233 SBSQ HOSP IP/OBS HIGH 50: CPT | Performed by: STUDENT IN AN ORGANIZED HEALTH CARE EDUCATION/TRAINING PROGRAM

## 2024-12-12 PROCEDURE — 2580000003 HC RX 258: Performed by: INTERNAL MEDICINE

## 2024-12-12 RX ORDER — LISINOPRIL 2.5 MG/1
2.5 TABLET ORAL DAILY
Qty: 30 TABLET | Refills: 0 | Status: SHIPPED | OUTPATIENT
Start: 2024-12-12

## 2024-12-12 RX ADMIN — PANTOPRAZOLE SODIUM 40 MG: 40 TABLET, DELAYED RELEASE ORAL at 06:17

## 2024-12-12 RX ADMIN — FERROUS SULFATE TAB 325 MG (65 MG ELEMENTAL FE) 325 MG: 325 (65 FE) TAB at 08:13

## 2024-12-12 RX ADMIN — DEXTROSE MONOHYDRATE: 50 INJECTION, SOLUTION INTRAVENOUS at 06:15

## 2024-12-12 RX ADMIN — ANTACID TABLETS 1500 MG: 500 TABLET, CHEWABLE ORAL at 08:12

## 2024-12-12 RX ADMIN — CITALOPRAM HYDROBROMIDE 20 MG: 20 TABLET ORAL at 08:13

## 2024-12-12 RX ADMIN — HEPARIN SODIUM 5000 UNITS: 5000 INJECTION INTRAVENOUS; SUBCUTANEOUS at 06:17

## 2024-12-12 RX ADMIN — SODIUM CHLORIDE, PRESERVATIVE FREE 10 ML: 5 INJECTION INTRAVENOUS at 08:13

## 2024-12-12 RX ADMIN — AMLODIPINE BESYLATE 5 MG: 5 TABLET ORAL at 08:13

## 2024-12-12 RX ADMIN — CLOPIDOGREL BISULFATE 75 MG: 75 TABLET ORAL at 08:13

## 2024-12-12 ASSESSMENT — PAIN SCALES - PAIN ASSESSMENT IN ADVANCED DEMENTIA (PAINAD)
CONSOLABILITY: NO NEED TO CONSOLE
BREATHING: NORMAL
TOTALSCORE: 1
FACIALEXPRESSION: SAD, FRIGHTENED, FROWN
BODYLANGUAGE: RELAXED

## 2024-12-12 NOTE — PLAN OF CARE
Problem: Chronic Conditions and Co-morbidities  Goal: Patient's chronic conditions and co-morbidity symptoms are monitored and maintained or improved  12/10/2024 1017 by Belgica Dupont RN  Outcome: Not Progressing  12/10/2024 0046 by Becky Ramirez RN  Outcome: Progressing     Problem: Chronic Conditions and Co-morbidities  Goal: Patient's chronic conditions and co-morbidity symptoms are monitored and maintained or improved  12/10/2024 1017 by Belgica Dupont, RN  Outcome: Not Progressing  12/10/2024 0046 by Becky Ramirez RN  Outcome: Progressing     Problem: Pain  Goal: Verbalizes/displays adequate comfort level or baseline comfort level  12/10/2024 0046 by Becky Ramirez RN  Outcome: Not Progressing  Flowsheets (Taken 12/9/2024 1949)  Verbalizes/displays adequate comfort level or baseline comfort level: Assess pain using appropriate pain scale     
  Problem: Chronic Conditions and Co-morbidities  Goal: Patient's chronic conditions and co-morbidity symptoms are monitored and maintained or improved  12/8/2024 2322 by Becky Ramirez RN  Outcome: Not Progressing  12/8/2024 1717 by Libby Anderson RN  Outcome: Progressing     Problem: Pain  Goal: Verbalizes/displays adequate comfort level or baseline comfort level  Recent Flowsheet Documentation  Taken 12/8/2024 2050 by Becky Ramirez RN  Verbalizes/displays adequate comfort level or baseline comfort level: Assess pain using appropriate pain scale  12/8/2024 1717 by Libby Anderson RN  Outcome: Progressing     Problem: Chronic Conditions and Co-morbidities  Goal: Patient's chronic conditions and co-morbidity symptoms are monitored and maintained or improved  12/8/2024 2322 by Becky Ramirez RN  Outcome: Not Progressing  12/8/2024 1717 by Libby Anderson RN  Outcome: Progressing     
  Problem: Chronic Conditions and Co-morbidities  Goal: Patient's chronic conditions and co-morbidity symptoms are monitored and maintained or improved  Outcome: Progressing     Problem: Discharge Planning  Goal: Discharge to home or other facility with appropriate resources  Outcome: Progressing     Problem: Pain  Goal: Verbalizes/displays adequate comfort level or baseline comfort level  Outcome: Progressing     Problem: Safety - Adult  Goal: Free from fall injury  Outcome: Progressing     Problem: Skin/Tissue Integrity  Goal: Absence of new skin breakdown  Description: 1.  Monitor for areas of redness and/or skin breakdown  2.  Assess vascular access sites hourly  3.  Every 4-6 hours minimum:  Change oxygen saturation probe site  4.  Every 4-6 hours:  If on nasal continuous positive airway pressure, respiratory therapy assess nares and determine need for appliance change or resting period.  Outcome: Progressing     
  Problem: Chronic Conditions and Co-morbidities  Goal: Patient's chronic conditions and co-morbidity symptoms are monitored and maintained or improved  Outcome: Progressing     Problem: Discharge Planning  Goal: Discharge to home or other facility with appropriate resources  Outcome: Progressing     Problem: Pain  Goal: Verbalizes/displays adequate comfort level or baseline comfort level  Outcome: Progressing     Problem: Safety - Adult  Goal: Free from fall injury  Outcome: Progressing     Problem: Skin/Tissue Integrity  Goal: Absence of new skin breakdown  Description: 1.  Monitor for areas of redness and/or skin breakdown  2.  Assess vascular access sites hourly  3.  Every 4-6 hours minimum:  Change oxygen saturation probe site  4.  Every 4-6 hours:  If on nasal continuous positive airway pressure, respiratory therapy assess nares and determine need for appliance change or resting period.  Outcome: Progressing     
  Problem: Occupational Therapy - Adult  Goal: By Discharge: Performs self-care activities at highest level of function for planned discharge setting.  See evaluation for individualized goals.  Description: FUNCTIONAL STATUS PRIOR TO ADMISSION:  Patient lives in an AK[facility] where she receives assistance for all ADLs. Unclear if patient mobilizes and if she uses a device    Occupational Therapy Goals:  Initiated 12/9/2024  1.  Patient will perform self-feeding with Minimal Assist within 7 day(s).  2.  Patient will perform upper body dressing with Minimal Assist within 7 day(s).  3.  Patient will perform lower body dressing with Maximal Assist within 7 day(s).  4.  Patient will perform toilet transfers with Maximal Assist to/from [facility] within 7 day(s).  5.  Patient will perform toilet hygiene with Maximal Assist within 7 day(s).    Outcome: Progressing    OCCUPATIONAL THERAPY EVALUATION    Patient: Juju Faulkner (79 y.o. female)  Date: 12/9/2024  Primary Diagnosis: Dehydration [E86.0]  Acute delirium [R41.0]  Elevated troponin [R79.89]  Failure to thrive in adult [R62.7]  OSCAR (acute kidney injury) ([facility]) [N17.9]         Precautions: Fall Risk                  ASSESSMENT :  The patient is limited by decreased functional mobility, independence in ADLs, strength, activity tolerance, cognition (orientation, insight, initiation, sequencing, memory, command following, attention), coordination, sitting balance who was admitted from [facility] with concerns for dehydration d/t decreased PO intake. Patient has a history of vascular dementia, CVA. She lives in an AKIL and likely required assist with all ADLs, unclear if patient ambulated or used a device.    Patient received in fetal position on L side, oriented to self only, inconsistent in following commands. She was somewhat resistant to movement, however agreeable to sit up with discussion over warm blankets and ice cream. Patient required Mod A to come to sit EOB where she required 
  Problem: Pain  Goal: Verbalizes/displays adequate comfort level or baseline comfort level  Outcome: Not Progressing  Flowsheets (Taken 12/9/2024 1949)  Verbalizes/displays adequate comfort level or baseline comfort level: Assess pain using appropriate pain scale     Problem: Pain  Goal: Verbalizes/displays adequate comfort level or baseline comfort level  Outcome: Not Progressing  Flowsheets (Taken 12/9/2024 1949)  Verbalizes/displays adequate comfort level or baseline comfort level: Assess pain using appropriate pain scale     
  Problem: Physical Therapy - Adult  Goal: By Discharge: Performs mobility at highest level of function for planned discharge setting.  See evaluation for individualized goals.  Description: FUNCTIONAL STATUS PRIOR TO ADMISSION: Patient unable to provide history, oriented only to name and minimal command following.  Lives at [facility] but not sure of functional mobility or ADL status.    Physical Therapy Goals  Initiated 12/9/2024  1.  Patient will move from supine to sit and sit to supine, scoot up and down, and roll side to side in bed with minimal assistance within 7 day(s).    2.  Patient will sit up in chair 2 hours at a time to improve OOB tolerance within 7 day(s).  3.  Patient will transfer from bed to chair and chair to bed with moderate assistance using the least restrictive device within 7 day(s).    Outcome: Progressing     Problem: Chronic Conditions and Co-morbidities  Goal: Patient's chronic conditions and co-morbidity symptoms are monitored and maintained or improved  12/10/2024 1017 by Belgica Dupont RN  Outcome: Not Progressing   PHYSICAL THERAPY TREATMENT    Patient: Juju Faulkner (79 y.o. female)  Date: 12/10/2024  Diagnosis: Dehydration [E86.0]  Acute delirium [R41.0]  Elevated troponin [R79.89]  Failure to thrive in adult [R62.7]  OSCAR (acute kidney injury) ([facility]) [N17.9] Acute delirium      Precautions: Fall Risk                      ASSESSMENT:  Patient continues to benefit from skilled PT services and is slowly progressing towards goals. Pt awaken easily to voice. Oriented to self. Follow 15% verbal cues for bed mobility with mod/ max A for follow through, pt did not resist assistance to come to sitting EOB. Tolerated unsupported sitting x 5 min. One attempt to stand with max A x 2, pt fearful with fwd lean and unable to clear buttocks from bed surface. Pt returning self initiated return to supine, with mod A to manage BLE.  May trial Tierra Steady next session for sit<>stand transfer 
  Problem: Physical Therapy - Adult  Goal: By Discharge: Performs mobility at highest level of function for planned discharge setting.  See evaluation for individualized goals.  Description: FUNCTIONAL STATUS PRIOR TO ADMISSION: Patient unable to provide history, oriented only to name and minimal command following.  Lives at [facility] but not sure of functional mobility or ADL status.    Physical Therapy Goals  Initiated 12/9/2024  1.  Patient will move from supine to sit and sit to supine, scoot up and down, and roll side to side in bed with minimal assistance within 7 day(s).    2.  Patient will sit up in chair 2 hours at a time to improve OOB tolerance within 7 day(s).  3.  Patient will transfer from bed to chair and chair to bed with moderate assistance using the least restrictive device within 7 day(s).    Outcome: Progressing   PHYSICAL THERAPY EVALUATION    Patient: Juju Faulkner (79 y.o. female)  Date: 12/9/2024  Primary Diagnosis: Dehydration [E86.0]  Acute delirium [R41.0]  Elevated troponin [R79.89]  Failure to thrive in adult [R62.7]  OSCAR (acute kidney injury) (H[facility]) [N17.9]       Precautions:                        ASSESSMENT :   DEFICITS/IMPAIRMENTS:   The patient is limited by decreased functional mobility, ROM, strength, endurance, safety awareness, cognition, command following, attention/concentration, balance. Received lying sidelying in fetal position, only oriented to self.  Patient with minimal ability to answer questions appropriately but improved with time such she would respond with yes or no occasionally. Somewhat resistant to mobility but motivated by eating ice cream (spoke with SLP who encouraged attempt).  Still required MOD A x 2 to achieve sitting EOB, balance initially poor but improved to fair/good with time and tactile facilitation. Attempted one stand but patient unable to clear buttocks and actively resisting at least to some extent. Returned to supine/sidelying with HOB 
  Problem: Safety - Adult  Goal: Free from fall injury  Outcome: Progressing  Flowsheets (Taken 12/11/2024 1035)  Free From Fall Injury: Instruct family/caregiver on patient safety     
Speech LAnguage Pathology EVALUATION    Patient: Juju Faulkner (79 y.o. female)  Date: 12/10/2024  Primary Diagnosis: Dehydration [E86.0]  Acute delirium [R41.0]  Elevated troponin [R79.89]  Failure to thrive in adult [R62.7]  OSCAR (acute kidney injury) (HCC) [N17.9]       Precautions:  Fall Risk                  ASSESSMENT :  Patient presents with decreased alertness, no command following, limited bolus acceptance. Refusing or biting straw for subsequent offers of PO. Adequate bolus extraction from spoon and straw with no anterior loss when accepting. No overt s/s of aspiration appreciated. Based on clinical presentation and chart review, her biggest risk factors for aspiration will be decreased alertness and dependence for feeding/oral care. Difficult to recommend a diet based on limited trials for assessment. She should be fed cautiously and only when actively accepting.     Patient will benefit from skilled intervention to address the above impairments.     PLAN :  Recommendations and Planned Interventions:  Diet: Puree and thin liquids   Feed cautiously only when awake and alert and actively accepting     Recommend next SLP session: diet check    Acute SLP Services: SLP Plan of Care: 3 times/week. Patient's rehabilitation potential is considered to be Fair.  Discharge Recommendations: Continue to assess pending progress     SUBJECTIVE:   Patient stated, “I feel a little bit better.”    OBJECTIVE:     Past Medical History:   Diagnosis Date    Depression     Fall at home 2/10/2014    Flashers or floaters of right eye 8/21/2014    Glaucoma     High cholesterol     History of stroke     Hypertension     Left acoustic neuroma (HCC)     Stroke (HCC)     2010 and 2019     Past Surgical History:   Procedure Laterality Date    COLONOSCOPY N/A 3/5/2021    COLONOSCOPY performed by Steve Pearl MD at [facility] ENDOSCOPY    FEMUR FRACTURE SURGERY Right 12/23/2023    RIGHT DISTAL FEMUR OPEN REDUCTION INTERNAL FIXATION 
address goals.    Recommend with staff: ACTIVITIES OF DAILY LIVING's, there ex, there act    Recommend next OT session: cont towards goals    Recommendation for discharge: (in order for the patient to meet his/her long term goals):   Moderate intensity short-term skilled occupational therapy up to 5x/week    Other factors to consider for discharge: patient's current support system is unable to meet their requirements for physical assistance, poor safety awareness, impaired cognition, and concern for safely navigating or managing the home environment    IF patient discharges home will need the following DME:        SUBJECTIVE:   Patient stated “.”    OBJECTIVE DATA SUMMARY:   Cognitive/Behavioral Status:  Orientation  Orientation Level: Oriented to person  Cognition  Overall Cognitive Status: Exceptions  Following Commands: Inconsistently follows commands  Attention Span: Difficulty attending to directions;Impaired  Memory: Impaired  Safety Judgement: Impaired  Problem Solving: Impaired    Functional Mobility and Transfers for ADLs:  Bed Mobility:    Max assist    Transfers:    Not tested           Balance:   Impaired sitting and standing         ADL Intervention:         Feeding: Dependent/Total   Feeding Skilled Clinical Factors: lunch             Product Used : Chlorhexidine wipes;Bath wipes      Activity Tolerance:   Poor  Please refer to the flowsheet for vital signs taken during this treatment.    After treatment:   Patient left in no apparent distress in bed and Call bell within reach    COMMUNICATION/EDUCATION:   The patient's plan of care was discussed with: physical therapy assistant and occupational therapist    Patient Education  Education Given To: Patient  Education Provided: Role of Therapy  Education Method: Verbal  Barriers to Learning: Cognition  Education Outcome: Continued education needed    Thank you for this referral.  SVETA Hyde  Minutes: 20

## 2024-12-12 NOTE — CARE COORDINATION
Case Management Discharge Note    Discharge Plan:  Patient is discharging back to [facility] with [facility].     CM met with patient and patient's dtr, Misa El at bedside to discuss discharge planning updates.  Patient is confused (Vascular Dementia).  Patient's dtr confirmed that Dr. Oliver, attending had spoken to her already about clinical updates and plan to DC today.  DTR is agreeable to the discharge plan.      (1)  CM called and spoke to Malou at [facility] and confirmed that patient has been accepted back to their facility.  CM faxed DC orders/DC summary/H&P to Malou.    [facility]  [phone]  Fax [phone]    (2)  CM called and confirmed with Mamie Dyson [phone], with [facility] that patient has been accepted back to their services.  Referral sent.  DC orders/DC summary uploaded to [facility].  CM confirmed with Mamie Dyson that SOC date is today.    (3)  Patient is DNR.  Paperwork completed by Palliative MD.      (4)  CM sent a copy of DC orders to patient's dtr per request:  [email or web address].    (5)  Transportation:  [facility].  Pick-up time:  1530.       Patient's dtr stated full understanding of the DCP and stated no other needs.  FDC also has no other needs.  Medical treatment team informed of updates.      ______________________________________  ANJELICA Garcia, RN-CM  [facility]- Care Management  Available via [employer]  12/12/2024  3:16 PM

## 2024-12-12 NOTE — PALLIATIVE CARE DISCHARGE
Goals of Care/Treatment Preferences    The Palliative Medicine team was consulted as part of your/your loved one's care in the hospital. Our team is a supportive service; we strive to relieve suffering and improve quality of life.    We reviewed advance care planning information, which includes the following:    Primary Decision Maker (Active): Misa El - [phone]  Patient's Healthcare Decision Maker is:: Named in Scanned ACP Document  Confirm Advance Directive: Yes, on file  Does the patient have other document types?: Power of     We reviewed / discussed your code status as:   Code Status: DNR     “Full Code” means perform CPR in the event of cardiac arrest.      “DNR” means do NOT perform CPR in the event of cardiac arrest.      “Partial Code” means you have specific preferences, please discuss with your healthcare team.      “No Order” means this issue was not addressed / resolved during your stay     You have a Durable Do Not Resuscitate Order in place, which should travel with you.  When you are in a facility, this form should be placed on your chart. Once you are home, it is recommended that the DDNR form be placed in a visible location such as on the refrigerator or bedroom door.      Because of the importance of this information, we are providing you with a printed copy to share with other healthcare providers after this hospitalization is complete.

## 2024-12-12 NOTE — CONSULTS
Palliative Medicine  Patient Name: Juju Faulkner  YOB: 1945  MRN: [id number]  Age: 79 y.o.  Gender: female    Date of Initial Consult: 12/9/2024  Date of Service: 12/9/2024  Time: 10:49 AM  Provider: Praveen France MD  Hospital Day: 3  Admit Date: 12/7/2024  Referring Provider: Dr. Ainsley Oliver    Reasons for Consultation:  Goals of Care    HISTORY OF PRESENT ILLNESS (HPI):   Juju Faulkner is a 79 y.o. female with a past medical history of HTN, HLD, Depression, Vascular Dementia who was admitted on 12/7/2024 from [facility] with a diagnosis of delirium, OSCAR, UTI.  Patient unable to participate in history taking, thus history obtained from daughter Misa.  Pt has been living at [facility] for about 8 weeks and under care of [facility] for 3 to 4 weeks.  Daughter reports patient was becoming more delirious in 48 hours prior to admission noted less responsive, not eating, very weak.  Facility staff felt patient may be was developing UTI, advised daughter to call hospice which she did but was unable to connect with their on-call team so family like to bring patient to the hospital for evaluation.  On presentation noted with sodium 147, BUN 59, creatinine 1.98, albumin 3.5, WBC 12.8, chest x-ray and CT A&P without acute processes, CT head with chronic changes but no acute process.  UA appeared infectious, urine culture pending.  She has been started on fluids and antibiotics.  Bedside swallow evaluation failed, speech therapy consulted and pending.    Psychosocial:   .  2 children including daughter Misa El who is medical POA.  Lives at [facility] assisted living [facility] for 8 weeks.    PALLIATIVE DIAGNOSES:    Vascular Dementia  UTI  OSCAR  Encephalopathy  Palliative Care Encounter    ASSESSMENT AND PLAN:   Reviewed medical chart including admit H&P, consultant notes, MAR, and recent labs/imaging.  Met with patient at bedside.  She 
Palliative Medicine  Patient Name: Juju Faulkner  YOB: 1945  MRN: [id number]  Age: 79 y.o.  Gender: female    Date of Initial Consult: 12/9/2024  Date of Service: 12/12/2024  Time: 1:24 PM  Provider: Praveen France MD  Hospital Day: 6  Admit Date: 12/7/2024  Referring Provider: Dr. Ainsley Oliver    Reasons for Consultation:  Goals of Care    HISTORY OF PRESENT ILLNESS (HPI):   Juju Faulkner is a 79 y.o. female with a past medical history of HTN, HLD, Depression, Vascular Dementia who was admitted on 12/7/2024 from [facility] with a diagnosis of delirium, OSCAR, UTI.  Patient unable to participate in history taking, thus history obtained from daughter Misa.  Pt has been living at [facility] for about 8 weeks and under care of [facility] for 3 to 4 weeks.  Daughter reports patient was becoming more delirious in 48 hours prior to admission noted less responsive, not eating, very weak.  Facility staff felt patient may be was developing UTI, advised daughter to call hospice which she did but was unable to connect with their on-call team so family like to bring patient to the hospital for evaluation.  On presentation noted with sodium 147, BUN 59, creatinine 1.98, albumin 3.5, WBC 12.8, chest x-ray and CT A&P without acute processes, CT head with chronic changes but no acute process.  UA appeared infectious, urine culture pending.  She has been started on fluids and antibiotics.  Bedside swallow evaluation failed, speech therapy consulted and pending.    Psychosocial:   .  2 children including daughter Misa El who is medical POA.  Lives at [facility] assisted living [facility] for 8 weeks.    PALLIATIVE DIAGNOSES:    Vascular Dementia  UTI--completed course of ceftriaxone  OSCAR--improving  Encephalopathy, Delirium  Failure to Thrive, Inadequate Oral intake--not taking much po per RN, receiving IVF  Palliative Care Encounter  Goals of 
Maladaptive     If \"Maladaptive\" to discuss with social work during IDT    ESAS Anxiety:      ESAS Depression:          LAB AND IMAGING FINDINGS:   Objective data reviewed:  labs, images, records, medication use, vitals, and chart     FINAL COMMENTS   Thank you for allowing Palliative Medicine to participate in the care of Juju Faulkner.    Only check if applicable and billing time based rather than MDM  [] The total encounter time on this service date was ____ minutes which was spent performing a face-to-face encounter and personally completing the provider-level activities documented in the note. This includes time spent prior to the visit and after the visit in direct care of the patient. This time does not include time spent in any separately reportable services.    Electronically signed by   Patti Renae MD  Palliative Care Team  on 12/11/2024 at 3:45 PM

## 2024-12-12 NOTE — ACP (ADVANCE CARE PLANNING)
Primary Decision Maker (Active): Misa El - Child - [phone]    Pt has AMD on file dated 5/8/2020 which appoints dtr Misa El as sole Medical POA.

## 2024-12-12 NOTE — DISCHARGE SUMMARY
Hospitalist Discharge Summary     Patient ID:  Juju Faulkner  [id number]  79 y.o.  1945    Admit date: 12/7/2024    Discharge date and time: 12/12/2024    Admission Diagnoses: Dehydration [E86.0]  Acute delirium [R41.0]  Elevated troponin [R79.89]  Failure to thrive in adult [R62.7]  OSCAR (acute kidney injury) (HCC) [N17.9]    Discharge Diagnoses:    Principal Problem:    Acute delirium  Active Problems:    Failure to thrive in adult    Palliative care by specialist    Goals of care, counseling/discussion  Resolved Problems:    * No resolved hospital problems. *         Hospital Course:   Acute metabolic encephalopathy: POA. Acute. Suspect 2/2 IVVD, OSCAR, hypovolemic hyPERnat. CT head NAP. CXR neg. Had leuks when she came in but this has resolved with IVF - suspect this was hemoconcentration. Procal neg. COVID and flu neg. RESOLVED. Back to [facility]     OSCAR / IVVD / hypernat / hyperchloremia / hypercalcemia: POA. Suspect d/t decreased PO. RESOLVED     Leukocytosis / equivocal UA - presumed UTI: POA. Suspect leuks were d/t hemoconcentration. Procal neg. COVID and flu neg. Leuks resolved with IVF  - s/p CTX     Vascular dementia with behavioral disturbance: POA. Chronic. Patient was on hospice for this.   - supportive care  - lipitor and plavix     Thrombocytosis: POA. Chronic for 1 year  - outpt follow up     Elevated BNP level: suspected POA. BNP below cutoff for age. No s/s fluid overload. ECHO ok     Elev troponin: POA. Patient has no chest pain and no chest pain reported by family.  EKG nonischemic. Likely nonischemic trop elevation from IVVD  - trended down. Stop trending. No further follow up     HLD: POA.  - hold lipitor     Depression: POA. Chronic  - Cont celexa     TSH low: POA. In setting of acute illness  - repeat in 4 weeks     HTN: POA. Chronic  - down to low dose lisinopril only       Imaging  CT ABDOMEN PELVIS WO CONTRAST Additional Contrast? Radiologist Recommendation    Result Date:

## 2025-05-09 NOTE — PROGRESS NOTES
1ST OUTREACH: Called and left message for patient to call to schedule a TCM.    Problem: Falls - Risk of  Goal: *Absence of Falls  Description: Document Powell Reason Fall Risk and appropriate interventions in the flowsheet.   Outcome: Progressing Towards Goal  Note: Fall Risk Interventions:  Mobility Interventions: Utilize walker, cane, or other assistive device         Medication Interventions: Patient to call before getting OOB    Elimination Interventions: Call light in reach              Problem: Patient Education: Go to Patient Education Activity  Goal: Patient/Family Education  Outcome: Progressing Towards Goal     Problem: Hypertension  Goal: *Blood pressure within specified parameters  Outcome: Progressing Towards Goal  Goal: *Fluid volume balance  Outcome: Progressing Towards Goal  Goal: *Labs within defined limits  Outcome: Progressing Towards Goal     Problem: Patient Education: Go to Patient Education Activity  Goal: Patient/Family Education  Outcome: Progressing Towards Goal

## (undated) DEVICE — SUTURE STRATAFIX SPRL SZ 1 L14IN ABSRB VLT L48CM CTX 1/2 SXPD2B405

## (undated) DEVICE — FORCEPS BX L160CM DIA8MM GRSP DISECT CUP TIP NONLOCKING ROT

## (undated) DEVICE — STAIN INDIA INK IN NACL 10ML --

## (undated) DEVICE — NON-REM POLYHESIVE PATIENT RETURN ELECTRODE: Brand: VALLEYLAB

## (undated) DEVICE — Device

## (undated) DEVICE — TOTAL JOINT-MRMC: Brand: MEDLINE INDUSTRIES, INC.

## (undated) DEVICE — Z DISCONTINUED PER MEDLINE LINE GAS SAMPLING O2/CO2 LNG AD 13 FT NSL W/ TBNG FILTERLINE

## (undated) DEVICE — SYR 10ML LUER LOK 1/5ML GRAD --

## (undated) DEVICE — TRAP,MUCUS SPECIMEN, 80CC: Brand: MEDLINE

## (undated) DEVICE — DRSG POSTOP PRMSL AG 3.5X14IN

## (undated) DEVICE — 2.5MM DRILL TIP GUIDE WIRE 200MM

## (undated) DEVICE — NEEDLE HYPO 18GA L1.5IN PNK S STL HUB POLYPR SHLD REG BVL

## (undated) DEVICE — OPTIFOAM GENTLE SA, POSTOP, 4X12: Brand: MEDLINE

## (undated) DEVICE — YANKAUER,TAPERED BULBOUS TIP,W/O VENT: Brand: MEDLINE

## (undated) DEVICE — PREP KIT PEEL PTCH POVIDONE IOD

## (undated) DEVICE — C-ARMOR C-ARM EQUIPMENT COVERS CLEAR STERILE UNIVERSAL FIT 12 PER CASE: Brand: C-ARMOR

## (undated) DEVICE — SOLUTION IRRIG 1000ML STRL H2O USP PLAS POUR BTL

## (undated) DEVICE — SUTURE ABSORBABLE MONOFILAMENT 1 CTX 36 CM 48 MM VIO PDS +

## (undated) DEVICE — 3M™ IOBAN™ 2 ANTIMICROBIAL INCISE DRAPE 6650EZ: Brand: IOBAN™ 2

## (undated) DEVICE — BAG SPEC BIOHZRD 10 X 10 IN --

## (undated) DEVICE — SUTURE VCRL SZ 0 L36IN ABSRB UD L36MM CT-1 1/2 CIR J946H

## (undated) DEVICE — BIT DRL L145MM DIA3.2MM QUIK CPL W/O STP REUSE

## (undated) DEVICE — TOWEL 4 PLY TISS 19X30 SUE WHT

## (undated) DEVICE — 1200 GUARD II KIT W/5MM TUBE W/O VAC TUBE: Brand: GUARDIAN

## (undated) DEVICE — SUTURE STRATAFIX SYMMETRIC SZ 1 L18IN ABSRB VLT CT1 L36CM SXPP1A404

## (undated) DEVICE — NEONATAL-ADULT SPO2 SENSOR: Brand: NELLCOR

## (undated) DEVICE — SYR 3ML LL TIP 1/10ML GRAD --

## (undated) DEVICE — CONTAINER SPEC 20 ML LID NEUT BUFF FORMALIN 10 % POLYPR STS

## (undated) DEVICE — GLOVE SURG SZ 8 L12IN FNGR THK79MIL GRN LTX FREE

## (undated) DEVICE — SOLIDIFIER FLD 2OZ 1500CC N DISINF IN BTL DISP SAFESORB

## (undated) DEVICE — STRAINER URIN CALC RNL MSH -- CONVERT TO ITEM 357634

## (undated) DEVICE — BLOCK BITE ENDOSCP AD 21 MM W/ DIL BLU LF DISP

## (undated) DEVICE — SUTURE VCRL SZ 2-0 L36IN ABSRB UD L36MM CT-1 1/2 CIR J945H

## (undated) DEVICE — ELECTRODE,RADIOTRANSLUCENT,FOAM,5PK: Brand: MEDLINE

## (undated) DEVICE — SOLUTION IRRIG 1000ML 0.9% SOD CHL USP POUR PLAS BTL

## (undated) DEVICE — BASIN EMSIS 16OZ GRAPHITE PLAS KID SHP MOLD GRAD FOR ORAL

## (undated) DEVICE — CATH IV AUTOGRD BC PNK 20GA 25 -- INSYTE

## (undated) DEVICE — SOLIDIFIER MEDC 1200ML -- CONVERT TO 356117

## (undated) DEVICE — NEEDLE SCLERO 25GA L240CM OD0.51MM ID0.24MM EXTN L4MM SHTH

## (undated) DEVICE — CLIP INT L235CM WRK CHAN DIA2.8MM OPN 11MM LCK MECHANISM MR

## (undated) DEVICE — SNARE ENDOSCP M L240CM W27MM SHTH DIA2.4MM CHN 2.8MM OVL

## (undated) DEVICE — SET ADMIN 16ML TBNG L100IN 2 Y INJ SITE IV PIGGY BK DISP

## (undated) DEVICE — APPLICATOR MEDICATED 26 CC SOLUTION HI LT ORNG CHLORAPREP

## (undated) DEVICE — (D)AGENT INJECTN ELEVIEW 10ML -- DISC BY MFG

## (undated) DEVICE — SUTURE ABSRB L30CM 2-0 VLT SPRL PDS + STRATAFIX SXPP1B410

## (undated) DEVICE — WORKING LENGTH 235CM, WORKING CHANNEL 2.8MM: Brand: RESOLUTION 360 CLIP

## (undated) DEVICE — GLOVE ORTHO 8   MSG9480

## (undated) DEVICE — GUIDEWIRE ORTH DIA2.5MM LOK SMOOTH DRL TIP FLX THRD FOR

## (undated) DEVICE — SUTURE VCRL SZ 1 L36IN ABSRB UD L36MM CT-1 1/2 CIR J947H